# Patient Record
Sex: MALE | Race: OTHER | URBAN - METROPOLITAN AREA
[De-identification: names, ages, dates, MRNs, and addresses within clinical notes are randomized per-mention and may not be internally consistent; named-entity substitution may affect disease eponyms.]

---

## 2019-05-07 ENCOUNTER — OUTPATIENT (OUTPATIENT)
Dept: OUTPATIENT SERVICES | Facility: HOSPITAL | Age: 69
LOS: 1 days | Discharge: HOME | End: 2019-05-07
Payer: MEDICARE

## 2019-05-07 DIAGNOSIS — Z90.89 ACQUIRED ABSENCE OF OTHER ORGANS: Chronic | ICD-10-CM

## 2019-05-07 DIAGNOSIS — Z98.61 CORONARY ANGIOPLASTY STATUS: Chronic | ICD-10-CM

## 2019-05-07 DIAGNOSIS — Z98.890 OTHER SPECIFIED POSTPROCEDURAL STATES: Chronic | ICD-10-CM

## 2019-05-07 DIAGNOSIS — K40.90 UNILATERAL INGUINAL HERNIA, WITHOUT OBSTRUCTION OR GANGRENE, NOT SPECIFIED AS RECURRENT: ICD-10-CM

## 2019-05-07 LAB
ANION GAP SERPL CALC-SCNC: 8 MMOL/L — SIGNIFICANT CHANGE UP (ref 5–17)
BASOPHILS # BLD AUTO: 0.02 K/UL — SIGNIFICANT CHANGE UP (ref 0–0.2)
BASOPHILS NFR BLD AUTO: 0.3 % — SIGNIFICANT CHANGE UP (ref 0–2)
BUN SERPL-MCNC: 21 MG/DL — SIGNIFICANT CHANGE UP (ref 7–23)
CALCIUM SERPL-MCNC: 9 MG/DL — SIGNIFICANT CHANGE UP (ref 8.5–10.1)
CHLORIDE SERPL-SCNC: 108 MMOL/L — SIGNIFICANT CHANGE UP (ref 96–108)
CO2 SERPL-SCNC: 26 MMOL/L — SIGNIFICANT CHANGE UP (ref 22–31)
CREAT SERPL-MCNC: 0.83 MG/DL — SIGNIFICANT CHANGE UP (ref 0.5–1.3)
EOSINOPHIL # BLD AUTO: 0.14 K/UL — SIGNIFICANT CHANGE UP (ref 0–0.5)
EOSINOPHIL NFR BLD AUTO: 2.2 % — SIGNIFICANT CHANGE UP (ref 0–6)
GLUCOSE SERPL-MCNC: 154 MG/DL — HIGH (ref 70–99)
HCT VFR BLD CALC: 43.4 % — SIGNIFICANT CHANGE UP (ref 39–50)
HGB BLD-MCNC: 15.6 G/DL — SIGNIFICANT CHANGE UP (ref 13–17)
IMM GRANULOCYTES NFR BLD AUTO: 0.2 % — SIGNIFICANT CHANGE UP (ref 0–1.5)
LYMPHOCYTES # BLD AUTO: 1.42 K/UL — SIGNIFICANT CHANGE UP (ref 1–3.3)
LYMPHOCYTES # BLD AUTO: 22.2 % — SIGNIFICANT CHANGE UP (ref 13–44)
MCHC RBC-ENTMCNC: 33.1 PG — SIGNIFICANT CHANGE UP (ref 27–34)
MCHC RBC-ENTMCNC: 35.9 GM/DL — SIGNIFICANT CHANGE UP (ref 32–36)
MCV RBC AUTO: 92.1 FL — SIGNIFICANT CHANGE UP (ref 80–100)
MONOCYTES # BLD AUTO: 0.63 K/UL — SIGNIFICANT CHANGE UP (ref 0–0.9)
MONOCYTES NFR BLD AUTO: 9.8 % — SIGNIFICANT CHANGE UP (ref 2–14)
NEUTROPHILS # BLD AUTO: 4.19 K/UL — SIGNIFICANT CHANGE UP (ref 1.8–7.4)
NEUTROPHILS NFR BLD AUTO: 65.3 % — SIGNIFICANT CHANGE UP (ref 43–77)
NRBC # BLD: 0 /100 WBCS — SIGNIFICANT CHANGE UP (ref 0–0)
PLATELET # BLD AUTO: 142 K/UL — LOW (ref 150–400)
POTASSIUM SERPL-MCNC: 3.9 MMOL/L — SIGNIFICANT CHANGE UP (ref 3.5–5.3)
POTASSIUM SERPL-SCNC: 3.9 MMOL/L — SIGNIFICANT CHANGE UP (ref 3.5–5.3)
RBC # BLD: 4.71 M/UL — SIGNIFICANT CHANGE UP (ref 4.2–5.8)
RBC # FLD: 12.8 % — SIGNIFICANT CHANGE UP (ref 10.3–14.5)
SODIUM SERPL-SCNC: 142 MMOL/L — SIGNIFICANT CHANGE UP (ref 135–145)
WBC # BLD: 6.41 K/UL — SIGNIFICANT CHANGE UP (ref 3.8–10.5)
WBC # FLD AUTO: 6.41 K/UL — SIGNIFICANT CHANGE UP (ref 3.8–10.5)

## 2019-05-07 PROCEDURE — 93010 ELECTROCARDIOGRAM REPORT: CPT

## 2019-05-07 NOTE — CHART NOTE - NSCHARTNOTEFT_GEN_A_CORE
Plan  1. Stop all NSAIDS, herbal supplements and vitamins for 7 days.  2. NPO at midnight.  3. Take the following medications ( prilosec ) with small sips of water on the morning of your procedure/surgery.  4. Use EZ sponges as directed  5. Labs, EKG as per surgeon  6. PMD visit for optimization prior to surgery as per surgeon

## 2019-05-07 NOTE — ASU PATIENT PROFILE, ADULT - PSH
H/O craniotomy  for non ruptured aneurysm 2007  History of other surgery  endovascular stent placement for brain Aneurysm at [place] - 2010  History of other surgery  Craniotomy brain aneurysm attempted coiling 2007  brain aneurysm attempted clipping 2007  History of PTCA  2006 x 1 stent  History of tonsillectomy    S/P colonoscopy  last - within 5 yrs H/O craniotomy  for non ruptured aneurysm 2007, attempted clipping  History of other surgery  endovascular stent placement for brain Aneurysm at [place] - 2010  History of other surgery  for brain aneurysm - attempted coiling 2007  History of PTCA  2006 x 1 stent  History of tonsillectomy    S/P colonoscopy  last - within 5 yrs

## 2019-05-08 PROBLEM — I67.1 CEREBRAL ANEURYSM, NONRUPTURED: Chronic | Status: ACTIVE | Noted: 2019-05-07

## 2019-05-13 ENCOUNTER — RESULT REVIEW (OUTPATIENT)
Age: 69
End: 2019-05-13

## 2019-05-13 ENCOUNTER — OUTPATIENT (OUTPATIENT)
Dept: OUTPATIENT SERVICES | Facility: HOSPITAL | Age: 69
LOS: 1 days | Discharge: HOME | End: 2019-05-13
Payer: MEDICARE

## 2019-05-13 VITALS
TEMPERATURE: 97 F | RESPIRATION RATE: 16 BRPM | OXYGEN SATURATION: 98 % | SYSTOLIC BLOOD PRESSURE: 137 MMHG | DIASTOLIC BLOOD PRESSURE: 66 MMHG | HEART RATE: 57 BPM

## 2019-05-13 VITALS
WEIGHT: 185.41 LBS | DIASTOLIC BLOOD PRESSURE: 72 MMHG | HEART RATE: 54 BPM | SYSTOLIC BLOOD PRESSURE: 138 MMHG | OXYGEN SATURATION: 99 % | HEIGHT: 68 IN | TEMPERATURE: 98 F | RESPIRATION RATE: 16 BRPM

## 2019-05-13 DIAGNOSIS — Z98.890 OTHER SPECIFIED POSTPROCEDURAL STATES: Chronic | ICD-10-CM

## 2019-05-13 DIAGNOSIS — K40.90 UNILATERAL INGUINAL HERNIA, WITHOUT OBSTRUCTION OR GANGRENE, NOT SPECIFIED AS RECURRENT: ICD-10-CM

## 2019-05-13 DIAGNOSIS — Z90.89 ACQUIRED ABSENCE OF OTHER ORGANS: Chronic | ICD-10-CM

## 2019-05-13 DIAGNOSIS — Z98.61 CORONARY ANGIOPLASTY STATUS: Chronic | ICD-10-CM

## 2019-05-13 PROCEDURE — 88304 TISSUE EXAM BY PATHOLOGIST: CPT | Mod: 26

## 2019-05-13 RX ORDER — LOSARTAN/HYDROCHLOROTHIAZIDE 100MG-25MG
1 TABLET ORAL
Qty: 0 | Refills: 0 | DISCHARGE

## 2019-05-13 RX ORDER — SODIUM CHLORIDE 9 MG/ML
1000 INJECTION, SOLUTION INTRAVENOUS
Refills: 0 | Status: DISCONTINUED | OUTPATIENT
Start: 2019-05-13 | End: 2019-05-13

## 2019-05-13 RX ORDER — OMEPRAZOLE 10 MG/1
1 CAPSULE, DELAYED RELEASE ORAL
Qty: 0 | Refills: 0 | DISCHARGE

## 2019-05-13 RX ORDER — ICOSAPENT ETHYL 500 MG/1
2 CAPSULE, LIQUID FILLED ORAL
Qty: 0 | Refills: 0 | DISCHARGE

## 2019-05-13 RX ORDER — OXYCODONE HYDROCHLORIDE 5 MG/1
10 TABLET ORAL ONCE
Refills: 0 | Status: DISCONTINUED | OUTPATIENT
Start: 2019-05-13 | End: 2019-05-13

## 2019-05-13 RX ORDER — FENTANYL CITRATE 50 UG/ML
50 INJECTION INTRAVENOUS
Refills: 0 | Status: DISCONTINUED | OUTPATIENT
Start: 2019-05-13 | End: 2019-05-13

## 2019-05-13 RX ORDER — ONDANSETRON 8 MG/1
4 TABLET, FILM COATED ORAL ONCE
Refills: 0 | Status: DISCONTINUED | OUTPATIENT
Start: 2019-05-13 | End: 2019-05-13

## 2019-05-13 RX ORDER — ROSUVASTATIN CALCIUM 5 MG/1
1 TABLET ORAL
Qty: 0 | Refills: 0 | DISCHARGE

## 2019-05-13 NOTE — ASU PATIENT PROFILE, ADULT - PMH
Brain aneurysm  non ruptured - had stent placed in 2010 after attemtped clipping and coiling in 2007  CAD (coronary artery disease)  last stress test Fall 2018  GERD (gastroesophageal reflux disease)    Hearing loss  right  HTN (hypertension)    Hyperlipidemia    Inguinal hernia  right  OA (osteoarthritis)    Psoriasis    Skin cancer  squamous cell - head, shin - removed  Stented coronary artery  2006 PTCA x 1

## 2019-05-13 NOTE — ASU DISCHARGE PLAN (ADULT/PEDIATRIC) - CALL YOUR DOCTOR IF YOU HAVE ANY OF THE FOLLOWING:
please call [phone] for any concerns Pain not relieved by Medications/Bleeding that does not stop/please call [phone] for any concerns

## 2019-05-13 NOTE — ASU PATIENT PROFILE, ADULT - PSH
H/O craniotomy  for non ruptured aneurysm 2007, attempted clipping  History of other surgery  endovascular stent placement for brain Aneurysm at [place] - 2010  History of other surgery  for brain aneurysm - attempted coiling 2007  History of PTCA  2006 x 1 stent  History of tonsillectomy    S/P colonoscopy  last - within 5 yrs

## 2019-05-14 LAB — SURGICAL PATHOLOGY STUDY: SIGNIFICANT CHANGE UP

## 2019-05-16 DIAGNOSIS — I10 ESSENTIAL (PRIMARY) HYPERTENSION: ICD-10-CM

## 2019-05-16 DIAGNOSIS — K40.90 UNILATERAL INGUINAL HERNIA, WITHOUT OBSTRUCTION OR GANGRENE, NOT SPECIFIED AS RECURRENT: ICD-10-CM

## 2019-05-16 DIAGNOSIS — Z79.82 LONG TERM (CURRENT) USE OF ASPIRIN: ICD-10-CM

## 2019-05-16 DIAGNOSIS — D17.6 BENIGN LIPOMATOUS NEOPLASM OF SPERMATIC CORD: ICD-10-CM

## 2019-05-16 DIAGNOSIS — H91.90 UNSPECIFIED HEARING LOSS, UNSPECIFIED EAR: ICD-10-CM

## 2019-05-16 DIAGNOSIS — I25.10 ATHEROSCLEROTIC HEART DISEASE OF NATIVE CORONARY ARTERY WITHOUT ANGINA PECTORIS: ICD-10-CM

## 2019-05-16 DIAGNOSIS — Z98.61 CORONARY ANGIOPLASTY STATUS: ICD-10-CM

## 2019-05-16 DIAGNOSIS — E78.5 HYPERLIPIDEMIA, UNSPECIFIED: ICD-10-CM

## 2019-05-16 DIAGNOSIS — F17.290 NICOTINE DEPENDENCE, OTHER TOBACCO PRODUCT, UNCOMPLICATED: ICD-10-CM

## 2019-05-16 DIAGNOSIS — K21.9 GASTRO-ESOPHAGEAL REFLUX DISEASE WITHOUT ESOPHAGITIS: ICD-10-CM

## 2019-05-16 DIAGNOSIS — Z88.8 ALLERGY STATUS TO OTHER DRUGS, MEDICAMENTS AND BIOLOGICAL SUBSTANCES: ICD-10-CM

## 2021-01-16 ENCOUNTER — INPATIENT (INPATIENT)
Facility: HOSPITAL | Age: 71
LOS: 0 days | Discharge: HOME | DRG: 864 | End: 2021-01-17
Attending: INTERNAL MEDICINE | Admitting: HOSPITALIST
Payer: MEDICARE

## 2021-01-16 VITALS — WEIGHT: 147.05 LBS | HEIGHT: 68 IN

## 2021-01-16 DIAGNOSIS — K86.9 DISEASE OF PANCREAS, UNSPECIFIED: Chronic | ICD-10-CM

## 2021-01-16 DIAGNOSIS — A41.9 SEPSIS, UNSPECIFIED ORGANISM: ICD-10-CM

## 2021-01-16 DIAGNOSIS — Z98.61 CORONARY ANGIOPLASTY STATUS: Chronic | ICD-10-CM

## 2021-01-16 DIAGNOSIS — Z98.890 OTHER SPECIFIED POSTPROCEDURAL STATES: Chronic | ICD-10-CM

## 2021-01-16 DIAGNOSIS — Z90.89 ACQUIRED ABSENCE OF OTHER ORGANS: Chronic | ICD-10-CM

## 2021-01-16 PROBLEM — M19.90 UNSPECIFIED OSTEOARTHRITIS, UNSPECIFIED SITE: Chronic | Status: ACTIVE | Noted: 2019-05-07

## 2021-01-16 PROBLEM — L40.9 PSORIASIS, UNSPECIFIED: Chronic | Status: ACTIVE | Noted: 2019-05-07

## 2021-01-16 PROBLEM — C44.90 UNSPECIFIED MALIGNANT NEOPLASM OF SKIN, UNSPECIFIED: Chronic | Status: ACTIVE | Noted: 2019-05-07

## 2021-01-16 PROBLEM — I25.10 ATHEROSCLEROTIC HEART DISEASE OF NATIVE CORONARY ARTERY WITHOUT ANGINA PECTORIS: Chronic | Status: ACTIVE | Noted: 2019-05-07

## 2021-01-16 PROBLEM — K40.90 UNILATERAL INGUINAL HERNIA, WITHOUT OBSTRUCTION OR GANGRENE, NOT SPECIFIED AS RECURRENT: Chronic | Status: ACTIVE | Noted: 2019-05-07

## 2021-01-16 PROBLEM — E78.5 HYPERLIPIDEMIA, UNSPECIFIED: Chronic | Status: ACTIVE | Noted: 2019-05-07

## 2021-01-16 PROBLEM — K21.9 GASTRO-ESOPHAGEAL REFLUX DISEASE WITHOUT ESOPHAGITIS: Chronic | Status: ACTIVE | Noted: 2019-05-07

## 2021-01-16 PROBLEM — I10 ESSENTIAL (PRIMARY) HYPERTENSION: Chronic | Status: ACTIVE | Noted: 2019-05-07

## 2021-01-16 PROBLEM — H91.90 UNSPECIFIED HEARING LOSS, UNSPECIFIED EAR: Chronic | Status: ACTIVE | Noted: 2019-05-07

## 2021-01-16 PROBLEM — Z95.5 PRESENCE OF CORONARY ANGIOPLASTY IMPLANT AND GRAFT: Chronic | Status: ACTIVE | Noted: 2019-05-07

## 2021-01-16 LAB
ALBUMIN SERPL ELPH-MCNC: 3.3 G/DL — SIGNIFICANT CHANGE UP (ref 3.3–5)
ALP SERPL-CCNC: 189 U/L — HIGH (ref 40–120)
ALT FLD-CCNC: 23 U/L — SIGNIFICANT CHANGE UP (ref 12–78)
ANION GAP SERPL CALC-SCNC: 10 MMOL/L — SIGNIFICANT CHANGE UP (ref 5–17)
APPEARANCE UR: ABNORMAL
APTT BLD: 34.2 SEC — SIGNIFICANT CHANGE UP (ref 27.5–35.5)
AST SERPL-CCNC: 20 U/L — SIGNIFICANT CHANGE UP (ref 15–37)
BACTERIA # UR AUTO: NEGATIVE — SIGNIFICANT CHANGE UP
BASOPHILS # BLD AUTO: 0 K/UL — SIGNIFICANT CHANGE UP (ref 0–0.2)
BASOPHILS NFR BLD AUTO: 0 % — SIGNIFICANT CHANGE UP (ref 0–2)
BILIRUB SERPL-MCNC: 0.7 MG/DL — SIGNIFICANT CHANGE UP (ref 0.2–1.2)
BILIRUB UR-MCNC: NEGATIVE — SIGNIFICANT CHANGE UP
BUN SERPL-MCNC: 12 MG/DL — SIGNIFICANT CHANGE UP (ref 7–23)
CALCIUM SERPL-MCNC: 9.3 MG/DL — SIGNIFICANT CHANGE UP (ref 8.5–10.1)
CHLORIDE SERPL-SCNC: 103 MMOL/L — SIGNIFICANT CHANGE UP (ref 96–108)
CO2 SERPL-SCNC: 25 MMOL/L — SIGNIFICANT CHANGE UP (ref 22–31)
COLOR SPEC: YELLOW — SIGNIFICANT CHANGE UP
COMMENT - URINE: SIGNIFICANT CHANGE UP
CREAT SERPL-MCNC: 0.93 MG/DL — SIGNIFICANT CHANGE UP (ref 0.5–1.3)
DIFF PNL FLD: NEGATIVE — SIGNIFICANT CHANGE UP
EOSINOPHIL # BLD AUTO: 0 K/UL — SIGNIFICANT CHANGE UP (ref 0–0.5)
EOSINOPHIL NFR BLD AUTO: 0 % — SIGNIFICANT CHANGE UP (ref 0–6)
EPI CELLS # UR: NEGATIVE — SIGNIFICANT CHANGE UP
GLUCOSE SERPL-MCNC: 104 MG/DL — HIGH (ref 70–99)
GLUCOSE UR QL: NEGATIVE MG/DL — SIGNIFICANT CHANGE UP
HCT VFR BLD CALC: 36.5 % — LOW (ref 39–50)
HGB BLD-MCNC: 11.7 G/DL — LOW (ref 13–17)
INR BLD: 1.29 RATIO — HIGH (ref 0.88–1.16)
KETONES UR-MCNC: NEGATIVE — SIGNIFICANT CHANGE UP
LACTATE SERPL-SCNC: 3.2 MMOL/L — HIGH (ref 0.7–2)
LACTATE SERPL-SCNC: 3.2 MMOL/L — HIGH (ref 0.7–2)
LACTATE SERPL-SCNC: 3.3 MMOL/L — HIGH (ref 0.7–2)
LEUKOCYTE ESTERASE UR-ACNC: NEGATIVE — SIGNIFICANT CHANGE UP
LYMPHOCYTES # BLD AUTO: 1.17 K/UL — SIGNIFICANT CHANGE UP (ref 1–3.3)
LYMPHOCYTES # BLD AUTO: 15 % — SIGNIFICANT CHANGE UP (ref 13–44)
MCHC RBC-ENTMCNC: 28 PG — SIGNIFICANT CHANGE UP (ref 27–34)
MCHC RBC-ENTMCNC: 32.1 GM/DL — SIGNIFICANT CHANGE UP (ref 32–36)
MCV RBC AUTO: 87.3 FL — SIGNIFICANT CHANGE UP (ref 80–100)
MONOCYTES # BLD AUTO: 1.63 K/UL — HIGH (ref 0–0.9)
MONOCYTES NFR BLD AUTO: 21 % — HIGH (ref 2–14)
MYELOCYTES NFR BLD: 2 % — HIGH (ref 0–0)
NEUTROPHILS # BLD AUTO: 4.82 K/UL — SIGNIFICANT CHANGE UP (ref 1.8–7.4)
NEUTROPHILS NFR BLD AUTO: 59 % — SIGNIFICANT CHANGE UP (ref 43–77)
NEUTS BAND # BLD: 3 % — SIGNIFICANT CHANGE UP (ref 0–8)
NITRITE UR-MCNC: NEGATIVE — SIGNIFICANT CHANGE UP
NRBC # BLD: 0 /100 — SIGNIFICANT CHANGE UP (ref 0–0)
NRBC # BLD: SIGNIFICANT CHANGE UP /100 WBCS (ref 0–0)
PH UR: 5 — SIGNIFICANT CHANGE UP (ref 5–8)
PLAT MORPH BLD: NORMAL — SIGNIFICANT CHANGE UP
PLATELET # BLD AUTO: 113 K/UL — LOW (ref 150–400)
POTASSIUM SERPL-MCNC: 3.4 MMOL/L — LOW (ref 3.5–5.3)
POTASSIUM SERPL-SCNC: 3.4 MMOL/L — LOW (ref 3.5–5.3)
PROT SERPL-MCNC: 7.7 GM/DL — SIGNIFICANT CHANGE UP (ref 6–8.3)
PROT UR-MCNC: 15 MG/DL
PROTHROM AB SERPL-ACNC: 14.8 SEC — HIGH (ref 10.6–13.6)
RAPID RVP RESULT: SIGNIFICANT CHANGE UP
RBC # BLD: 4.18 M/UL — LOW (ref 4.2–5.8)
RBC # FLD: 18.6 % — HIGH (ref 10.3–14.5)
RBC BLD AUTO: NORMAL — SIGNIFICANT CHANGE UP
RBC CASTS # UR COMP ASSIST: NEGATIVE /HPF — SIGNIFICANT CHANGE UP (ref 0–4)
SARS-COV-2 RNA SPEC QL NAA+PROBE: SIGNIFICANT CHANGE UP
SODIUM SERPL-SCNC: 138 MMOL/L — SIGNIFICANT CHANGE UP (ref 135–145)
SP GR SPEC: 1.01 — SIGNIFICANT CHANGE UP (ref 1.01–1.02)
UROBILINOGEN FLD QL: NEGATIVE MG/DL — SIGNIFICANT CHANGE UP
WBC # BLD: 7.78 K/UL — SIGNIFICANT CHANGE UP (ref 3.8–10.5)
WBC # FLD AUTO: 7.78 K/UL — SIGNIFICANT CHANGE UP (ref 3.8–10.5)
WBC UR QL: SIGNIFICANT CHANGE UP

## 2021-01-16 PROCEDURE — 93010 ELECTROCARDIOGRAM REPORT: CPT

## 2021-01-16 PROCEDURE — 71045 X-RAY EXAM CHEST 1 VIEW: CPT | Mod: 26

## 2021-01-16 PROCEDURE — 83036 HEMOGLOBIN GLYCOSYLATED A1C: CPT

## 2021-01-16 PROCEDURE — 82962 GLUCOSE BLOOD TEST: CPT

## 2021-01-16 PROCEDURE — 83605 ASSAY OF LACTIC ACID: CPT | Mod: 59

## 2021-01-16 PROCEDURE — 86769 SARS-COV-2 COVID-19 ANTIBODY: CPT

## 2021-01-16 PROCEDURE — 86803 HEPATITIS C AB TEST: CPT

## 2021-01-16 PROCEDURE — 99223 1ST HOSP IP/OBS HIGH 75: CPT | Mod: AI

## 2021-01-16 PROCEDURE — 36415 COLL VENOUS BLD VENIPUNCTURE: CPT

## 2021-01-16 PROCEDURE — 81001 URINALYSIS AUTO W/SCOPE: CPT

## 2021-01-16 PROCEDURE — 87086 URINE CULTURE/COLONY COUNT: CPT

## 2021-01-16 RX ORDER — PIPERACILLIN AND TAZOBACTAM 4; .5 G/20ML; G/20ML
3.38 INJECTION, POWDER, LYOPHILIZED, FOR SOLUTION INTRAVENOUS EVERY 8 HOURS
Refills: 0 | Status: DISCONTINUED | OUTPATIENT
Start: 2021-01-16 | End: 2021-01-17

## 2021-01-16 RX ORDER — SODIUM CHLORIDE 9 MG/ML
2100 INJECTION INTRAMUSCULAR; INTRAVENOUS; SUBCUTANEOUS ONCE
Refills: 0 | Status: COMPLETED | OUTPATIENT
Start: 2021-01-16 | End: 2021-01-16

## 2021-01-16 RX ORDER — DEXTROSE 50 % IN WATER 50 %
15 SYRINGE (ML) INTRAVENOUS ONCE
Refills: 0 | Status: DISCONTINUED | OUTPATIENT
Start: 2021-01-16 | End: 2021-01-17

## 2021-01-16 RX ORDER — DRONABINOL 2.5 MG
2.5 CAPSULE ORAL
Refills: 0 | Status: DISCONTINUED | OUTPATIENT
Start: 2021-01-16 | End: 2021-01-17

## 2021-01-16 RX ORDER — ATORVASTATIN CALCIUM 80 MG/1
20 TABLET, FILM COATED ORAL AT BEDTIME
Refills: 0 | Status: DISCONTINUED | OUTPATIENT
Start: 2021-01-16 | End: 2021-01-17

## 2021-01-16 RX ORDER — INSULIN LISPRO 100/ML
VIAL (ML) SUBCUTANEOUS
Refills: 0 | Status: DISCONTINUED | OUTPATIENT
Start: 2021-01-16 | End: 2021-01-17

## 2021-01-16 RX ORDER — LIPASE/PROTEASE/AMYLASE 16-48-48K
2 CAPSULE,DELAYED RELEASE (ENTERIC COATED) ORAL
Refills: 0 | Status: DISCONTINUED | OUTPATIENT
Start: 2021-01-16 | End: 2021-01-17

## 2021-01-16 RX ORDER — PANTOPRAZOLE SODIUM 20 MG/1
40 TABLET, DELAYED RELEASE ORAL
Refills: 0 | Status: DISCONTINUED | OUTPATIENT
Start: 2021-01-16 | End: 2021-01-17

## 2021-01-16 RX ORDER — GLUCAGON INJECTION, SOLUTION 0.5 MG/.1ML
1 INJECTION, SOLUTION SUBCUTANEOUS ONCE
Refills: 0 | Status: DISCONTINUED | OUTPATIENT
Start: 2021-01-16 | End: 2021-01-17

## 2021-01-16 RX ORDER — POTASSIUM CHLORIDE 20 MEQ
40 PACKET (EA) ORAL
Refills: 0 | Status: DISCONTINUED | OUTPATIENT
Start: 2021-01-16 | End: 2021-01-17

## 2021-01-16 RX ORDER — ONDANSETRON 8 MG/1
4 TABLET, FILM COATED ORAL EVERY 6 HOURS
Refills: 0 | Status: DISCONTINUED | OUTPATIENT
Start: 2021-01-16 | End: 2021-01-17

## 2021-01-16 RX ORDER — SODIUM CHLORIDE 9 MG/ML
1000 INJECTION, SOLUTION INTRAVENOUS
Refills: 0 | Status: DISCONTINUED | OUTPATIENT
Start: 2021-01-16 | End: 2021-01-17

## 2021-01-16 RX ORDER — DEXTROSE 50 % IN WATER 50 %
12.5 SYRINGE (ML) INTRAVENOUS ONCE
Refills: 0 | Status: DISCONTINUED | OUTPATIENT
Start: 2021-01-16 | End: 2021-01-17

## 2021-01-16 RX ORDER — INSULIN LISPRO 100/ML
VIAL (ML) SUBCUTANEOUS AT BEDTIME
Refills: 0 | Status: DISCONTINUED | OUTPATIENT
Start: 2021-01-16 | End: 2021-01-17

## 2021-01-16 RX ORDER — DEXTROSE 50 % IN WATER 50 %
25 SYRINGE (ML) INTRAVENOUS ONCE
Refills: 0 | Status: DISCONTINUED | OUTPATIENT
Start: 2021-01-16 | End: 2021-01-17

## 2021-01-16 RX ORDER — MAGNESIUM SULFATE 500 MG/ML
2 VIAL (ML) INJECTION ONCE
Refills: 0 | Status: COMPLETED | OUTPATIENT
Start: 2021-01-16 | End: 2021-01-16

## 2021-01-16 RX ORDER — INSULIN GLARGINE 100 [IU]/ML
10 INJECTION, SOLUTION SUBCUTANEOUS AT BEDTIME
Refills: 0 | Status: DISCONTINUED | OUTPATIENT
Start: 2021-01-16 | End: 2021-01-17

## 2021-01-16 RX ORDER — POTASSIUM CHLORIDE 20 MEQ
40 PACKET (EA) ORAL
Refills: 0 | Status: DISCONTINUED | OUTPATIENT
Start: 2021-01-16 | End: 2021-01-16

## 2021-01-16 RX ORDER — ENOXAPARIN SODIUM 100 MG/ML
40 INJECTION SUBCUTANEOUS DAILY
Refills: 0 | Status: DISCONTINUED | OUTPATIENT
Start: 2021-01-16 | End: 2021-01-17

## 2021-01-16 RX ORDER — PIPERACILLIN AND TAZOBACTAM 4; .5 G/20ML; G/20ML
3.38 INJECTION, POWDER, LYOPHILIZED, FOR SOLUTION INTRAVENOUS ONCE
Refills: 0 | Status: COMPLETED | OUTPATIENT
Start: 2021-01-16 | End: 2021-01-16

## 2021-01-16 RX ORDER — FAMOTIDINE 10 MG/ML
40 INJECTION INTRAVENOUS AT BEDTIME
Refills: 0 | Status: DISCONTINUED | OUTPATIENT
Start: 2021-01-16 | End: 2021-01-17

## 2021-01-16 RX ADMIN — PIPERACILLIN AND TAZOBACTAM 200 GRAM(S): 4; .5 INJECTION, POWDER, LYOPHILIZED, FOR SOLUTION INTRAVENOUS at 16:01

## 2021-01-16 RX ADMIN — SODIUM CHLORIDE 2100 MILLILITER(S): 9 INJECTION INTRAMUSCULAR; INTRAVENOUS; SUBCUTANEOUS at 17:00

## 2021-01-16 RX ADMIN — SODIUM CHLORIDE 2100 MILLILITER(S): 9 INJECTION INTRAMUSCULAR; INTRAVENOUS; SUBCUTANEOUS at 15:10

## 2021-01-16 RX ADMIN — PIPERACILLIN AND TAZOBACTAM 3.38 GRAM(S): 4; .5 INJECTION, POWDER, LYOPHILIZED, FOR SOLUTION INTRAVENOUS at 17:00

## 2021-01-16 NOTE — ED ADULT NURSE NOTE - PMH
Brain aneurysm  non ruptured - had stent placed in 2010 after attemtped clipping and coiling in 2007  CAD (coronary artery disease)  last stress test Fall 2018  GERD (gastroesophageal reflux disease)    Hearing loss  right  HTN (hypertension)    Hyperlipidemia    Inguinal hernia  right  OA (osteoarthritis)    Pancreatic cancer  s/p Whipple procedure  Psoriasis    Skin cancer  squamous cell - head, shin - removed  Stented coronary artery  2006 PTCA x 1

## 2021-01-16 NOTE — H&P ADULT - HISTORY OF PRESENT ILLNESS
69 yo M with a PMH pancreatic cancer (s/p Whipple, on chemotherapy), CAD s/p stents, brain aneurysm s/p embolization, HTN, HLD, and GERD who presents with fever and palpitations.    In the ED, he was given Zosyn 3.375 g IV x1 and NS 2.1 L x1. 69 yo M with a [facility] pancreatic cancer (s/p Whipple, on chemotherapy), medication-induced DM, CAD s/p stents, brain aneurysm s/p embolization, HTN, HLD, and GERD who presents with fever. He had been feeling at baseline until this morning, when he woke up and felt very fatigued and with malaise. He had a fever to 102F (he notes he had not had a fever in a long time, including during any prior chemotherapy treatments). He denies any specific symptoms other than some slight rhinorrhea for the past 10 days. He denies recent travel or sick contacts. He lives with his wife who goes out to do errands. His last chemotherapy was 1.5 weeks ago and he is scheduled for his next dose in 3 days.  ROS is notable for chronic intermittent soft stools without recent changes, and intermittent nausea/vomiting.    His laparoscopic Whipple was in September 2020 shortly after he was diagnosed. 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 He began chemotherapy around Thanksgiving.    In the ED, he was given Zosyn 3.375 g IV x1 and NS 2.1 L x1.

## 2021-01-16 NOTE — ED ADULT NURSE NOTE - OBJECTIVE STATEMENT
pt c/o fever, fatigue, body aches and weakness starting this morning. fever up to 102. pt has pancreatic ca, last chemo treatment 1/5. pt has port (not accessed) on right chest. pt denies SOB, CP, palpitations, n/v/d. PT to ed from home for c/o fatigue and fever since this morning. pt ambulatory without assistance from home, lives with wife.

## 2021-01-16 NOTE — ED PROVIDER NOTE - OBJECTIVE STATEMENT
71 y/o M with PMHx of skin CA, pancreatic CA s/p Whipple on 4th round of chemo therapy follows at [facility], CAD s/p coronary artery stent placement, non-ruptured brain aneurysm s/p craniotomy for attempted clipping s/p stent placement, HTN, HLD, and GERD presents to the ED c/o +palpitations with associated +fever. Thought it was dehydration so came to the ED but unsure why he has a fever. Also notes +urinary frequency and +soft stools last night. NKDA. PCP: Dr. Ariel Amaro.

## 2021-01-16 NOTE — H&P ADULT - NSHPLABSRESULTS_GEN_ALL_CORE
Labs personally reviewed and interpreted. Notable for no leukocytosis (WBC 7.78), left shift, or lymphopenia but with 21% monocytosis. Hb 11.7 (was 15 in 2019), plt low at 113, INR 1.29, Na 138, K slightly low at 3.4, Cl 103, HCO3 25, BUN/creatinine 12/0.93, , calcium 9.3 with albumin 3.3, alk phos elevated at 189, AST/ALT normal at 20/23. Lactate elevated at 3.3 x1, then 3.2 x1, then 3.2 x1.  COVID-19 PCR/RVP negative.    CXR personally reviewed and interpreted. Notable for clear lungs, no focal consolidations, effusions, interstitial markings, pneumothorax, or obvious cardiomegaly.    EKG ______________ Labs personally reviewed and interpreted. Notable for no leukocytosis (WBC 7.78), left shift, or lymphopenia but with 21% monocytosis. Hb 11.7 (was 15 in 2019), plt low at 113, INR 1.29, Na 138, K slightly low at 3.4, Cl 103, HCO3 25, BUN/creatinine 12/0.93, , calcium 9.3 with albumin 3.3, alk phos elevated at 189, AST/ALT normal at 20/23. Lactate elevated at 3.3 x1, then 3.2 x1, then 3.2 x1.  COVID-19 PCR/RVP negative.    CXR personally reviewed and interpreted. Notable for clear lungs, no focal consolidations, effusions, interstitial markings, pneumothorax, or obvious cardiomegaly.    EKG personally reviewed. Normal sinus rhythm, new left axis deviation. New TWIs in leads III and aVF. No pathological Q waves or ST changes. Rate 88, , QTc 433.

## 2021-01-16 NOTE — ED ADULT NURSE REASSESSMENT NOTE - NS ED NURSE REASSESS COMMENT FT1
received pt from ST at 1542, sepsis VS to be obtained at this time. blood and BC draw PTA to main 8 by MARIANA Castelan.

## 2021-01-16 NOTE — H&P ADULT - NSHPREVIEWOFSYSTEMS_GEN_ALL_CORE
Gen: + fevers, weight loss, malaise, fatigue  Eyes: no blurred vision or lacrimation  ENT: no tinnitus, vertigo, or decreased hearing  Resp: no wheezing, dyspnea, pleuritic chest pain, hemoptysis, or orthopnea  CV: no chest pain, dyspnea on exertion, or palpitations  GI: + intermittent nausea, vomiting, soft stools. No abdominal pain, constipation, melena, or hematochezia  : no dysuria, hematuria, discharge, or incontinence  MSK: no arthralgias, joint swelling, or myalgias  Neuro: no focal deficits, confusion, dizziness, tremors, or seizures  Skin: no rash, lesions, or edema

## 2021-01-16 NOTE — H&P ADULT - ASSESSMENT
71 yo M with a PMH pancreatic cancer (s/p Whipple, on chemotherapy), CAD s/p stents, brain aneurysm s/p embolization, HTN, HLD, and GERD who presents with fever and palpitations. 69 yo M with a PMH pancreatic cancer (s/p Whipple, on chemotherapy), medication-induced DM, CAD s/p stents, brain aneurysm s/p embolization, HTN, HLD, and GERD who presents with fever.    1) Fever  - Unknown etiology  - Elevated temperature but not febrile in ED  - No obvious source, no abdominal pain, CXR normal, UA normal, no cardiac murmurs, no cellulitis  - Suspect malignancy vs chemotherapy related  - However, would continue antibiotics (Zosyn) for now, with ID consult  - No prior cx seen, no known history of resistant organisms  - COVID-19 PCR negative, RVP negative  - F/u blood and urine cx    2) Pancreatic cancer  - S/p laparoscopic Whipple, September 2020  - Oncology f/u outpatient, plan for next chemotherapy Tuesday 1/19  - Anti-nausea medications  - Dronabinol 2.5 mg BID    3) CAD  - C/w statin    4) GERD  - C/w Famotidine QHS    5) Drug-induced diabetes mellitus  - Supposedly since malignancy, due to ?chemo  - C/w Lantus 10 units QHS  - Check FS with low dose SSI QAC + HS  - Check A1C    6) Prophylactic measure  - DVT PPX: IMPROVE score of 3, will give Lovenox 40 mg SQ QD  - Diet: consistent carb/DASH  - Dispo: pending improvement in sxs, ID consult 71 yo M with a PMH pancreatic cancer (s/p Whipple, on chemotherapy), medication-induced DM, CAD s/p stents, brain aneurysm s/p embolization, HTN, HLD, and GERD who presents with fever.    1) Fever  - Unknown etiology  - Elevated temperature but not febrile in ED  - No obvious source, no abdominal pain, CXR normal, UA normal, no cardiac murmurs, no cellulitis  - Suspect malignancy vs chemotherapy related  - However, would continue antibiotics (Zosyn) for now, with ID consult  - No prior cx seen, no known history of resistant organisms  - COVID-19 PCR negative, RVP negative  - F/u blood and urine cx    2) Lactic acidosis  - Possibly due to infection??  - Patient is very hemodynamically stable, low suspicion for poor perfusion  - May have underlying hepatic abnormality affecting clearance but less likely given no other lab abnormalities  - May have lag time with lactate, will repeat  - Consider more IVFs if dehydrated    3) Pancreatic cancer  - S/p laparoscopic Whipple, September 2020  - Oncology f/u outpatient, plan for next chemotherapy Tuesday 1/19  - Anti-nausea medications  - Dronabinol 2.5 mg BID  - Pancreatic enzymes TID    4) CAD  - C/w statin    5) GERD  - C/w Famotidine QHS    6) Drug-induced diabetes mellitus  - Supposedly since malignancy, due to ?chemo  - C/w Lantus 10 units QHS  - Check FS with low dose SSI QAC + HS  - Check A1C    7) Hypokalemia  - Mild, 3.4  - On potassium supplements at home, will continue  - Give Mg and check K and Mg in AM    8) Prophylactic measure  - DVT PPX: IMPROVE score of 3, will give Lovenox 40 mg SQ QD  - Diet: consistent carb/DASH  - Dispo: pending improvement in sxs, ID consult

## 2021-01-16 NOTE — ED PROVIDER NOTE - PSH
H/O craniotomy  for non ruptured aneurysm 2007, attempted clipping  History of other surgery  endovascular stent placement for brain Aneurysm at [place] - 2010  History of other surgery  for brain aneurysm - attempted coiling 2007  History of PTCA  2006 x 1 stent  History of tonsillectomy    S/P colonoscopy  last - within 5 yrs

## 2021-01-16 NOTE — H&P ADULT - NSICDXPASTSURGICALHX_GEN_ALL_CORE_FT
PAST SURGICAL HISTORY:  H/O craniotomy for non ruptured aneurysm 2007, attempted clipping    H/O inguinal hernia repair     History of other surgery for brain aneurysm - attempted coiling 2007    History of other surgery endovascular stent placement for brain Aneurysm at [place] - 2010    History of PTCA 2006 x 1 stent    History of tonsillectomy     Pancreatic disorder Whipple for cancer    S/P colonoscopy last - within 5 yrs

## 2021-01-16 NOTE — ED ADULT NURSE NOTE - NSIMPLEMENTINTERV_GEN_ALL_ED
Implemented All Universal Safety Interventions:  [place] to call system. Call bell, personal items and telephone within reach. Instruct patient to call for assistance. Room bathroom lighting operational. Non-slip footwear when patient is off stretcher. Physically safe environment: no spills, clutter or unnecessary equipment. Stretcher in lowest position, wheels locked, appropriate side rails in place.

## 2021-01-16 NOTE — H&P ADULT - NSHPPHYSICALEXAM_GEN_ALL_CORE
Vital Signs Last 24 Hrs  T(C): 37 (16 Jan 2021 18:30), Max: 37.8 (16 Jan 2021 14:33)  T(F): 98.6 (16 Jan 2021 18:30), Max: 100.1 (16 Jan 2021 14:33)  HR: 84 (16 Jan 2021 18:30) (79 - 110)  BP: 125/76 (16 Jan 2021 18:30) (117/69 - 134/106)  BP(mean): 86 (16 Jan 2021 18:30) (79 - 86)  RR: 18 (16 Jan 2021 18:30) (16 - 20)  SpO2: 98% (16 Jan 2021 18:30) (96% - 99%) Vital Signs Last 24 Hrs  T(C): 37 (16 Jan 2021 18:30), Max: 37.8 (16 Jan 2021 14:33)  T(F): 98.6 (16 Jan 2021 18:30), Max: 100.1 (16 Jan 2021 14:33)  HR: 84 (16 Jan 2021 18:30) (79 - 110)  BP: 125/76 (16 Jan 2021 18:30) (117/69 - 134/106)  BP(mean): 86 (16 Jan 2021 18:30) (79 - 86)  RR: 18 (16 Jan 2021 18:30) (16 - 20)  SpO2: 98% (16 Jan 2021 18:30) (96% - 99%)    GENERAL: No acute distress  HEENT: PERRL, EOMI, MM dry, no oropharyngeal lesions  NECK: supple, no stiffness, no JVD, no thyromegaly  PULM: respirations non-labored, clear to auscultation bilaterally, no rales, rhonchi, or wheezes  CV: regular rate and rhythm, no murmurs, gallops, or rubs  GI: abdomen soft, nontender, nondistended, no masses felt, normal bowel sounds  MSK: strength 5/5 bilateral upper/lower extremities. No joint swelling, erythema, or warmth. Chest chemo-port nontender  LYMPH: no anterior cervical, posterior cervical, supraclavicular, or inguinal lymphadenopathy  NEURO: A&Ox3, no tremors, sensation intact  SKIN: no rashes, lesions, or edema

## 2021-01-16 NOTE — H&P ADULT - NSHPSOCIALHISTORY_GEN_ALL_CORE
Former smoker, 1/2 ppd x ~ 5 years stopped in 1970s.  Drank 2 glasses of alcohol per day until September 2020.  Lives with his wife.  Ambulates without assistance.  Retired .

## 2021-01-16 NOTE — ED ADULT TRIAGE NOTE - CHIEF COMPLAINT QUOTE
PT to ed from home for c/o fatigue and fever since this morning. Reports fever of 102.0, took Tylenol @1330.  PT a&ox4. Denies SOB and chest pain. Reports being on chemo therapy. Wife -[phone] Partial Purse String (Intermediate) Text: Given the location of the defect and the characteristics of the surrounding skin an intermediate purse string closure was deemed most appropriate.  Undermining was performed circumfirentially around the surgical defect.  A purse string suture was then placed and tightened. Wound tension only allowed a partial closure of the circular defect.

## 2021-01-16 NOTE — ED ADULT NURSE NOTE - CHIEF COMPLAINT QUOTE
PT to ed from home for c/o fatigue and fever since this morning. Reports fever of 102.0, took Tylenol @1330.  PT a&ox4. Denies SOB and chest pain. Reports being on chemo therapy. Wife -[phone]

## 2021-01-16 NOTE — H&P ADULT - NSICDXPASTMEDICALHX_GEN_ALL_CORE_FT
PAST MEDICAL HISTORY:  Brain aneurysm non ruptured - had stent placed in 2010 after attemtped clipping and coiling in 2007    CAD (coronary artery disease) last stress test Fall 2018    GERD (gastroesophageal reflux disease)     Hearing loss right    HTN (hypertension)     Hyperlipidemia     Inguinal hernia right    OA (osteoarthritis)     Pancreatic cancer s/p Whipple procedure    Psoriasis     Skin cancer squamous cell - head, shin - removed    Stented coronary artery 2006 PTCA x 1     PAST MEDICAL HISTORY:  Brain aneurysm non ruptured - had stent placed in 2010 after attemtped clipping and coiling in 2007    CAD (coronary artery disease) last stress test Fall 2018    Drug or chemical induced diabetes mellitus     GERD (gastroesophageal reflux disease)     Hearing loss right    HTN (hypertension)     Hyperlipidemia     Inguinal hernia right    OA (osteoarthritis)     Pancreatic cancer s/p Whipple procedure    Psoriasis     Skin cancer squamous cell - head, shin - removed    Stented coronary artery 2006 PTCA x 1

## 2021-01-16 NOTE — ED STATDOCS - PROGRESS NOTE DETAILS
Scribe Jaclyn Casale for attending Dr Bustillos: 69 y/o male with pmhx of brain aneurysm, inguinal hernia, psoriasis, OA, skin cancer on chemo therapy being treated at [facility] , HERD, HLD, HTN, CAD, stented coronary artery, hearing loss presents to the ED c/o fatigue and fever tmax 102. Pt is experiencing decreased appetite, nausea. pt took Tylenol at 1330. Denies SOB, chest pain. will send to the main ED for further eval.

## 2021-01-16 NOTE — ED STATDOCS - PSH
H/O craniotomy  for non ruptured aneurysm 2007, attempted clipping  History of other surgery  endovascular stent placement for brain Aneurysm at [place] - 2010  History of other surgery  for brain aneurysm - attempted coiling 2007  History of PTCA  2006 x 1 stent  History of tonsillectomy    S/P colonoscopy  last - within 5 yrs

## 2021-01-17 ENCOUNTER — TRANSCRIPTION ENCOUNTER (OUTPATIENT)
Age: 71
End: 2021-01-17

## 2021-01-17 VITALS — WEIGHT: 148.59 LBS | HEIGHT: 68 IN

## 2021-01-17 LAB
A1C WITH ESTIMATED AVERAGE GLUCOSE RESULT: 5.4 % — SIGNIFICANT CHANGE UP (ref 4–5.6)
CULTURE RESULTS: NO GROWTH — SIGNIFICANT CHANGE UP
ESTIMATED AVERAGE GLUCOSE: 108 MG/DL — SIGNIFICANT CHANGE UP (ref 68–114)
HCV AB S/CO SERPL IA: 0.06 S/CO — SIGNIFICANT CHANGE UP (ref 0–0.99)
HCV AB SERPL-IMP: SIGNIFICANT CHANGE UP
LACTATE SERPL-SCNC: 2 MMOL/L — SIGNIFICANT CHANGE UP (ref 0.7–2)
SARS-COV-2 IGG SERPL QL IA: NEGATIVE — SIGNIFICANT CHANGE UP
SARS-COV-2 IGM SERPL IA-ACNC: 0.06 INDEX — SIGNIFICANT CHANGE UP
SPECIMEN SOURCE: SIGNIFICANT CHANGE UP

## 2021-01-17 PROCEDURE — 99239 HOSP IP/OBS DSCHRG MGMT >30: CPT

## 2021-01-17 RX ORDER — ONDANSETRON 8 MG/1
4 TABLET, FILM COATED ORAL EVERY 6 HOURS
Refills: 0 | Status: DISCONTINUED | OUTPATIENT
Start: 2021-01-17 | End: 2021-01-17

## 2021-01-17 RX ORDER — CEFUROXIME AXETIL 250 MG
1 TABLET ORAL
Qty: 8 | Refills: 0
Start: 2021-01-17 | End: 2021-01-20

## 2021-01-17 RX ORDER — LIPASE/PROTEASE/AMYLASE 16-48-48K
2 CAPSULE,DELAYED RELEASE (ENTERIC COATED) ORAL
Refills: 0 | Status: DISCONTINUED | OUTPATIENT
Start: 2021-01-17 | End: 2021-01-17

## 2021-01-17 RX ADMIN — Medication 2.5 MILLIGRAM(S): at 14:01

## 2021-01-17 RX ADMIN — INSULIN GLARGINE 10 UNIT(S): 100 INJECTION, SOLUTION SUBCUTANEOUS at 01:55

## 2021-01-17 RX ADMIN — Medication 0: at 01:55

## 2021-01-17 RX ADMIN — PIPERACILLIN AND TAZOBACTAM 25 GRAM(S): 4; .5 INJECTION, POWDER, LYOPHILIZED, FOR SOLUTION INTRAVENOUS at 14:03

## 2021-01-17 RX ADMIN — Medication 40 MILLIEQUIVALENT(S): at 00:13

## 2021-01-17 RX ADMIN — Medication 50 GRAM(S): at 00:13

## 2021-01-17 RX ADMIN — ENOXAPARIN SODIUM 40 MILLIGRAM(S): 100 INJECTION SUBCUTANEOUS at 09:58

## 2021-01-17 RX ADMIN — PIPERACILLIN AND TAZOBACTAM 25 GRAM(S): 4; .5 INJECTION, POWDER, LYOPHILIZED, FOR SOLUTION INTRAVENOUS at 05:54

## 2021-01-17 RX ADMIN — PANTOPRAZOLE SODIUM 40 MILLIGRAM(S): 20 TABLET, DELAYED RELEASE ORAL at 09:58

## 2021-01-17 RX ADMIN — PIPERACILLIN AND TAZOBACTAM 25 GRAM(S): 4; .5 INJECTION, POWDER, LYOPHILIZED, FOR SOLUTION INTRAVENOUS at 01:56

## 2021-01-17 RX ADMIN — Medication 40 MILLIEQUIVALENT(S): at 09:58

## 2021-01-17 RX ADMIN — Medication 2 CAPSULE(S): at 12:19

## 2021-01-17 RX ADMIN — Medication 2 CAPSULE(S): at 09:57

## 2021-01-17 NOTE — DISCHARGE NOTE PROVIDER - CARE PROVIDER_API CALL
Follow up with pcp and oncologist with in  1 week,   Phone: (   )    -  Fax: (   )    -  Follow Up Time:

## 2021-01-17 NOTE — DISCHARGE NOTE PROVIDER - HOSPITAL COURSE
Reason for Admission: fever  History of Present Illness:   69 yo M with a PMH pancreatic cancer (s/p Whipple, on chemotherapy), medication-induced DM, CAD s/p stents, brain aneurysm s/p embolization, HTN, HLD, and GERD who presents with fever. He had been feeling at baseline until this morning, when he woke up and felt very fatigued and with malaise. He had a fever to 102F (he notes he had not had a fever in a long time, including during any prior chemotherapy treatments). He denies any specific symptoms other than some slight rhinorrhea for the past 10 days. He denies recent travel or sick contacts. He lives with his wife who goes out to do errands. His last chemotherapy was 1.5 weeks ago and he is scheduled for his next dose in 3 days.  ROS is notable for chronic intermittent soft stools without recent changes, and intermittent nausea/vomiting.    His laparoscopic Whipple was in September 2020 shortly after he was diagnosed. 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 He began chemotherapy around Thanksgiving.    In the ED, he was given Zosyn 3.375 g IV x1 and NS 2.1 L x1.    Hospital Course:  Pt seen at bedside, feeling well, denies fever, chills, n, v, abd pain, chest pain, SOB, nausea, vomiting or diarrhea.  Pt denies any urinary complaints. Pt feels well and eager to go home.   Infectious work up negative, but was given empiric zosyn while hospitalized.    REVIEW OF SYSTEMS: All other review of systems is negative unless indicated above.    Vital Signs Last 24 Hrs  T(C): 36.7 (17 Jan 2021 09:26), Max: 37.8 (16 Jan 2021 14:33)  T(F): 98.1 (17 Jan 2021 09:26), Max: 100.1 (16 Jan 2021 14:33)  HR: 69 (17 Jan 2021 09:26) (69 - 110)  BP: 138/73 (17 Jan 2021 09:26) (117/69 - 138/73)  BP(mean): 86 (16 Jan 2021 18:30) (79 - 86)  RR: 17 (17 Jan 2021 09:26) (16 - 20)  SpO2: 95% (17 Jan 2021 09:26) (95% - 99%)    PHYSICAL EXAM:    Constitutional: NAD, awake and alert, well-developed  HEENT: PERR, EOMI, Normal Hearing, MMM  Neck: Soft and supple  Respiratory: Breath sounds are clear bilaterally, No wheezing, rales or rhonchi  Cardiovascular: S1 and S2, regular rate and rhythm, no Murmurs, gallops or rubs  Gastrointestinal: Bowel Sounds present, soft, nontender, nondistended, no guarding, no rebound  Extremities: No peripheral edema  Neurological: A/O x 3, no focal deficits in my limited exam    med/labs: Reviewed and interpreted     Assessment and Plan:  69 yo M with a PMH pancreatic cancer (s/p Whipple, on chemotherapy), medication-induced DM, CAD s/p stents, brain aneurysm s/p embolization, HTN, HLD, and GERD who presents with fever.    1) Fever:  Resolved, unknown etiology.  Infectious work up negative.  Pt with out symptoms.  Urine culture and blood cultures pending.   - However, would continue antibiotics (Zosyn) for now, with ID consult  - No prior cx seen, no known history of resistant organisms  - COVID-19 PCR negative, RVP negative    2) Lactic acidosis / Dehydration: RESOLVED s/p IVFs      3) Pancreatic cancer  - S/p laparoscopic Whipple, September 2020  - Oncology f/u outpatient, plan for next chemotherapy Tuesday 1/19  - Anti-nausea medications  - Dronabinol 2.5 mg BID  - Pancreatic enzymes TID    4) CAD  - C/w statin    5) GERD  - C/w Famotidine QHS    6) Drug-induced diabetes mellitus  - Supposedly since malignancy, due to ?chemo  - C/w Lantus 10 units QHS  - Check FS with low dose SSI QAC + HS        Discharge home with out patient follow up.   Reason for Admission: fever  History of Present Illness:   71 yo M with a PMH pancreatic cancer (s/p Whipple, on chemotherapy), medication-induced DM, CAD s/p stents, brain aneurysm s/p embolization, HTN, HLD, and GERD who presents with fever. He had been feeling at baseline until this morning, when he woke up and felt very fatigued and with malaise. He had a fever to 102F (he notes he had not had a fever in a long time, including during any prior chemotherapy treatments). He denies any specific symptoms other than some slight rhinorrhea for the past 10 days. He denies recent travel or sick contacts. He lives with his wife who goes out to do errands. His last chemotherapy was 1.5 weeks ago and he is scheduled for his next dose in 3 days.  ROS is notable for chronic intermittent soft stools without recent changes, and intermittent nausea/vomiting.    His laparoscopic Whipple was in September 2020 shortly after he was diagnosed. 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 He began chemotherapy around Thanksgiving.    In the ED, he was given Zosyn 3.375 g IV x1 and NS 2.1 L x1.    Hospital Course:  Pt seen at bedside, feeling well, denies fever, chills, n, v, abd pain, chest pain, SOB, nausea, vomiting or diarrhea.  Pt denies any urinary complaints. Pt feels well and eager to go home.   Infectious work up negative, but was given empiric zosyn while hospitalized.  Will sent to complete 5 days empiric antibiotics.    REVIEW OF SYSTEMS: All other review of systems is negative unless indicated above.    Vital Signs Last 24 Hrs  T(C): 36.7 (17 Jan 2021 09:26), Max: 37.8 (16 Jan 2021 14:33)  T(F): 98.1 (17 Jan 2021 09:26), Max: 100.1 (16 Jan 2021 14:33)  HR: 69 (17 Jan 2021 09:26) (69 - 110)  BP: 138/73 (17 Jan 2021 09:26) (117/69 - 138/73)  BP(mean): 86 (16 Jan 2021 18:30) (79 - 86)  RR: 17 (17 Jan 2021 09:26) (16 - 20)  SpO2: 95% (17 Jan 2021 09:26) (95% - 99%)    PHYSICAL EXAM:    Constitutional: NAD, awake and alert, well-developed  HEENT: PERR, EOMI, Normal Hearing, MMM  Neck: Soft and supple  Respiratory: Breath sounds are clear bilaterally, No wheezing, rales or rhonchi  Cardiovascular: S1 and S2, regular rate and rhythm, no Murmurs, gallops or rubs  Gastrointestinal: Bowel Sounds present, soft, nontender, nondistended, no guarding, no rebound  Extremities: No peripheral edema  Neurological: A/O x 3, no focal deficits in my limited exam    med/labs: Reviewed and interpreted     Assessment and Plan:  71 yo M with a PMH pancreatic cancer (s/p Whipple, on chemotherapy), medication-induced DM, CAD s/p stents, brain aneurysm s/p embolization, HTN, HLD, and GERD who presents with fever.    1) Fever:  Resolved, unknown etiology.  Infectious work up negative.  Pt with out symptoms.  Urine culture and blood cultures pending.   - However, would continue antibiotics (Zosyn) for now, with ID consult  - No prior cx seen, no known history of resistant organisms  - COVID-19 PCR negative, RVP negative    2) Lactic acidosis / Dehydration: RESOLVED s/p IVFs      3) Pancreatic cancer  - S/p laparoscopic Whipple, September 2020  - Oncology f/u outpatient, plan for next chemotherapy Tuesday 1/19  - Anti-nausea medications  - Dronabinol 2.5 mg BID  - Pancreatic enzymes TID    4) CAD  - C/w statin    5) GERD  - C/w Famotidine QHS    6) Drug-induced diabetes mellitus  - Supposedly since malignancy, due to ?chemo  - C/w Lantus 10 units QHS  - Check FS with low dose SSI QAC + HS        Discharge home with out patient follow up.

## 2021-01-17 NOTE — PATIENT PROFILE ADULT - SAFE PLACE TO LIVE
no Referred To Otolaryngology For Closure Text (Leave Blank If You Do Not Want): After obtaining clear surgical margins the patient was sent to otolaryngology for surgical repair.  The patient understands they will receive post-surgical care and follow-up from the referring physician's office.

## 2021-01-17 NOTE — DISCHARGE NOTE PROVIDER - NSDCCPCAREPLAN_GEN_ALL_CORE_FT
PRINCIPAL DISCHARGE DIAGNOSIS  Diagnosis: Fever  Assessment and Plan of Treatment: Resolved.  Infectious work up negative. COVID negative. xray clear. Urinalysis not showing infection.  If symptoms recur come back to ED for further work up and management.       PRINCIPAL DISCHARGE DIAGNOSIS  Diagnosis: Fever  Assessment and Plan of Treatment: Resolved.  Infectious work up negative. COVID negative. xray clear. Urinalysis not showing infection.  If symptoms recur come back to ED for further work up and management.  Antibiotic sent to your pharmacy to complete 5 day course.

## 2021-01-17 NOTE — CONSULT NOTE ADULT - SUBJECTIVE AND OBJECTIVE BOX
[facility]/ [place] [facility] consult   HPI:  71 yo M with a PMH pancreatic cancer (s/p Whipple, on chemotherapy), medication-induced DM, CAD s/p stents, brain aneurysm s/p embolization, HTN, HLD, and GERD who presents with fever. He had been feeling at baseline until this morning, when he woke up and felt very fatigued and with malaise. He had a fever to 102F (he notes he had not had a fever in a long time, including during any prior chemotherapy treatments). He denies any specific symptoms other than some slight rhinorrhea for the past 10 days. He denies recent travel or sick contacts. He lives with his wife who goes out to do errands. His last chemotherapy was 1.5 weeks ago and he is scheduled for his next dose in 3 days.  ROS is notable for chronic intermittent soft stools without recent changes, and intermittent nausea/vomiting.    His laparoscopic Whipple was in September 2020 shortly after he was diagnosed. 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 He began chemotherapy around Thanksgiving.    In the ED, he was given Zosyn 3.375 g IV x1 and NS 2.1 L x1. (16 Jan 2021 21:50)          Allergies    No Known Allergies    Intolerances        MEDICATIONS  (STANDING):  atorvastatin 20 milliGRAM(s) Oral at bedtime  dextrose 40% Gel 15 Gram(s) Oral once  dextrose 5%. 1000 milliLiter(s) (100 mL/Hr) IV Continuous <Continuous>  dextrose 50% Injectable 25 Gram(s) IV Push once  dextrose 50% Injectable 12.5 Gram(s) IV Push once  dextrose 50% Injectable 25 Gram(s) IV Push once  dronabinol 2.5 milliGRAM(s) Oral two times a day  enoxaparin Injectable 40 milliGRAM(s) SubCutaneous daily  famotidine   Suspension 40 milliGRAM(s) Oral at bedtime  glucagon  Injectable 1 milliGRAM(s) IntraMuscular once  insulin glargine Injectable (LANTUS) 10 Unit(s) SubCutaneous at bedtime  insulin lispro (ADMELOG) corrective regimen sliding scale   SubCutaneous three times a day before meals  insulin lispro (ADMELOG) corrective regimen sliding scale   SubCutaneous at bedtime  pancrelipase  (CREON 12,000 Lipase Units) 2 Capsule(s) Oral three times a day with meals  pantoprazole    Tablet 40 milliGRAM(s) Oral two times a day  potassium chloride   Powder 40 milliEquivalent(s) Oral two times a day    MEDICATIONS  (PRN):  ondansetron   Disintegrating Tablet 4 milliGRAM(s) Oral every 6 hours PRN Nausea and/or Vomiting      PAST MEDICAL & SURGICAL HISTORY:  Drug or chemical induced diabetes mellitus    Pancreatic cancer  s/p Whipple procedure    Brain aneurysm  non ruptured - had stent placed in 2010 after attemtped clipping and coiling in 2007    Inguinal hernia  right    Psoriasis    OA (osteoarthritis)    Skin cancer  squamous cell - head, shin - removed    GERD (gastroesophageal reflux disease)    Hyperlipidemia    HTN (hypertension)    CAD (coronary artery disease)  last stress test Fall 2018    Stented coronary artery  2006 PTCA x 1    Hearing loss  right    Pancreatic disorder  Whipple for cancer    H/O inguinal hernia repair    History of PTCA  2006 x 1 stent    S/P colonoscopy  last - within 5 yrs    History of tonsillectomy    History of other surgery  endovascular stent placement for brain Aneurysm at [place] - 2010    History of other surgery  for brain aneurysm - attempted coiling 2007    H/O craniotomy  for non ruptured aneurysm 2007, attempted clipping        FAMILY HISTORY:  Family history of breast cancer in sister        SOCIAL HISTORY: No EtOH, no tobacco    Interval:       Todays's Evaluation:    GENERAL: NAD, well-developed  HEAD:  Atraumatic, Normocephalic  EYES: EOMI, PERRLA, conjunctiva and sclera clear  NECK: Supple, No JVD  CHEST/LUNG: Clear to auscultation bilaterally; No wheeze  HEART: Regular rate and rhythm; No murmurs, rubs, or gallops  ABDOMEN: Soft, Nontender, Nondistended; Bowel sounds present  EXTREMITIES:  2+ Peripheral Pulses, No clubbing, cyanosis, or edema  NEUROLOGY: non-focal  SKIN: No rashes or lesions    Laboratories:                           11.7   7.78  )-----------( 113      ( 16 Jan 2021 15:20 )             36.5       01-16    138  |  103  |  12  ----------------------------<  104<H>  3.4<L>   |  25  |  0.93    Ca    9.3      16 Jan 2021 15:20    TPro  7.7  /  Alb  3.3  /  TBili  0.7  /  DBili  x   /  AST  20  /  ALT  23  /  AlkPhos  189<H>  01-16          Summary:    Plan:    
Patient is a 70y old  Male who presents with a chief complaint of fever (2021 14:47)    HPI:  69 yo M with a PMH pancreatic cancer (s/p Whipple, on chemotherapy), medication-induced DM, CAD s/p stents, brain aneurysm s/p embolization, HTN, HLD, and GERD admitted on  for evaluation of fever and fatigue. Last chemotherapy was about 1 and half weeks ago; since admission has been afebrile; has no sick contacts and no specific complaints at all. Wants to go home. No recent travel and no recent dental work.       PMH: as above  PSH: as above  Meds: per reconciliation sheet, noted below  MEDICATIONS  (STANDING):  atorvastatin 20 milliGRAM(s) Oral at bedtime  dextrose 40% Gel 15 Gram(s) Oral once  dextrose 5%. 1000 milliLiter(s) (100 mL/Hr) IV Continuous <Continuous>  dextrose 50% Injectable 25 Gram(s) IV Push once  dextrose 50% Injectable 12.5 Gram(s) IV Push once  dextrose 50% Injectable 25 Gram(s) IV Push once  dronabinol 2.5 milliGRAM(s) Oral two times a day  enoxaparin Injectable 40 milliGRAM(s) SubCutaneous daily  famotidine   Suspension 40 milliGRAM(s) Oral at bedtime  glucagon  Injectable 1 milliGRAM(s) IntraMuscular once  insulin glargine Injectable (LANTUS) 10 Unit(s) SubCutaneous at bedtime  insulin lispro (ADMELOG) corrective regimen sliding scale   SubCutaneous three times a day before meals  insulin lispro (ADMELOG) corrective regimen sliding scale   SubCutaneous at bedtime  pancrelipase  (CREON 12,000 Lipase Units) 2 Capsule(s) Oral three times a day with meals  pantoprazole    Tablet 40 milliGRAM(s) Oral two times a day  potassium chloride   Powder 40 milliEquivalent(s) Oral two times a day    MEDICATIONS  (PRN):  ondansetron   Disintegrating Tablet 4 milliGRAM(s) Oral every 6 hours PRN Nausea and/or Vomiting    Allergies    No Known Allergies    Intolerances      Social: no smoking, no alcohol, no illegal drugs; no recent travel, no exposure to TB  FAMILY HISTORY:  Family history of breast cancer in sister       no history of premature cardiovascular disease in first degree relatives  ROS: the patient has no chills, no HA, no dizziness, no sore throat, no blurry vision, no CP, no palpitations, no SOB, no cough, no abdominal pain, no diarrhea, no N/V, no dysuria, no leg pain, no claudication, no rash, no joint aches, no rectal pain or bleeding, no night sweats  All other systems reviewed and are negative    Vital Signs Last 24 Hrs  T(C): 36.6 (2021 14:48), Max: 36.9 (2021 01:17)  T(F): 97.9 (2021 14:48), Max: 98.4 (2021 01:17)  HR: 70 (2021 14:48) (69 - 74)  BP: 127/81 (2021 14:48) (124/78 - 138/73)  BP(mean): --  RR: 18 (2021 14:48) (17 - 18)  SpO2: 96% (2021 14:48) (95% - 96%)  Daily     Daily     PE:    Constitutional: frail looking  HEENT: NC/AT, EOMI, PERRLA, conjunctivae clear; ears and nose atraumatic; pharynx clear  Neck: supple; thyroid not palpable  Back: no tenderness  Respiratory: respiratory effort normal; clear to auscultation  Cardiovascular: S1S2 regular, no murmurs  Abdomen: soft, not tender, not distended, positive BS; no liver or spleen organomegaly  Genitourinary: no suprapubic tenderness  Musculoskeletal: no muscle tenderness, no joint swelling or tenderness  Neurological/ Psychiatric: AxOx3, judgement and insight normal;  moving all extremities  Skin: no rashes; no palpable lesions    Labs: all available labs reviewed                        11.7   778  )-----------( 113      ( 2021 15:20 )             36.5     -16    138  |  103  |  12  ----------------------------<  104<H>  3.4<L>   |  25  |  0.93    Ca    9.3      2021 15:20    TPro  7.7  /  Alb  3.3  /  TBili  0.7  /  DBili  x   /  AST  20  /  ALT  23  /  AlkPhos  189<H>  01-16     LIVER FUNCTIONS - ( 2021 15:20 )  Alb: 3.3 g/dL / Pro: 7.7 gm/dL / ALK PHOS: 189 U/L / ALT: 23 U/L / AST: 20 U/L / GGT: x           Urinalysis Basic - ( 2021 18:36 )    Color: Yellow / Appearance: Slightly Turbid / S.015 / pH: x  Gluc: x / Ketone: Negative  / Bili: Negative / Urobili: Negative mg/dL   Blood: x / Protein: 15 mg/dL / Nitrite: Negative   Leuk Esterase: Negative / RBC: Negative /HPF / WBC 0-2   Sq Epi: x / Non Sq Epi: Negative / Bacteria: Negative    < from: Xray Chest 1 View- PORTABLE-Urgent (21 @ 15:57) >    EXAM:  XR CHEST PORTABLE URGENT 1V                            PROCEDURE DATE:  2021          INTERPRETATION:  Portable chest radiograph    CLINICAL INFORMATION: Sepsis    TECHNIQUE:  Portable  AP view of the chest was obtained.    COMPARISON:2007 chest radiograph available for review.    FINDINGS: Mediport catheter tip in SVC.  The lungs are clear of airspace consolidations or effusions. No pneumothorax.    The heart and mediastinum are within normal limits.    Visualized osseous structures are intact.        IMPRESSION:   No evidence of active chest disease.      < end of copied text >      Culture Results:   No growth ( @ 18:36)    Radiology: all available radiological tests reviewed    Advanced directives addressed: full resuscitation

## 2021-01-17 NOTE — DISCHARGE NOTE NURSING/CASE MANAGEMENT/SOCIAL WORK - PATIENT PORTAL LINK FT
You can access the FollowMyHealth Patient Portal offered by [facility] by registering at the following website: [email or web address]. By joining [email or web address]’s FollowMyHealth portal, you will also be able to view your health information using other applications (apps) compatible with our system.

## 2021-01-17 NOTE — CONSULT NOTE ADULT - ASSESSMENT
71 yo M with a PMH pancreatic cancer (s/p Whipple, on chemotherapy), medication-induced DM, CAD s/p stents, brain aneurysm s/p embolization, HTN, HLD, and GERD admitted on 1/16 for evaluation of fever and fatigue. Last chemotherapy was about 1 and half weeks ago; since admission has been afebrile; has no sick contacts and no specific complaints at all. Wants to go home. No recent travel and no recent dental work.   1. Patient admitted with one acute episode of fever; none since admission and negative review of symptoms and unremarkable physical exam  - follow up cultures   - received zosyn which has been stopped  - okay from id standpoint to discharge home at this point on no antibiotics; patient is not neutropenic  - to follow up with oncology as outpatient  2. other issues: per medicine
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

## 2021-01-17 NOTE — DISCHARGE NOTE PROVIDER - NSDCMRMEDTOKEN_GEN_ALL_CORE_FT
dronabinol 2.5 mg oral capsule: 1 cap(s) orally 2 times a day  famotidine 40 mg/5 mL oral liquid: 5 milliliter(s) orally once a day (at bedtime)  HumaLOG 100 units/mL injectable solution: sliding scale insulin (usually 0-2 units) injectable 3 times a day (before meals)  hydroCHLOROthiazide 12.5 mg oral capsule: 1 cap(s) orally once a day  Lantus 100 units/mL subcutaneous solution: 10 unit(s) subcutaneous once a day (at bedtime)  omeprazole 40 mg oral delayed release capsule: 1 cap(s) orally 2 times a day  potassium chloride 20 mEq/15 mL oral liquid: 15 milliliter(s) orally 2 times a day  prochlorperazine 10 mg oral tablet: 1 tab(s) orally 3 times a day, As Needed  rosuvastatin 5 mg oral tablet: 1 tab(s) orally once a day  Zenpep 15,000 units-47,000 units-63,000 units oral delayed release capsule: 2 cap(s) orally 3 times a day  Zofran 8 mg oral tablet: 1 tab(s) orally 3 times a day   cefuroxime 500 mg oral tablet: 1 tab(s) orally 2 times a day   dronabinol 2.5 mg oral capsule: 1 cap(s) orally 2 times a day  famotidine 40 mg/5 mL oral liquid: 5 milliliter(s) orally once a day (at bedtime)  HumaLOG 100 units/mL injectable solution: sliding scale insulin (usually 0-2 units) injectable 3 times a day (before meals)  hydroCHLOROthiazide 12.5 mg oral capsule: 1 cap(s) orally once a day  Lantus 100 units/mL subcutaneous solution: 10 unit(s) subcutaneous once a day (at bedtime)  omeprazole 40 mg oral delayed release capsule: 1 cap(s) orally 2 times a day  potassium chloride 20 mEq/15 mL oral liquid: 15 milliliter(s) orally 2 times a day  prochlorperazine 10 mg oral tablet: 1 tab(s) orally 3 times a day, As Needed  rosuvastatin 5 mg oral tablet: 1 tab(s) orally once a day  Zenpep 15,000 units-47,000 units-63,000 units oral delayed release capsule: 2 cap(s) orally 3 times a day  Zofran 8 mg oral tablet: 1 tab(s) orally 3 times a day

## 2021-01-18 ENCOUNTER — TRANSCRIPTION ENCOUNTER (OUTPATIENT)
Age: 71
End: 2021-01-18

## 2021-01-21 LAB
CULTURE RESULTS: SIGNIFICANT CHANGE UP
CULTURE RESULTS: SIGNIFICANT CHANGE UP
SPECIMEN SOURCE: SIGNIFICANT CHANGE UP
SPECIMEN SOURCE: SIGNIFICANT CHANGE UP

## 2021-01-22 DIAGNOSIS — T45.1X5A ADVERSE EFFECT OF ANTINEOPLASTIC AND IMMUNOSUPPRESSIVE DRUGS, INITIAL ENCOUNTER: ICD-10-CM

## 2021-01-22 DIAGNOSIS — H91.91 UNSPECIFIED HEARING LOSS, RIGHT EAR: ICD-10-CM

## 2021-01-22 DIAGNOSIS — Z85.828 PERSONAL HISTORY OF OTHER MALIGNANT NEOPLASM OF SKIN: ICD-10-CM

## 2021-01-22 DIAGNOSIS — Z87.891 PERSONAL HISTORY OF NICOTINE DEPENDENCE: ICD-10-CM

## 2021-01-22 DIAGNOSIS — C77.2 SECONDARY AND UNSPECIFIED MALIGNANT NEOPLASM OF INTRA-ABDOMINAL LYMPH NODES: ICD-10-CM

## 2021-01-22 DIAGNOSIS — E86.0 DEHYDRATION: ICD-10-CM

## 2021-01-22 DIAGNOSIS — E78.5 HYPERLIPIDEMIA, UNSPECIFIED: ICD-10-CM

## 2021-01-22 DIAGNOSIS — Z95.820 PERIPHERAL VASCULAR ANGIOPLASTY STATUS WITH IMPLANTS AND GRAFTS: ICD-10-CM

## 2021-01-22 DIAGNOSIS — R11.2 NAUSEA WITH VOMITING, UNSPECIFIED: ICD-10-CM

## 2021-01-22 DIAGNOSIS — Z95.5 PRESENCE OF CORONARY ANGIOPLASTY IMPLANT AND GRAFT: ICD-10-CM

## 2021-01-22 DIAGNOSIS — E87.6 HYPOKALEMIA: ICD-10-CM

## 2021-01-22 DIAGNOSIS — Z90.49 ACQUIRED ABSENCE OF OTHER SPECIFIED PARTS OF DIGESTIVE TRACT: ICD-10-CM

## 2021-01-22 DIAGNOSIS — Z90.411 ACQUIRED PARTIAL ABSENCE OF PANCREAS: ICD-10-CM

## 2021-01-22 DIAGNOSIS — E09.9 DRUG OR CHEMICAL INDUCED DIABETES MELLITUS WITHOUT COMPLICATIONS: ICD-10-CM

## 2021-01-22 DIAGNOSIS — R50.9 FEVER, UNSPECIFIED: ICD-10-CM

## 2021-01-22 DIAGNOSIS — K40.90 UNILATERAL INGUINAL HERNIA, WITHOUT OBSTRUCTION OR GANGRENE, NOT SPECIFIED AS RECURRENT: ICD-10-CM

## 2021-01-22 DIAGNOSIS — I25.10 ATHEROSCLEROTIC HEART DISEASE OF NATIVE CORONARY ARTERY WITHOUT ANGINA PECTORIS: ICD-10-CM

## 2021-01-22 DIAGNOSIS — I10 ESSENTIAL (PRIMARY) HYPERTENSION: ICD-10-CM

## 2021-01-22 DIAGNOSIS — M19.90 UNSPECIFIED OSTEOARTHRITIS, UNSPECIFIED SITE: ICD-10-CM

## 2021-01-22 DIAGNOSIS — E87.2 ACIDOSIS: ICD-10-CM

## 2021-01-22 DIAGNOSIS — Z98.890 OTHER SPECIFIED POSTPROCEDURAL STATES: ICD-10-CM

## 2021-01-22 DIAGNOSIS — C24.1 MALIGNANT NEOPLASM OF AMPULLA OF VATER: ICD-10-CM

## 2021-01-22 DIAGNOSIS — I67.1 CEREBRAL ANEURYSM, NONRUPTURED: ICD-10-CM

## 2021-01-22 DIAGNOSIS — K21.9 GASTRO-ESOPHAGEAL REFLUX DISEASE WITHOUT ESOPHAGITIS: ICD-10-CM

## 2021-07-26 ENCOUNTER — INPATIENT (INPATIENT)
Facility: HOSPITAL | Age: 71
LOS: 1 days | Discharge: HOME | DRG: 809 | End: 2021-07-28
Attending: FAMILY MEDICINE | Admitting: FAMILY MEDICINE
Payer: MEDICARE

## 2021-07-26 VITALS
HEIGHT: 68 IN | WEIGHT: 139.99 LBS | HEART RATE: 88 BPM | DIASTOLIC BLOOD PRESSURE: 70 MMHG | TEMPERATURE: 98 F | SYSTOLIC BLOOD PRESSURE: 116 MMHG | OXYGEN SATURATION: 99 % | RESPIRATION RATE: 14 BRPM

## 2021-07-26 DIAGNOSIS — Z98.890 OTHER SPECIFIED POSTPROCEDURAL STATES: Chronic | ICD-10-CM

## 2021-07-26 DIAGNOSIS — I25.10 ATHEROSCLEROTIC HEART DISEASE OF NATIVE CORONARY ARTERY WITHOUT ANGINA PECTORIS: ICD-10-CM

## 2021-07-26 DIAGNOSIS — D70.9 NEUTROPENIA, UNSPECIFIED: ICD-10-CM

## 2021-07-26 DIAGNOSIS — I10 ESSENTIAL (PRIMARY) HYPERTENSION: ICD-10-CM

## 2021-07-26 DIAGNOSIS — Z98.61 CORONARY ANGIOPLASTY STATUS: Chronic | ICD-10-CM

## 2021-07-26 DIAGNOSIS — K21.9 GASTRO-ESOPHAGEAL REFLUX DISEASE WITHOUT ESOPHAGITIS: ICD-10-CM

## 2021-07-26 DIAGNOSIS — Z90.89 ACQUIRED ABSENCE OF OTHER ORGANS: Chronic | ICD-10-CM

## 2021-07-26 DIAGNOSIS — K86.9 DISEASE OF PANCREAS, UNSPECIFIED: Chronic | ICD-10-CM

## 2021-07-26 LAB
ALBUMIN SERPL ELPH-MCNC: 3.5 G/DL — SIGNIFICANT CHANGE UP (ref 3.3–5)
ALP SERPL-CCNC: 89 U/L — SIGNIFICANT CHANGE UP (ref 40–120)
ALT FLD-CCNC: 63 U/L — SIGNIFICANT CHANGE UP (ref 12–78)
ANION GAP SERPL CALC-SCNC: 6 MMOL/L — SIGNIFICANT CHANGE UP (ref 5–17)
APPEARANCE UR: CLEAR — SIGNIFICANT CHANGE UP
APTT BLD: 31.7 SEC — SIGNIFICANT CHANGE UP (ref 27.5–35.5)
AST SERPL-CCNC: 29 U/L — SIGNIFICANT CHANGE UP (ref 15–37)
BASOPHILS # BLD AUTO: 0 K/UL — SIGNIFICANT CHANGE UP (ref 0–0.2)
BASOPHILS NFR BLD AUTO: 0 % — SIGNIFICANT CHANGE UP (ref 0–2)
BILIRUB SERPL-MCNC: 1.8 MG/DL — HIGH (ref 0.2–1.2)
BILIRUB UR-MCNC: NEGATIVE — SIGNIFICANT CHANGE UP
BUN SERPL-MCNC: 17 MG/DL — SIGNIFICANT CHANGE UP (ref 7–23)
CALCIUM SERPL-MCNC: 8.9 MG/DL — SIGNIFICANT CHANGE UP (ref 8.5–10.1)
CHLORIDE SERPL-SCNC: 104 MMOL/L — SIGNIFICANT CHANGE UP (ref 96–108)
CO2 SERPL-SCNC: 28 MMOL/L — SIGNIFICANT CHANGE UP (ref 22–31)
COLOR SPEC: YELLOW — SIGNIFICANT CHANGE UP
CREAT SERPL-MCNC: 0.76 MG/DL — SIGNIFICANT CHANGE UP (ref 0.5–1.3)
DIFF PNL FLD: NEGATIVE — SIGNIFICANT CHANGE UP
EOSINOPHIL # BLD AUTO: 0.04 K/UL — SIGNIFICANT CHANGE UP (ref 0–0.5)
EOSINOPHIL NFR BLD AUTO: 3 % — SIGNIFICANT CHANGE UP (ref 0–6)
GLUCOSE SERPL-MCNC: 149 MG/DL — HIGH (ref 70–99)
GLUCOSE UR QL: NEGATIVE MG/DL — SIGNIFICANT CHANGE UP
HCT VFR BLD CALC: 30.9 % — LOW (ref 39–50)
HGB BLD-MCNC: 10.5 G/DL — LOW (ref 13–17)
INR BLD: 1.11 RATIO — SIGNIFICANT CHANGE UP (ref 0.88–1.16)
KETONES UR-MCNC: NEGATIVE — SIGNIFICANT CHANGE UP
LACTATE SERPL-SCNC: 1.9 MMOL/L — SIGNIFICANT CHANGE UP (ref 0.7–2)
LEUKOCYTE ESTERASE UR-ACNC: NEGATIVE — SIGNIFICANT CHANGE UP
LYMPHOCYTES # BLD AUTO: 0.54 K/UL — LOW (ref 1–3.3)
LYMPHOCYTES # BLD AUTO: 41 % — SIGNIFICANT CHANGE UP (ref 13–44)
MCHC RBC-ENTMCNC: 30.8 PG — SIGNIFICANT CHANGE UP (ref 27–34)
MCHC RBC-ENTMCNC: 34 GM/DL — SIGNIFICANT CHANGE UP (ref 32–36)
MCV RBC AUTO: 90.6 FL — SIGNIFICANT CHANGE UP (ref 80–100)
MONOCYTES # BLD AUTO: 0.09 K/UL — SIGNIFICANT CHANGE UP (ref 0–0.9)
MONOCYTES NFR BLD AUTO: 7 % — SIGNIFICANT CHANGE UP (ref 2–14)
NEUTROPHILS # BLD AUTO: 0.6 K/UL — LOW (ref 1.8–7.4)
NEUTROPHILS NFR BLD AUTO: 45 % — SIGNIFICANT CHANGE UP (ref 43–77)
NITRITE UR-MCNC: NEGATIVE — SIGNIFICANT CHANGE UP
NRBC # BLD: SIGNIFICANT CHANGE UP /100 WBCS (ref 0–0)
PH UR: 5 — SIGNIFICANT CHANGE UP (ref 5–8)
PLATELET # BLD AUTO: 48 K/UL — LOW (ref 150–400)
POTASSIUM SERPL-MCNC: 3.4 MMOL/L — LOW (ref 3.5–5.3)
POTASSIUM SERPL-SCNC: 3.4 MMOL/L — LOW (ref 3.5–5.3)
PROT SERPL-MCNC: 6.7 GM/DL — SIGNIFICANT CHANGE UP (ref 6–8.3)
PROT UR-MCNC: 15 MG/DL
PROTHROM AB SERPL-ACNC: 12.8 SEC — SIGNIFICANT CHANGE UP (ref 10.6–13.6)
RAPID RVP RESULT: SIGNIFICANT CHANGE UP
RBC # BLD: 3.41 M/UL — LOW (ref 4.2–5.8)
RBC # FLD: 12.2 % — SIGNIFICANT CHANGE UP (ref 10.3–14.5)
SARS-COV-2 RNA SPEC QL NAA+PROBE: SIGNIFICANT CHANGE UP
SODIUM SERPL-SCNC: 138 MMOL/L — SIGNIFICANT CHANGE UP (ref 135–145)
SP GR SPEC: 1 — LOW (ref 1.01–1.02)
UROBILINOGEN FLD QL: NEGATIVE MG/DL — SIGNIFICANT CHANGE UP
WBC # BLD: 1.31 K/UL — LOW (ref 3.8–10.5)
WBC # FLD AUTO: 1.31 K/UL — LOW (ref 3.8–10.5)

## 2021-07-26 PROCEDURE — 85025 COMPLETE CBC W/AUTO DIFF WBC: CPT

## 2021-07-26 PROCEDURE — 82962 GLUCOSE BLOOD TEST: CPT

## 2021-07-26 PROCEDURE — 93010 ELECTROCARDIOGRAM REPORT: CPT

## 2021-07-26 PROCEDURE — 81001 URINALYSIS AUTO W/SCOPE: CPT

## 2021-07-26 PROCEDURE — 99285 EMERGENCY DEPT VISIT HI MDM: CPT

## 2021-07-26 PROCEDURE — 86769 SARS-COV-2 COVID-19 ANTIBODY: CPT

## 2021-07-26 PROCEDURE — 36415 COLL VENOUS BLD VENIPUNCTURE: CPT

## 2021-07-26 PROCEDURE — 83036 HEMOGLOBIN GLYCOSYLATED A1C: CPT

## 2021-07-26 PROCEDURE — 71045 X-RAY EXAM CHEST 1 VIEW: CPT | Mod: 26

## 2021-07-26 PROCEDURE — 87086 URINE CULTURE/COLONY COUNT: CPT

## 2021-07-26 PROCEDURE — 80048 BASIC METABOLIC PNL TOTAL CA: CPT

## 2021-07-26 PROCEDURE — 83735 ASSAY OF MAGNESIUM: CPT

## 2021-07-26 RX ORDER — PANTOPRAZOLE SODIUM 20 MG/1
40 TABLET, DELAYED RELEASE ORAL
Refills: 0 | Status: DISCONTINUED | OUTPATIENT
Start: 2021-07-26 | End: 2021-07-28

## 2021-07-26 RX ORDER — SODIUM CHLORIDE 9 MG/ML
1000 INJECTION, SOLUTION INTRAVENOUS
Refills: 0 | Status: DISCONTINUED | OUTPATIENT
Start: 2021-07-26 | End: 2021-07-28

## 2021-07-26 RX ORDER — INSULIN LISPRO 100/ML
VIAL (ML) SUBCUTANEOUS
Refills: 0 | Status: DISCONTINUED | OUTPATIENT
Start: 2021-07-26 | End: 2021-07-28

## 2021-07-26 RX ORDER — DEXTROSE 50 % IN WATER 50 %
25 SYRINGE (ML) INTRAVENOUS ONCE
Refills: 0 | Status: DISCONTINUED | OUTPATIENT
Start: 2021-07-26 | End: 2021-07-28

## 2021-07-26 RX ORDER — FAMOTIDINE 10 MG/ML
5 INJECTION INTRAVENOUS
Qty: 0 | Refills: 0 | DISCHARGE

## 2021-07-26 RX ORDER — ACETAMINOPHEN 500 MG
650 TABLET ORAL EVERY 6 HOURS
Refills: 0 | Status: DISCONTINUED | OUTPATIENT
Start: 2021-07-26 | End: 2021-07-28

## 2021-07-26 RX ORDER — LIPASE/PROTEASE/AMYLASE 16-48-48K
4 CAPSULE,DELAYED RELEASE (ENTERIC COATED) ORAL
Refills: 0 | Status: DISCONTINUED | OUTPATIENT
Start: 2021-07-26 | End: 2021-07-28

## 2021-07-26 RX ORDER — LOSARTAN POTASSIUM 100 MG/1
50 TABLET, FILM COATED ORAL DAILY
Refills: 0 | Status: DISCONTINUED | OUTPATIENT
Start: 2021-07-26 | End: 2021-07-26

## 2021-07-26 RX ORDER — HYDROCHLOROTHIAZIDE 25 MG
12.5 TABLET ORAL DAILY
Refills: 0 | Status: DISCONTINUED | OUTPATIENT
Start: 2021-07-27 | End: 2021-07-28

## 2021-07-26 RX ORDER — ROSUVASTATIN CALCIUM 5 MG/1
1 TABLET ORAL
Qty: 0 | Refills: 0 | DISCHARGE

## 2021-07-26 RX ORDER — SODIUM CHLORIDE 9 MG/ML
1950 INJECTION INTRAMUSCULAR; INTRAVENOUS; SUBCUTANEOUS ONCE
Refills: 0 | Status: COMPLETED | OUTPATIENT
Start: 2021-07-26 | End: 2021-07-26

## 2021-07-26 RX ORDER — ASPIRIN/CALCIUM CARB/MAGNESIUM 324 MG
81 TABLET ORAL DAILY
Refills: 0 | Status: DISCONTINUED | OUTPATIENT
Start: 2021-07-26 | End: 2021-07-28

## 2021-07-26 RX ORDER — LANOLIN ALCOHOL/MO/W.PET/CERES
10 CREAM (GRAM) TOPICAL AT BEDTIME
Refills: 0 | Status: DISCONTINUED | OUTPATIENT
Start: 2021-07-26 | End: 2021-07-28

## 2021-07-26 RX ORDER — OMEPRAZOLE 10 MG/1
1 CAPSULE, DELAYED RELEASE ORAL
Qty: 0 | Refills: 0 | DISCHARGE

## 2021-07-26 RX ORDER — LIPASE/PROTEASE/AMYLASE 16-48-48K
4 CAPSULE,DELAYED RELEASE (ENTERIC COATED) ORAL
Refills: 0 | Status: DISCONTINUED | OUTPATIENT
Start: 2021-07-26 | End: 2021-07-26

## 2021-07-26 RX ORDER — ONDANSETRON 8 MG/1
1 TABLET, FILM COATED ORAL
Qty: 0 | Refills: 0 | DISCHARGE

## 2021-07-26 RX ORDER — ENOXAPARIN SODIUM 100 MG/ML
40 INJECTION SUBCUTANEOUS DAILY
Refills: 0 | Status: DISCONTINUED | OUTPATIENT
Start: 2021-07-27 | End: 2021-07-27

## 2021-07-26 RX ORDER — IBUPROFEN 200 MG
400 TABLET ORAL THREE TIMES A DAY
Refills: 0 | Status: DISCONTINUED | OUTPATIENT
Start: 2021-07-26 | End: 2021-07-28

## 2021-07-26 RX ORDER — IBUPROFEN 200 MG
600 TABLET ORAL ONCE
Refills: 0 | Status: COMPLETED | OUTPATIENT
Start: 2021-07-26 | End: 2021-07-26

## 2021-07-26 RX ORDER — LIPASE/PROTEASE/AMYLASE 16-48-48K
2 CAPSULE,DELAYED RELEASE (ENTERIC COATED) ORAL
Refills: 0 | Status: DISCONTINUED | OUTPATIENT
Start: 2021-07-26 | End: 2021-07-26

## 2021-07-26 RX ORDER — INSULIN GLARGINE 100 [IU]/ML
10 INJECTION, SOLUTION SUBCUTANEOUS
Qty: 0 | Refills: 0 | DISCHARGE

## 2021-07-26 RX ORDER — LANOLIN ALCOHOL/MO/W.PET/CERES
3 CREAM (GRAM) TOPICAL AT BEDTIME
Refills: 0 | Status: DISCONTINUED | OUTPATIENT
Start: 2021-07-26 | End: 2021-07-26

## 2021-07-26 RX ORDER — GLUCAGON INJECTION, SOLUTION 0.5 MG/.1ML
1 INJECTION, SOLUTION SUBCUTANEOUS ONCE
Refills: 0 | Status: DISCONTINUED | OUTPATIENT
Start: 2021-07-26 | End: 2021-07-28

## 2021-07-26 RX ORDER — LIPASE/PROTEASE/AMYLASE 16-48-48K
2 CAPSULE,DELAYED RELEASE (ENTERIC COATED) ORAL
Qty: 0 | Refills: 0 | DISCHARGE

## 2021-07-26 RX ORDER — CEFEPIME 1 G/1
2000 INJECTION, POWDER, FOR SOLUTION INTRAMUSCULAR; INTRAVENOUS ONCE
Refills: 0 | Status: COMPLETED | OUTPATIENT
Start: 2021-07-26 | End: 2021-07-26

## 2021-07-26 RX ORDER — DRONABINOL 2.5 MG
1 CAPSULE ORAL
Qty: 0 | Refills: 0 | DISCHARGE

## 2021-07-26 RX ORDER — VANCOMYCIN HCL 1 G
1000 VIAL (EA) INTRAVENOUS ONCE
Refills: 0 | Status: COMPLETED | OUTPATIENT
Start: 2021-07-26 | End: 2021-07-26

## 2021-07-26 RX ORDER — INSULIN LISPRO 100/ML
0 VIAL (ML) SUBCUTANEOUS
Qty: 0 | Refills: 0 | DISCHARGE

## 2021-07-26 RX ORDER — POTASSIUM CHLORIDE 20 MEQ
15 PACKET (EA) ORAL
Qty: 0 | Refills: 0 | DISCHARGE

## 2021-07-26 RX ORDER — DEXTROSE 50 % IN WATER 50 %
15 SYRINGE (ML) INTRAVENOUS ONCE
Refills: 0 | Status: DISCONTINUED | OUTPATIENT
Start: 2021-07-26 | End: 2021-07-28

## 2021-07-26 RX ORDER — SODIUM CHLORIDE 9 MG/ML
1000 INJECTION INTRAMUSCULAR; INTRAVENOUS; SUBCUTANEOUS
Refills: 0 | Status: DISCONTINUED | OUTPATIENT
Start: 2021-07-26 | End: 2021-07-28

## 2021-07-26 RX ORDER — PROCHLORPERAZINE MALEATE 5 MG
1 TABLET ORAL
Qty: 0 | Refills: 0 | DISCHARGE

## 2021-07-26 RX ORDER — CEFEPIME 1 G/1
INJECTION, POWDER, FOR SOLUTION INTRAMUSCULAR; INTRAVENOUS
Refills: 0 | Status: DISCONTINUED | OUTPATIENT
Start: 2021-07-27 | End: 2021-07-28

## 2021-07-26 RX ORDER — ONDANSETRON 8 MG/1
4 TABLET, FILM COATED ORAL EVERY 8 HOURS
Refills: 0 | Status: DISCONTINUED | OUTPATIENT
Start: 2021-07-26 | End: 2021-07-28

## 2021-07-26 RX ORDER — LIPASE/PROTEASE/AMYLASE 16-48-48K
3 CAPSULE,DELAYED RELEASE (ENTERIC COATED) ORAL
Refills: 0 | Status: DISCONTINUED | OUTPATIENT
Start: 2021-07-26 | End: 2021-07-26

## 2021-07-26 RX ORDER — ATORVASTATIN CALCIUM 80 MG/1
20 TABLET, FILM COATED ORAL AT BEDTIME
Refills: 0 | Status: DISCONTINUED | OUTPATIENT
Start: 2021-07-26 | End: 2021-07-27

## 2021-07-26 RX ORDER — DEXTROSE 50 % IN WATER 50 %
12.5 SYRINGE (ML) INTRAVENOUS ONCE
Refills: 0 | Status: DISCONTINUED | OUTPATIENT
Start: 2021-07-26 | End: 2021-07-28

## 2021-07-26 RX ORDER — CEFEPIME 1 G/1
2000 INJECTION, POWDER, FOR SOLUTION INTRAMUSCULAR; INTRAVENOUS EVERY 8 HOURS
Refills: 0 | Status: DISCONTINUED | OUTPATIENT
Start: 2021-07-27 | End: 2021-07-28

## 2021-07-26 RX ORDER — LIPASE/PROTEASE/AMYLASE 16-48-48K
2 CAPSULE,DELAYED RELEASE (ENTERIC COATED) ORAL
Refills: 0 | Status: DISCONTINUED | OUTPATIENT
Start: 2021-07-26 | End: 2021-07-28

## 2021-07-26 RX ORDER — LOSARTAN POTASSIUM 100 MG/1
100 TABLET, FILM COATED ORAL DAILY
Refills: 0 | Status: DISCONTINUED | OUTPATIENT
Start: 2021-07-26 | End: 2021-07-28

## 2021-07-26 RX ADMIN — Medication 600 MILLIGRAM(S): at 21:58

## 2021-07-26 RX ADMIN — SODIUM CHLORIDE 1950 MILLILITER(S): 9 INJECTION INTRAMUSCULAR; INTRAVENOUS; SUBCUTANEOUS at 17:30

## 2021-07-26 RX ADMIN — SODIUM CHLORIDE 1950 MILLILITER(S): 9 INJECTION INTRAMUSCULAR; INTRAVENOUS; SUBCUTANEOUS at 18:36

## 2021-07-26 RX ADMIN — Medication 250 MILLIGRAM(S): at 18:36

## 2021-07-26 NOTE — ED PROVIDER NOTE - PSH
H/O craniotomy  for non ruptured aneurysm 2007, attempted clipping  H/O inguinal hernia repair    History of other surgery  endovascular stent placement for brain Aneurysm at [place] - 2010  History of other surgery  for brain aneurysm - attempted coiling 2007  History of PTCA  2006 x 1 stent  History of tonsillectomy    Pancreatic disorder  Whipple for cancer  S/P colonoscopy  last - within 5 yrs

## 2021-07-26 NOTE — ED PROVIDER NOTE - CARDIAC, MLM
Normal rate, normal radial pulse, regular rhythm.  Heart sounds S1, S2.  No murmurs, rubs or gallops.

## 2021-07-26 NOTE — ED PROVIDER NOTE - OBJECTIVE STATEMENT
72 y/o male with a PMHx of pancreatic CA, osteoarthritis, HTN, HLD, CAD s/p stent, hard of hearing, unruptured brain aneurysm with stent and DM presents ambulatory to the ED c/o fever x4 days. Pt sent in by oncologist Dr. Thomas for +fatigue, +fever at home tmax 100.9, fever lowered to 97 s/p ibuprofen. Pt is +neutropenic with wbc at 1k. Pt started on zosyn, Neupogen administered at oncologist office PTA today. Pt recently started new chemo regimen 2 weeks ago for pancreatic CA. Pt was sent in for admission, reports +mild nausea, +fatigue, +aching pain in back. Pt denies vomiting, diarrhea, dysuria, SOB, HA, skin sores. PCP: Dr. Mckay. 72 y/o male with a PMHx of pancreatic CA, osteoarthritis, HTN, HLD, CAD s/p stent, hard of hearing, unruptured brain aneurysm with stent and DM presents ambulatory to the ED c/o fever x4 days. Pt sent in by oncologist Dr. Thomas for +fatigue, +fever at home tmax 100.9, fever lowered to 97 s/p ibuprofen. Pt is +neutropenic with wbc at 1k. Pt started on zosyn, Neupogen administered at oncologist office PTA today. Pt recently started new chemo regimen 2 weeks ago for pancreatic CA. Pt was sent in for admission, reports +mild nausea, +fatigue, +aching pain in back. Pt denies vomiting, diarrhea, dysuria, SOB, HA, skin sores. PCP: Dr. Amaro.

## 2021-07-26 NOTE — H&P ADULT - NSICDXPASTMEDICALHX_GEN_ALL_CORE_FT
PAST MEDICAL HISTORY:  Brain aneurysm non ruptured - had stent placed in 2010 after attemtped clipping and coiling in 2007    CAD (coronary artery disease) last stress test Fall 2018    Drug or chemical induced diabetes mellitus     GERD (gastroesophageal reflux disease)     Hearing loss right    HTN (hypertension)     Hyperlipidemia     Inguinal hernia right    OA (osteoarthritis)     Pancreatic cancer s/p Whipple procedure    Psoriasis     Skin cancer squamous cell - head, shin - removed    Stented coronary artery 2006 PTCA x 1

## 2021-07-26 NOTE — ED PROVIDER NOTE - CONSTITUTIONAL, MLM
normal... Older white male, awake, alert, oriented to person, place, time/situation and in no apparent distress, non toxic.

## 2021-07-26 NOTE — H&P ADULT - HISTORY OF PRESENT ILLNESS
70 yo M with a PMH ampullary pancreatic cancer (s/p Whipple, on chemotherapy), medication-induced DM, CAD s/p stents, brain aneurysm s/p embolization, HTN, HLD, and GERD who presents from Oncologists office with neutropenic  fever. Pt sent in by oncologist Dr. Thomas ([facility] [place]) for +fatigue, +fever at home tmax 100.9, fever lowered to 97 s/p ibuprofen. Pt is +neutropenic with wbc at 1k on outpatient labs. Pt started on Zosyn and Neupogen administered at oncologist office PTA today. Pt recently started new chemo regimen 2 weeks ago for pancreatic CA.. He denies any specific symptoms other than some chronic post nasal drip. He denies recent travel or sick contacts. He lives with his wife who goes out to do errands.

## 2021-07-26 NOTE — ED PROVIDER NOTE - ENMT, MLM
Airway patent, Nasal mucosa clear. Mouth with slightly dry mucosa. Throat has no vesicles, no oropharyngeal exudates and uvula is midline, no obvious legions/sores.

## 2021-07-26 NOTE — ED ADULT TRIAGE NOTE - CHIEF COMPLAINT QUOTE
sent in by dr. ordonez's office for fatigue, fever at home, neutropenic, started on zosyn, sent in for admission

## 2021-07-26 NOTE — H&P ADULT - NSHPLABSRESULTS_GEN_ALL_CORE
10.5   1.31  )-----------( 48       ( 2021 17:48 )             30.9         138  |  104  |  17  ----------------------------<  149<H>  3.4<L>   |  28  |  0.76    Ca    8.9      2021 17:48    TPro  6.7  /  Alb  3.5  /  TBili  1.8<H>  /  DBili  x   /  AST  29  /  ALT  63  /  AlkPhos  89          LIVER FUNCTIONS - ( 2021 17:48 )  Alb: 3.5 g/dL / Pro: 6.7 gm/dL / ALK PHOS: 89 U/L / ALT: 63 U/L / AST: 29 U/L / GGT: x           PT/INR - ( 2021 17:48 )   PT: 12.8 sec;   INR: 1.11 ratio         PTT - ( 2021 17:48 )  PTT:31.7 sec  Urinalysis Basic - ( 2021 20:57 )    Color: Yellow / Appearance: Clear / S.005 / pH: x  Gluc: x / Ketone: Negative  / Bili: Negative / Urobili: Negative mg/dL   Blood: x / Protein: 15 mg/dL / Nitrite: Negative   Leuk Esterase: Negative / RBC: 0-2 /HPF / WBC 0-2   Sq Epi: x / Non Sq Epi: x / Bacteria: Negative        Lactate, Blood: 1.9 mmol/L ( @ 17:48)    Blood, Urine: Negative ( @ 20:57)

## 2021-07-26 NOTE — H&P ADULT - PROBLEM SELECTOR PLAN 1
- Ampullary pancreatic cancer, recent peritoneal biopsy proven recurrence noted during hernia repair, s/p [place] and Nab Paclitaxel 7/20/21    - Admit to med surg  - Received Zosyn and Neupogen PTA, Vanco given I ED  - Continue Cefepime 2g q8h  - follow urine and blood cultures. CXR negative. No SIRS criteria/sepsis  - IVF  - ID consult  - Repeat CBC with diff in AM

## 2021-07-26 NOTE — ED PROVIDER NOTE - PMH
Brain aneurysm  non ruptured - had stent placed in 2010 after attemtped clipping and coiling in 2007  CAD (coronary artery disease)  last stress test Fall 2018  Drug or chemical induced diabetes mellitus    GERD (gastroesophageal reflux disease)    Hearing loss  right  HTN (hypertension)    Hyperlipidemia    Inguinal hernia  right  OA (osteoarthritis)    Pancreatic cancer  s/p Whipple procedure  Psoriasis    Skin cancer  squamous cell - head, shin - removed  Stented coronary artery  2006 PTCA x 1

## 2021-07-26 NOTE — ED PROVIDER NOTE - CLINICAL SUMMARY MEDICAL DECISION MAKING FREE TEXT BOX
70 y/o male +pancreatic CA followed at [facility], on new chemo regiment past 2 weeks, last dose 1 week ago, BIBEMS from onc office regarding +generalized weakness, +fever at home tmax 100.9, outpatient labs today, wbc 1k with 44% neutrophils. +neutropenic fever meta port access at onc office, 1 dose IV zosyn administered PTA, meta port accessed blood culture obtained from meta port and peripheral and urine cultures sent, IV zosyn administered from port. Pt received Neupogen today also PTA. Plan neutropenic fever/sepsis workup, sepsis labs, IV fluid chest XR, EKG, IV vancomycin. Pt already received zosyn IV PTA, admit.

## 2021-07-26 NOTE — ED ADULT NURSE NOTE - NSIMPLEMENTINTERV_GEN_ALL_ED
Implemented All Fall with Harm Risk Interventions:  [place]ares to call system. Call bell, personal items and telephone within reach. Instruct patient to call for assistance. Room bathroom lighting operational. Non-slip footwear when patient is off stretcher. Physically safe environment: no spills, clutter or unnecessary equipment. Stretcher in lowest position, wheels locked, appropriate side rails in place. Provide visual cue, wrist band, yellow gown, etc. Monitor gait and stability. Monitor for mental status changes and reorient to person, place, and time. Review medications for side effects contributing to fall risk. Reinforce activity limits and safety measures with patient and family. Provide visual clues: red socks.

## 2021-07-26 NOTE — H&P ADULT - NSICDXPASTSURGICALHX_GEN_ALL_CORE_FT
PAST SURGICAL HISTORY:  H/O craniotomy for non ruptured aneurysm 2007, attempted clipping    H/O inguinal hernia repair     History of other surgery for brain aneurysm - attempted coiling 2007    History of other surgery endovascular stent placement for brain Aneurysm at [place] - 2010    History of PTCA 2006 x 1 stent    History of tonsillectomy     Pancreatic disorder Whipple for cancer    S/P colonoscopy last - within 5 yrs

## 2021-07-26 NOTE — ED PROVIDER NOTE - CARE PLAN
Principal Discharge DX:	Neutropenic fever  Secondary Diagnosis:	Malignant neoplasm of pancreas, unspecified location of malignancy

## 2021-07-27 PROBLEM — E09.9 DRUG OR CHEMICAL INDUCED DIABETES MELLITUS WITHOUT COMPLICATIONS: Chronic | Status: ACTIVE | Noted: 2021-01-17

## 2021-07-27 PROBLEM — C25.9 MALIGNANT NEOPLASM OF PANCREAS, UNSPECIFIED: Chronic | Status: ACTIVE | Noted: 2021-01-16

## 2021-07-27 LAB
A1C WITH ESTIMATED AVERAGE GLUCOSE RESULT: 5.5 % — SIGNIFICANT CHANGE UP (ref 4–5.6)
ANION GAP SERPL CALC-SCNC: 6 MMOL/L — SIGNIFICANT CHANGE UP (ref 5–17)
BASOPHILS # BLD AUTO: 0 K/UL — SIGNIFICANT CHANGE UP (ref 0–0.2)
BASOPHILS NFR BLD AUTO: 0 % — SIGNIFICANT CHANGE UP (ref 0–2)
BUN SERPL-MCNC: 12 MG/DL — SIGNIFICANT CHANGE UP (ref 7–23)
CALCIUM SERPL-MCNC: 8.6 MG/DL — SIGNIFICANT CHANGE UP (ref 8.5–10.1)
CHLORIDE SERPL-SCNC: 111 MMOL/L — HIGH (ref 96–108)
CO2 SERPL-SCNC: 26 MMOL/L — SIGNIFICANT CHANGE UP (ref 22–31)
COVID-19 SPIKE DOMAIN AB INTERP: POSITIVE
COVID-19 SPIKE DOMAIN ANTIBODY RESULT: 14.3 U/ML — HIGH
CREAT SERPL-MCNC: 0.55 MG/DL — SIGNIFICANT CHANGE UP (ref 0.5–1.3)
CULTURE RESULTS: NO GROWTH — SIGNIFICANT CHANGE UP
EOSINOPHIL # BLD AUTO: 0.02 K/UL — SIGNIFICANT CHANGE UP (ref 0–0.5)
EOSINOPHIL NFR BLD AUTO: 1 % — SIGNIFICANT CHANGE UP (ref 0–6)
ESTIMATED AVERAGE GLUCOSE: 111 MG/DL — SIGNIFICANT CHANGE UP (ref 68–114)
GLUCOSE SERPL-MCNC: 92 MG/DL — SIGNIFICANT CHANGE UP (ref 70–99)
HCT VFR BLD CALC: 25.7 % — LOW (ref 39–50)
HGB BLD-MCNC: 9 G/DL — LOW (ref 13–17)
LYMPHOCYTES # BLD AUTO: 0.59 K/UL — LOW (ref 1–3.3)
LYMPHOCYTES # BLD AUTO: 27 % — SIGNIFICANT CHANGE UP (ref 13–44)
MCHC RBC-ENTMCNC: 31.8 PG — SIGNIFICANT CHANGE UP (ref 27–34)
MCHC RBC-ENTMCNC: 35 GM/DL — SIGNIFICANT CHANGE UP (ref 32–36)
MCV RBC AUTO: 90.8 FL — SIGNIFICANT CHANGE UP (ref 80–100)
MONOCYTES # BLD AUTO: 0.2 K/UL — SIGNIFICANT CHANGE UP (ref 0–0.9)
MONOCYTES NFR BLD AUTO: 9 % — SIGNIFICANT CHANGE UP (ref 2–14)
NEUTROPHILS # BLD AUTO: 1.3 K/UL — LOW (ref 1.8–7.4)
NEUTROPHILS NFR BLD AUTO: 56 % — SIGNIFICANT CHANGE UP (ref 43–77)
NRBC # BLD: SIGNIFICANT CHANGE UP /100 WBCS (ref 0–0)
PLATELET # BLD AUTO: 33 K/UL — LOW (ref 150–400)
POTASSIUM SERPL-MCNC: 3.7 MMOL/L — SIGNIFICANT CHANGE UP (ref 3.5–5.3)
POTASSIUM SERPL-SCNC: 3.7 MMOL/L — SIGNIFICANT CHANGE UP (ref 3.5–5.3)
RBC # BLD: 2.83 M/UL — LOW (ref 4.2–5.8)
RBC # FLD: 12.2 % — SIGNIFICANT CHANGE UP (ref 10.3–14.5)
SARS-COV-2 IGG+IGM SERPL QL IA: 14.3 U/ML — HIGH
SARS-COV-2 IGG+IGM SERPL QL IA: POSITIVE
SODIUM SERPL-SCNC: 143 MMOL/L — SIGNIFICANT CHANGE UP (ref 135–145)
SPECIMEN SOURCE: SIGNIFICANT CHANGE UP
WBC # BLD: 2.2 K/UL — LOW (ref 3.8–10.5)
WBC # FLD AUTO: 2.2 K/UL — LOW (ref 3.8–10.5)

## 2021-07-27 PROCEDURE — 99223 1ST HOSP IP/OBS HIGH 75: CPT

## 2021-07-27 PROCEDURE — 12345: CPT | Mod: NC

## 2021-07-27 RX ORDER — ROSUVASTATIN CALCIUM 5 MG/1
5 TABLET ORAL AT BEDTIME
Refills: 0 | Status: DISCONTINUED | OUTPATIENT
Start: 2021-07-27 | End: 2021-07-28

## 2021-07-27 RX ORDER — ROSUVASTATIN CALCIUM 5 MG/1
5 TABLET ORAL ONCE
Refills: 0 | Status: DISCONTINUED | OUTPATIENT
Start: 2021-07-27 | End: 2021-07-28

## 2021-07-27 RX ADMIN — LOSARTAN POTASSIUM 100 MILLIGRAM(S): 100 TABLET, FILM COATED ORAL at 09:38

## 2021-07-27 RX ADMIN — Medication 4 CAPSULE(S): at 09:11

## 2021-07-27 RX ADMIN — CEFEPIME 100 MILLIGRAM(S): 1 INJECTION, POWDER, FOR SOLUTION INTRAMUSCULAR; INTRAVENOUS at 05:21

## 2021-07-27 RX ADMIN — PANTOPRAZOLE SODIUM 40 MILLIGRAM(S): 20 TABLET, DELAYED RELEASE ORAL at 09:38

## 2021-07-27 RX ADMIN — CEFEPIME 100 MILLIGRAM(S): 1 INJECTION, POWDER, FOR SOLUTION INTRAMUSCULAR; INTRAVENOUS at 21:40

## 2021-07-27 RX ADMIN — Medication 81 MILLIGRAM(S): at 09:38

## 2021-07-27 RX ADMIN — Medication 4 CAPSULE(S): at 17:55

## 2021-07-27 RX ADMIN — Medication 10 MILLIGRAM(S): at 21:40

## 2021-07-27 RX ADMIN — CEFEPIME 100 MILLIGRAM(S): 1 INJECTION, POWDER, FOR SOLUTION INTRAMUSCULAR; INTRAVENOUS at 13:14

## 2021-07-27 RX ADMIN — Medication 12.5 MILLIGRAM(S): at 09:39

## 2021-07-27 RX ADMIN — Medication 4 CAPSULE(S): at 13:22

## 2021-07-27 RX ADMIN — SODIUM CHLORIDE 75 MILLILITER(S): 9 INJECTION INTRAMUSCULAR; INTRAVENOUS; SUBCUTANEOUS at 02:15

## 2021-07-27 RX ADMIN — ROSUVASTATIN CALCIUM 5 MILLIGRAM(S): 5 TABLET ORAL at 21:40

## 2021-07-27 RX ADMIN — SODIUM CHLORIDE 75 MILLILITER(S): 9 INJECTION INTRAMUSCULAR; INTRAVENOUS; SUBCUTANEOUS at 16:07

## 2021-07-27 RX ADMIN — Medication 1 TABLET(S): at 09:42

## 2021-07-27 NOTE — CONSULT NOTE ADULT - ASSESSMENT
70yo male patient of Dr. Thomas with ampullary adenocarcinoma,  pancreatobiliary type s/p resection s/p gem and Nab paclitaxel (LD 7/20/21). He presented 7/26   with reports of fever to 100.8 at home and general feeling of fatigue and unwell.   noted to be neutrepenic    #Febrile neutropenia  -c/w cefepime  -ID eval  -f/u cultures  -daily CBC and start G-CSF if ANC goes <0.5    #Pancyteopenia  -likely chemo related  -transfuse plts if < 15 K prbc if Hgb < 7  -daily cbc    We will be happy to answer any onc questions on behalf of [facility] and will be in touch with them.    Mango Duffy MD  Hematology/Oncology  Cell:  [phone]  Office Phone: [phone]  Office Fax:  [phone]  [facility] [place] [address]  70yo male patient of Dr. Thomas with ampullary adenocarcinoma,  pancreatobiliary type s/p resection s/p gem and Nab paclitaxel (LD 7/20/21). He presented 7/26   with reports of fever to 100.8 at home and general feeling of fatigue and unwell.   noted to be neutrepenic    #Febrile neutropenia  -c/w cefepime  -ID eval  -f/u cultures  -daily CBC and start G- mcg daily (received 1 dose at [facility] [place] yesterday)     #Pancyteopenia  -likely chemo related  -transfuse plts if < 15 K prbc if Hgb < 7  -daily cbc    We will be happy to answer any onc questions on behalf of [facility] and will be in touch with them.  d/w hospitalist  Mango Duffy MD  Hematology/Oncology  Cell:  [phone]  Office Phone: [phone]  Office Fax:  [phone]  [facility] [place] [address]

## 2021-07-27 NOTE — PROGRESS NOTE ADULT - ASSESSMENT
Problem/Plan - 1:  ·  Problem: Neutropenic fever.  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 CXR negative. No SIRS criteria/sepsis  - IVF  - ID consult  - Repeat CBC with diff in AM.     Problem/Plan - 2:  ·  Problem: Gastroesophageal reflux disease, unspecified whether esophagitis present.  Plan: - Cont PPI.     Problem/Plan - 3:  ·  Problem: Coronary artery disease involving native coronary artery of native heart without angina pectoris.  Plan: - stable, cont statin, asa.     Problem/Plan - 4:  ·  Problem: Essential hypertension.  Plan: - continue Losartan/HCTZ with parameters.    Neutropenic fever.    Ampullary pancreatic cancer, recent peritoneal biopsy proven recurrence noted during hernia repair, s/p [place] and Nab Paclitaxel 7/20/21  - Received Zosyn and Neupogen PTA, Vanco given I ED  7/27  Continue Cefepime 2g q8h, f/u Cxs   appreciate ID help  discussed with Dr Duffy - to consider addn of neupogen       Gastroesophageal reflux disease  Cont PPI.     Coronary artery disease involving native coronary artery of native heart without angina pectoris  stable, cont statin, asa.     Essential hypertension.  continue Losartan/HCTZ with parameters.       7/27 discussed with Oncololgy, team at [facility]  DC Planning - home tomorrow

## 2021-07-27 NOTE — CONSULT NOTE ADULT - SUBJECTIVE AND OBJECTIVE BOX
Patient is a 71y old  Male who presents with a chief complaint of fever    HPI:  72 y/o Male with h/o ampullary pancreatic cancer s/p Whipple, on chemotherapy, medication-induced DM, CAD s/p stents, brain aneurysm s/p embolization, HTN, HLD, and GERD was admitted on  for neutropenic  fever. Pt sent in by oncologist Dr. Thomas ([facility] [place]) for fatigue, fever at home tmax 100.9F, fever lowered to 97 s/p ibuprofen. Pt is neutropenic with wbc at 1k on outpatient labs. Pt started on Zosyn and Neupogen administered at oncologist office on the day of admission. Pt recently started new chemo regimen 2 weeks PTA for pancreatic CA.. He denies any specific symptoms other than some chronic post nasal drip. In ER he received cefepime.     PMH: as above  PSH: as above  Meds: per reconciliation sheet, noted below  MEDICATIONS  (STANDING):  aspirin enteric coated 81 milliGRAM(s) Oral daily  cefepime   IVPB 2000 milliGRAM(s) IV Intermittent every 8 hours  cefepime   IVPB      dextrose 40% Gel 15 Gram(s) Oral once  dextrose 5%. 1000 milliLiter(s) (50 mL/Hr) IV Continuous <Continuous>  dextrose 5%. 1000 milliLiter(s) (100 mL/Hr) IV Continuous <Continuous>  dextrose 50% Injectable 25 Gram(s) IV Push once  dextrose 50% Injectable 12.5 Gram(s) IV Push once  dextrose 50% Injectable 25 Gram(s) IV Push once  enoxaparin Injectable 40 milliGRAM(s) SubCutaneous daily  glucagon  Injectable 1 milliGRAM(s) IntraMuscular once  hydrochlorothiazide 12.5 milliGRAM(s) Oral daily  insulin lispro (ADMELOG) corrective regimen sliding scale   SubCutaneous three times a day before meals  losartan 100 milliGRAM(s) Oral daily  melatonin 10 milliGRAM(s) Oral at bedtime  multivitamin 1 Tablet(s) Oral daily  pancrelipase (ZENPEP 20,000 Lipase Units) 4 Capsule(s) Oral three times a day with meals  pantoprazole    Tablet 40 milliGRAM(s) Oral before breakfast  rosuvastatin 5 milliGRAM(s) Oral once  rosuvastatin 5 milliGRAM(s) Oral at bedtime  sodium chloride 0.9%. 1000 milliLiter(s) (75 mL/Hr) IV Continuous <Continuous>    MEDICATIONS  (PRN):  acetaminophen   Tablet .. 650 milliGRAM(s) Oral every 6 hours PRN Temp greater or equal to 38.5C (101.3F), Mild Pain (1 - 3)  aluminum hydroxide/magnesium hydroxide/simethicone Suspension 30 milliLiter(s) Oral every 4 hours PRN Dyspepsia  ibuprofen  Tablet. 400 milliGRAM(s) Oral three times a day PRN Moderate Pain (4 - 6)  ondansetron Injectable 4 milliGRAM(s) IV Push every 8 hours PRN Nausea and/or Vomiting  pancrelipase (ZENPEP 20,000 Lipase Units) 2 Capsule(s) Oral three times a day with meals PRN SNACKS    Allergies    No Known Allergies    Intolerances      Social: no smoking, no alcohol, no illegal drugs; no recent travel, no exposure to TB  FAMILY HISTORY:  Family history of breast cancer in sister      no history of premature cardiovascular disease in first degree relatives    ROS: the patient denies HA, no seizures, no dizziness, no sore throat, no nasal congestion, no blurry vision, no CP, no palpitations, no SOB, no cough, no abdominal pain, no diarrhea, no N/V, no dysuria, no leg pain, no claudication, no rash, no joint aches, no rectal pain or bleeding, no night sweats; has increased weakness  All other systems reviewed and are negative    Vital Signs Last 24 Hrs  T(C): 36.6 (2021 06:40), Max: 36.9 (2021 21:59)  T(F): 97.8 (2021 06:40), Max: 98.4 (2021 21:59)  HR: 57 (2021 06:40) (57 - 88)  BP: 120/49 (2021 06:40) (116/70 - 138/77)  BP(mean): 79 (2021 20:25) (79 - 83)  RR: 18 (2021 06:40) (14 - 20)  SpO2: 100% (2021 06:40) (97% - 100%)  Daily Height in cm: 172.72 (2021 16:56)    Daily     PE:    Constitutional:  No acute distress  HEENT: NC/AT, EOMI, PERRLA, conjunctivae clear; ears and nose atraumatic; pharynx benign  Neck: supple; thyroid not palpable  Back: no tenderness  Respiratory: respiratory effort normal; clear to auscultation  Cardiovascular: S1S2 regular, no murmurs  Abdomen: soft, not tender, not distended, positive BS; no liver or spleen organomegaly  Genitourinary: no suprapubic tenderness  Lymphatic: no LN palpable  Musculoskeletal: no muscle tenderness, no joint swelling or tenderness  Extremities: no pedal edema  Neurological/ Psychiatric: AxOx3, judgement and insight normal; moving all extremities  Skin: no rashes; no palpable lesions    Labs: all available labs reviewed                        10.5   1.31  )-----------( 48       ( 2021 17:48 )             30.9         138  |  104  |  17  ----------------------------<  149<H>  3.4<L>   |  28  |  0.76    Ca    8.9      2021 17:48    TPro  6.7  /  Alb  3.5  /  TBili  1.8<H>  /  DBili  x   /  AST  29  /  ALT  63  /  AlkPhos  89       LIVER FUNCTIONS - ( 2021 17:48 )  Alb: 3.5 g/dL / Pro: 6.7 gm/dL / ALK PHOS: 89 U/L / ALT: 63 U/L / AST: 29 U/L / GGT: x           Urinalysis Basic - ( 2021 20:57 )    Color: Yellow / Appearance: Clear / S.005 / pH: x  Gluc: x / Ketone: Negative  / Bili: Negative / Urobili: Negative mg/dL   Blood: x / Protein: 15 mg/dL / Nitrite: Negative   Leuk Esterase: Negative / RBC: 0-2 /HPF / WBC 0-2   Sq Epi: x / Non Sq Epi: x / Bacteria: Negative    ( @ 17:48)  NotDete      Radiology: all available radiological tests reviewed    < from: Xray Chest 1 View- PORTABLE-Urgent (21 @ 17:33) >  No acute disease.    < end of copied text >      Advanced directives addressed: full resuscitation
: 72yo male patient of Dr. Thomas with ampullary adenocarcinoma,  pancreatobiliary type s/p resection s/p gem and Nab paclitaxel (LD 7/20/21). He presented 7/26   with reports of fever to 100.8 at home and general feeling of fatigue and unwell. Denies chills,  cough, or congestion. Ongoing back and rib pain relieved with tylenol or advil which he is using  infrequently. Po intake has been diminished.    Pt started on Zosyn and Neupogen administered at oncologist office PTA today. Pt recently started new chemo regimen 2 weeks ago for pancreatic CA.. He denies any specific symptoms other than some chronic post nasal drip. He denies recent travel or sick contacts. He lives with his wife who goes out to do errands.        Past Medical History:  Coronary artery disease  HTN  Hearing loss  Hyperlipidemia  Hypertension  Status post cardiac catheterization, PCI, left circumflex artery 2006. Two thousand seven repeat  catheterization showed nonobstructive disease.  Skin cancer, squamous cell carcinoma  Vertigo. (1)    Past Surgical History:  Craniotomy brain aneurysm 2007  Cerebral angiogram 2010  Laparoscopic cholecystectomy September 2020  Hernia repair 2019  Tonsillectomy  Wrist surgery April 2019 for trigger finger. (1)    Oncology History:  Ampullary adenocarcinoma status post Whipple resection, 5/18 lymph nodes involved. pT3b N2  0. IHC for MMR protein staining intact. IHC focal weak staining CDX2, CK 20-, MUC1 positive.  Negative margins. 9/15/20, Dr. Betty Sanchez, [place]  11/24/20 to 4/27/21 Treatment: adjuvant FOLFIRINOX 12 cycles  oxali held following C6 due to poor tolerance  July 2021 imaging with suspicion for recurrence, though not definitive, noted to have peritoneal  recurrence at time of hernia repair, biopsy-proven peritoneal disease metastatic adenocarcinoma.        Tobacco Use:  Former smoker.(1)  Details: 0.25 packs of cigarettes a day for 10 years. Quit in 1976.  Social History:  Drink 1 glass of wine, 4-5 shots liquor per week, none since the [place]. Retired police office.    ALLERGIES:  · No known allergies:    Home Medication List:  · Zenpep 15,000 units-47,000 units-63,000 units oral delayed release capsule: 3  cap(s) orally 3 times a day x 30 days prior to meals and one cap prior to snacks  · Marinol 2.5 mg oral capsule: 1 cap(s) orally 2 times a day x 30 days MDD:2 tabs , prior  to lunch and dinner for appetite  · ondansetron 8 mg oral tablet: 1 tab(s) orally every 8 hours x 20 days  · HumaLOG 100 units/mL subcutaneous solution:  · Tums 500 mg oral tablet, chewable: 1 tab(s) orally once a day, As Needed  · aspirin 81 mg oral tablet: 81 milligram(s) orally once a day (at bedtime)  · losartan-hydrochlorothiazide 100mg-12.5mg oral tablet: 1 tab(s) orally once a day  · omeprazole 20 mg oral delayed release capsule: 1 cap(s) orally once a day    Current meds  acetaminophen   Tablet .. 650 milliGRAM(s) Oral every 6 hours PRN  aluminum hydroxide/magnesium hydroxide/simethicone Suspension 30 milliLiter(s) Oral every 4 hours PRN  aspirin enteric coated 81 milliGRAM(s) Oral daily  cefepime   IVPB 2000 milliGRAM(s) IV Intermittent every 8 hours  cefepime   IVPB      enoxaparin Injectable 40 milliGRAM(s) SubCutaneous daily  glucagon  Injectable 1 milliGRAM(s) IntraMuscular once  hydrochlorothiazide 12.5 milliGRAM(s) Oral daily  ibuprofen  Tablet. 400 milliGRAM(s) Oral three times a day PRN  insulin lispro (ADMELOG) corrective regimen sliding scale   SubCutaneous three times a day before meals  losartan 100 milliGRAM(s) Oral daily  melatonin 10 milliGRAM(s) Oral at bedtime  multivitamin 1 Tablet(s) Oral daily  ondansetron Injectable 4 milliGRAM(s) IV Push every 8 hours PRN  pancrelipase (ZENPEP 20,000 Lipase Units) 4 Capsule(s) Oral three times a day with meals  pancrelipase (ZENPEP 20,000 Lipase Units) 2 Capsule(s) Oral three times a day with meals PRN  pantoprazole    Tablet 40 milliGRAM(s) Oral before breakfast  rosuvastatin 5 milliGRAM(s) Oral once  rosuvastatin 5 milliGRAM(s) Oral at bedtime  sodium chloride 0.9%. 1000 milliLiter(s) IV Continuous <Continuous>      No Known Allergies      ROS otherwise negative     T(C): 36.6 (07-27-21 @ 06:40), Max: 36.9 (07-26-21 @ 21:59)  HR: 57 (07-27-21 @ 06:40) (57 - 88)  BP: 120/49 (07-27-21 @ 06:40) (116/70 - 138/77)  RR: 18 (07-27-21 @ 06:40) (14 - 20)  SpO2: 100% (07-27-21 @ 06:40) (97% - 100%)  PHYSICAL EXAM  Gen:  laying in bed, nad  H:  anicteric, eomi  Ext:  no edema                          10.5   1.31  )-----------( 48       ( 26 Jul 2021 17:48 )             30.9   07-26    138  |  104  |  17  ----------------------------<  149<H>  3.4<L>   |  28  |  0.76    Ca    8.9      26 Jul 2021 17:48    TPro  6.7  /  Alb  3.5  /  TBili  1.8<H>  /  DBili  x   /  AST  29  /  ALT  63  /  AlkPhos  89  07-26    < from: Xray Chest 1 View- PORTABLE-Urgent (07.26.21 @ 17:33) >  IMPRESSION: No acute disease.    < end of copied text >

## 2021-07-27 NOTE — PROGRESS NOTE ADULT - SUBJECTIVE AND OBJECTIVE BOX
CHIEF COMPLAINT:    SUBJECTIVE:         Vital Signs Last 24 Hrs  T(F): 97.8 (27 Jul 2021 09:24), Max: 98.4 (26 Jul 2021 21:59)  HR: 62 (27 Jul 2021 09:24) (57 - 88)  BP: 123/61 (27 Jul 2021 09:24) (116/70 - 138/77)  RR: 18 (27 Jul 2021 09:24) (14 - 20)  SpO2: 100% (27 Jul 2021 09:24) (97% - 100%)  PHYSICAL EXAM:  Constitutional: NAD, awake and alert, well-developed  HEENT: PERR, EOMI  Neck: Soft and supple,  No JVD  Respiratory: Breath sounds are clear bilaterally, No wheezing, rales or rhonchi  Cardiovascular: S1 and S2, regular rate and rhythm, no Murmurs  Gastrointestinal: Bowel Sounds present, soft, nontender, nondistended  Extremities: No peripheral edema  Vascular: 2+ peripheral pulses  Neurological: A/O x 3, no focal deficits  Musculoskeletal: 5/5 strength b/l upper and lower extremities  Skin: No rashes    MEDICATIONS:  MEDICATIONS  (STANDING):  aspirin enteric coated 81 milliGRAM(s) Oral daily  cefepime   IVPB 2000 milliGRAM(s) IV Intermittent every 8 hours  cefepime   IVPB      dextrose 40% Gel 15 Gram(s) Oral once  dextrose 5%. 1000 milliLiter(s) (50 mL/Hr) IV Continuous <Continuous>  dextrose 5%. 1000 milliLiter(s) (100 mL/Hr) IV Continuous <Continuous>  dextrose 50% Injectable 25 Gram(s) IV Push once  dextrose 50% Injectable 12.5 Gram(s) IV Push once  dextrose 50% Injectable 25 Gram(s) IV Push once  glucagon  Injectable 1 milliGRAM(s) IntraMuscular once  hydrochlorothiazide 12.5 milliGRAM(s) Oral daily  insulin lispro (ADMELOG) corrective regimen sliding scale   SubCutaneous three times a day before meals  losartan 100 milliGRAM(s) Oral daily  melatonin 10 milliGRAM(s) Oral at bedtime  multivitamin 1 Tablet(s) Oral daily  pancrelipase (ZENPEP 20,000 Lipase Units) 4 Capsule(s) Oral three times a day with meals  pantoprazole    Tablet 40 milliGRAM(s) Oral before breakfast  rosuvastatin 5 milliGRAM(s) Oral once  rosuvastatin 5 milliGRAM(s) Oral at bedtime  sodium chloride 0.9%. 1000 milliLiter(s) (75 mL/Hr) IV Continuous <Continuous>      LABS: All Labs Reviewed:                      9.0    2.20  )-----------( 33       ( 27 Jul 2021 08:20 )             25.7   138  |  104  |  17  ----------------------------<  149<H>  3.4<L>   |  28  |  0.76  Ca    8.9      26 Jul 2021 17:48  TPro  6.7  /  Alb  3.5  /  TBili  1.8<H>  /  DBili  x   /  AST  29  /  ALT  63  /  AlkPhos  89  07-26  PT/INR - ( 26 Jul 2021 17:48 )   PT: 12.8 sec;   INR: 1.11 ratio    PTT - ( 26 Jul 2021 17:48 )  PTT:31.7 sec      RADIOLOGY/EKG:    DVT PPX:    ADVANCED DIRECTIVE:    DISPOSITION: CHIEF COMPLAINT:70 yo M with a PMH ampullary pancreatic cancer (s/p Whipple, on chemotherapy), medication-induced DM, CAD s/p stents, brain aneurysm s/p embolization, HTN, HLD, and GERD who presents from Oncologists office with neutropenic  fever. Pt sent in by oncologist Dr. Thomas ([facility] [place]) for +fatigue, +fever at home tmax 100.9, fever lowered to 97 s/p ibuprofen. Pt is +neutropenic with wbc at 1k on outpatient labs. Pt started on Zosyn and Neupogen administered at oncologist office PTA today. Pt recently started new chemo regimen 2 weeks ago for pancreatic CA.. He denies any specific symptoms other than some chronic post nasal drip. He denies recent travel or sick contacts. He lives with his wife who goes out to do errands.    7/27 - no cp palps sob cough odynophagia abdo distress etc; ambualting in hallway     Vital Signs Last 24 Hrs  T(F): 97.8 (27 Jul 2021 09:24), Max: 98.4 (26 Jul 2021 21:59)  HR: 62 (27 Jul 2021 09:24) (57 - 88)  BP: 123/61 (27 Jul 2021 09:24) (116/70 - 138/77)  RR: 18 (27 Jul 2021 09:24) (14 - 20)  SpO2: 100% (27 Jul 2021 09:24) (97% - 100%)  PHYSICAL EXAM:  Constitutional: NAD, awake and alert, well-developed  HEENT: PERR, EOMI  Neck: Soft and supple,  No JVD  Respiratory: Breath sounds are clear bilaterally, No wheezing, rales or rhonchi  Cardiovascular: S1 and S2, regular rate and rhythm, no Murmurs  Gastrointestinal: Bowel Sounds present, soft, nontender, nondistended  Extremities: No peripheral edema  Vascular: 2+ peripheral pulses  Neurological: A/O x 3, no focal deficits  Musculoskeletal: 5/5 strength b/l upper and lower extremities  Skin: No rashes    MEDICATIONS:  MEDICATIONS  (STANDING):  aspirin enteric coated 81 milliGRAM(s) Oral daily  cefepime   IVPB 2000 milliGRAM(s) IV Intermittent every 8 hours  cefepime   IVPB      hydrochlorothiazide 12.5 milliGRAM(s) Oral daily  insulin lispro (ADMELOG) corrective regimen sliding scale   SubCutaneous three times a day before meals  losartan 100 milliGRAM(s) Oral daily  melatonin 10 milliGRAM(s) Oral at bedtime  multivitamin 1 Tablet(s) Oral daily  pancrelipase (ZENPEP 20,000 Lipase Units) 4 Capsule(s) Oral three times a day with meals  pantoprazole    Tablet 40 milliGRAM(s) Oral before breakfast  rosuvastatin 5 milliGRAM(s) Oral once  rosuvastatin 5 milliGRAM(s) Oral at bedtime  sodium chloride 0.9%. 1000 milliLiter(s) (75 mL/Hr) IV Continuous <Continuous>      LABS: All Labs Reviewed:                      9.0    2.20  )-----------( 33       ( 27 Jul 2021 08:20 )             25.7   138  |  104  |  17  ----------------------------<  149<H>  3.4<L>   |  28  |  0.76  Ca    8.9      26 Jul 2021 17:48    RADIOLOGY/EKG:  < from: Xray Chest 1 View- PORTABLE-Urgent (07.26.21 @ 17:33) >  Right chest wall infusion port with the tip in the SVC.  The cardiac silhouette is within normal limits. The lungs are clear. No pleural abnormality.

## 2021-07-27 NOTE — CONSULT NOTE ADULT - ASSESSMENT
72 y/o Male with h/o ampullary pancreatic cancer s/p Whipple, on chemotherapy, medication-induced DM, CAD s/p stents, brain aneurysm s/p embolization, HTN, HLD, and GERD was admitted on 7/26 for neutropenic  fever. Pt [place] [place], fever at home tmax 100.9F, fever lowered to 97 s/p ibuprofen. Pt is neutropenic with wbc at 1k on outpatient labs. Pt started on Zosyn and Neupogen administered at oncologist office on the day of admission. Pt recently started new chemo regimen 2 weeks PTA for pancreatic CA.. He denies any specific symptoms other than some chronic post nasal drip. In ER he received cefepime.     1. Febrile syndrome. Neutropenia. Pancreatic Ca s/p chemotherapy.  -pancytopenia  -obtain BC x 2  -agree with cefepime 2 gm IV q8h  -reason for abx use and side effects reviewed with patient; monitor BMP   -neupogen per oncology  -old chart reviewed to assess prior cultures  -monitor temps  -f/u CBC  -supportive care  2. Other issues:   -care per medicine

## 2021-07-28 ENCOUNTER — TRANSCRIPTION ENCOUNTER (OUTPATIENT)
Age: 71
End: 2021-07-28

## 2021-07-28 VITALS
RESPIRATION RATE: 19 BRPM | OXYGEN SATURATION: 99 % | SYSTOLIC BLOOD PRESSURE: 149 MMHG | TEMPERATURE: 98 F | HEART RATE: 54 BPM | DIASTOLIC BLOOD PRESSURE: 66 MMHG

## 2021-07-28 LAB
ANION GAP SERPL CALC-SCNC: 5 MMOL/L — SIGNIFICANT CHANGE UP (ref 5–17)
BASOPHILS # BLD AUTO: 0.03 K/UL — SIGNIFICANT CHANGE UP (ref 0–0.2)
BASOPHILS NFR BLD AUTO: 1 % — SIGNIFICANT CHANGE UP (ref 0–2)
BUN SERPL-MCNC: 10 MG/DL — SIGNIFICANT CHANGE UP (ref 7–23)
CALCIUM SERPL-MCNC: 8.7 MG/DL — SIGNIFICANT CHANGE UP (ref 8.5–10.1)
CHLORIDE SERPL-SCNC: 111 MMOL/L — HIGH (ref 96–108)
CO2 SERPL-SCNC: 27 MMOL/L — SIGNIFICANT CHANGE UP (ref 22–31)
CREAT SERPL-MCNC: 0.54 MG/DL — SIGNIFICANT CHANGE UP (ref 0.5–1.3)
EOSINOPHIL # BLD AUTO: 0.1 K/UL — SIGNIFICANT CHANGE UP (ref 0–0.5)
EOSINOPHIL NFR BLD AUTO: 3 % — SIGNIFICANT CHANGE UP (ref 0–6)
GLUCOSE SERPL-MCNC: 106 MG/DL — HIGH (ref 70–99)
HCT VFR BLD CALC: 26 % — LOW (ref 39–50)
HGB BLD-MCNC: 9 G/DL — LOW (ref 13–17)
LYMPHOCYTES # BLD AUTO: 1.17 K/UL — SIGNIFICANT CHANGE UP (ref 1–3.3)
LYMPHOCYTES # BLD AUTO: 37 % — SIGNIFICANT CHANGE UP (ref 13–44)
MAGNESIUM SERPL-MCNC: 1.2 MG/DL — LOW (ref 1.6–2.6)
MCHC RBC-ENTMCNC: 31.6 PG — SIGNIFICANT CHANGE UP (ref 27–34)
MCHC RBC-ENTMCNC: 34.6 GM/DL — SIGNIFICANT CHANGE UP (ref 32–36)
MCV RBC AUTO: 91.2 FL — SIGNIFICANT CHANGE UP (ref 80–100)
MONOCYTES # BLD AUTO: 0.25 K/UL — SIGNIFICANT CHANGE UP (ref 0–0.9)
MONOCYTES NFR BLD AUTO: 8 % — SIGNIFICANT CHANGE UP (ref 2–14)
NEUTROPHILS # BLD AUTO: 1.62 K/UL — LOW (ref 1.8–7.4)
NEUTROPHILS NFR BLD AUTO: 51 % — SIGNIFICANT CHANGE UP (ref 43–77)
NRBC # BLD: SIGNIFICANT CHANGE UP /100 WBCS (ref 0–0)
PLATELET # BLD AUTO: 36 K/UL — LOW (ref 150–400)
POTASSIUM SERPL-MCNC: 3.5 MMOL/L — SIGNIFICANT CHANGE UP (ref 3.5–5.3)
POTASSIUM SERPL-SCNC: 3.5 MMOL/L — SIGNIFICANT CHANGE UP (ref 3.5–5.3)
RBC # BLD: 2.85 M/UL — LOW (ref 4.2–5.8)
RBC # FLD: 12.4 % — SIGNIFICANT CHANGE UP (ref 10.3–14.5)
SODIUM SERPL-SCNC: 143 MMOL/L — SIGNIFICANT CHANGE UP (ref 135–145)
WBC # BLD: 3.17 K/UL — LOW (ref 3.8–10.5)
WBC # FLD AUTO: 3.17 K/UL — LOW (ref 3.8–10.5)

## 2021-07-28 PROCEDURE — 99239 HOSP IP/OBS DSCHRG MGMT >30: CPT

## 2021-07-28 RX ORDER — MAGNESIUM SULFATE 500 MG/ML
2 VIAL (ML) INJECTION ONCE
Refills: 0 | Status: COMPLETED | OUTPATIENT
Start: 2021-07-28 | End: 2021-07-28

## 2021-07-28 RX ORDER — IBUPROFEN 200 MG
1 TABLET ORAL
Qty: 0 | Refills: 0 | DISCHARGE

## 2021-07-28 RX ORDER — CEFUROXIME AXETIL 250 MG
1 TABLET ORAL
Qty: 14 | Refills: 0
Start: 2021-07-28 | End: 2021-08-03

## 2021-07-28 RX ADMIN — Medication 12.5 MILLIGRAM(S): at 08:47

## 2021-07-28 RX ADMIN — PANTOPRAZOLE SODIUM 40 MILLIGRAM(S): 20 TABLET, DELAYED RELEASE ORAL at 08:47

## 2021-07-28 RX ADMIN — Medication 4 CAPSULE(S): at 11:56

## 2021-07-28 RX ADMIN — Medication 81 MILLIGRAM(S): at 08:46

## 2021-07-28 RX ADMIN — LOSARTAN POTASSIUM 100 MILLIGRAM(S): 100 TABLET, FILM COATED ORAL at 08:47

## 2021-07-28 RX ADMIN — Medication 4 CAPSULE(S): at 08:37

## 2021-07-28 RX ADMIN — SODIUM CHLORIDE 75 MILLILITER(S): 9 INJECTION INTRAMUSCULAR; INTRAVENOUS; SUBCUTANEOUS at 05:57

## 2021-07-28 RX ADMIN — CEFEPIME 100 MILLIGRAM(S): 1 INJECTION, POWDER, FOR SOLUTION INTRAMUSCULAR; INTRAVENOUS at 13:27

## 2021-07-28 RX ADMIN — Medication 50 GRAM(S): at 10:55

## 2021-07-28 RX ADMIN — CEFEPIME 100 MILLIGRAM(S): 1 INJECTION, POWDER, FOR SOLUTION INTRAMUSCULAR; INTRAVENOUS at 05:58

## 2021-07-28 RX ADMIN — Medication 1 TABLET(S): at 08:47

## 2021-07-28 NOTE — PROGRESS NOTE ADULT - SUBJECTIVE AND OBJECTIVE BOX
INTERVAL HPI/OVERNIGHT EVENTS:  Patient S&E at bedside. No o/n events, patient resting comfortably.     VITAL SIGNS:  T(F): 97.8 (21 @ 20:20)  HR: 67 (21 @ 20:20)  BP: 119/59 (21 @ 20:20)  RR: 18 (21 @ 20:20)  SpO2: 100% (21 @ 20:20)  Wt(kg): --    PHYSICAL EXAM:    Constitutional: NAD  Eyes: EOMI, sclera non-icteric  Neck: supple, no masses, no JVD  Respiratory: CTA b/l, good air entry b/l  Cardiovascular: RRR, no M/R/G  Gastrointestinal: soft, NTND, no masses palpable, + BS, no hepatosplenomegaly  Extremities: no c/c/e  Neurological: AAOx3      MEDICATIONS  (STANDING):  aspirin enteric coated 81 milliGRAM(s) Oral daily  cefepime   IVPB 2000 milliGRAM(s) IV Intermittent every 8 hours  cefepime   IVPB      dextrose 40% Gel 15 Gram(s) Oral once  dextrose 5%. 1000 milliLiter(s) (50 mL/Hr) IV Continuous <Continuous>  dextrose 5%. 1000 milliLiter(s) (100 mL/Hr) IV Continuous <Continuous>  dextrose 50% Injectable 25 Gram(s) IV Push once  dextrose 50% Injectable 12.5 Gram(s) IV Push once  dextrose 50% Injectable 25 Gram(s) IV Push once  glucagon  Injectable 1 milliGRAM(s) IntraMuscular once  hydrochlorothiazide 12.5 milliGRAM(s) Oral daily  insulin lispro (ADMELOG) corrective regimen sliding scale   SubCutaneous three times a day before meals  losartan 100 milliGRAM(s) Oral daily  melatonin 10 milliGRAM(s) Oral at bedtime  multivitamin 1 Tablet(s) Oral daily  pancrelipase (ZENPEP 20,000 Lipase Units) 4 Capsule(s) Oral three times a day with meals  pantoprazole    Tablet 40 milliGRAM(s) Oral before breakfast  rosuvastatin 5 milliGRAM(s) Oral once  rosuvastatin 5 milliGRAM(s) Oral at bedtime  sodium chloride 0.9%. 1000 milliLiter(s) (75 mL/Hr) IV Continuous <Continuous>    MEDICATIONS  (PRN):  acetaminophen   Tablet .. 650 milliGRAM(s) Oral every 6 hours PRN Temp greater or equal to 38.5C (101.3F), Mild Pain (1 - 3)  aluminum hydroxide/magnesium hydroxide/simethicone Suspension 30 milliLiter(s) Oral every 4 hours PRN Dyspepsia  ibuprofen  Tablet. 400 milliGRAM(s) Oral three times a day PRN Moderate Pain (4 - 6)  ondansetron Injectable 4 milliGRAM(s) IV Push every 8 hours PRN Nausea and/or Vomiting  pancrelipase (ZENPEP 20,000 Lipase Units) 2 Capsule(s) Oral three times a day with meals PRN SNACKS      Allergies    No Known Allergies    Intolerances        LABS:                        9.0    3.17  )-----------( x        ( 2021 07:35 )             26.0         143  |  111<H>  |  10  ----------------------------<  106<H>  3.5   |  27  |  0.54    Ca    8.7      2021 07:35  Mg     1.2         TPro  6.7  /  Alb  3.5  /  TBili  1.8<H>  /  DBili  x   /  AST  29  /  ALT  63  /  AlkPhos  89      PT/INR - ( 2021 17:48 )   PT: 12.8 sec;   INR: 1.11 ratio         PTT - ( 2021 17:48 )  PTT:31.7 sec  Urinalysis Basic - ( 2021 20:57 )    Color: Yellow / Appearance: Clear / S.005 / pH: x  Gluc: x / Ketone: Negative  / Bili: Negative / Urobili: Negative mg/dL   Blood: x / Protein: 15 mg/dL / Nitrite: Negative   Leuk Esterase: Negative / RBC: 0-2 /HPF / WBC 0-2   Sq Epi: x / Non Sq Epi: x / Bacteria: Negative        RADIOLOGY & ADDITIONAL TESTS:  Studies reviewed.    ASSESSMENT & PLAN:

## 2021-07-28 NOTE — PROGRESS NOTE ADULT - ASSESSMENT
72yo male patient of Dr. Thomas with ampullary adenocarcinoma,  pancreatobiliary type s/p resection s/p gem and Nab paclitaxel (LD 7/20/21). He presented 7/26   with reports of fever to 100.8 at home and general feeling of fatigue and unwell.   noted to be neutrepenic    #Febrile neutropenia  -c/w cefepime  -ID eval appreciated   -f/u cultures - ALL CULTURE URINE AND BLOOD NEG  -daily CBC and start G- mcg daily   - Diff pending continue with GCSF  - CXR neg      #Pancyteopenia  -likely chemo related  -transfuse plts if < 15 K prbc if Hgb < 7  -daily cbc   70yo male patient of Dr. Thomas with ampullary adenocarcinoma,  pancreatobiliary type s/p resection s/p gem and Nab paclitaxel (LD 7/20/21). He presented 7/26   with reports of fever to 100.8 at home and general feeling of fatigue and unwell.   noted to be neutrepenic    #Febrile neutropenia  -c/w cefepime  -ID eval appreciated   -f/u cultures - ALL CULTURE URINE AND BLOOD NEG  -daily CBC and start G- mcg daily   - Diff pending continue with GCSF  - CXR neg    - WOULD BE OK for discharge and d/c home on CEFTIN   - ANC 1600 discussed case with Dr WINKLER  #Pancyteopenia  -likely chemo related  -transfuse plts if < 15 K prbc if Hgb < 7  -daily cbc

## 2021-07-28 NOTE — DISCHARGE NOTE PROVIDER - HOSPITAL COURSE
72 yo M with a PMH ampullary pancreatic cancer (s/p Whipple, on chemotherapy), medication-induced DM, CAD s/p stents, brain aneurysm s/p embolization, HTN, HLD, and GERD who presents on 7/26/21  from Oncologists office with neutropenic  fever. Pt sent in by oncologist Dr. Thomas ([facility] for +fatigue, +fever at home tmax 100.9, fever lowered to 97 s/p ibuprofen. Pt is +neutropenic with wbc at 1k on outpatient labs. Pt started on Zosyn and Neupogen administered at oncologist office PTA today. Pt recently started new chemo regimen 2 weeks ago for pancreatic CA.. He denies any specific symptoms other than some chronic post nasal drip. He denies recent travel or sick contacts. He lives with his wife who goes out to do errands.    7/27 - no cp palps sob cough odynophagia abdo distress etc; ambualting in hallway   7/28 - pt seen and examined, denies cp, dyspnea, tolerating po intake, afebrile, denies diarrhea , plan discussed   Review of system- Rest of the review of system are negative except mentioned in HPI    Vital Signs Last 24 Hrs  T(C): 36.6 (07-28-21 @ 08:40), Max: 36.6 (07-27-21 @ 20:20)  T(F): 97.9 (07-28-21 @ 08:40), Max: 97.9 (07-28-21 @ 08:40)  HR: 54 (07-28-21 @ 08:40) (54 - 67)  BP: 149/66 (07-28-21 @ 08:40) (119/59 - 149/66)  RR: 19 (07-28-21 @ 08:40) (18 - 19)  SpO2: 99% (07-28-21 @ 08:40) (99% - 100%)  Wt(kg): --    PHYSICAL EXAM:  Constitutional: NAD, awake and alert, well-developed  HEENT: PERR, EOMI  Neck: Soft and supple,  No JVD  Respiratory: Breath sounds are clear bilaterally, No wheezing, rales or rhonchi  Cardiovascular: S1 and S2, regular rate and rhythm, no Murmurs  Gastrointestinal: Bowel Sounds present, soft, nontender, nondistended  Extremities: No peripheral edema  Vascular: 2+ peripheral pulses  Neurological: A/O x 3, no focal deficits  Musculoskeletal: 5/5 strength b/l upper and lower extremities  Skin: No rashes    · Assessment    Neutropenic fever.    Ampullary pancreatic cancer, recent peritoneal biopsy proven recurrence noted during hernia repair, s/p [place] Paclitaxel 7/20/21  - s/p  Zosyn and Neupogen PTA, Vanco given I ED  - c/w  Cefepime 2g q8h, f/u Cxs  - neg , po ceftin 500 bid for 7 days  appreciate ID help   discussed with Dr Duffy - to consider addn of neupogen   Gastroesophageal reflux disease Cont PPI.   Coronary artery disease involving native coronary artery of native heart without angina pectoris  stable, cont statin, asa.   Essential hypertension.  continue Losartan/HCTZ with parameters.      discussed with Oncololgy, team at [facility]  Disposition - medically optimized to be discharged home with close follow up with PCP  complete antibiotics  return to ED if fever, abdominal pain, nausea, vomiting, chest pain, dyspnea  Discharge plan discussed with patient, RN  Patient advised to follow up with PCP within 3-7 days  time spend 40 min  Discharge note faxed to PCP with my contact information to call me back   PCP within 1 week

## 2021-07-28 NOTE — PROGRESS NOTE ADULT - SUBJECTIVE AND OBJECTIVE BOX
Date of service: 21 @ 09:02    Lying in bed in NAD  Feel better  Fever is down    ROS: denies dizziness, no HA, no SOB or cough, no abdominal pain, no diarrhea or constipation; no dysuria, no legs pain, no rashes    MEDICATIONS  (STANDING):  aspirin enteric coated 81 milliGRAM(s) Oral daily  cefepime   IVPB 2000 milliGRAM(s) IV Intermittent every 8 hours  cefepime   IVPB      dextrose 40% Gel 15 Gram(s) Oral once  dextrose 5%. 1000 milliLiter(s) (50 mL/Hr) IV Continuous <Continuous>  dextrose 5%. 1000 milliLiter(s) (100 mL/Hr) IV Continuous <Continuous>  dextrose 50% Injectable 25 Gram(s) IV Push once  dextrose 50% Injectable 12.5 Gram(s) IV Push once  dextrose 50% Injectable 25 Gram(s) IV Push once  glucagon  Injectable 1 milliGRAM(s) IntraMuscular once  hydrochlorothiazide 12.5 milliGRAM(s) Oral daily  insulin lispro (ADMELOG) corrective regimen sliding scale   SubCutaneous three times a day before meals  losartan 100 milliGRAM(s) Oral daily  magnesium sulfate  IVPB 2 Gram(s) IV Intermittent once  melatonin 10 milliGRAM(s) Oral at bedtime  multivitamin 1 Tablet(s) Oral daily  pancrelipase (ZENPEP 20,000 Lipase Units) 4 Capsule(s) Oral three times a day with meals  pantoprazole    Tablet 40 milliGRAM(s) Oral before breakfast  rosuvastatin 5 milliGRAM(s) Oral once  rosuvastatin 5 milliGRAM(s) Oral at bedtime  sodium chloride 0.9%. 1000 milliLiter(s) (75 mL/Hr) IV Continuous <Continuous>    Vital Signs Last 24 Hrs  T(C): 36.6 (2021 08:40), Max: 36.6 (2021 09:24)  T(F): 97.9 (2021 08:40), Max: 97.9 (2021 08:40)  HR: 54 (2021 08:40) (54 - 67)  BP: 149/66 (2021 08:40) (119/59 - 149/66)  BP(mean): --  RR: 19 (2021 08:40) (18 - 19)  SpO2: 99% (2021 08:40) (99% - 100%)     Physical exam:    Constitutional:  No acute distress  HEENT: NC/AT, EOMI, PERRLA, conjunctivae clear  Neck: supple; thyroid not palpable  Back: no tenderness  Respiratory: respiratory effort normal; clear to auscultation  Cardiovascular: S1S2 regular, no murmurs  Abdomen: soft, not tender, not distended, positive BS  Genitourinary: no suprapubic tenderness  Lymphatic: no LN palpable  Musculoskeletal: no muscle tenderness, no joint swelling or tenderness  Extremities: no pedal edema  Neurological/ Psychiatric: AxOx3, moving all extremities  Skin: no rashes; no palpable lesions    Labs: reviewed                        9.0    3.17  )-----------( x        ( 2021 07:35 )             26.0     0728    143  |  111<H>  |  10  ----------------------------<  106<H>  3.5   |  27  |  0.54    Ca    8.7      2021 07:35  Mg     1.2         TPro  6.7  /  Alb  3.5  /  TBili  1.8<H>  /  DBili  x   /  AST  29  /  ALT  63  /  AlkPhos  89                          10.5   1.31  )-----------( 48       ( 2021 17:48 )             30.9     07-26    138  |  104  |  17  ----------------------------<  149<H>  3.4<L>   |  28  |  0.76    Ca    8.9      2021 17:48    TPro  6.7  /  Alb  3.5  /  TBili  1.8<H>  /  DBili  x   /  AST  29  /  ALT  63  /  AlkPhos  89       LIVER FUNCTIONS - ( 2021 17:48 )  Alb: 3.5 g/dL / Pro: 6.7 gm/dL / ALK PHOS: 89 U/L / ALT: 63 U/L / AST: 29 U/L / GGT: x           Urinalysis Basic - ( 2021 20:57 )    Color: Yellow / Appearance: Clear / S.005 / pH: x  Gluc: x / Ketone: Negative  / Bili: Negative / Urobili: Negative mg/dL   Blood: x / Protein: 15 mg/dL / Nitrite: Negative   Leuk Esterase: Negative / RBC: 0-2 /HPF / WBC 0-2   Sq Epi: x / Non Sq Epi: x / Bacteria: Negative    ( @ 17:48)  NotDete      Culture - Urine (collected 2021 20:57)  Source: .Urine None  Final Report (2021 21:31):    No growth    Culture - Blood (collected 2021 18:27)  Source: .Blood None  Preliminary Report (2021 02:01):    No growth to date.    Culture - Blood (collected 2021 17:48)  Source: .Blood Blood-Peripheral  Preliminary Report (2021 01:02):    No growth to date.    Radiology: all available radiological tests reviewed    < from: Xray Chest 1 View- PORTABLE-Urgent (21 @ 17:33) >  No acute disease.    < end of copied text >      Advanced directives addressed: full resuscitation

## 2021-07-28 NOTE — DISCHARGE NOTE PROVIDER - NSDCMRMEDTOKEN_GEN_ALL_CORE_FT
Aspirin Enteric Coated 81 mg oral delayed release tablet: 1 tab(s) orally once a day  cefuroxime 500 mg oral tablet: 1 tab(s) orally 2 times a day   diphenhydrAMINE 25 mg oral capsule: 1 cap(s) orally once a day (at bedtime)  HumaLOG 100 units/mL injectable solution: Use as needed after a high carb/sugar meal  losartan-hydrochlorothiazide 50mg-12.5mg oral tablet: 1 tab(s) orally once a day  Melatonin 10 mg oral capsule: 1 cap(s) orally once a day (at bedtime)  Multiple Vitamins oral tablet: 1 tab(s) orally once a day  omeprazole 20 mg oral delayed release capsule: 1 cap(s) orally 1-2 times daily  ondansetron 8 mg oral tablet: 1 tab(s) orally 3 times a day, As Needed - for nausea  rosuvastatin 5 mg oral tablet: 1 tab(s) orally once a day (in the evening)  Tylenol Extra Strength 500 mg oral tablet: 1 tab(s) orally every 8 hours, As Needed for pain  Zenpep 40,000 units-126,000 units-168,000 units oral delayed release capsule: 2 cap(s) orally 3 times a day before meals  Zenpep 40,000 units-126,000 units-168,000 units oral delayed release capsule: 1 cap(s) orally with snacks

## 2021-07-28 NOTE — PROGRESS NOTE ADULT - ASSESSMENT
70 y/o Male with h/o ampullary pancreatic cancer s/p Whipple, on chemotherapy, medication-induced DM, CAD s/p stents, brain aneurysm s/p embolization, HTN, HLD, and GERD was admitted on 7/26 for neutropenic  fever. Pt sent in by oncologist Dr. Thomas ([facility]) for fatigue, fever at home tmax 100.9F, fever lowered to 97 s/p ibuprofen. Pt is neutropenic with wbc at 1k on outpatient labs. Pt started on Zosyn and Neupogen administered at oncologist office on the day of admission. Pt recently started new chemo regimen 2 weeks PTA for pancreatic CA.. He denies any specific symptoms other than some chronic post nasal drip. In ER he received cefepime.     1. Febrile syndrome. Neutropenia. Pancreatic Ca s/p chemotherapy.  -pancytopenia  -BC x 2 are negative to date  -on cefepime 2 gm IV q8h # 2  -tolerating abx well so far; no side effects noted  -neupogen per oncology  -neutropenia resolving; no further fever  -may change abx to ceftin 500 mg PO q12h for 7 more days  -monitor temps  -f/u CBC  -supportive care  2. Other issues:   -care per medicine

## 2021-07-28 NOTE — DISCHARGE NOTE PROVIDER - CARE PROVIDER_API CALL
Ariel Amaro  [facility]  [address] [place] [address]  Phone: [phone]  Fax: [phone]  Follow Up Time: 1 week    Wally Castro)  Internal Medicine  [address]  [place] [address]  Phone: [phone]  Fax: [phone]  Follow Up Time: 1-3 days

## 2021-07-28 NOTE — DISCHARGE NOTE PROVIDER - PROVIDER TOKENS
PROVIDER:[TOKEN:[[id number]],FOLLOWUP:[1 week]],PROVIDER:[TOKEN:[[id number]],FOLLOWUP:[1-3 days]]

## 2021-07-28 NOTE — DISCHARGE NOTE PROVIDER - NSDCCPCAREPLAN_GEN_ALL_CORE_FT
PRINCIPAL DISCHARGE DIAGNOSIS  Diagnosis: Neutropenic fever  Assessment and Plan of Treatment: complete antibiotics, follow up with PCP within 1 week      SECONDARY DISCHARGE DIAGNOSES  Diagnosis: Malignant neoplasm of pancreas, unspecified location of malignancy  Assessment and Plan of Treatment: follow up with oncologist within 1 week

## 2021-07-28 NOTE — DISCHARGE NOTE NURSING/CASE MANAGEMENT/SOCIAL WORK - PATIENT PORTAL LINK FT
You can access the FollowMyHealth Patient Portal offered by [facility] by registering at the following website: [email or web address]. By joining [facility]’s FollowMyHealth portal, you will also be able to view your health information using other applications (apps) compatible with our system.

## 2021-08-03 DIAGNOSIS — M19.90 UNSPECIFIED OSTEOARTHRITIS, UNSPECIFIED SITE: ICD-10-CM

## 2021-08-03 DIAGNOSIS — D61.810 ANTINEOPLASTIC CHEMOTHERAPY INDUCED PANCYTOPENIA: ICD-10-CM

## 2021-08-03 DIAGNOSIS — Z79.82 LONG TERM (CURRENT) USE OF ASPIRIN: ICD-10-CM

## 2021-08-03 DIAGNOSIS — I10 ESSENTIAL (PRIMARY) HYPERTENSION: ICD-10-CM

## 2021-08-03 DIAGNOSIS — C25.9 MALIGNANT NEOPLASM OF PANCREAS, UNSPECIFIED: ICD-10-CM

## 2021-08-03 DIAGNOSIS — Z95.5 PRESENCE OF CORONARY ANGIOPLASTY IMPLANT AND GRAFT: ICD-10-CM

## 2021-08-03 DIAGNOSIS — K21.9 GASTRO-ESOPHAGEAL REFLUX DISEASE WITHOUT ESOPHAGITIS: ICD-10-CM

## 2021-08-03 DIAGNOSIS — Z85.828 PERSONAL HISTORY OF OTHER MALIGNANT NEOPLASM OF SKIN: ICD-10-CM

## 2021-08-03 DIAGNOSIS — Z92.21 PERSONAL HISTORY OF ANTINEOPLASTIC CHEMOTHERAPY: ICD-10-CM

## 2021-08-03 DIAGNOSIS — T45.1X5A ADVERSE EFFECT OF ANTINEOPLASTIC AND IMMUNOSUPPRESSIVE DRUGS, INITIAL ENCOUNTER: ICD-10-CM

## 2021-08-03 DIAGNOSIS — L40.9 PSORIASIS, UNSPECIFIED: ICD-10-CM

## 2021-08-03 DIAGNOSIS — I25.10 ATHEROSCLEROTIC HEART DISEASE OF NATIVE CORONARY ARTERY WITHOUT ANGINA PECTORIS: ICD-10-CM

## 2021-08-03 DIAGNOSIS — D70.1 AGRANULOCYTOSIS SECONDARY TO CANCER CHEMOTHERAPY: ICD-10-CM

## 2021-08-03 DIAGNOSIS — E78.5 HYPERLIPIDEMIA, UNSPECIFIED: ICD-10-CM

## 2021-08-03 DIAGNOSIS — H91.93 UNSPECIFIED HEARING LOSS, BILATERAL: ICD-10-CM

## 2021-08-06 ENCOUNTER — INPATIENT (INPATIENT)
Facility: HOSPITAL | Age: 71
LOS: 1 days | Discharge: HOME | DRG: 445 | End: 2021-08-08
Attending: FAMILY MEDICINE | Admitting: INTERNAL MEDICINE
Payer: MEDICARE

## 2021-08-06 VITALS — HEIGHT: 68 IN | WEIGHT: 145.06 LBS

## 2021-08-06 DIAGNOSIS — Z98.890 OTHER SPECIFIED POSTPROCEDURAL STATES: Chronic | ICD-10-CM

## 2021-08-06 DIAGNOSIS — K86.9 DISEASE OF PANCREAS, UNSPECIFIED: Chronic | ICD-10-CM

## 2021-08-06 DIAGNOSIS — Z90.89 ACQUIRED ABSENCE OF OTHER ORGANS: Chronic | ICD-10-CM

## 2021-08-06 DIAGNOSIS — Z98.61 CORONARY ANGIOPLASTY STATUS: Chronic | ICD-10-CM

## 2021-08-06 DIAGNOSIS — R50.9 FEVER, UNSPECIFIED: ICD-10-CM

## 2021-08-06 LAB
ADD ON TEST-SPECIMEN IN LAB: SIGNIFICANT CHANGE UP
ALBUMIN SERPL ELPH-MCNC: 3.3 G/DL — SIGNIFICANT CHANGE UP (ref 3.3–5)
ALP SERPL-CCNC: 83 U/L — SIGNIFICANT CHANGE UP (ref 40–120)
ALT FLD-CCNC: 28 U/L — SIGNIFICANT CHANGE UP (ref 12–78)
ANION GAP SERPL CALC-SCNC: 8 MMOL/L — SIGNIFICANT CHANGE UP (ref 5–17)
APPEARANCE UR: CLEAR — SIGNIFICANT CHANGE UP
APTT BLD: 32.9 SEC — SIGNIFICANT CHANGE UP (ref 27.5–35.5)
AST SERPL-CCNC: 25 U/L — SIGNIFICANT CHANGE UP (ref 15–37)
BASOPHILS # BLD AUTO: 0 K/UL — SIGNIFICANT CHANGE UP (ref 0–0.2)
BASOPHILS NFR BLD AUTO: 0 % — SIGNIFICANT CHANGE UP (ref 0–2)
BILIRUB SERPL-MCNC: 1.2 MG/DL — SIGNIFICANT CHANGE UP (ref 0.2–1.2)
BILIRUB UR-MCNC: NEGATIVE — SIGNIFICANT CHANGE UP
BUN SERPL-MCNC: 19 MG/DL — SIGNIFICANT CHANGE UP (ref 7–23)
CALCIUM SERPL-MCNC: 8.2 MG/DL — LOW (ref 8.5–10.1)
CHLORIDE SERPL-SCNC: 107 MMOL/L — SIGNIFICANT CHANGE UP (ref 96–108)
CO2 SERPL-SCNC: 25 MMOL/L — SIGNIFICANT CHANGE UP (ref 22–31)
COLOR SPEC: YELLOW — SIGNIFICANT CHANGE UP
CREAT SERPL-MCNC: 0.55 MG/DL — SIGNIFICANT CHANGE UP (ref 0.5–1.3)
DIFF PNL FLD: NEGATIVE — SIGNIFICANT CHANGE UP
EOSINOPHIL # BLD AUTO: 0.01 K/UL — SIGNIFICANT CHANGE UP (ref 0–0.5)
EOSINOPHIL NFR BLD AUTO: 0.2 % — SIGNIFICANT CHANGE UP (ref 0–6)
GLUCOSE SERPL-MCNC: 111 MG/DL — HIGH (ref 70–99)
GLUCOSE UR QL: NEGATIVE MG/DL — SIGNIFICANT CHANGE UP
HCT VFR BLD CALC: 28.7 % — LOW (ref 39–50)
HGB BLD-MCNC: 9.9 G/DL — LOW (ref 13–17)
IMM GRANULOCYTES NFR BLD AUTO: 0.3 % — SIGNIFICANT CHANGE UP (ref 0–1.5)
INR BLD: 1.15 RATIO — SIGNIFICANT CHANGE UP (ref 0.88–1.16)
KETONES UR-MCNC: NEGATIVE — SIGNIFICANT CHANGE UP
LACTATE SERPL-SCNC: 1.4 MMOL/L — SIGNIFICANT CHANGE UP (ref 0.7–2)
LEUKOCYTE ESTERASE UR-ACNC: NEGATIVE — SIGNIFICANT CHANGE UP
LYMPHOCYTES # BLD AUTO: 0.58 K/UL — LOW (ref 1–3.3)
LYMPHOCYTES # BLD AUTO: 8.9 % — LOW (ref 13–44)
MCHC RBC-ENTMCNC: 31.4 PG — SIGNIFICANT CHANGE UP (ref 27–34)
MCHC RBC-ENTMCNC: 34.5 GM/DL — SIGNIFICANT CHANGE UP (ref 32–36)
MCV RBC AUTO: 91.1 FL — SIGNIFICANT CHANGE UP (ref 80–100)
MONOCYTES # BLD AUTO: 0.35 K/UL — SIGNIFICANT CHANGE UP (ref 0–0.9)
MONOCYTES NFR BLD AUTO: 5.4 % — SIGNIFICANT CHANGE UP (ref 2–14)
NEUTROPHILS # BLD AUTO: 5.53 K/UL — SIGNIFICANT CHANGE UP (ref 1.8–7.4)
NEUTROPHILS NFR BLD AUTO: 85.2 % — HIGH (ref 43–77)
NITRITE UR-MCNC: NEGATIVE — SIGNIFICANT CHANGE UP
PH UR: 5 — SIGNIFICANT CHANGE UP (ref 5–8)
PLATELET # BLD AUTO: 170 K/UL — SIGNIFICANT CHANGE UP (ref 150–400)
POTASSIUM SERPL-MCNC: 3 MMOL/L — LOW (ref 3.5–5.3)
POTASSIUM SERPL-SCNC: 3 MMOL/L — LOW (ref 3.5–5.3)
PROT SERPL-MCNC: 6.4 GM/DL — SIGNIFICANT CHANGE UP (ref 6–8.3)
PROT UR-MCNC: NEGATIVE MG/DL — SIGNIFICANT CHANGE UP
PROTHROM AB SERPL-ACNC: 13.4 SEC — SIGNIFICANT CHANGE UP (ref 10.6–13.6)
RAPID RVP RESULT: SIGNIFICANT CHANGE UP
RBC # BLD: 3.15 M/UL — LOW (ref 4.2–5.8)
RBC # FLD: 14.6 % — HIGH (ref 10.3–14.5)
SARS-COV-2 RNA SPEC QL NAA+PROBE: SIGNIFICANT CHANGE UP
SODIUM SERPL-SCNC: 140 MMOL/L — SIGNIFICANT CHANGE UP (ref 135–145)
SP GR SPEC: 1 — LOW (ref 1.01–1.02)
UROBILINOGEN FLD QL: NEGATIVE MG/DL — SIGNIFICANT CHANGE UP
WBC # BLD: 6.49 K/UL — SIGNIFICANT CHANGE UP (ref 3.8–10.5)
WBC # FLD AUTO: 6.49 K/UL — SIGNIFICANT CHANGE UP (ref 3.8–10.5)

## 2021-08-06 PROCEDURE — 71045 X-RAY EXAM CHEST 1 VIEW: CPT | Mod: 26

## 2021-08-06 PROCEDURE — 96365 THER/PROPH/DIAG IV INF INIT: CPT

## 2021-08-06 PROCEDURE — 83690 ASSAY OF LIPASE: CPT

## 2021-08-06 PROCEDURE — 84484 ASSAY OF TROPONIN QUANT: CPT

## 2021-08-06 PROCEDURE — 93010 ELECTROCARDIOGRAM REPORT: CPT

## 2021-08-06 PROCEDURE — 80048 BASIC METABOLIC PNL TOTAL CA: CPT

## 2021-08-06 PROCEDURE — 36415 COLL VENOUS BLD VENIPUNCTURE: CPT

## 2021-08-06 PROCEDURE — 83880 ASSAY OF NATRIURETIC PEPTIDE: CPT

## 2021-08-06 PROCEDURE — 93005 ELECTROCARDIOGRAM TRACING: CPT

## 2021-08-06 PROCEDURE — 86140 C-REACTIVE PROTEIN: CPT

## 2021-08-06 PROCEDURE — 80053 COMPREHEN METABOLIC PANEL: CPT

## 2021-08-06 PROCEDURE — 99285 EMERGENCY DEPT VISIT HI MDM: CPT | Mod: CS

## 2021-08-06 PROCEDURE — 99223 1ST HOSP IP/OBS HIGH 75: CPT

## 2021-08-06 PROCEDURE — 81003 URINALYSIS AUTO W/O SCOPE: CPT

## 2021-08-06 PROCEDURE — 85027 COMPLETE CBC AUTOMATED: CPT

## 2021-08-06 PROCEDURE — 85025 COMPLETE CBC W/AUTO DIFF WBC: CPT

## 2021-08-06 PROCEDURE — 87086 URINE CULTURE/COLONY COUNT: CPT

## 2021-08-06 PROCEDURE — 82962 GLUCOSE BLOOD TEST: CPT

## 2021-08-06 PROCEDURE — 82550 ASSAY OF CK (CPK): CPT

## 2021-08-06 PROCEDURE — 86769 SARS-COV-2 COVID-19 ANTIBODY: CPT

## 2021-08-06 PROCEDURE — 99285 EMERGENCY DEPT VISIT HI MDM: CPT | Mod: 25

## 2021-08-06 PROCEDURE — 84145 PROCALCITONIN (PCT): CPT

## 2021-08-06 RX ORDER — DEXTROSE 50 % IN WATER 50 %
15 SYRINGE (ML) INTRAVENOUS ONCE
Refills: 0 | Status: DISCONTINUED | OUTPATIENT
Start: 2021-08-06 | End: 2021-08-08

## 2021-08-06 RX ORDER — CEFEPIME 1 G/1
1000 INJECTION, POWDER, FOR SOLUTION INTRAMUSCULAR; INTRAVENOUS EVERY 12 HOURS
Refills: 0 | Status: DISCONTINUED | OUTPATIENT
Start: 2021-08-06 | End: 2021-08-06

## 2021-08-06 RX ORDER — SODIUM CHLORIDE 9 MG/ML
1000 INJECTION, SOLUTION INTRAVENOUS
Refills: 0 | Status: DISCONTINUED | OUTPATIENT
Start: 2021-08-06 | End: 2021-08-08

## 2021-08-06 RX ORDER — LIPASE/PROTEASE/AMYLASE 16-48-48K
1 CAPSULE,DELAYED RELEASE (ENTERIC COATED) ORAL
Refills: 0 | Status: DISCONTINUED | OUTPATIENT
Start: 2021-08-06 | End: 2021-08-06

## 2021-08-06 RX ORDER — INSULIN LISPRO 100/ML
VIAL (ML) SUBCUTANEOUS AT BEDTIME
Refills: 0 | Status: DISCONTINUED | OUTPATIENT
Start: 2021-08-06 | End: 2021-08-08

## 2021-08-06 RX ORDER — MAGNESIUM SULFATE 500 MG/ML
1 VIAL (ML) INJECTION ONCE
Refills: 0 | Status: COMPLETED | OUTPATIENT
Start: 2021-08-06 | End: 2021-08-06

## 2021-08-06 RX ORDER — INSULIN LISPRO 100/ML
VIAL (ML) SUBCUTANEOUS
Refills: 0 | Status: DISCONTINUED | OUTPATIENT
Start: 2021-08-06 | End: 2021-08-08

## 2021-08-06 RX ORDER — POTASSIUM CHLORIDE 20 MEQ
40 PACKET (EA) ORAL ONCE
Refills: 0 | Status: COMPLETED | OUTPATIENT
Start: 2021-08-06 | End: 2021-08-06

## 2021-08-06 RX ORDER — ONDANSETRON 8 MG/1
4 TABLET, FILM COATED ORAL EVERY 6 HOURS
Refills: 0 | Status: DISCONTINUED | OUTPATIENT
Start: 2021-08-06 | End: 2021-08-08

## 2021-08-06 RX ORDER — LIPASE/PROTEASE/AMYLASE 16-48-48K
2 CAPSULE,DELAYED RELEASE (ENTERIC COATED) ORAL
Refills: 0 | Status: DISCONTINUED | OUTPATIENT
Start: 2021-08-06 | End: 2021-08-07

## 2021-08-06 RX ORDER — DEXTROSE 50 % IN WATER 50 %
25 SYRINGE (ML) INTRAVENOUS ONCE
Refills: 0 | Status: DISCONTINUED | OUTPATIENT
Start: 2021-08-06 | End: 2021-08-08

## 2021-08-06 RX ORDER — DEXTROSE 50 % IN WATER 50 %
12.5 SYRINGE (ML) INTRAVENOUS ONCE
Refills: 0 | Status: DISCONTINUED | OUTPATIENT
Start: 2021-08-06 | End: 2021-08-08

## 2021-08-06 RX ORDER — CEFEPIME 1 G/1
2000 INJECTION, POWDER, FOR SOLUTION INTRAMUSCULAR; INTRAVENOUS ONCE
Refills: 0 | Status: COMPLETED | OUTPATIENT
Start: 2021-08-06 | End: 2021-08-06

## 2021-08-06 RX ORDER — LIPASE/PROTEASE/AMYLASE 16-48-48K
1 CAPSULE,DELAYED RELEASE (ENTERIC COATED) ORAL
Refills: 0 | Status: DISCONTINUED | OUTPATIENT
Start: 2021-08-06 | End: 2021-08-08

## 2021-08-06 RX ORDER — LIPASE/PROTEASE/AMYLASE 16-48-48K
2 CAPSULE,DELAYED RELEASE (ENTERIC COATED) ORAL
Refills: 0 | Status: DISCONTINUED | OUTPATIENT
Start: 2021-08-06 | End: 2021-08-06

## 2021-08-06 RX ORDER — ASPIRIN/CALCIUM CARB/MAGNESIUM 324 MG
81 TABLET ORAL DAILY
Refills: 0 | Status: DISCONTINUED | OUTPATIENT
Start: 2021-08-06 | End: 2021-08-08

## 2021-08-06 RX ORDER — GLUCAGON INJECTION, SOLUTION 0.5 MG/.1ML
1 INJECTION, SOLUTION SUBCUTANEOUS ONCE
Refills: 0 | Status: DISCONTINUED | OUTPATIENT
Start: 2021-08-06 | End: 2021-08-08

## 2021-08-06 RX ORDER — DIPHENHYDRAMINE HCL 50 MG
25 CAPSULE ORAL AT BEDTIME
Refills: 0 | Status: DISCONTINUED | OUTPATIENT
Start: 2021-08-06 | End: 2021-08-08

## 2021-08-06 RX ORDER — PANTOPRAZOLE SODIUM 20 MG/1
40 TABLET, DELAYED RELEASE ORAL
Refills: 0 | Status: DISCONTINUED | OUTPATIENT
Start: 2021-08-06 | End: 2021-08-08

## 2021-08-06 RX ORDER — ATORVASTATIN CALCIUM 80 MG/1
20 TABLET, FILM COATED ORAL AT BEDTIME
Refills: 0 | Status: DISCONTINUED | OUTPATIENT
Start: 2021-08-06 | End: 2021-08-08

## 2021-08-06 RX ORDER — LANOLIN ALCOHOL/MO/W.PET/CERES
10 CREAM (GRAM) TOPICAL AT BEDTIME
Refills: 0 | Status: DISCONTINUED | OUTPATIENT
Start: 2021-08-06 | End: 2021-08-08

## 2021-08-06 RX ORDER — LOSARTAN POTASSIUM 100 MG/1
50 TABLET, FILM COATED ORAL DAILY
Refills: 0 | Status: DISCONTINUED | OUTPATIENT
Start: 2021-08-06 | End: 2021-08-08

## 2021-08-06 RX ORDER — ENOXAPARIN SODIUM 100 MG/ML
40 INJECTION SUBCUTANEOUS DAILY
Refills: 0 | Status: DISCONTINUED | OUTPATIENT
Start: 2021-08-06 | End: 2021-08-08

## 2021-08-06 RX ORDER — CEFEPIME 1 G/1
2000 INJECTION, POWDER, FOR SOLUTION INTRAMUSCULAR; INTRAVENOUS EVERY 12 HOURS
Refills: 0 | Status: DISCONTINUED | OUTPATIENT
Start: 2021-08-06 | End: 2021-08-08

## 2021-08-06 RX ORDER — ACETAMINOPHEN 500 MG
650 TABLET ORAL EVERY 6 HOURS
Refills: 0 | Status: DISCONTINUED | OUTPATIENT
Start: 2021-08-06 | End: 2021-08-08

## 2021-08-06 RX ORDER — SODIUM CHLORIDE 9 MG/ML
2000 INJECTION, SOLUTION INTRAVENOUS ONCE
Refills: 0 | Status: COMPLETED | OUTPATIENT
Start: 2021-08-06 | End: 2021-08-06

## 2021-08-06 RX ADMIN — Medication 40 MILLIEQUIVALENT(S): at 13:11

## 2021-08-06 RX ADMIN — SODIUM CHLORIDE 2000 MILLILITER(S): 9 INJECTION, SOLUTION INTRAVENOUS at 07:16

## 2021-08-06 RX ADMIN — Medication 1 TABLET(S): at 16:28

## 2021-08-06 RX ADMIN — CEFEPIME 2000 MILLIGRAM(S): 1 INJECTION, POWDER, FOR SOLUTION INTRAMUSCULAR; INTRAVENOUS at 08:03

## 2021-08-06 RX ADMIN — ATORVASTATIN CALCIUM 20 MILLIGRAM(S): 80 TABLET, FILM COATED ORAL at 21:25

## 2021-08-06 RX ADMIN — Medication 10 MILLIGRAM(S): at 21:25

## 2021-08-06 RX ADMIN — PANTOPRAZOLE SODIUM 40 MILLIGRAM(S): 20 TABLET, DELAYED RELEASE ORAL at 13:13

## 2021-08-06 RX ADMIN — Medication 2 CAPSULE(S): at 17:41

## 2021-08-06 RX ADMIN — Medication 100 GRAM(S): at 11:36

## 2021-08-06 RX ADMIN — Medication 81 MILLIGRAM(S): at 13:11

## 2021-08-06 RX ADMIN — SODIUM CHLORIDE 2000 MILLILITER(S): 9 INJECTION, SOLUTION INTRAVENOUS at 06:52

## 2021-08-06 RX ADMIN — Medication 2 CAPSULE(S): at 13:11

## 2021-08-06 RX ADMIN — CEFEPIME 100 MILLIGRAM(S): 1 INJECTION, POWDER, FOR SOLUTION INTRAMUSCULAR; INTRAVENOUS at 07:32

## 2021-08-06 RX ADMIN — Medication 40 MILLIEQUIVALENT(S): at 07:42

## 2021-08-06 RX ADMIN — CEFEPIME 100 MILLIGRAM(S): 1 INJECTION, POWDER, FOR SOLUTION INTRAMUSCULAR; INTRAVENOUS at 21:25

## 2021-08-06 RX ADMIN — Medication 650 MILLIGRAM(S): at 16:28

## 2021-08-06 NOTE — H&P ADULT - ASSESSMENT
72 y/o male with PMHX of Pancreatic cancer dx 2020 s/p Whipple presently on chemo, CAD s/p PCI, HTN, HLD, GERD, DM, cerebral aneurysm s/p craniotomy and stenting who presented to  with CC of fever, fatigue, loss of appetite.      #FEVER:    Admit to [facility].    Check blood/ urine cultures.    CXR clear.  No resp complaints.    COVID/ RVP negative.    Port site appears clean.    Start cefepime pending cultures with recent chemo/ neutropenia last week.    Follow counts s/p chemo.   ID consult.       #Metastatic Pancreatic Cancer:    Initial surgery 2020.    Now on further chemo due to recurrence seen on CT scan.    ONC eval.    Last chemo was 8/4/21.    Cont pancreatic enzymes.      #Hypokalemia/ Hypomag:    Replete.   Hold HCTZ.      #HTN:    Cont losartan.  Hold HCTZ.      #CAD/ PCI:    Stable.  Cont asa/ statin.      #DM:    Sliding scale.      #GERD:  cont ppi.      #DVT Proph:  Lovenox.      #Code status:  FULL  D/w wife and patient in detail.

## 2021-08-06 NOTE — PATIENT PROFILE ADULT - NSPROSPHOSPCHAPLAINYN_GEN_A_NUR
71 year old female patient with past medical history of hypertension, Afib, hypothyroidism presented to the ED for dyspnea     The patient was admitted in January for new onset Afib with atypical pneumonia and since then has been having dry cough. She also reports palpitations that started at the end of March as well as dyspnea on exertion that started 2 weeks ago and has been getting progressively worse.     In the ED : /min, RR 26, Temp 98.1, /103, Patient was found to be in Afib with RVR and was given 10 mg IV of Cardizem and was then started on Cardizem infusion. Chest x ray was done and showed bilateral pleural effusions and small pericardial effusion.    Pt was tested for COvid 19 and was negative.    Pt remained A-fib tachy to 112-130. Was switched to amio drip then PO amio. Pulm team consullted for possible tap notindicated at this time as pt responding well to lasix. RReporting feeling much better and less short of breath.    Pt underwent a CCTA to rule out a Left atrial appendage which was negative and then went for a cardioversion which was tolerated well.        Pt is stable for d/c. no 71 year old female patient with past medical history of hypertension, Afib, hypothyroidism presented to the ED for dyspnea     The patient was admitted in January for new onset Afib with atypical pneumonia and since then has been having dry cough. She also reports palpitations that started at the end of March as well as dyspnea on exertion that started 2 weeks ago and has been getting progressively worse.     In the ED : /min, RR 26, Temp 98.1, /103, Patient was found to be in Afib with RVR and was given 10 mg IV of Cardizem and was then started on Cardizem infusion. Chest x ray was done and showed bilateral pleural effusions and small pericardial effusion.    Pt was tested for COvid 19 and was negative.    Pt remained A-fib tachy to 112-130. Was switched to amio drip then PO amio. Pulm team consullted for possible tap notindicated at this time as pt responding well to lasix. RReporting feeling much better and less short of breath.    Pt underwent a CCTA to rule out a Left atrial appendage which was negative and then went for a cardioversion which was tried 5 times and was unseuccesful. Conitnued IV lasix 3 more days will continue amio po [id number] one week then 200 daily.        Pt is stable for d/c.

## 2021-08-06 NOTE — ED PROVIDER NOTE - PSH
H/O craniotomy  for non ruptured aneurysm 2007, attempted clipping  H/O inguinal hernia repair    History of other surgery  endovascular stent placement for brain Aneurysm at [facility] - 2010  History of other surgery  for brain aneurysm - attempted coiling 2007  History of PTCA  2006 x 1 stent  History of tonsillectomy    Pancreatic disorder  Whipple for cancer  S/P colonoscopy  last - within 5 yrs

## 2021-08-06 NOTE — ED ADULT NURSE NOTE - OBJECTIVE STATEMENT
Pt A&Ox4, presents to the ED c/o fever and weakness. Pt states fever started at 3am, took Tylenol without relief. Pt received chemo on Wednesday and was admitted to  10 days ago with similar symptoms. Denies fevers, N/V/D, SOB, CP.

## 2021-08-06 NOTE — CONSULT NOTE ADULT - ASSESSMENT
70 y/o male with h/o Pancreatic cancer dx 2020 s/p Whipple presently on chemo, CAD s/p PCI, HTN, HLD, GERD, DM, cerebral aneurysm s/p craniotomy and stenting was admitted on 8/6 for fever, fatigue, loss of appetite. Patient was just discharged from  on 7/28/21. During last admission, patient developed neutropenic fever 1 week after chemo. The patient received IV abx and cultures were negative and patient was discharged on 7 more days of oral abx therapy. On the evening PTA, patient started with fatigue, loss of appetite, and fevers up to 101F overnight.  Patient notes he has had similar reactions in the past after chemo.  In the ER, the patient received cefepime.     1. Febrile syndrome. Pancreatic Ca s/p Whipple on chemotherapy. Immunocompromised host.   -no leukocytosis  -the patient has been on abx therapy PTA  -unclear cause of current febrile syndrome ?sepsis ?tumor fever  -obtain BC x 2, urine c/s  -agree with cefepime 2 gm IV q12h pending further workup  -reason for abx use and side effects reviewed with patient; monitor BMP   -old chart reviewed to assess prior cultures  -monitor temps  -f/u CBC  -supportive care  2. Other issues:   -care per medicine         72 y/o male with h/o Pancreatic cancer dx 2020 s/p Whipple presently on chemo, CAD s/p PCI, HTN, HLD, GERD, DM, cerebral aneurysm s/p craniotomy and stenting was admitted on 8/6 for fever, fatigue, loss of appetite. Patient was just discharged from  on 7/28/21. During last admission, patient developed neutropenic fever 1 week after chemo. The patient received IV abx and cultures were negative and patient was discharged on 7 more days of oral abx therapy. On the evening PTA, patient started with fatigue, loss of appetite, and fevers up to 101F overnight.  Patient notes he has had similar reactions in the past after chemo.  In the ER, the patient received cefepime.     1. Febrile syndrome. Possible acute cholangitis. Pancreatic Ca s/p Whipple on chemotherapy. Immunocompromised host.   -no leukocytosis  -the patient has been on abx therapy PTA  -unclear cause of current febrile syndrome ?sepsis ?tumor fever  -obtain BC x 2, urine c/s  -agree with cefepime 2 gm IV q12h pending further workup  -reason for abx use and side effects reviewed with patient; monitor BMP   -old chart reviewed to assess prior cultures  -monitor temps  -f/u CBC  -supportive care  2. Other issues:   -care per medicine

## 2021-08-06 NOTE — ED ADULT TRIAGE NOTE - CHIEF COMPLAINT QUOTE
Pt presents to the ED with fever and weakness, tmax 101.1. Pt receives chemo for a pancreatic cancer with last treatment on tuesday and states that he has had a similar reaction to chemo in the past. States he took tylenol at 3 am this morning with no relief. Denies N/V/D.

## 2021-08-06 NOTE — H&P ADULT - NSHPLABSRESULTS_GEN_ALL_CORE
9.9    6.49  )-----------( 170      ( 06 Aug 2021 06:17 )             28.7     08-    140  |  107  |  19  ----------------------------<  111<H>  3.0<L>   |  25  |  0.55    Ca    8.2<L>      06 Aug 2021 06:17  Mg     1.5     -    TPro  6.4  /  Alb  3.3  /  TBili  1.2  /  DBili  x   /  AST  25  /  ALT  28  /  AlkPhos  83  08-    CAPILLARY BLOOD GLUCOSE      POCT Blood Glucose.: 90 mg/dL (06 Aug 2021 10:49)    PT/INR - ( 06 Aug 2021 06:17 )   PT: 13.4 sec;   INR: 1.15 ratio         PTT - ( 06 Aug 2021 06:17 )  PTT:32.9 sec  Urinalysis Basic - ( 06 Aug 2021 08:04 )    Color: Yellow / Appearance: Clear / S.005 / pH: x  Gluc: x / Ketone: Negative  / Bili: Negative / Urobili: Negative mg/dL   Blood: x / Protein: Negative mg/dL / Nitrite: Negative   Leuk Esterase: Negative / RBC: x / WBC x   Sq Epi: x / Non Sq Epi: x / Bacteria: x    CXR:  negative

## 2021-08-06 NOTE — ED ADULT NURSE NOTE - CHIEF COMPLAINT
Phone call with daughter-in-law Tenisha.   Client currently with daughter Niki who will take her to PCP appt.   Alerted Tenisha that [facility] was unable to moved schedule to attend PCP appt.   Read portion of nephrologist's notes re: recommendation for clonazepam twice daily but that would require PCP agreement so that prescription does not run out. Caution with falls in elders explained to Tenisha who verbalized understanding.   Tenisha stated that client's culture does not openly endorse mental health such as anxiety but calls it \"palpitations.\" she does not believe any provider has spoken with her about anxiety for a behavioral health consults.   Affirmed family's use of [facility] for sliding scale providers and prescription coverage. Other option explained would be the free clinic, such as [facility]. Advantage re: free health care. Disadvantage re: waiting in line at clinic and no guarantee for consistent provider. Tenisha states that [facility] sounds like a better option.   States that lashawn Prince will be taking client to PCP appt today.     Tenisha also states that she plans to drive client to [place] to see her son but that departure plans have not been solidified at this time.     Aware that [facility] left message with lashawn Prince re: inability to attend appt and request for return call to discuss next steps.     [facility] intervention  Nephrology 6/18/19 office note submitted for inclusion in medical record.   Affirmed Tenisha's skills at coordinating services to date.   Patient education; Clonazepam.   CBCM Interventions    Authentic Engagement:    Perform complex relationship building  Assessment:    Conduct focused/targeted assessment, Perform EHR data review and synthesis  Care Planning:    Facilitation of Learning / Promotion of Self-Efficacy:     client/family to navigate the health care system(s), Affirm patient/family accomplishments in completing positive health care behaviors, Provide client/family  education  Care Coordination:    [place] [employer]:    Promote client/family understanding of the risks, benefits & outcomes, Foster comfort and hope to honor the client's mind, body and spirit connection  Collaboration:         Client Plan  Attend PCP appt 6/24/19  Tenisha to recommend lashawn Prince calling NCM.     [facility]CM plan  Follow-up re: PCP visit and outcome of clonazepam plan.   Discuss role of CBCM as health care coverage is maximized. Consider discussing potential treatment for anxiety.     Next Interaction TBD. Will await call from lashawn Prince or call her in 1 week.        The patient is a 71y Male complaining of fever.

## 2021-08-06 NOTE — H&P ADULT - HISTORY OF PRESENT ILLNESS
72 y/o male with PMHX of Pancreatic cancer dx 2020 s/p Whipple presently on chemo, CAD s/p PCI, HTN, HLD, GERD, DM, cerebral aneurysm s/p craniotomy and stenting who presented to  with CC of fever, fatigue, loss of appetite.  Patient was just discharged from  after similar admission on 7/28/21.  During last admission, patient was admitted with neutropenic fever 1 week after chemo.  Cultures were negative and patient was discharged on 7 days of ABX.  Patient still has one day left.  Last evening, patient started with fatigue, loss of appetite, and fevers up to 101 overnight.  Patient presented for evaluation.  No other sx-- no dysuria, cough, SOB, abd pain, diarrhea.  Patient notes he has had similar reactions in the past after chemo.  In the ER, CBC showed WBC of 6.  No neutorpenia.  CXR negative.  UA negative.  Patient started on cefepime and cultured.        PAST MEDICAL & SURGICAL HISTORY:  Hearing loss  right  Stented coronary artery  2006 PTCA x 1  CAD (coronary artery disease)  last stress test Fall 2018  HTN (hypertension)  Hyperlipidemia  GERD (gastroesophageal reflux disease)  Skin cancer  squamous cell - head, shin - removed  OA (osteoarthritis)  Psoriasis  Inguinal hernia  right  Brain aneurysm  non ruptured - had stent placed in 2010 after attemtped clipping and coiling in 2007  Pancreatic cancer  s/p Whipple procedure  Drug or chemical induced diabetes mellitus  H/O craniotomy  for non ruptured aneurysm 2007, attempted clipping  History of other surgery  for brain aneurysm - attempted coiling 2007  History of other surgery  endovascular stent placement for brain Aneurysm at [place] - 2010  History of tonsillectomy  S/P colonoscopy  last - within 5 yrs  History of PTCA  2006 x 1 stent  H/O inguinal hernia repair  Pancreatic disorder  Whipple for cancer      FAMILY HISTORY:  Family history of breast cancer in sister      Social History:    Lives at home with wife.    No tob/ etoh/ drug use.        Allergies:  No Known Allergies      Home Medications:  Aspirin Enteric Coated 81 mg oral delayed release tablet: 1 tab(s) orally once a day (06 Aug 2021 10:22)  diphenhydrAMINE 25 mg oral capsule: 1 cap(s) orally once a day (at bedtime) (06 Aug 2021 10:22)  HumaLOG 100 units/mL injectable solution: Use as needed after a high carb/sugar meal (06 Aug 2021 10:22)  losartan-hydrochlorothiazide 50mg-12.5mg oral tablet: 1 tab(s) orally once a day (06 Aug 2021 10:22)  Melatonin 10 mg oral capsule: 1 cap(s) orally once a day (at bedtime) (06 Aug 2021 10:22)  Multiple Vitamins oral tablet: 1 tab(s) orally once a day (06 Aug 2021 10:22)  omeprazole 20 mg oral delayed release capsule: 1 cap(s) orally 1-2 times daily (06 Aug 2021 10:22)  ondansetron 8 mg oral tablet: 1 tab(s) orally 3 times a day, As Needed - for nausea (06 Aug 2021 10:22)  rosuvastatin 5 mg oral tablet: 1 tab(s) orally once a day (in the evening) (06 Aug 2021 10:22)  Tylenol Extra Strength 500 mg oral tablet: 1 tab(s) orally every 8 hours, As Needed for pain (06 Aug 2021 10:22)  Zenpep 40,000 units-126,000 units-168,000 units oral delayed release capsule: 2 cap(s) orally 3 times a day before meals (06 Aug 2021 10:22)  Zenpep 40,000 units-126,000 units-168,000 units oral delayed release capsule: 1 cap(s) orally with snacks (06 Aug 2021 10:22)

## 2021-08-06 NOTE — H&P ADULT - NSHPPHYSICALEXAM_GEN_ALL_CORE
PEx  T(C): 36.8 (08-06-21 @ 10:51), Max: 37.2 (08-06-21 @ 05:48)  HR: 60 (08-06-21 @ 10:51) (60 - 74)  BP: 131/62 (08-06-21 @ 10:51) (122/71 - 140/64)  RR: 16 (08-06-21 @ 10:51) (16 - 18)  SpO2: 100% (08-06-21 @ 10:51) (98% - 100%)    General:  Well appearing, well nourished in no distress, no identifying marks , scars, or tattoos.  Skin: no rash or prominent lesions  Head: normocephalic, atraumatic     Sinuses: non-tender  Nose: no external lesions, mucosa non-inflamed, septum and turbinates normal  Throat: no erythema, exudates or lesions.  Neck: Supple without lymphadenopathy. Thyroid no thyromegaly, no palpable thyroid nodules, no palpable nodules or masses, carotid arteries no bruits.   Breasts: No palpable masses or lesions.  Heart: RRR, no murmur or gallop.  Normal S1, S2.  No S3, S4.   Lungs: CTA bilaterally, no wheezes, rhonchi, rales.  Breathing unlabored.   Chest wall: Normal insp   Abdomen:  Soft, NT/ND, normal bowel sounds, no HSM, no masses.  No peritoneal signs.   Back: spine normal without deformity or tenderness.  Normal ROM   : Exam normal.  no inguinal hernias.  Extremities: No deformities, clubbing, cyanosis, or edema.  Musculoskeletal: Normal gait and station. No decreased range of motion, instability, atrophy or abnormal strength or tone in the head, neck, spine, ribs, pelvis or extremities.   Neurologic: CN 2-12 normal. Sensation to pain, touch and proprioception normal. DTRs normal in upper and lower extremities. No pathologic reflexes.  Motor normal.  Psychiatric: Oriented X3, intact recent and remote memory, judgement and insight, normal mood and affect.

## 2021-08-06 NOTE — H&P ADULT - NSHPREVIEWOFSYSTEMS_GEN_ALL_CORE
ROS:  General:  fever  Skin: No rash or bothersome skin lesions  Musculoskeletal: No arthalgias, myalgias or joint swelling  Eyes: No visual changes or eye pain  Ears: No hearing loss , otorrhea or ear pain  Nose, Mouth, Throat: No nasal congestion, rhinorrhea, oral lesions, postnasal drip or sore throat  Cardio: No chest pain or palpitations. no lower extremity edema. no syncope. no claudication.   Respiratory: No cough, shortness of breath or wheezing   GI: No diarrhea, constipation, blood in stools, abdominal pain, vomiting or heartburn  : No urinary frequency, hematuria, incontinence, or dysuria  Neurologic: No headaches, parasthesias, confusion, dysarthria or gait instability  Psychiatric:  No anxiety or depression  Lymphatic:  No easy bruising, easy bleeding or swollen glands  Allergic: No itching, sneezing , watery eyes, clear rhinorrhea or recurrent infections

## 2021-08-06 NOTE — ED PROVIDER NOTE - OBJECTIVE STATEMENT
70 yo M with a PMH ampullary pancreatic cancer (s/p Whipple, on chemotherapy), medication-induced DM, CAD s/p stents, brain aneurysm s/p embolization, HTN, HLD, and GERD with recent d/c from  on 7/28/21 for neutropenic fever p/w 72 yo M with a PMH ampullary pancreatic cancer (s/p Whipple, on chemotherapy), medication-induced DM, CAD s/p stents, brain aneurysm s/p embolization, HTN, HLD, and GERD with recent d/c from  on 7/28/21 for neutropenic fever p/w fever that began at about 3 AM associated with fatigue but no other symptoms.  Pt's TMax was 101F at 5 AM.  He took 1,000 mg of Acetaminophen at about 3 AM.  Pt denies CP, SOB, cough, n/v/d.  His most recent chemo tx was 8/4/21.

## 2021-08-07 LAB
ANION GAP SERPL CALC-SCNC: 3 MMOL/L — LOW (ref 5–17)
BUN SERPL-MCNC: 12 MG/DL — SIGNIFICANT CHANGE UP (ref 7–23)
CALCIUM SERPL-MCNC: 8.5 MG/DL — SIGNIFICANT CHANGE UP (ref 8.5–10.1)
CHLORIDE SERPL-SCNC: 109 MMOL/L — HIGH (ref 96–108)
CO2 SERPL-SCNC: 26 MMOL/L — SIGNIFICANT CHANGE UP (ref 22–31)
COVID-19 SPIKE DOMAIN AB INTERP: POSITIVE
COVID-19 SPIKE DOMAIN ANTIBODY RESULT: 12.2 U/ML — HIGH
CREAT SERPL-MCNC: 0.5 MG/DL — SIGNIFICANT CHANGE UP (ref 0.5–1.3)
CULTURE RESULTS: NO GROWTH — SIGNIFICANT CHANGE UP
GLUCOSE SERPL-MCNC: 149 MG/DL — HIGH (ref 70–99)
HCT VFR BLD CALC: 28.1 % — LOW (ref 39–50)
HGB BLD-MCNC: 9.3 G/DL — LOW (ref 13–17)
MCHC RBC-ENTMCNC: 30.7 PG — SIGNIFICANT CHANGE UP (ref 27–34)
MCHC RBC-ENTMCNC: 33.1 GM/DL — SIGNIFICANT CHANGE UP (ref 32–36)
MCV RBC AUTO: 92.7 FL — SIGNIFICANT CHANGE UP (ref 80–100)
PLATELET # BLD AUTO: 164 K/UL — SIGNIFICANT CHANGE UP (ref 150–400)
POTASSIUM SERPL-MCNC: 3.7 MMOL/L — SIGNIFICANT CHANGE UP (ref 3.5–5.3)
POTASSIUM SERPL-SCNC: 3.7 MMOL/L — SIGNIFICANT CHANGE UP (ref 3.5–5.3)
RBC # BLD: 3.03 M/UL — LOW (ref 4.2–5.8)
RBC # FLD: 14.6 % — HIGH (ref 10.3–14.5)
SARS-COV-2 IGG+IGM SERPL QL IA: 12.2 U/ML — HIGH
SARS-COV-2 IGG+IGM SERPL QL IA: POSITIVE
SODIUM SERPL-SCNC: 138 MMOL/L — SIGNIFICANT CHANGE UP (ref 135–145)
SPECIMEN SOURCE: SIGNIFICANT CHANGE UP
WBC # BLD: 3.77 K/UL — LOW (ref 3.8–10.5)
WBC # FLD AUTO: 3.77 K/UL — LOW (ref 3.8–10.5)

## 2021-08-07 PROCEDURE — 99232 SBSQ HOSP IP/OBS MODERATE 35: CPT

## 2021-08-07 RX ORDER — LIPASE/PROTEASE/AMYLASE 16-48-48K
4 CAPSULE,DELAYED RELEASE (ENTERIC COATED) ORAL
Refills: 0 | Status: DISCONTINUED | OUTPATIENT
Start: 2021-08-07 | End: 2021-08-08

## 2021-08-07 RX ADMIN — CEFEPIME 100 MILLIGRAM(S): 1 INJECTION, POWDER, FOR SOLUTION INTRAMUSCULAR; INTRAVENOUS at 10:09

## 2021-08-07 RX ADMIN — ATORVASTATIN CALCIUM 20 MILLIGRAM(S): 80 TABLET, FILM COATED ORAL at 22:13

## 2021-08-07 RX ADMIN — Medication 10 MILLIGRAM(S): at 22:13

## 2021-08-07 RX ADMIN — Medication 2 CAPSULE(S): at 13:29

## 2021-08-07 RX ADMIN — Medication 2: at 17:03

## 2021-08-07 RX ADMIN — ENOXAPARIN SODIUM 40 MILLIGRAM(S): 100 INJECTION SUBCUTANEOUS at 10:09

## 2021-08-07 RX ADMIN — Medication 2 CAPSULE(S): at 08:45

## 2021-08-07 RX ADMIN — CEFEPIME 100 MILLIGRAM(S): 1 INJECTION, POWDER, FOR SOLUTION INTRAMUSCULAR; INTRAVENOUS at 22:13

## 2021-08-07 RX ADMIN — PANTOPRAZOLE SODIUM 40 MILLIGRAM(S): 20 TABLET, DELAYED RELEASE ORAL at 10:09

## 2021-08-07 RX ADMIN — LOSARTAN POTASSIUM 50 MILLIGRAM(S): 100 TABLET, FILM COATED ORAL at 10:09

## 2021-08-07 RX ADMIN — Medication 1 TABLET(S): at 10:09

## 2021-08-07 RX ADMIN — Medication 4 CAPSULE(S): at 18:19

## 2021-08-07 RX ADMIN — Medication 81 MILLIGRAM(S): at 10:09

## 2021-08-07 NOTE — CONSULT NOTE ADULT - SUBJECTIVE AND OBJECTIVE BOX
: 70yo male patient of Dr. Thomas with ampullary adenocarcinoma,  pancreatobiliary type s/p resection s/p gem and Nab paclitaxel (LD 21). He initially presented    with reports of fever to 100.8 at home and general feeling of fatigue and unwell; cultures were negative and patient was discharged home .  patient was readmitted on 2021 after having recurrent episodes fevers malaise and weakness.    Patient is T-max at home was 101.    Also patient was started on cefepime as has been afebrile.  No signs of leukocytosis.. Denies chills,  cough, or congestion. Ongoing back and rib pain relieved with tylenol or advil which he is using  infrequently. Po intake has been diminished.      Past Medical History:  Coronary artery disease  HTN  Hearing loss  Hyperlipidemia  Hypertension  Status post cardiac catheterization, PCI, left circumflex artery . Two thousand seven repeat  catheterization showed nonobstructive disease.  Skin cancer, squamous cell carcinoma  Vertigo. (1)    Past Surgical History:  Craniotomy brain aneurysm   Cerebral angiogram   Laparoscopic cholecystectomy 2020  Hernia repair   Tonsillectomy  Wrist surgery 2019 for trigger finger. (1)    Oncology History:  Ampullary adenocarcinoma status post Whipple resection,  lymph nodes involved. pT3b N2  0. IHC for MMR protein staining intact. IHC focal weak staining CDX2, CK 20-, MUC1 positive.  Negative margins. 9/15/20, Dr. Betty Sanchez, [place]  20 to 21 Treatment: adjuvant FOLFIRINOX 12 cycles  oxali held following C6 due to poor tolerance  2021 imaging with suspicion for recurrence, though not definitive, noted to have peritoneal  recurrence at time of hernia repair, biopsy-proven peritoneal disease metastatic adenocarcinoma.        Tobacco Use:  Former smoker.(1)  Details: 0.25 packs of cigarettes a day for 10 years. Quit in .  Social History:  Drink 1 glass of wine, 4-5 shots liquor per week, none since the [place]. Retired police office.    ALLERGIES:  · No known allergies:    Home Medication List:  · Zenpep 15,000 units-47,000 units-63,000 units oral delayed release capsule: 3  cap(s) orally 3 times a day x 30 days prior to meals and one cap prior to snacks  · Marinol 2.5 mg oral capsule: 1 cap(s) orally 2 times a day x 30 days MDD:2 tabs , prior  to lunch and dinner for appetite  · ondansetron 8 mg oral tablet: 1 tab(s) orally every 8 hours x 20 days  · HumaLOG 100 units/mL subcutaneous solution:  · Tums 500 mg oral tablet, chewable: 1 tab(s) orally once a day, As Needed  · aspirin 81 mg oral tablet: 81 milligram(s) orally once a day (at bedtime)  · losartan-hydrochlorothiazide 100mg-12.5mg oral tablet: 1 tab(s) orally once a day  · omeprazole 20 mg oral delayed release capsule: 1 cap(s) orally once a day  MEDICATIONS  (STANDING):  aspirin enteric coated 81 milliGRAM(s) Oral daily  atorvastatin 20 milliGRAM(s) Oral at bedtime  cefepime   IVPB 2000 milliGRAM(s) IV Intermittent every 12 hours  dextrose 40% Gel 15 Gram(s) Oral once  dextrose 5%. 1000 milliLiter(s) (50 mL/Hr) IV Continuous <Continuous>  dextrose 5%. 1000 milliLiter(s) (100 mL/Hr) IV Continuous <Continuous>  dextrose 50% Injectable 25 Gram(s) IV Push once  dextrose 50% Injectable 12.5 Gram(s) IV Push once  dextrose 50% Injectable 25 Gram(s) IV Push once  enoxaparin Injectable 40 milliGRAM(s) SubCutaneous daily  glucagon  Injectable 1 milliGRAM(s) IntraMuscular once  insulin lispro (ADMELOG) corrective regimen sliding scale   SubCutaneous three times a day before meals  insulin lispro (ADMELOG) corrective regimen sliding scale   SubCutaneous at bedtime  losartan 50 milliGRAM(s) Oral daily  melatonin 10 milliGRAM(s) Oral at bedtime  multivitamin 1 Tablet(s) Oral daily  pancrelipase (ZENPEP 20,000 Lipase Units) 2 Capsule(s) Oral four times a day with meals  pantoprazole    Tablet 40 milliGRAM(s) Oral before breakfast    No Known Allergies      ROS otherwise negative   .  LABS:                         9.9    6.49  )-----------( 170      ( 06 Aug 2021 06:17 )             28.7   Complete Blood Count + Automated Diff (21 @ 06:17)   WBC Count: 6.49 K/uL   RBC Count: 3.15 M/uL   Hemoglobin: 9.9 g/dL   Hematocrit: 28.7 %   Mean Cell Volume: 91.1 fl   Mean Cell Hemoglobin: 31.4 pg   Mean Cell Hemoglobin Conc: 34.5 gm/dL   Red Cell Distrib Width: 14.6 %   Platelet Count - Automated: 170 K/uL   Auto Neutrophil #: 5.53 K/uL   Auto Lymphocyte #: 0.58 K/uL   Auto Monocyte #: 0.35 K/uL   Auto Eosinophil #: 0.01 K/uL   Auto Basophil #: 0.00 K/uL   Auto Neutrophil %: 85.2: Differential percentages must be correlated with absolute numbers for   clinical significance. %   Auto Lymphocyte %: 8.9 %   Auto Monocyte %: 5.4 %   Auto Eosinophil %: 0.2 %   Auto Basophil %: 0.0 %   Auto Immature Granulocyte %: 0.3: (Includes meta, myelo and promyelocytes) %       Historical Values  Complete Blood Count + Automated Diff (21 @ 06:17)   WBC Count: 6.49 K/uL   RBC Count: 3.15 M/uL   Hemoglobin: 9.9 g/dL   Hematocrit: 28.7 %   Mean Cell Volume: 91.1 fl   Mean Cell Hemoglobin: 31.4 pg   Mean Cell Hemoglobin Conc: 34.5 gm/dL   Red Cell Distrib Width: 14.6 %   Platelet Count - Automated: 170 K/uL   Auto Neutrophil #: 5.53 K/uL   Auto Lymphocyte #: 0.58 K/uL   Auto Monocyte #: 0.35 K/uL   Auto Eosinophil #: 0.01 K/uL   Auto Basophil #: 0.00 K/uL   Auto Neutrophil %: 85.2: Differential percentages must be correlated with absolute numbers for   clinical significance. %   Auto Lymphocyte %: 8.9 %   Auto Monocyte %: 5.4 %   Auto Eosinophil %: 0.2 %   Auto Basophil %: 0.0 %   Auto Immature Granulocyte %: 0.3: (Includes meta, myelo and promyelocytes) %   Complete Blood Count + Automated Diff (21 @ 07:35)   WBC Count: 3.17 K/uL   RBC Count: 2.85 M/uL   Hemoglobin: 9.0 g/dL   Hematocrit: 26.0 %   Mean Cell Volume: 91.2 fl   Mean Cell Hemoglobin: 31.6 pg   Mean Cell Hemoglobin Conc: 34.6 gm/dL   Red Cell Distrib Width: 12.4 %   Platelet Count - Automated: 36 K/uL   Auto Neutrophil #: 1.62 K/uL   Auto Lymphocyte #: 1.17 K/uL   Auto Monocyte #: 0.25 K/uL   Auto Eosinophil #: 0.10 K/uL   Auto Basophil #: 0.03 K/uL   Auto Neutrophil %: 51.0: Differential percentages must be correlated with absolute numbers for   clinical significance. %   Auto Lymphocyte %: 37.0 %   Auto Monocyte %: 8.0 %   Auto Eosinophil %: 3.0 %   Auto Basophil %: 1.0 %   Nucleated RBC: N/A: Manual NRBC performed. /100 WBCs   Complete Blood Count + Automated Diff (21 @ 08:20)   WBC Count: 2.20 K/uL   RBC Count: 2.83 M/uL   Hemoglobin: 9.0 g/dL   Hematocrit: 25.7 %   Mean Cell Volume: 90.8 fl   Mean Cell Hemoglobin: 31.8 pg   Mean Cell Hemoglobin Conc: 35.0 gm/dL   Red Cell Distrib Width: 12.2 %   Platelet Count - Automated: 33 K/uL   Auto Neutrophil #: 1.30 K/uL   Auto Lymphocyte #: 0.59 K/uL   Auto Monocyte #: 0.20 K/uL   Auto Eosinophil #: 0.02 K/uL   Auto Basophil #: 0.00 K/uL   Auto Neutrophil %: 56.0: Differential percentages must be correlated with absolute numbers for   clinical significance. %   Auto Lymphocyte %: 27.0 %   Auto Monocyte %: 9.0 %   Auto Eosinophil %: 1.0 %   Auto Basophil %: 0.0 %   Nucleated RBC: N/A: Manual NRBC performed. /100 WBCs   Complete Blood Count + Automated Diff (21 @ 17:48)   WBC Count: 1.31 K/uL   RBC Count: 3.41 M/uL   Hemoglobin: 10.5 g/dL   Hematocrit: 30.9 %   Mean Cell Volume: 90.6 fl   Mean Cell Hemoglobin: 30.8 pg   Mean Cell Hemoglobin Conc: 34.0 gm/dL   Red Cell Distrib Width: 12.2 %   Platelet Count - Automated: 48 K/uL   Auto Neutrophil #: 0.60 K/uL   Auto Lymphocyte #: 0.54 K/uL   Auto Monocyte #: 0.09 K/uL   Auto Eosinophil #: 0.04 K/uL   Auto Basophil #: 0.00 K/uL   Auto Neutrophil %: 45.0: Differential percentages must be correlated with absolute numbers for   clinical significance. %   Auto Lymphocyte %: 41.0 %   Auto Monocyte %: 7.0 %   Auto Eosinophil %: 3.0 %   Auto Basophil %: 0.0 %   Nucleated RBC: N/A: Manual NRBC performed. /100 WBCs   Complete Blood Count + Automated Diff (21 @ 15:20)   WBC Count: 7.78 K/uL   RBC Count: 4.18 M/uL   Hemoglobin: 11.7 g/dL   Hematocrit: 36.5 %   Mean Cell Volume: 87.3 fl   Mean Cell Hemoglobin: 28.0 pg   Mean Cell Hemoglobin Conc: 32.1 gm/dL   Red Cell Distrib Width: 18.6 %   Platelet Count - Automated: 113 K/uL   Auto Neutrophil #: 4.82 K/uL   Auto Lymphocyte #: 1.17 K/uL   Auto Monocyte #: 1.63 K/uL   Auto Eosinophil #: 0.00 K/uL   Auto Basophil #: 0.00 K/uL   Auto Neutrophil %: 59.0: Differential percentages must be correlated with absolute numbers for   clinical significance. %   Auto Lymphocyte %: 15.0 %   Auto Monocyte %: 21.0 %   Auto Eosinophil %: 0.0 %   Auto Basophil %: 0.0 %   Nucleated RBC: N/A: Manual NRBC performed. /100 WBCs   Complete Blood Count + Automated Diff (19 @ 09:29)   WBC Count: 6.41 K/uL   RBC Count: 4.71 M/uL   Hemoglobin: 15.6 g/dL   Hematocrit: 43.4 %   Mean Cell Volume: 92.1 fl   Mean Cell Hemoglobin: 33.1 pg   Mean Cell Hemoglobin Conc: 35.9 gm/dL   Red Cell Distrib Width: 12.8 %   Platelet Count - Automated: 142 K/uL   Auto Neutrophil #: 4.19 K/uL   Auto Lymphocyte #: 1.42 K/uL   Auto Monocyte #: 0.63 K/uL   Auto Eosinophil #: 0.14 K/uL   Auto Basophil #: 0.02 K/uL   Auto Neutrophil %: 65.3: Differential percentages must be correlated with absolute numbers for   clinical significance. %   Auto Lymphocyte %: 22.2 %   Auto Monocyte %: 9.8 %   Auto Eosinophil %: 2.2 %   Auto Basophil %: 0.3 %   Auto Immature Granulocyte %: 0.2 %   Nucleated RBC: 0 /100 WBCs     -06    140  |  107  |  19  ----------------------------<  111<H>  3.0<L>   |  25  |  0.55    Ca    8.2<L>      06 Aug 2021 06:17  Mg     1.5     -    TPro  6.4  /  Alb  3.3  /  TBili  1.2  /  DBili  x   /  AST  25  /  ALT  28  /  AlkPhos  83  08-    PT/INR - ( 06 Aug 2021 06:17 )   PT: 13.4 sec;   INR: 1.15 ratio         PTT - ( 06 Aug 2021 06:17 )  PTT:32.9 sec  Urinalysis Basic - ( 06 Aug 2021 08:04 )    Color: Yellow / Appearance: Clear / S.005 / pH: x  Gluc: x / Ketone: Negative  / Bili: Negative / Urobili: Negative mg/dL   Blood: x / Protein: Negative mg/dL / Nitrite: Negative   Leuk Esterase: Negative / RBC: x / WBC x   Sq Epi: x / Non Sq Epi: x / Bacteria: x            RADIOLOGY, EKG & ADDITIONAL TESTS: Reviewed.   
Patient is a 71y old  Male who presents with a chief complaint of fever     HPI:  72 y/o male with h/o Pancreatic cancer dx 2020 s/p Whipple presently on chemo, CAD s/p PCI, HTN, HLD, GERD, DM, cerebral aneurysm s/p craniotomy and stenting was admitted on  for fever, fatigue, loss of appetite. Patient was just discharged from  on 21. During last admission, patient developed neutropenic fever 1 week after chemo. The patient received IV abx and cultures were negative and patient was discharged on 7 more days of oral abx therapy. On the evening PTA, patient started with fatigue, loss of appetite, and fevers up to 101F overnight.  Patient notes he has had similar reactions in the past after chemo.  In the ER, the patient received cefepime.     PAST MEDICAL & SURGICAL HISTORY:  Hearing loss  right  Stented coronary artery   PTCA x 1  CAD (coronary artery disease)  last stress test 2018  HTN (hypertension)  Hyperlipidemia  GERD (gastroesophageal reflux disease)  Skin cancer  squamous cell - head, shin - removed  OA (osteoarthritis)  Psoriasis  Inguinal hernia  right  Brain aneurysm  non ruptured - had stent placed in  after attemtped clipping and coiling in   Pancreatic cancer  s/p Whipple procedure  Drug or chemical induced diabetes mellitus  H/O craniotomy  for non ruptured aneurysm , attempted clipping  History of other surgery  for brain aneurysm - attempted coiling   History of other surgery  endovascular stent placement for brain Aneurysm at [place] -   History of tonsillectomy  S/P colonoscopy  last - within 5 yrs  History of PTCA  2006 x 1 stent  H/O inguinal hernia repair  Pancreatic disorder  Whipple for cancer    Meds: per reconciliation sheet, noted below  MEDICATIONS  (STANDING):  aspirin enteric coated 81 milliGRAM(s) Oral daily  atorvastatin 20 milliGRAM(s) Oral at bedtime  cefepime  Injectable. 1000 milliGRAM(s) IV Push every 12 hours  dextrose 40% Gel 15 Gram(s) Oral once  dextrose 5%. 1000 milliLiter(s) (50 mL/Hr) IV Continuous <Continuous>  dextrose 5%. 1000 milliLiter(s) (100 mL/Hr) IV Continuous <Continuous>  dextrose 50% Injectable 25 Gram(s) IV Push once  dextrose 50% Injectable 12.5 Gram(s) IV Push once  dextrose 50% Injectable 25 Gram(s) IV Push once  enoxaparin Injectable 40 milliGRAM(s) SubCutaneous daily  glucagon  Injectable 1 milliGRAM(s) IntraMuscular once  insulin lispro (ADMELOG) corrective regimen sliding scale   SubCutaneous three times a day before meals  insulin lispro (ADMELOG) corrective regimen sliding scale   SubCutaneous at bedtime  losartan 50 milliGRAM(s) Oral daily  melatonin 10 milliGRAM(s) Oral at bedtime  multivitamin 1 Tablet(s) Oral daily  pancrelipase (ZENPEP 20,000 Lipase Units) 2 Capsule(s) Oral four times a day with meals  pantoprazole    Tablet 40 milliGRAM(s) Oral before breakfast    MEDICATIONS  (PRN):  acetaminophen   Tablet .. 650 milliGRAM(s) Oral every 6 hours PRN Mild Pain (1 - 3)  aluminum hydroxide/magnesium hydroxide/simethicone Suspension 30 milliLiter(s) Oral every 4 hours PRN Dyspepsia  diphenhydrAMINE   Oral Tab/Cap - Peds 25 milliGRAM(s) Oral at bedtime PRN sleep  ondansetron Injectable 4 milliGRAM(s) IV Push every 6 hours PRN Nausea and/or Vomiting  pancrelipase (ZENPEP 20,000 Lipase Units) 1 Capsule(s) Oral two times a day with meals PRN with snacks    Allergies    No Known Allergies    Intolerances      Social: no smoking, no alcohol, no illegal drugs; no recent travel, no exposure to TB  FAMILY HISTORY:  Family history of breast cancer in sister      no history of premature cardiovascular disease in first degree relatives    ROS: the patient denies HA, no seizures, no dizziness, no sore throat, no nasal congestion, no blurry vision, no CP, no palpitations, no SOB, no cough, no abdominal pain, no diarrhea, no N/V, no dysuria, no leg pain, no claudication, no rash, no joint aches, no rectal pain or bleeding, no night sweats; has increased fatigue; has poor appetite  All other systems reviewed and are negative    Vital Signs Last 24 Hrs  T(C): 37 (06 Aug 2021 15:20), Max: 37.2 (06 Aug 2021 05:48)  T(F): 98.6 (06 Aug 2021 15:20), Max: 99 (06 Aug 2021 05:48)  HR: 71 (06 Aug 2021 15:20) (60 - 74)  BP: 119/64 (06 Aug 2021 15:20) (119/64 - 141/65)  BP(mean): 82 (06 Aug 2021 10:51) (76 - 82)  RR: 18 (06 Aug 2021 15:20) (16 - 18)  SpO2: 100% (06 Aug 2021 15:20) (98% - 100%)  Daily Height in cm: 172.72 (06 Aug 2021 05:44)    Daily     PE:    Constitutional:  No acute distress  HEENT: NC/AT, EOMI, PERRLA, conjunctivae clear; ears and nose atraumatic; pharynx benign  Neck: supple; thyroid not palpable  Back: no tenderness  Respiratory: respiratory effort normal; decreased BS at bases  Cardiovascular: S1S2 regular, no murmurs  Abdomen: soft, not tender, mild distended, positive BS; no liver or spleen organomegaly  Genitourinary: no suprapubic tenderness  Lymphatic: no LN palpable  Musculoskeletal: no muscle tenderness, no joint swelling or tenderness  Extremities: no pedal edema  Neurological/ Psychiatric: AxOx3, judgement and insight normal; moving all extremities  Skin: no rashes; no palpable lesions    Labs: all available labs reviewed                        9.9    6.49  )-----------( 170      ( 06 Aug 2021 06:17 )             28.7     08-06    140  |  107  |  19  ----------------------------<  111<H>  3.0<L>   |  25  |  0.55    Ca    8.2<L>      06 Aug 2021 06:17  Mg     1.5     08-06    TPro  6.4  /  Alb  3.3  /  TBili  1.2  /  DBili  x   /  AST  25  /  ALT  28  /  AlkPhos  83  08-06     LIVER FUNCTIONS - ( 06 Aug 2021 06:17 )  Alb: 3.3 g/dL / Pro: 6.4 gm/dL / ALK PHOS: 83 U/L / ALT: 28 U/L / AST: 25 U/L / GGT: x           Urinalysis Basic - ( 06 Aug 2021 08:04 )    Color: Yellow / Appearance: Clear / S.005 / pH: x  Gluc: x / Ketone: Negative  / Bili: Negative / Urobili: Negative mg/dL   Blood: x / Protein: Negative mg/dL / Nitrite: Negative   Leuk Esterase: Negative / RBC: x / WBC x   Sq Epi: x / Non Sq Epi: x / Bacteria: x    ( @ 06:30)  NotDete        Radiology: all available radiological tests reviewed    < from: Xray Chest 1 View-PORTABLE IMMEDIATE (21 @ 06:25) >  No acute finding or change.  < end of copied text >      Advanced directives addressed: full resuscitation

## 2021-08-07 NOTE — CONSULT NOTE ADULT - ASSESSMENT
70yo male patient of Dr. Thomas with ampullary adenocarcinoma,  pancreatobiliary type s/p resection s/p gem and Nab paclitaxel now with fever     #Febrile   -c/w cefepime  -ID eval appreciated  -f/u cultures   UA neg      We will be happy to answer any onc questions on behalf of [facility] and will be in touch with them.

## 2021-08-08 ENCOUNTER — TRANSCRIPTION ENCOUNTER (OUTPATIENT)
Age: 71
End: 2021-08-08

## 2021-08-08 VITALS
HEART RATE: 69 BPM | DIASTOLIC BLOOD PRESSURE: 75 MMHG | OXYGEN SATURATION: 100 % | RESPIRATION RATE: 18 BRPM | SYSTOLIC BLOOD PRESSURE: 151 MMHG | TEMPERATURE: 98 F

## 2021-08-08 LAB
ALBUMIN SERPL ELPH-MCNC: 3.1 G/DL — LOW (ref 3.3–5)
ALP SERPL-CCNC: 79 U/L — SIGNIFICANT CHANGE UP (ref 40–120)
ALT FLD-CCNC: 22 U/L — SIGNIFICANT CHANGE UP (ref 12–78)
ANION GAP SERPL CALC-SCNC: 6 MMOL/L — SIGNIFICANT CHANGE UP (ref 5–17)
AST SERPL-CCNC: 17 U/L — SIGNIFICANT CHANGE UP (ref 15–37)
BASOPHILS # BLD AUTO: 0 K/UL — SIGNIFICANT CHANGE UP (ref 0–0.2)
BASOPHILS NFR BLD AUTO: 0 % — SIGNIFICANT CHANGE UP (ref 0–2)
BILIRUB SERPL-MCNC: 0.9 MG/DL — SIGNIFICANT CHANGE UP (ref 0.2–1.2)
BUN SERPL-MCNC: 12 MG/DL — SIGNIFICANT CHANGE UP (ref 7–23)
CALCIUM SERPL-MCNC: 8.8 MG/DL — SIGNIFICANT CHANGE UP (ref 8.5–10.1)
CHLORIDE SERPL-SCNC: 109 MMOL/L — HIGH (ref 96–108)
CK SERPL-CCNC: 15 U/L — LOW (ref 26–308)
CO2 SERPL-SCNC: 27 MMOL/L — SIGNIFICANT CHANGE UP (ref 22–31)
CREAT SERPL-MCNC: 0.49 MG/DL — LOW (ref 0.5–1.3)
CRP SERPL-MCNC: 20 MG/L — HIGH
EOSINOPHIL # BLD AUTO: 0.03 K/UL — SIGNIFICANT CHANGE UP (ref 0–0.5)
EOSINOPHIL NFR BLD AUTO: 1 % — SIGNIFICANT CHANGE UP (ref 0–6)
GLUCOSE SERPL-MCNC: 103 MG/DL — HIGH (ref 70–99)
HCT VFR BLD CALC: 28.7 % — LOW (ref 39–50)
HGB BLD-MCNC: 9.8 G/DL — LOW (ref 13–17)
LIDOCAIN IGE QN: <10 U/L — LOW (ref 73–393)
LYMPHOCYTES # BLD AUTO: 0.57 K/UL — LOW (ref 1–3.3)
LYMPHOCYTES # BLD AUTO: 17 % — SIGNIFICANT CHANGE UP (ref 13–44)
MCHC RBC-ENTMCNC: 31.6 PG — SIGNIFICANT CHANGE UP (ref 27–34)
MCHC RBC-ENTMCNC: 34.1 GM/DL — SIGNIFICANT CHANGE UP (ref 32–36)
MCV RBC AUTO: 92.6 FL — SIGNIFICANT CHANGE UP (ref 80–100)
MONOCYTES # BLD AUTO: 0.03 K/UL — SIGNIFICANT CHANGE UP (ref 0–0.9)
MONOCYTES NFR BLD AUTO: 1 % — LOW (ref 2–14)
NEUTROPHILS # BLD AUTO: 2.71 K/UL — SIGNIFICANT CHANGE UP (ref 1.8–7.4)
NEUTROPHILS NFR BLD AUTO: 81 % — HIGH (ref 43–77)
NRBC # BLD: SIGNIFICANT CHANGE UP /100 WBCS (ref 0–0)
NT-PROBNP SERPL-SCNC: 146 PG/ML — HIGH (ref 0–125)
PLATELET # BLD AUTO: 165 K/UL — SIGNIFICANT CHANGE UP (ref 150–400)
POTASSIUM SERPL-MCNC: 4 MMOL/L — SIGNIFICANT CHANGE UP (ref 3.5–5.3)
POTASSIUM SERPL-SCNC: 4 MMOL/L — SIGNIFICANT CHANGE UP (ref 3.5–5.3)
PROCALCITONIN SERPL-MCNC: 0.06 NG/ML — SIGNIFICANT CHANGE UP (ref 0.02–0.1)
PROT SERPL-MCNC: 6.2 GM/DL — SIGNIFICANT CHANGE UP (ref 6–8.3)
RBC # BLD: 3.1 M/UL — LOW (ref 4.2–5.8)
RBC # FLD: 14.5 % — SIGNIFICANT CHANGE UP (ref 10.3–14.5)
SODIUM SERPL-SCNC: 142 MMOL/L — SIGNIFICANT CHANGE UP (ref 135–145)
TROPONIN I SERPL-MCNC: <0.015 NG/ML — SIGNIFICANT CHANGE UP (ref 0.01–0.04)
WBC # BLD: 3.35 K/UL — LOW (ref 3.8–10.5)
WBC # FLD AUTO: 3.35 K/UL — LOW (ref 3.8–10.5)

## 2021-08-08 PROCEDURE — 99239 HOSP IP/OBS DSCHRG MGMT >30: CPT

## 2021-08-08 PROCEDURE — 93010 ELECTROCARDIOGRAM REPORT: CPT

## 2021-08-08 RX ORDER — CEFUROXIME AXETIL 250 MG
1 TABLET ORAL
Qty: 20 | Refills: 0
Start: 2021-08-08 | End: 2021-08-17

## 2021-08-08 RX ADMIN — CEFEPIME 100 MILLIGRAM(S): 1 INJECTION, POWDER, FOR SOLUTION INTRAMUSCULAR; INTRAVENOUS at 09:45

## 2021-08-08 RX ADMIN — Medication 81 MILLIGRAM(S): at 09:45

## 2021-08-08 RX ADMIN — Medication 4 CAPSULE(S): at 12:40

## 2021-08-08 RX ADMIN — ENOXAPARIN SODIUM 40 MILLIGRAM(S): 100 INJECTION SUBCUTANEOUS at 09:44

## 2021-08-08 RX ADMIN — Medication 4 CAPSULE(S): at 09:01

## 2021-08-08 RX ADMIN — LOSARTAN POTASSIUM 50 MILLIGRAM(S): 100 TABLET, FILM COATED ORAL at 09:44

## 2021-08-08 RX ADMIN — PANTOPRAZOLE SODIUM 40 MILLIGRAM(S): 20 TABLET, DELAYED RELEASE ORAL at 09:44

## 2021-08-08 RX ADMIN — Medication 2: at 12:39

## 2021-08-08 RX ADMIN — Medication 1 TABLET(S): at 09:45

## 2021-08-08 NOTE — PROGRESS NOTE ADULT - ASSESSMENT
72yo male patient of Dr. Thomas with ampullary adenocarcinoma,  pancreatobiliary type s/p resection s/p gem and Nab paclitaxel now with fever     #Febrile   -c/w cefepime   would recommend descalation of abx ? augmentin   -ID eval appreciated  -cultures were negative    UA neg  - FROM MED ONC standpoint would consider d/c today     # Abnormal EKG   - reviewed EKGs from January and prior EKg suggest anterior changes that have been consistent with ekg from january.   no new changes     We will be happy to answer any onc questions on behalf of [facility] and will be in touch with them.  
70 y/o male with h/o Pancreatic cancer dx 2020 s/p Whipple presently on chemo, CAD s/p PCI, HTN, HLD, GERD, DM, cerebral aneurysm s/p craniotomy and stenting was admitted on 8/6 for fever, fatigue, loss of appetite. Patient was just discharged from  on 7/28/21. During last admission, patient developed neutropenic fever 1 week after chemo. The patient received IV abx and cultures were negative and patient was discharged on 7 more days of oral abx therapy. On the evening PTA, patient started with fatigue, loss of appetite, and fevers up to 101F overnight.  Patient notes he has had similar reactions in the past after chemo.  In the ER, the patient received cefepime.     1. Febrile syndrome improving. Possible acute cholangitis. Pancreatic Ca s/p Whipple on chemotherapy. Immunocompromised host.   -no leukocytosis  -the patient has been on abx therapy PTA  -unclear cause of current febrile syndrome ?sepsis ?tumor fever  -BC x 2, urine c/s collected  -on cefepime 2 gm IV q12h # 2  -tolerating abx well so far; no side effects noted  -f/u cultures  -continue abx coverage  -monitor temps  -f/u CBC  -supportive care  2. Other issues:   -care per medicine        
70 y/o male with PMHX of Pancreatic cancer dx 2020 s/p Whipple presently on chemo, CAD s/p PCI, HTN, HLD, GERD, DM, cerebral aneurysm s/p craniotomy and stenting who presented to  with CC of fever, fatigue, loss of appetite.      Febrile syndrome , possible acute cholangitis vs drug adverse effect vs tumor fever  c/w  cefepime pending cultures with recent chemo    CXR clear.  No resp complaints.  COVID/ RVP negative.     ID consult.     Metastatic Pancreatic Cancer  Initial surgery 2020.  Now on further chemo due to recurrence seen on CT scan.    ONC eval.  , Last chemo was 8/4/21.  , Cont pancreatic enzymes.    Hypokalemia/ Hypomagnesemia, resolved Replete.  Hold HCTZ.    HTN Cont losartan.  Hold HCTZ.    CAD s/p  PCI abnormal EKG  - repeat EKG in am,   Cont asa/ statin.  check troponin, CK, BNP   DM2   with A1C 5.5  - Sliding scale.    GERD:  cont ppi.    DVT Proph:  Lovenox.      Code status:  FULL  D/w wife and patient in detail.           
70 y/o male with h/o Pancreatic cancer dx 2020 s/p Whipple presently on chemo, CAD s/p PCI, HTN, HLD, GERD, DM, cerebral aneurysm s/p craniotomy and stenting was admitted on 8/6 for fever, fatigue, loss of appetite. Patient was just discharged from  on 7/28/21. During last admission, patient developed neutropenic fever 1 week after chemo. The patient received IV abx and cultures were negative and patient was discharged on 7 more days of oral abx therapy. On the evening PTA, patient started with fatigue, loss of appetite, and fevers up to 101F overnight.  Patient notes he has had similar reactions in the past after chemo.  In the ER, the patient received cefepime.     1. Febrile syndrome improving. Possible acute cholangitis. Pancreatic Ca s/p Whipple on chemotherapy. Immunocompromised host.   -no leukocytosis  -the patient has been on abx therapy PTA  -unclear cause of current febrile syndrome ?sepsis ?tumor fever  -BC x 2, urine c/s collected  -on cefepime 2 gm IV q12h # 3  -tolerating abx well so far; no side effects noted  -f/u cultures  -may change abx to ceftin 500 mg PO q12h for 10 more days  -monitor temps  -f/u CBC  -supportive care  2. Other issues:   -care per medicine

## 2021-08-08 NOTE — DISCHARGE NOTE PROVIDER - HOSPITAL COURSE
Patient is a 71y old  Male who presents with a chief complaint of fever    HPI:       72 y/o male with PMHX of Pancreatic cancer dx 2020 s/p Whipple presently on chemo, CAD s/p PCI, HTN, HLD, GERD, DM, cerebral aneurysm s/p craniotomy and stenting who presented to  on 8/6/21  with CC of fever, fatigue, loss of appetite.  Patient was just discharged from  after similar admission on 7/28/21.  During last admission, patient was admitted with neutropenic fever 1 week after chemo.  Cultures were negative and patient was discharged on 7 days of ABX.  Patient still has one day left.  Last evening, patient started with fatigue, loss of appetite, and fevers up to 101 overnight.  Patient presented for evaluation.  No other sx-- no dysuria, cough, SOB, abd pain, diarrhea.  Patient notes he has had similar reactions in the past after chemo.  In the ER, CBC showed WBC of 6.  No neutorpenia.  CXR negative.  UA negative.  Patient started on cefepime and cultured.      8/7    Patient seen and examined at bedside earlier today, feels well, afebrile, denies cp, dyspnea, abdominal pain, plan discussed   8/8- pt seen and examined, afebrile, denies cp, abdominal pain, denies cough, diarrhea, plan discussed    Review of system- Rest of the review of system are negative except mentioned in HPI    Objective:   T(C): 36.8 (08-08-21 @ 09:05), Max: 36.8 (08-07-21 @ 21:22)  T(F): 98.3 (08-08-21 @ 09:05), Max: 98.3 (08-08-21 @ 09:05)  HR: 62 (08-08-21 @ 09:05) (59 - 62)  BP: 150/73 (08-08-21 @ 09:05) (137/67 - 150/73)  RR: 17 (08-08-21 @ 09:05) (17 - 18)  SpO2: 99% (08-08-21 @ 09:05) (99% - 100%)  Wt(kg): --  CAPILLARY BLOOD GLUCOSE  A1C with Estimated Average Glucose (07.27.21 @ 08:20)    A1C with Estimated Average Glucose Result: 5.5:  CAPILLARY BLOOD GLUCOSE  POCT Blood Glucose.: 158 mg/dL (08 Aug 2021 12:37)  POCT Blood Glucose.: 113 mg/dL (08 Aug 2021 07:54)  POCT Blood Glucose.: 130 mg/dL (07 Aug 2021 21:01)  POCT Blood Glucose.: 176 mg/dL (07 Aug 2021 16:48)      Physical exam:   GENERAL: NAD  NERVOUS SYSTEM:  Alert & Oriented X3, non- focal exam, Motor Strength 5/5 B/L upper and lower extremities; DTRs 2+ intact and symmetric  HEAD:  Atraumatic, Normocephalic  EYES: EOMI, PERRLA, conjunctiva and sclera clear  HEENT: Moist mucous membranes  NECK: Supple, No JVD  CHEST/LUNG: Clear to auscultation bilaterally; No rales, no rhonchi, no wheezing  HEART: Regular rate and rhythm; No murmurs, no rubs or gallops  ABDOMEN: Soft, Non-tender, Non-distended; Bowel sounds present  GENITOURINARY- Voiding, no suprapubic tenderness  EXTREMITIES:  2+ Peripheral Pulses, No clubbing, cyanosis,   edema  MUSCULOSKELETAL:- No muscle tenderness, Muscle tone normal, No joint tenderness, no Joint swelling,  Joint ROM –normal   SKIN-no rash, no lesion    · Assessment    72 y/o male with PMHX of Pancreatic cancer dx 2020 s/p Whipple presently on chemo, CAD s/p PCI, HTN, HLD, GERD, DM, cerebral aneurysm s/p craniotomy and stenting who presented to  with CC of fever, fatigue, loss of appetite.      Febrile syndrome , possible acute cholangitis , can be drug adverse effect of chemotherapy   c/w  cefepime pending cultures with recent chemo    CXR clear.  No resp complaints.  COVID/ RVP negative.     ID consult.     Metastatic Pancreatic Cancer   Initial surgery 2020.  Now on further chemo due to recurrence seen on CT scan.    ONC eval.  , Last chemo was 8/4/21.  , Cont pancreatic enzymes.    Hypokalemia/ Hypomagnesemia, resolved Replete.  Hold HCTZ.    HTN Cont losartan.  Hold HCTZ.    CAD s/p  PCI abnormal EKG  - repeat EKG in am,   Cont asa/ statin.  check troponin, CK, BNP   DM2   with A1C 5.5  - Sliding scale.  deascalate diabetic meds   GERD:  cont ppi.    DVT Proph:  Lovenox.      Code status:  FULL  Disposition - medically optimized to be discharged home with close follow up with PCP  complete antibiotics  return to ED if fever, abdominal pain, nausea, vomiting, chest pain, dyspnea  Discharge plan discussed with patient, RN  Patient advised to follow up with PCP within 3-7 days  time spend 40 min  Discharge note faxed to PCP with my contact information to call me back   PCP Dr. Amaro Patient is a 71y old  Male who presents with a chief complaint of fever    HPI:       72 y/o male with PMHX of Pancreatic cancer dx 2020 s/p Whipple presently on chemo, CAD s/p PCI, HTN, HLD, GERD, DM, cerebral aneurysm s/p craniotomy and stenting who presented to  on 8/6/21  with CC of fever, fatigue, loss of appetite.  Patient was just discharged from  after similar admission on 7/28/21.  During last admission, patient was admitted with neutropenic fever 1 week after chemo.  Cultures were negative and patient was discharged on 7 days of ABX.  Patient still has one day left.  Last evening, patient started with fatigue, loss of appetite, and fevers up to 101 overnight.  Patient presented for evaluation.  No other sx-- no dysuria, cough, SOB, abd pain, diarrhea.  Patient notes he has had similar reactions in the past after chemo.  In the ER, CBC showed WBC of 6.  No neutorpenia.  CXR negative.  UA negative.  Patient started on cefepime and cultured.      8/7    Patient seen and examined at bedside earlier today, feels well, afebrile, denies cp, dyspnea, abdominal pain, plan discussed   8/8- pt seen and examined, afebrile, denies cp, abdominal pain, denies cough, diarrhea, plan discussed    Review of system- Rest of the review of system are negative except mentioned in HPI    Objective:   T(C): 36.8 (08-08-21 @ 09:05), Max: 36.8 (08-07-21 @ 21:22)  T(F): 98.3 (08-08-21 @ 09:05), Max: 98.3 (08-08-21 @ 09:05)  HR: 62 (08-08-21 @ 09:05) (59 - 62)  BP: 150/73 (08-08-21 @ 09:05) (137/67 - 150/73)  RR: 17 (08-08-21 @ 09:05) (17 - 18)  SpO2: 99% (08-08-21 @ 09:05) (99% - 100%)  Wt(kg): --  CAPILLARY BLOOD GLUCOSE  A1C with Estimated Average Glucose (07.27.21 @ 08:20)    A1C with Estimated Average Glucose Result: 5.5:  CAPILLARY BLOOD GLUCOSE  POCT Blood Glucose.: 158 mg/dL (08 Aug 2021 12:37)  POCT Blood Glucose.: 113 mg/dL (08 Aug 2021 07:54)  POCT Blood Glucose.: 130 mg/dL (07 Aug 2021 21:01)  POCT Blood Glucose.: 176 mg/dL (07 Aug 2021 16:48)      Physical exam:   GENERAL: NAD  NERVOUS SYSTEM:  Alert & Oriented X3, non- focal exam, Motor Strength 5/5 B/L upper and lower extremities; DTRs 2+ intact and symmetric  HEAD:  Atraumatic, Normocephalic  EYES: EOMI, PERRLA, conjunctiva and sclera clear  HEENT: Moist mucous membranes  NECK: Supple, No JVD  CHEST/LUNG: Clear to auscultation bilaterally; No rales, no rhonchi, no wheezing  HEART: Regular rate and rhythm; No murmurs, no rubs or gallops  ABDOMEN: Soft, Non-tender, Non-distended; Bowel sounds present  GENITOURINARY- Voiding, no suprapubic tenderness  EXTREMITIES:  2+ Peripheral Pulses, No clubbing, cyanosis,   edema  MUSCULOSKELETAL:- No muscle tenderness, Muscle tone normal, No joint tenderness, no Joint swelling,  Joint ROM –normal   SKIN-no rash, no lesion    · Assessment    72 y/o male with PMHX of Pancreatic cancer dx 2020 s/p Whipple presently on chemo, CAD s/p PCI, HTN, HLD, GERD, DM, cerebral aneurysm s/p craniotomy and stenting who presented to  with CC of fever, fatigue, loss of appetite.      Febrile syndrome , possible acute cholangitis , can be drug adverse effect of chemotherapy   c/w  cefepime pending cultures with recent chemo    CXR clear.  No resp complaints.  COVID/ RVP negative.     ID consult.     Metastatic Pancreatic Cancer   Initial surgery 2020.  Now on further chemo due to recurrence seen on CT scan.    ONC eval.  , Last chemo was 8/4/21.  , Cont pancreatic enzymes.    Anemia , leukopenia due to chemotherapy  Hypokalemia/ Hypomagnesemia, resolved Replete.  Hold HCTZ.    HTN Cont losartan.  Hold HCTZ.    CAD s/p  PCI abnormal EKG  - repeat EKG in am,   Cont asa/ statin.  check troponin, CK, BNP   DM2   with A1C 5.5  - Sliding scale.  deascalate diabetic meds   GERD:  cont ppi.    DVT Proph:  Lovenox.      Code status:  FULL  Disposition - medically optimized to be discharged home with close follow up with PCP  complete antibiotics  return to ED if fever, abdominal pain, nausea, vomiting, chest pain, dyspnea  Discharge plan discussed with patient, RN  Patient advised to follow up with PCP within 3-7 days  time spend 40 min  Discharge note faxed to PCP with my contact information to call me back   PCP Dr. Amaro

## 2021-08-08 NOTE — DISCHARGE NOTE PROVIDER - PROVIDER TOKENS
PROVIDER:[TOKEN:[[id number]],FOLLOWUP:[1 week]],PROVIDER:[TOKEN:[[id number]],FOLLOWUP:[1 week]],FREE:[LAST:[oncologist],PHONE:[(   )    -],FAX:[(   )    -],FOLLOWUP:[1 week]]

## 2021-08-08 NOTE — DISCHARGE NOTE PROVIDER - CARE PROVIDER_API CALL
Ariel Amaro  MEDICINE  [address] [place] [address]  Phone: [phone]  Fax: [phone]  Follow Up Time: 1 week    Corona Delgadillo)  Cardiovascular Disease; Internal Medicine  [address] [place] [address]  Phone: [phone]  Fax: [phone]  Follow Up Time: 1 week    oncologist,   Phone: (   )    -  Fax: (   )    -  Follow Up Time: 1 week

## 2021-08-08 NOTE — DISCHARGE NOTE PROVIDER - NSDCCPCAREPLAN_GEN_ALL_CORE_FT
PRINCIPAL DISCHARGE DIAGNOSIS  Diagnosis: Cholangitis  Assessment and Plan of Treatment: complete antibiotics , follow up with PCP and oncologist within 1 week, return to ED if fever, chills, abdominal pain, any other concerns      SECONDARY DISCHARGE DIAGNOSES  Diagnosis: Diabetes mellitus  Assessment and Plan of Treatment: based on A1C 5.5 pt does not have diabetes, likely steroids induced hyperglycemia , follow up with PCP for further preventive care, consider deescalating diabetic medication , check blood glucose once a day, follow  low carb diet, repeat A1C in 2 month    Diagnosis: Abnormal EKG  Assessment and Plan of Treatment: follow up with cardiologist within 1 week     PRINCIPAL DISCHARGE DIAGNOSIS  Diagnosis: Cholangitis  Assessment and Plan of Treatment: complete antibiotics , follow up with PCP and oncologist within 1 week, return to ED if fever, chills, abdominal pain, any other concerns      SECONDARY DISCHARGE DIAGNOSES  Diagnosis: Diabetes mellitus  Assessment and Plan of Treatment: based on A1C 5.5 pt does not have diabetes, likely steroids induced hyperglycemia , follow up with PCP for further preventive care, consider deescalating diabetic medication , check blood glucose once a day, follow  low carb diet, repeat A1C in 2 month    Diagnosis: Abnormal EKG  Assessment and Plan of Treatment: follow up with cardiologist within 1 week    Diagnosis: Pancreatic cancer  Assessment and Plan of Treatment: follow up with oncologist within 1 week    Diagnosis: Anemia in neoplastic disease  Assessment and Plan of Treatment: follow up with oncologist within 1 week

## 2021-08-08 NOTE — PROGRESS NOTE ADULT - SUBJECTIVE AND OBJECTIVE BOX
Date of service: 21 @ 08:49    Sitting in bed in NAD  Fever is down  Denies abdominal pain    ROS: denies dizziness, no HA, no SOB or cough, no diarrhea or constipation; no dysuria, no legs pain, no rashes    MEDICATIONS  (STANDING):  aspirin enteric coated 81 milliGRAM(s) Oral daily  atorvastatin 20 milliGRAM(s) Oral at bedtime  cefepime   IVPB 2000 milliGRAM(s) IV Intermittent every 12 hours  dextrose 40% Gel 15 Gram(s) Oral once  dextrose 5%. 1000 milliLiter(s) (50 mL/Hr) IV Continuous <Continuous>  dextrose 5%. 1000 milliLiter(s) (100 mL/Hr) IV Continuous <Continuous>  dextrose 50% Injectable 12.5 Gram(s) IV Push once  dextrose 50% Injectable 25 Gram(s) IV Push once  dextrose 50% Injectable 25 Gram(s) IV Push once  enoxaparin Injectable 40 milliGRAM(s) SubCutaneous daily  glucagon  Injectable 1 milliGRAM(s) IntraMuscular once  insulin lispro (ADMELOG) corrective regimen sliding scale   SubCutaneous three times a day before meals  insulin lispro (ADMELOG) corrective regimen sliding scale   SubCutaneous at bedtime  losartan 50 milliGRAM(s) Oral daily  melatonin 10 milliGRAM(s) Oral at bedtime  multivitamin 1 Tablet(s) Oral daily  pancrelipase (ZENPEP 20,000 Lipase Units) 2 Capsule(s) Oral four times a day with meals  pantoprazole    Tablet 40 milliGRAM(s) Oral before breakfast    Vital Signs Last 24 Hrs  T(C): 36.6 (06 Aug 2021 22:30), Max: 37 (06 Aug 2021 15:20)  T(F): 97.9 (06 Aug 2021 22:30), Max: 98.6 (06 Aug 2021 15:20)  HR: 71 (06 Aug 2021 15:20) (60 - 71)  BP: 124/61 (06 Aug 2021 22:30) (119/64 - 141/65)  BP(mean): 82 (06 Aug 2021 10:51) (82 - 82)  RR: 18 (06 Aug 2021 22:30) (16 - 18)  SpO2: 99% (06 Aug 2021 22:30) (99% - 100%)     Physical exam:    Constitutional:  No acute distress  HEENT: NC/AT, EOMI, PERRLA, conjunctivae clear  Neck: supple; thyroid not palpable  Back: no tenderness  Respiratory: respiratory effort normal; decreased BS at bases  Cardiovascular: S1S2 regular, no murmurs  Abdomen: soft, not tender, mild distended, positive BS  Genitourinary: no suprapubic tenderness  Lymphatic: no LN palpable  Musculoskeletal: no muscle tenderness, no joint swelling or tenderness  Extremities: no pedal edema  Neurological/ Psychiatric: AxOx3, moving all extremities  Skin: no rashes; no palpable lesions    Labs: all available labs reviewed                        9.9    6.49  )-----------( 170      ( 06 Aug 2021 06:17 )             28.7     -    140  |  107  |  19  ----------------------------<  111<H>  3.0<L>   |  25  |  0.55    Ca    8.2<L>      06 Aug 2021 06:17  Mg     1.5         TPro  6.4  /  Alb  3.3  /  TBili  1.2  /  DBili  x   /  AST  25  /  ALT  28  /  AlkPhos  83       LIVER FUNCTIONS - ( 06 Aug 2021 06:17 )  Alb: 3.3 g/dL / Pro: 6.4 gm/dL / ALK PHOS: 83 U/L / ALT: 28 U/L / AST: 25 U/L / GGT: x           Urinalysis Basic - ( 06 Aug 2021 08:04 )    Color: Yellow / Appearance: Clear / S.005 / pH: x  Gluc: x / Ketone: Negative  / Bili: Negative / Urobili: Negative mg/dL   Blood: x / Protein: Negative mg/dL / Nitrite: Negative   Leuk Esterase: Negative / RBC: x / WBC x   Sq Epi: x / Non Sq Epi: x / Bacteria: x    ( @ 06:30)  NotDete        Radiology: all available radiological tests reviewed    < from: Xray Chest 1 View-PORTABLE IMMEDIATE (21 @ 06:25) >  No acute finding or change.  < end of copied text >      Advanced directives addressed: full resuscitation
Date of service: 21 @ 11:51    Lying in bed in NAD  No further fever  No abdominal pain  Feels better    ROS: no fever or chills; denies dizziness, no HA, no SOB or cough, no abdominal pain, no diarrhea or constipation; no dysuria, no legs pain, no rashes    MEDICATIONS  (STANDING):  aspirin enteric coated 81 milliGRAM(s) Oral daily  atorvastatin 20 milliGRAM(s) Oral at bedtime  cefepime   IVPB 2000 milliGRAM(s) IV Intermittent every 12 hours  dextrose 40% Gel 15 Gram(s) Oral once  dextrose 5%. 1000 milliLiter(s) (50 mL/Hr) IV Continuous <Continuous>  dextrose 5%. 1000 milliLiter(s) (100 mL/Hr) IV Continuous <Continuous>  dextrose 50% Injectable 25 Gram(s) IV Push once  dextrose 50% Injectable 12.5 Gram(s) IV Push once  dextrose 50% Injectable 25 Gram(s) IV Push once  enoxaparin Injectable 40 milliGRAM(s) SubCutaneous daily  glucagon  Injectable 1 milliGRAM(s) IntraMuscular once  insulin lispro (ADMELOG) corrective regimen sliding scale   SubCutaneous three times a day before meals  insulin lispro (ADMELOG) corrective regimen sliding scale   SubCutaneous at bedtime  losartan 50 milliGRAM(s) Oral daily  melatonin 10 milliGRAM(s) Oral at bedtime  multivitamin 1 Tablet(s) Oral daily  pancrelipase (ZENPEP 20,000 Lipase Units) 4 Capsule(s) Oral three times a day with meals  pantoprazole    Tablet 40 milliGRAM(s) Oral before breakfast    Vital Signs Last 24 Hrs  T(C): 36.8 (08 Aug 2021 09:05), Max: 36.8 (07 Aug 2021 21:22)  T(F): 98.3 (08 Aug 2021 09:05), Max: 98.3 (08 Aug 2021 09:05)  HR: 62 (08 Aug 2021 09:05) (59 - 62)  BP: 150/73 (08 Aug 2021 09:05) (137/67 - 150/73)  BP(mean): --  RR: 17 (08 Aug 2021 09:05) (17 - 18)  SpO2: 99% (08 Aug 2021 09:05) (99% - 100%)     Physical exam:    Constitutional:  No acute distress  HEENT: NC/AT, EOMI, PERRLA, conjunctivae clear  Neck: supple; thyroid not palpable  Back: no tenderness  Respiratory: respiratory effort normal; decreased BS at bases  Cardiovascular: S1S2 regular, no murmurs  Abdomen: soft, not tender, mild distended, positive BS  Genitourinary: no suprapubic tenderness  Lymphatic: no LN palpable  Musculoskeletal: no muscle tenderness, no joint swelling or tenderness  Extremities: no pedal edema  Neurological/ Psychiatric: AxOx3, moving all extremities  Skin: no rashes; no palpable lesions    Labs: reviewed                        9.8    3.35  )-----------( 165      ( 08 Aug 2021 08:00 )             28.7     08-08    142  |  109<H>  |  12  ----------------------------<  103<H>  4.0   |  27  |  0.49<L>    Ca    8.8      08 Aug 2021 08:00    TPro  6.2  /  Alb  3.1<L>  /  TBili  0.9  /  DBili  x   /  AST  17  /  ALT  22  /  AlkPhos  79                          9.9    6.49  )-----------( 170      ( 06 Aug 2021 06:17 )             28.7     08-06    140  |  107  |  19  ----------------------------<  111<H>  3.0<L>   |  25  |  0.55    Ca    8.2<L>      06 Aug 2021 06:17  Mg     1.5     08-06    TPro  6.4  /  Alb  3.3  /  TBili  1.2  /  DBili  x   /  AST  25  /  ALT  28  /  AlkPhos  83  08-06     LIVER FUNCTIONS - ( 06 Aug 2021 06:17 )  Alb: 3.3 g/dL / Pro: 6.4 gm/dL / ALK PHOS: 83 U/L / ALT: 28 U/L / AST: 25 U/L / GGT: x           Urinalysis Basic - ( 06 Aug 2021 08:04 )    Color: Yellow / Appearance: Clear / S.005 / pH: x  Gluc: x / Ketone: Negative  / Bili: Negative / Urobili: Negative mg/dL   Blood: x / Protein: Negative mg/dL / Nitrite: Negative   Leuk Esterase: Negative / RBC: x / WBC x   Sq Epi: x / Non Sq Epi: x / Bacteria: x    ( @ 06:30)  NotDete        Radiology: all available radiological tests reviewed    < from: Xray Chest 1 View-PORTABLE IMMEDIATE (21 @ 06:25) >  No acute finding or change.  < end of copied text >      Advanced directives addressed: full resuscitation
INTERVAL HPI/OVERNIGHT EVENTS:  Patient S&E at bedside. No o/n events, patient resting comfortably. No complaints at this time.  Afebrile overnight     VITAL SIGNS:  T(F): 98.2 (08-07-21 @ 21:22)  HR: 61 (08-07-21 @ 21:22)  BP: 147/69 (08-07-21 @ 21:22)  RR: 17 (08-07-21 @ 21:22)  SpO2: 99% (08-07-21 @ 21:22)  Wt(kg): --    PHYSICAL EXAM:    Constitutional: NAD  Eyes: EOMI, sclera non-icteric  Neck: supple, no masses, no JVD  Respiratory: CTA b/l, good air entry b/l  Cardiovascular: RRR, no M/R/G  Gastrointestinal: soft, NTND, no masses palpable, + BS, no hepatosplenomegaly  Extremities: no c/c/e  Neurological: AAOx3      MEDICATIONS  (STANDING):  aspirin enteric coated 81 milliGRAM(s) Oral daily  atorvastatin 20 milliGRAM(s) Oral at bedtime  cefepime   IVPB 2000 milliGRAM(s) IV Intermittent every 12 hours  dextrose 40% Gel 15 Gram(s) Oral once  dextrose 5%. 1000 milliLiter(s) (50 mL/Hr) IV Continuous <Continuous>  dextrose 5%. 1000 milliLiter(s) (100 mL/Hr) IV Continuous <Continuous>  dextrose 50% Injectable 25 Gram(s) IV Push once  dextrose 50% Injectable 12.5 Gram(s) IV Push once  dextrose 50% Injectable 25 Gram(s) IV Push once  enoxaparin Injectable 40 milliGRAM(s) SubCutaneous daily  glucagon  Injectable 1 milliGRAM(s) IntraMuscular once  insulin lispro (ADMELOG) corrective regimen sliding scale   SubCutaneous three times a day before meals  insulin lispro (ADMELOG) corrective regimen sliding scale   SubCutaneous at bedtime  losartan 50 milliGRAM(s) Oral daily  melatonin 10 milliGRAM(s) Oral at bedtime  multivitamin 1 Tablet(s) Oral daily  pancrelipase (ZENPEP 20,000 Lipase Units) 4 Capsule(s) Oral three times a day with meals  pantoprazole    Tablet 40 milliGRAM(s) Oral before breakfast    MEDICATIONS  (PRN):  acetaminophen   Tablet .. 650 milliGRAM(s) Oral every 6 hours PRN Mild Pain (1 - 3)  aluminum hydroxide/magnesium hydroxide/simethicone Suspension 30 milliLiter(s) Oral every 4 hours PRN Dyspepsia  diphenhydrAMINE   Oral Tab/Cap - Peds 25 milliGRAM(s) Oral at bedtime PRN sleep  ondansetron Injectable 4 milliGRAM(s) IV Push every 6 hours PRN Nausea and/or Vomiting  pancrelipase (ZENPEP 20,000 Lipase Units) 1 Capsule(s) Oral two times a day with meals PRN with snacks      Allergies    No Known Allergies    Intolerances        LABS:                        9.8    3.35  )-----------( 165      ( 08 Aug 2021 08:00 )             28.7     08-08    142  |  109<H>  |  12  ----------------------------<  103<H>  4.0   |  27  |  0.49<L>    Ca    8.8      08 Aug 2021 08:00    TPro  6.2  /  Alb  3.1<L>  /  TBili  0.9  /  DBili  x   /  AST  17  /  ALT  22  /  AlkPhos  79  08-08          RADIOLOGY & ADDITIONAL TESTS:  Studies reviewed.    ASSESSMENT & PLAN: 
Subjective:  Patient is a 71y old  Male who presents with a chief complaint of fever    HPI:       70 y/o male with PMHX of Pancreatic cancer dx 2020 s/p Whipple presently on chemo, CAD s/p PCI, HTN, HLD, GERD, DM, cerebral aneurysm s/p craniotomy and stenting who presented to  on 21  with CC of fever, fatigue, loss of appetite.  Patient was just discharged from  after similar admission on 21.  During last admission, patient was admitted with neutropenic fever 1 week after chemo.  Cultures were negative and patient was discharged on 7 days of ABX.  Patient still has one day left.  Last evening, patient started with fatigue, loss of appetite, and fevers up to 101 overnight.  Patient presented for evaluation.  No other sx-- no dysuria, cough, SOB, abd pain, diarrhea.  Patient notes he has had similar reactions in the past after chemo.  In the ER, CBC showed WBC of 6.  No neutorpenia.  CXR negative.  UA negative.  Patient started on cefepime and cultured.          Patient seen and examined at bedside earlier today, feels well, afebrile, denies cp, dyspnea, abdominal pain, plan discussed     Review of system- Rest of the review of system are negative except mentioned in HPI    Objective:   T(C): 36.7 (21 @ 15:11), Max: 36.7 (21 @ 08:51)  HR: 59 (21 @ 15:11) (59 - 67)  BP: 137/67 (21 @ 15:11) (124/61 - 137/67)  RR: 18 (21 @ 15:11) (18 - 18)  SpO2: 100% (21 @ 15:11) (99% - 100%)  Wt(kg): --  Daily     Daily   CAPILLARY BLOOD GLUCOSE  A1C with Estimated Average Glucose (21 @ 08:20)    A1C with Estimated Average Glucose Result: 5.5:  POCT Blood Glucose.: 176 mg/dL (07 Aug 2021 16:48)  POCT Blood Glucose.: 136 mg/dL (07 Aug 2021 13:07)  POCT Blood Glucose.: 125 mg/dL (07 Aug 2021 08:39)  POCT Blood Glucose.: 159 mg/dL (06 Aug 2021 21:23)  POCT Blood Glucose.: 114 mg/dL (06 Aug 2021 17:31)    Physical exam:   GENERAL: NAD  NERVOUS SYSTEM:  Alert & Oriented X3, non- focal exam, Motor Strength 5/5 B/L upper and lower extremities; DTRs 2+ intact and symmetric  HEAD:  Atraumatic, Normocephalic  EYES: EOMI, PERRLA, conjunctiva and sclera clear  HEENT: Moist mucous membranes  NECK: Supple, No JVD  CHEST/LUNG: Clear to auscultation bilaterally; No rales, no rhonchi, no wheezing  HEART: Regular rate and rhythm; No murmurs, no rubs or gallops  ABDOMEN: Soft, Non-tender, Non-distended; Bowel sounds present  GENITOURINARY- Voiding, no suprapubic tenderness  EXTREMITIES:  2+ Peripheral Pulses, No clubbing, cyanosis,   edema  MUSCULOSKELETAL:- No muscle tenderness, Muscle tone normal, No joint tenderness, no Joint swelling,  Joint ROM –normal   SKIN-no rash, no lesion  LABS: all reviewed                        9.3    3.77  )-----------( 164      ( 07 Aug 2021 09:42 )             28.1     08-07    138  |  109<H>  |  12  ----------------------------<  149<H>  3.7   |  26  |  0.50    Ca    8.5      07 Aug 2021 09:42  Mg     1.5     08-06    TPro  6.4  /  Alb  3.3  /  TBili  1.2  /  DBili  x   /  AST  25  /  ALT  28  /  AlkPhos  83  08-06    PT/INR - ( 06 Aug 2021 06:17 )   PT: 13.4 sec;   INR: 1.15 ratio         PTT - ( 06 Aug 2021 06:17 )  PTT:32.9 sec        Urinalysis Basic - ( 06 Aug 2021 08:04 )    Color: Yellow / Appearance: Clear / S.005 / pH: x  Gluc: x / Ketone: Negative  / Bili: Negative / Urobili: Negative mg/dL   Blood: x / Protein: Negative mg/dL / Nitrite: Negative   Leuk Esterase: Negative / RBC: x / WBC x   Sq Epi: x / Non Sq Epi: x / Bacteria: x      RECENT CULTURES:  Culture - Urine (21 @ 08:04)    Specimen Source: Clean Catch None    Culture Results:   No growth    Culture - Blood (21 @ 07:05)    Specimen Source: .Blood Blood-Peripheral    Culture Results:   No growth to date.    Culture - Blood (21 @ 06:17)    Specimen Source: .Blood None    Culture Results:   No growth to date.      RADIOLOGY & ADDITIONAL TESTS: all reviewed EKG reviewed  < from: 12 Lead ECG (21 @ 06:30) >  Ventricular Rate 69 BPM    Atrial Rate 69 BPM    P-R Interval 146 ms    QRS Duration 84 ms    Q-T Interval 402 ms    QTC Calculation(Bazett) 430 ms    P Axis 69 degrees    R Axis -15 degrees    T Axis 37 degrees    Diagnosis Line Sinus rhythm with Premature atrial complexes  Cannot rule out Anterior infarct (cited on or before 2021)  Abnormal ECG  When compared with ECG of 2021 17:05,  Premature atrial complexes are now Present  Questionable change in initial forces of Septal leads  Nonspecific T wave abnormality now evident in Inferior leads    < end of copied text >  < from: Xray Chest 1 View-PORTABLE IMMEDIATE (21 @ 06:25) >  INTERPRETATION:  AP semierect chest on 2021 at 6:18 AM. Patient has sepsis. 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 Patient is on chemotherapy. There is history of coronary stents.    Heart size is within normal limits. Lungs remain clear. Right [id number] again noted.    Chest is similar to  of this year.    IMPRESSION: No acute finding or change.    < end of copied text >    Current medications:  acetaminophen   Tablet .. 650 milliGRAM(s) Oral every 6 hours PRN  aluminum hydroxide/magnesium hydroxide/simethicone Suspension 30 milliLiter(s) Oral every 4 hours PRN  aspirin enteric coated 81 milliGRAM(s) Oral daily  atorvastatin 20 milliGRAM(s) Oral at bedtime  cefepime   IVPB 2000 milliGRAM(s) IV Intermittent every 12 hours  dextrose 40% Gel 15 Gram(s) Oral once  dextrose 5%. 1000 milliLiter(s) IV Continuous <Continuous>  dextrose 5%. 1000 milliLiter(s) IV Continuous <Continuous>  dextrose 50% Injectable 25 Gram(s) IV Push once  dextrose 50% Injectable 12.5 Gram(s) IV Push once  dextrose 50% Injectable 25 Gram(s) IV Push once  diphenhydrAMINE   Oral Tab/Cap - Peds 25 milliGRAM(s) Oral at bedtime PRN  enoxaparin Injectable 40 milliGRAM(s) SubCutaneous daily  glucagon  Injectable 1 milliGRAM(s) IntraMuscular once  insulin lispro (ADMELOG) corrective regimen sliding scale   SubCutaneous three times a day before meals  insulin lispro (ADMELOG) corrective regimen sliding scale   SubCutaneous at bedtime  losartan 50 milliGRAM(s) Oral daily  melatonin 10 milliGRAM(s) Oral at bedtime  multivitamin 1 Tablet(s) Oral daily  ondansetron Injectable 4 milliGRAM(s) IV Push every 6 hours PRN  pancrelipase (ZENPEP 20,000 Lipase Units) 1 Capsule(s) Oral two times a day with meals PRN  pancrelipase (ZENPEP 20,000 Lipase Units) 2 Capsule(s) Oral four times a day with meals  pantoprazole    Tablet 40 milliGRAM(s) Oral before breakfast

## 2021-08-08 NOTE — DISCHARGE NOTE NURSING/CASE MANAGEMENT/SOCIAL WORK - PATIENT PORTAL LINK FT
You can access the FollowMyHealth Patient Portal offered by [facility] by registering at the following website: [email or web address]. By joining [employer]’s FollowMyHealth portal, you will also be able to view your health information using other applications (apps) compatible with our system.

## 2021-08-12 DIAGNOSIS — D70.1 AGRANULOCYTOSIS SECONDARY TO CANCER CHEMOTHERAPY: ICD-10-CM

## 2021-08-12 DIAGNOSIS — Z95.5 PRESENCE OF CORONARY ANGIOPLASTY IMPLANT AND GRAFT: ICD-10-CM

## 2021-08-12 DIAGNOSIS — E11.9 TYPE 2 DIABETES MELLITUS WITHOUT COMPLICATIONS: ICD-10-CM

## 2021-08-12 DIAGNOSIS — L40.9 PSORIASIS, UNSPECIFIED: ICD-10-CM

## 2021-08-12 DIAGNOSIS — I10 ESSENTIAL (PRIMARY) HYPERTENSION: ICD-10-CM

## 2021-08-12 DIAGNOSIS — C25.9 MALIGNANT NEOPLASM OF PANCREAS, UNSPECIFIED: ICD-10-CM

## 2021-08-12 DIAGNOSIS — C78.6 SECONDARY MALIGNANT NEOPLASM OF RETROPERITONEUM AND PERITONEUM: ICD-10-CM

## 2021-08-12 DIAGNOSIS — D64.81 ANEMIA DUE TO ANTINEOPLASTIC CHEMOTHERAPY: ICD-10-CM

## 2021-08-12 DIAGNOSIS — Z79.82 LONG TERM (CURRENT) USE OF ASPIRIN: ICD-10-CM

## 2021-08-12 DIAGNOSIS — R94.31 ABNORMAL ELECTROCARDIOGRAM [ECG] [EKG]: ICD-10-CM

## 2021-08-12 DIAGNOSIS — E78.5 HYPERLIPIDEMIA, UNSPECIFIED: ICD-10-CM

## 2021-08-12 DIAGNOSIS — D84.9 IMMUNODEFICIENCY, UNSPECIFIED: ICD-10-CM

## 2021-08-12 DIAGNOSIS — K21.9 GASTRO-ESOPHAGEAL REFLUX DISEASE WITHOUT ESOPHAGITIS: ICD-10-CM

## 2021-08-12 DIAGNOSIS — I25.10 ATHEROSCLEROTIC HEART DISEASE OF NATIVE CORONARY ARTERY WITHOUT ANGINA PECTORIS: ICD-10-CM

## 2021-08-12 DIAGNOSIS — E87.6 HYPOKALEMIA: ICD-10-CM

## 2021-08-12 DIAGNOSIS — R50.2 DRUG INDUCED FEVER: ICD-10-CM

## 2021-08-12 DIAGNOSIS — Z20.822 CONTACT WITH AND (SUSPECTED) EXPOSURE TO COVID-19: ICD-10-CM

## 2021-08-12 DIAGNOSIS — E83.42 HYPOMAGNESEMIA: ICD-10-CM

## 2021-08-12 DIAGNOSIS — K83.09 OTHER CHOLANGITIS: ICD-10-CM

## 2021-08-12 DIAGNOSIS — T45.1X5A ADVERSE EFFECT OF ANTINEOPLASTIC AND IMMUNOSUPPRESSIVE DRUGS, INITIAL ENCOUNTER: ICD-10-CM

## 2021-09-13 NOTE — PATIENT PROFILE ADULT - ARE SIGNIFICANT INDICATORS COMPLETE.
Last PAP: 2020  ASCUS/Neg    Last MM2020 Neg/dense    Last Exam: 2020 with Dr. Petit    Next Scheduled Appointment: 2021 Dr. Petit    Requests: MMG ordered already in EPIC         Yes

## 2022-01-13 ENCOUNTER — INPATIENT (INPATIENT)
Facility: HOSPITAL | Age: 72
LOS: 2 days | Discharge: HOME | DRG: 872 | End: 2022-01-16
Attending: FAMILY MEDICINE | Admitting: HOSPITALIST
Payer: MEDICARE

## 2022-01-13 VITALS — WEIGHT: 130.07 LBS | HEIGHT: 68 IN

## 2022-01-13 DIAGNOSIS — Z98.890 OTHER SPECIFIED POSTPROCEDURAL STATES: Chronic | ICD-10-CM

## 2022-01-13 DIAGNOSIS — R50.9 FEVER, UNSPECIFIED: ICD-10-CM

## 2022-01-13 DIAGNOSIS — Z98.61 CORONARY ANGIOPLASTY STATUS: Chronic | ICD-10-CM

## 2022-01-13 DIAGNOSIS — Z90.89 ACQUIRED ABSENCE OF OTHER ORGANS: Chronic | ICD-10-CM

## 2022-01-13 DIAGNOSIS — K86.9 DISEASE OF PANCREAS, UNSPECIFIED: Chronic | ICD-10-CM

## 2022-01-13 LAB
ALBUMIN SERPL ELPH-MCNC: 3.3 G/DL — SIGNIFICANT CHANGE UP (ref 3.3–5)
ALP SERPL-CCNC: 206 U/L — HIGH (ref 40–120)
ALT FLD-CCNC: 51 U/L — SIGNIFICANT CHANGE UP (ref 12–78)
ANION GAP SERPL CALC-SCNC: 7 MMOL/L — SIGNIFICANT CHANGE UP (ref 5–17)
APPEARANCE UR: CLEAR — SIGNIFICANT CHANGE UP
APTT BLD: 30.9 SEC — SIGNIFICANT CHANGE UP (ref 27.5–35.5)
AST SERPL-CCNC: 51 U/L — HIGH (ref 15–37)
BASOPHILS # BLD AUTO: 0.01 K/UL — SIGNIFICANT CHANGE UP (ref 0–0.2)
BASOPHILS NFR BLD AUTO: 0.3 % — SIGNIFICANT CHANGE UP (ref 0–2)
BILIRUB SERPL-MCNC: 1.3 MG/DL — HIGH (ref 0.2–1.2)
BILIRUB UR-MCNC: NEGATIVE — SIGNIFICANT CHANGE UP
BUN SERPL-MCNC: 20 MG/DL — SIGNIFICANT CHANGE UP (ref 7–23)
CALCIUM SERPL-MCNC: 9 MG/DL — SIGNIFICANT CHANGE UP (ref 8.5–10.1)
CHLORIDE SERPL-SCNC: 105 MMOL/L — SIGNIFICANT CHANGE UP (ref 96–108)
CO2 SERPL-SCNC: 25 MMOL/L — SIGNIFICANT CHANGE UP (ref 22–31)
COLOR SPEC: YELLOW — SIGNIFICANT CHANGE UP
CREAT SERPL-MCNC: 0.94 MG/DL — SIGNIFICANT CHANGE UP (ref 0.5–1.3)
DIFF PNL FLD: ABNORMAL
EOSINOPHIL # BLD AUTO: 0.02 K/UL — SIGNIFICANT CHANGE UP (ref 0–0.5)
EOSINOPHIL NFR BLD AUTO: 0.5 % — SIGNIFICANT CHANGE UP (ref 0–6)
GLUCOSE SERPL-MCNC: 147 MG/DL — HIGH (ref 70–99)
GLUCOSE UR QL: NEGATIVE — SIGNIFICANT CHANGE UP
HCT VFR BLD CALC: 29.3 % — LOW (ref 39–50)
HGB BLD-MCNC: 10 G/DL — LOW (ref 13–17)
IMM GRANULOCYTES NFR BLD AUTO: 0.3 % — SIGNIFICANT CHANGE UP (ref 0–1.5)
INR BLD: 1.2 RATIO — HIGH (ref 0.88–1.16)
KETONES UR-MCNC: NEGATIVE — SIGNIFICANT CHANGE UP
LACTATE SERPL-SCNC: 1.3 MMOL/L — SIGNIFICANT CHANGE UP (ref 0.7–2)
LEUKOCYTE ESTERASE UR-ACNC: ABNORMAL
LYMPHOCYTES # BLD AUTO: 0.16 K/UL — LOW (ref 1–3.3)
LYMPHOCYTES # BLD AUTO: 4.1 % — LOW (ref 13–44)
MCHC RBC-ENTMCNC: 31 PG — SIGNIFICANT CHANGE UP (ref 27–34)
MCHC RBC-ENTMCNC: 34.1 GM/DL — SIGNIFICANT CHANGE UP (ref 32–36)
MCV RBC AUTO: 90.7 FL — SIGNIFICANT CHANGE UP (ref 80–100)
MONOCYTES # BLD AUTO: 0.37 K/UL — SIGNIFICANT CHANGE UP (ref 0–0.9)
MONOCYTES NFR BLD AUTO: 9.4 % — SIGNIFICANT CHANGE UP (ref 2–14)
NEUTROPHILS # BLD AUTO: 3.38 K/UL — SIGNIFICANT CHANGE UP (ref 1.8–7.4)
NEUTROPHILS NFR BLD AUTO: 85.4 % — HIGH (ref 43–77)
NITRITE UR-MCNC: NEGATIVE — SIGNIFICANT CHANGE UP
PH UR: 7 — SIGNIFICANT CHANGE UP (ref 5–8)
PLATELET # BLD AUTO: 47 K/UL — LOW (ref 150–400)
POTASSIUM SERPL-MCNC: 3.5 MMOL/L — SIGNIFICANT CHANGE UP (ref 3.5–5.3)
POTASSIUM SERPL-SCNC: 3.5 MMOL/L — SIGNIFICANT CHANGE UP (ref 3.5–5.3)
PROT SERPL-MCNC: 7.1 GM/DL — SIGNIFICANT CHANGE UP (ref 6–8.3)
PROT UR-MCNC: 15
PROTHROM AB SERPL-ACNC: 13.9 SEC — HIGH (ref 10.6–13.6)
RAPID RVP RESULT: SIGNIFICANT CHANGE UP
RBC # BLD: 3.23 M/UL — LOW (ref 4.2–5.8)
RBC # FLD: 13.2 % — SIGNIFICANT CHANGE UP (ref 10.3–14.5)
SARS-COV-2 RNA SPEC QL NAA+PROBE: SIGNIFICANT CHANGE UP
SODIUM SERPL-SCNC: 137 MMOL/L — SIGNIFICANT CHANGE UP (ref 135–145)
SP GR SPEC: 1 — LOW (ref 1.01–1.02)
UROBILINOGEN FLD QL: 1
WBC # BLD: 3.95 K/UL — SIGNIFICANT CHANGE UP (ref 3.8–10.5)
WBC # FLD AUTO: 3.95 K/UL — SIGNIFICANT CHANGE UP (ref 3.8–10.5)

## 2022-01-13 PROCEDURE — 76700 US EXAM ABDOM COMPLETE: CPT

## 2022-01-13 PROCEDURE — 80048 BASIC METABOLIC PNL TOTAL CA: CPT

## 2022-01-13 PROCEDURE — 93005 ELECTROCARDIOGRAM TRACING: CPT

## 2022-01-13 PROCEDURE — 99222 1ST HOSP IP/OBS MODERATE 55: CPT

## 2022-01-13 PROCEDURE — 36415 COLL VENOUS BLD VENIPUNCTURE: CPT

## 2022-01-13 PROCEDURE — 71045 X-RAY EXAM CHEST 1 VIEW: CPT | Mod: 26

## 2022-01-13 PROCEDURE — 85027 COMPLETE CBC AUTOMATED: CPT

## 2022-01-13 PROCEDURE — 87040 BLOOD CULTURE FOR BACTERIA: CPT

## 2022-01-13 PROCEDURE — 93010 ELECTROCARDIOGRAM REPORT: CPT

## 2022-01-13 PROCEDURE — 99285 EMERGENCY DEPT VISIT HI MDM: CPT | Mod: CS

## 2022-01-13 RX ORDER — DRONABINOL 2.5 MG
2.5 CAPSULE ORAL DAILY
Refills: 0 | Status: DISCONTINUED | OUTPATIENT
Start: 2022-01-13 | End: 2022-01-16

## 2022-01-13 RX ORDER — LOSARTAN/HYDROCHLOROTHIAZIDE 100MG-25MG
1 TABLET ORAL
Qty: 0 | Refills: 0 | DISCHARGE

## 2022-01-13 RX ORDER — VANCOMYCIN HCL 1 G
1000 VIAL (EA) INTRAVENOUS ONCE
Refills: 0 | Status: COMPLETED | OUTPATIENT
Start: 2022-01-13 | End: 2022-01-13

## 2022-01-13 RX ORDER — LANOLIN ALCOHOL/MO/W.PET/CERES
10 CREAM (GRAM) TOPICAL AT BEDTIME
Refills: 0 | Status: DISCONTINUED | OUTPATIENT
Start: 2022-01-13 | End: 2022-01-16

## 2022-01-13 RX ORDER — DIPHENHYDRAMINE HCL 50 MG
25 CAPSULE ORAL AT BEDTIME
Refills: 0 | Status: DISCONTINUED | OUTPATIENT
Start: 2022-01-13 | End: 2022-01-16

## 2022-01-13 RX ORDER — ACETAMINOPHEN 500 MG
1 TABLET ORAL
Qty: 0 | Refills: 0 | DISCHARGE

## 2022-01-13 RX ORDER — MAGNESIUM OXIDE 400 MG ORAL TABLET 241.3 MG
400 TABLET ORAL
Refills: 0 | Status: DISCONTINUED | OUTPATIENT
Start: 2022-01-13 | End: 2022-01-16

## 2022-01-13 RX ORDER — PIPERACILLIN AND TAZOBACTAM 4; .5 G/20ML; G/20ML
3.38 INJECTION, POWDER, LYOPHILIZED, FOR SOLUTION INTRAVENOUS ONCE
Refills: 0 | Status: COMPLETED | OUTPATIENT
Start: 2022-01-13 | End: 2022-01-13

## 2022-01-13 RX ORDER — PIPERACILLIN AND TAZOBACTAM 4; .5 G/20ML; G/20ML
3.38 INJECTION, POWDER, LYOPHILIZED, FOR SOLUTION INTRAVENOUS EVERY 8 HOURS
Refills: 0 | Status: DISCONTINUED | OUTPATIENT
Start: 2022-01-13 | End: 2022-01-16

## 2022-01-13 RX ORDER — PANTOPRAZOLE SODIUM 20 MG/1
40 TABLET, DELAYED RELEASE ORAL
Refills: 0 | Status: DISCONTINUED | OUTPATIENT
Start: 2022-01-13 | End: 2022-01-16

## 2022-01-13 RX ORDER — ONDANSETRON 8 MG/1
4 TABLET, FILM COATED ORAL EVERY 8 HOURS
Refills: 0 | Status: DISCONTINUED | OUTPATIENT
Start: 2022-01-13 | End: 2022-01-16

## 2022-01-13 RX ORDER — INSULIN LISPRO 100/ML
0 VIAL (ML) SUBCUTANEOUS
Qty: 0 | Refills: 0 | DISCHARGE

## 2022-01-13 RX ORDER — CHOLECALCIFEROL (VITAMIN D3) 125 MCG
2000 CAPSULE ORAL DAILY
Refills: 0 | Status: DISCONTINUED | OUTPATIENT
Start: 2022-01-13 | End: 2022-01-16

## 2022-01-13 RX ORDER — LOSARTAN POTASSIUM 100 MG/1
50 TABLET, FILM COATED ORAL DAILY
Refills: 0 | Status: DISCONTINUED | OUTPATIENT
Start: 2022-01-13 | End: 2022-01-16

## 2022-01-13 RX ORDER — ASPIRIN/CALCIUM CARB/MAGNESIUM 324 MG
81 TABLET ORAL DAILY
Refills: 0 | Status: DISCONTINUED | OUTPATIENT
Start: 2022-01-13 | End: 2022-01-16

## 2022-01-13 RX ORDER — ATORVASTATIN CALCIUM 80 MG/1
20 TABLET, FILM COATED ORAL AT BEDTIME
Refills: 0 | Status: DISCONTINUED | OUTPATIENT
Start: 2022-01-13 | End: 2022-01-16

## 2022-01-13 RX ORDER — LIPASE/PROTEASE/AMYLASE 16-48-48K
6 CAPSULE,DELAYED RELEASE (ENTERIC COATED) ORAL
Refills: 0 | Status: DISCONTINUED | OUTPATIENT
Start: 2022-01-13 | End: 2022-01-16

## 2022-01-13 RX ORDER — METOPROLOL TARTRATE 50 MG
25 TABLET ORAL DAILY
Refills: 0 | Status: DISCONTINUED | OUTPATIENT
Start: 2022-01-13 | End: 2022-01-16

## 2022-01-13 RX ORDER — ACETAMINOPHEN 500 MG
650 TABLET ORAL EVERY 6 HOURS
Refills: 0 | Status: DISCONTINUED | OUTPATIENT
Start: 2022-01-13 | End: 2022-01-16

## 2022-01-13 RX ORDER — SODIUM CHLORIDE 9 MG/ML
1850 INJECTION INTRAMUSCULAR; INTRAVENOUS; SUBCUTANEOUS ONCE
Refills: 0 | Status: COMPLETED | OUTPATIENT
Start: 2022-01-13 | End: 2022-01-13

## 2022-01-13 RX ORDER — LIPASE/PROTEASE/AMYLASE 16-48-48K
2 CAPSULE,DELAYED RELEASE (ENTERIC COATED) ORAL
Qty: 0 | Refills: 0 | DISCHARGE

## 2022-01-13 RX ADMIN — Medication 10 MILLIGRAM(S): at 23:16

## 2022-01-13 RX ADMIN — Medication 25 MILLIGRAM(S): at 23:16

## 2022-01-13 RX ADMIN — Medication 250 MILLIGRAM(S): at 17:43

## 2022-01-13 RX ADMIN — Medication 650 MILLIGRAM(S): at 18:22

## 2022-01-13 RX ADMIN — SODIUM CHLORIDE 1850 MILLILITER(S): 9 INJECTION INTRAMUSCULAR; INTRAVENOUS; SUBCUTANEOUS at 16:26

## 2022-01-13 RX ADMIN — PIPERACILLIN AND TAZOBACTAM 200 GRAM(S): 4; .5 INJECTION, POWDER, LYOPHILIZED, FOR SOLUTION INTRAVENOUS at 16:26

## 2022-01-13 RX ADMIN — SODIUM CHLORIDE 1850 MILLILITER(S): 9 INJECTION INTRAMUSCULAR; INTRAVENOUS; SUBCUTANEOUS at 17:42

## 2022-01-13 RX ADMIN — PIPERACILLIN AND TAZOBACTAM 3.38 GRAM(S): 4; .5 INJECTION, POWDER, LYOPHILIZED, FOR SOLUTION INTRAVENOUS at 17:42

## 2022-01-13 NOTE — H&P ADULT - HISTORY OF PRESENT ILLNESS
70 yo Male PMHx Pancreatic CA, Brain aneurysm non ruptured, CAD (coronary artery disease), GERD (gastroesophageal reflux disease), presents with complain of fever. Patient states he had chills earlier today, fatigue, and fever initially 102 and then up to 104.  Had one episode of vomiting.  Denies sore throat, ear ache, congestion, cough, SOB, abdominal pain, diarrhea, constipation, rash, dysuria, or any other symptoms. Patient states fever went away on its own without medication. Patient states he's had multiple admission to hospital in past for same. Patient's last chemotherapy was on 1/4/22.  Was unable to get chemotherapy this past Tuesday secondary to low platelet count. No other complain today. He was found to be febrile in ED.

## 2022-01-13 NOTE — ED PROVIDER NOTE - OBJECTIVE STATEMENT
72 yo M PMHx Pancreatic CA, Brain aneurysm non ruptured - had stent placed in 2010 after attemtped clipping and coiling in 2007, CAD (coronary artery disease) last stress test Fall 2018, Drug or chemical induced diabetes mellitus, GERD (gastroesophageal reflux disease), Hearing loss right, presents with CC fever.  Pt states he had chills earlier today, fatigue, and fever initially 102 and then up to 104.  Had one episode of vomiting.  Denies sore throat, ear ache, congestion, cough, SOB, abdominal pain, diarrhea, constipation, rash, dysuria, or any other symptoms. Pt states fever went away on own without medication.  Pt states he's had multiple admission to hospital in past for same. Pt last chemotherapy was 1/4/22.  Was unable to get chemotherapy this past Tuesday secondary to low platelet count.  Oncologist is Dr. Thomas.

## 2022-01-13 NOTE — H&P ADULT - NSICDXPASTSURGICALHX_GEN_ALL_CORE_FT
PAST SURGICAL HISTORY:  H/O craniotomy for non ruptured aneurysm 2007, attempted clipping    H/O inguinal hernia repair     History of other surgery for brain aneurysm - attempted coiling 2007    History of other surgery endovascular stent placement for brain Aneurysm at [place] - 2010    History of PTCA 2006 x 1 stent    History of tonsillectomy     Pancreatic disorder Whipple for cancer    S/P colonoscopy last - within 5 yrs

## 2022-01-13 NOTE — H&P ADULT - ASSESSMENT
A/P:    1.  Pancreatic Cancer  Fever  Neutropenia- mild  Thrombocytopenia  -started on Empiric Antibiotics  -follow cultures and clinically  -No active bleeding-so hold off any platelet transfusion for now  -follow ID and Hem/Onc consults    2.  SCD for DVT ppx    3.  Code status: Patient is in full code status.

## 2022-01-13 NOTE — ED ADULT TRIAGE NOTE - CHIEF COMPLAINT QUOTE
Pt presents to er with 104 fever at home and weakness, took Tylenol at 13:30 and vomitted shortly after, states he last had chemotherapy Jan 4th and sees , reports he was supposed to receive chemo last Tuesday and was unable due to low platelets.  Daughter [phone]-[phone]

## 2022-01-13 NOTE — H&P ADULT - RESPIRATORY AND THORAX
Discussion/Summary   Negrita, your labs look pretty good for the most part.  The hormone levels really do not show menopause.  Your urinalysis showed a little bit of blood on the dipstick but it did not show a lot of cells in the urine culture grew out what is really sort of a skin contaminant.  I do not know where you wearing your cycle, but if you are bleeding this could have contaminated the urine.  Please let us know and we should probably recheck a urinalysis when you are well hydrated and get a midstream clean catch urine to recheck this.  I will order it and all you need to do is make a lab appointment        Verified Results  CBC WITH AUTOMATED DIFFERENTIAL [id number] 11:05AM [facility]     Test Name Result Flag Reference   WHITE BLOOD COUNT 4.2 K/mcL  4.2-11.0   RED CELL COUNT 4.75 mil/mcL  4.00-5.20   HEMOGLOBIN 13.8 g/dl  12.0-15.5   HEMATOCRIT 45.1 %  36.0-46.5   MEAN CORPUSCULAR VOLUME 94.9 fL  78.0-100.0   MEAN CORPUSCULAR HEMOGLOBIN 29.1 pg  26.0-34.0   MEAN CORPUSCULAR HGB CONC 30.6 g/dl L 32.0-36.5   RDW-CV 12.7 %  11.0-15.0   PLATELET COUNT 239 K/mcL  140-450   ONUR% 50 %     LYM% 37 %     MON% 10 %     EOS% 2 %     BASO% 1 %     ONUR ABS 2.1 K/mcL  1.8-7.7   LYM ABS 1.6 K/mcL  1.0-4.8   MON ABS 0.4 K/mcL  0.3-0.9   EOS ABS 0.1 K/mcL  0.1-0.5   BASO ABS 0.0 K/mcL  0.0-0.3   DIFF TYPE      AUTOMATED DIFFERENTIAL   NRBC 0 /100 WBC  0   PERCENT IMMATURE GRANULOCYTES 0 %     ABSOLUTE IMMATURE GRANULOCYTES 0.0 K/mcL  0-0.2     COMP METABOLIC PNL [id number] 11:05AM [facility]     Test Name Result Flag Reference   SODIUM 140 mmol/L  135-145   POTASSIUM 4.2 mmol/L  3.4-5.1   CHLORIDE 106 mmol/L     CARBON DIOXIDE 27 mmol/L  21-32   ANION GAP 11 mmol/L  10-20   GLUCOSE 79 mg/dl  65-99   BUN 9 mg/dl  6-20   CREATININE 0.69 mg/dl  0.51-0.95   GFR EST.AFRICAN AMER >90     eGFR results = or >90 mL/min/1.73m2 = Normal kidney function.   GFR EST.NONAFRI AMER >90     eGFR results = or >90 mL/min/1.73m2  = Normal kidney function.   BUN/CREATININE RATIO 13  7-25   BILIRUBIN TOTAL 0.5 mg/dl  0.2-1.0   GOT/AST 20 Units/L  <38   ALKALINE PHOSPHATASE 62 Units/L     ALBUMIN 4.3 g/dl  3.6-5.1   TOTAL PROTEIN 7.4 g/dl  6.4-8.2   GLOBULIN (CALCULATED) 3.1 g/dl  2.0-4.0   A/G RATIO 1.4  1.0-2.4   CALCIUM 8.9 mg/dl  8.4-10.2   GPT/ALT 28 Units/L  <79   FASTING STATUS UNKNOWN hrs       LIPID PNL [id number] 11:05AM [facility]     Test Name Result Flag Reference   FASTING STATUS 12 hrs     CHOLESTEROL 191 mg/dl  <200   Desirable            <200  Borderline High      200 to 239  High                 >=240   HDL CHOLESTEROL 66 mg/dl  >49   Low            <40  Borderline Low 40 to 49  Near Optimal   50 to 59  Optimal        >=60   TRIGLYCERIDES 71 mg/dl  <150   Normal                   <150  Borderline High          150 to 199  High                     200 to 499  Very High                >=500   LDL CHOLESTEROL (CALCULATED) 111 mg/dl  <130   OPTIMAL               <100  NEAR OPTIMAL          100-129  BORDERLINE HIGH       130-159  HIGH                  160-189  VERY HIGH             >=190   NON-HDL CHOLESTEROL 125 mg/dl     Therapeutic Target:  CHD and risk equivalents <130  Multiple risk factors    <160  0 to 1 risk factors      <190   CHOLESTEROL/HDL RATIO 2.9  <4.5     TSH WITH REFLEX [id number] 11:05AM [facility]     Test Name Result Flag Reference   TSH 0.930 mcUnits/mL  0.350-5.000   Findings most consistent with euthyroid state, no additional testing suggested. TSH may be normal in patients with thyroid dysfunction and pituitary disease. Clinical correlation recommended.  (Reflex TSH algorithm is not recommended in hospitalized patients. A variety of drugs, as well as serious acute and chronic illnesses may alter thyroid function tests. Commonly implicated drugs include glucocorticoids, dopamine, carbamazepine, iodine, amiodarone, lithium and heparin.)     URINALYSIS, COMPLETE INCLUDING MICROSCOPIC AND URINE  CULTURE REFLEX [id number] 11:05AM [facility]     Test Name Result Flag Reference   URINE COLOR STRAW A YELLOW   APPEARANCE, URINE CLEAR     URINE SPECIFIC GRAVITY 1.005  1.005-1.030   PH-URINE 7.0 Units  5.0-7.0   URINE PROTEIN NEGATIVE mg/dl  NEGATIVE   URINE GLUCOSE NEGATIVE mg/dl  NEGATIVE   KETONES NEGATIVE mg/dl  NEGATIVE   UROBILINOGEN-URINE 0.2 mg/dl  0.0-1.0   URINE BILIRUBIN NEGATIVE  NEGATIVE   RBC-URINE MODERATE A NEGATIVE   NITRITE NEGATIVE  NEGATIVE   WBC-URINE TRACE A NEGATIVE   Culture indicated, results to follow.   SQUAMOUS EPITHELIAL CELLS 1 to 5 /HPF  0-5   ERYTHROCYTES 1 to 3 /HPF  0-3   LEUKOCYTES 1 to 5 /HPF  0-5   BACTERIA FEW /HPF A NONE SEEN   HYALINE CASTS NONE SEEN /LPF  0-5   SPECIMEN TYPE      URINE, CLEAN CATCH/MIDSTREAM   MUCUS PRESENT       VITAMIN D,25 HYDROXY [id number] 11:05AM [facility]     Test Name Result Flag Reference   VIT D,25 HYDROXY 26.2 ng/ml L 30.0-100.0   <20  ng/mL=Vitamin D deficiency  20-29  ng/mL=Vitamin D insufficiency   ng/mL=Optimal Vitamin D  >150 ng/mL=Possible toxicity     FSH [id number] 11:05AM [facility]     Test Name Result Flag Reference   FSH 8.3 mUnits/mL     Follicular Phase   2.5 to 10.2 mUnits/mL    Midcycle Peak      3.4 to 33.4 mUnits/mL    Luteal Phase       1.5 to 9.1 mUnits/mL    Pregnant           0.0 to 0.2 mUnits/mL    Postmenopausal     23.0 to 116.3 mUnits/mL     MAGNESIUM [id number] 11:05AM [facility]     Test Name Result Flag Reference   MAGNESIUM 2.4 mg/dl  1.7-2.4     URINE CULTURE [id number] 11:05AM [facility]     Test Name Result Flag Reference   URINE CULTURE  O    SPECIMEN DESCRIPTION (SDES): URINE, CLEAN CATCH/MIDSTREAM  CULTURE (CULT):        10,000 TO 50,000 CFU/mL STREPTOCOCCUS AGALACTIAE (STREP GROUP B)  REPORT STATUS (RPT):     FINAL 09/16/2018     ESTRADIOL [id number] 12:01AM [facility]     Test Name Result Flag Reference   ESTRADIOL 59 pg/ml     FEMALE NORMALS:  FOLLICULAR PHASE:     pg/mL  MIDCYCLE PHASE:      pg/mL  LUTEAL PHASE:        pg/mL  POST MENOPAUSAL:  (UNTREATED):        <33 pg/mL  MALE NORMALS:       <41 pg/mL        details…

## 2022-01-13 NOTE — ED PROVIDER NOTE - NSICDXPASTSURGICALHX_GEN_ALL_CORE_FT
PAST SURGICAL HISTORY:  H/O craniotomy for non ruptured aneurysm 2007, attempted clipping    H/O inguinal hernia repair     History of other surgery for brain aneurysm - attempted coiling 2007    History of other surgery endovascular stent placement for brain Aneurysm at [place] - 2010    History of PTCA 2006 x 1 stent    History of tonsillectomy     Pancreatic disorder Whipple for cancer    S/P colonoscopy last - within 5 yrs

## 2022-01-13 NOTE — H&P ADULT - NSHPPHYSICALEXAM_GEN_ALL_CORE
Vital Signs Last 24 Hrs  T(C): 36.6 (13 Jan 2022 21:49), Max: 38.3 (13 Jan 2022 17:44)  T(F): 97.9 (13 Jan 2022 21:49), Max: 100.9 (13 Jan 2022 17:44)  HR: 64 (13 Jan 2022 21:49) (64 - 97)  BP: 135/63 (13 Jan 2022 21:49) (133/57 - 136/54)  BP(mean): 80 (13 Jan 2022 21:49) (75 - 88)  RR: 17 (13 Jan 2022 21:49) (17 - 20)  SpO2: 98% (13 Jan 2022 21:49) (97% - 98%)

## 2022-01-13 NOTE — ED PROVIDER NOTE - PROGRESS NOTE DETAILS
Joseph Frankel PGY3: Labs thus far non actionable. CXR clear. RVP and urine pending. Will admit. Patient stable.

## 2022-01-13 NOTE — ED ADULT NURSE NOTE - CHIEF COMPLAINT QUOTE
Pt presents to er with 104 fever at home and weakness, took Tylenol at 13:30 and vomitted shortly after, states he last had chemotherapy Jan 4th and sees , reports he was supposed to receive chemo last Tuesday and was unable due to low platelets.  Daughter [phone]

## 2022-01-13 NOTE — ED ADULT NURSE NOTE - OBJECTIVE STATEMENT
pt presents to ed ambulatory for evaluation of weakness and fever at home. pt has pancreatic cancer, on chemo, unable to receive treatment due to weakness. pt vitals stable, ekg done, cardiac monitor

## 2022-01-14 LAB
ANION GAP SERPL CALC-SCNC: 5 MMOL/L — SIGNIFICANT CHANGE UP (ref 5–17)
BUN SERPL-MCNC: 15 MG/DL — SIGNIFICANT CHANGE UP (ref 7–23)
CALCIUM SERPL-MCNC: 8.5 MG/DL — SIGNIFICANT CHANGE UP (ref 8.5–10.1)
CHLORIDE SERPL-SCNC: 111 MMOL/L — HIGH (ref 96–108)
CO2 SERPL-SCNC: 26 MMOL/L — SIGNIFICANT CHANGE UP (ref 22–31)
CREAT SERPL-MCNC: 0.75 MG/DL — SIGNIFICANT CHANGE UP (ref 0.5–1.3)
CULTURE RESULTS: SIGNIFICANT CHANGE UP
E COLI DNA BLD POS QL NAA+NON-PROBE: SIGNIFICANT CHANGE UP
GLUCOSE SERPL-MCNC: 104 MG/DL — HIGH (ref 70–99)
GRAM STN FLD: SIGNIFICANT CHANGE UP
HCT VFR BLD CALC: 25.8 % — LOW (ref 39–50)
HGB BLD-MCNC: 8.4 G/DL — LOW (ref 13–17)
MCHC RBC-ENTMCNC: 30.2 PG — SIGNIFICANT CHANGE UP (ref 27–34)
MCHC RBC-ENTMCNC: 32.6 GM/DL — SIGNIFICANT CHANGE UP (ref 32–36)
MCV RBC AUTO: 92.8 FL — SIGNIFICANT CHANGE UP (ref 80–100)
METHOD TYPE: SIGNIFICANT CHANGE UP
PLATELET # BLD AUTO: 47 K/UL — LOW (ref 150–400)
POTASSIUM SERPL-MCNC: 3.7 MMOL/L — SIGNIFICANT CHANGE UP (ref 3.5–5.3)
POTASSIUM SERPL-SCNC: 3.7 MMOL/L — SIGNIFICANT CHANGE UP (ref 3.5–5.3)
RBC # BLD: 2.78 M/UL — LOW (ref 4.2–5.8)
RBC # FLD: 13.4 % — SIGNIFICANT CHANGE UP (ref 10.3–14.5)
SODIUM SERPL-SCNC: 142 MMOL/L — SIGNIFICANT CHANGE UP (ref 135–145)
SPECIMEN SOURCE: SIGNIFICANT CHANGE UP
SPECIMEN SOURCE: SIGNIFICANT CHANGE UP
WBC # BLD: 4.39 K/UL — SIGNIFICANT CHANGE UP (ref 3.8–10.5)
WBC # FLD AUTO: 4.39 K/UL — SIGNIFICANT CHANGE UP (ref 3.8–10.5)

## 2022-01-14 PROCEDURE — 99233 SBSQ HOSP IP/OBS HIGH 50: CPT | Mod: GC

## 2022-01-14 PROCEDURE — 93010 ELECTROCARDIOGRAM REPORT: CPT

## 2022-01-14 RX ADMIN — PIPERACILLIN AND TAZOBACTAM 25 GRAM(S): 4; .5 INJECTION, POWDER, LYOPHILIZED, FOR SOLUTION INTRAVENOUS at 21:37

## 2022-01-14 RX ADMIN — PANTOPRAZOLE SODIUM 40 MILLIGRAM(S): 20 TABLET, DELAYED RELEASE ORAL at 09:41

## 2022-01-14 RX ADMIN — PIPERACILLIN AND TAZOBACTAM 25 GRAM(S): 4; .5 INJECTION, POWDER, LYOPHILIZED, FOR SOLUTION INTRAVENOUS at 06:01

## 2022-01-14 RX ADMIN — ATORVASTATIN CALCIUM 20 MILLIGRAM(S): 80 TABLET, FILM COATED ORAL at 21:36

## 2022-01-14 RX ADMIN — Medication 6 CAPSULE(S): at 08:54

## 2022-01-14 RX ADMIN — MAGNESIUM OXIDE 400 MG ORAL TABLET 400 MILLIGRAM(S): 241.3 TABLET ORAL at 18:40

## 2022-01-14 RX ADMIN — Medication 6 CAPSULE(S): at 18:40

## 2022-01-14 RX ADMIN — PIPERACILLIN AND TAZOBACTAM 25 GRAM(S): 4; .5 INJECTION, POWDER, LYOPHILIZED, FOR SOLUTION INTRAVENOUS at 14:48

## 2022-01-14 RX ADMIN — Medication 2.5 MILLIGRAM(S): at 09:41

## 2022-01-14 RX ADMIN — Medication 25 MILLIGRAM(S): at 21:35

## 2022-01-14 RX ADMIN — Medication 6 CAPSULE(S): at 13:12

## 2022-01-14 RX ADMIN — LOSARTAN POTASSIUM 50 MILLIGRAM(S): 100 TABLET, FILM COATED ORAL at 09:41

## 2022-01-14 RX ADMIN — Medication 10 MILLIGRAM(S): at 21:36

## 2022-01-14 RX ADMIN — Medication 2000 UNIT(S): at 09:41

## 2022-01-14 RX ADMIN — MAGNESIUM OXIDE 400 MG ORAL TABLET 400 MILLIGRAM(S): 241.3 TABLET ORAL at 08:54

## 2022-01-14 RX ADMIN — Medication 650 MILLIGRAM(S): at 05:58

## 2022-01-14 RX ADMIN — Medication 650 MILLIGRAM(S): at 20:26

## 2022-01-14 RX ADMIN — Medication 81 MILLIGRAM(S): at 09:41

## 2022-01-14 NOTE — CHART NOTE - NSCHARTNOTEFT_GEN_A_CORE
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 CHRIS PERAZA at [facility] [place]   - LAST TREATMENT ON 1/4 WITH GEM + ABRAXANE   - Patient's most recent chemo was held secondary to thrombocytopenia   - Patient now on ZOSYN   - Agree with broad spectrum Abx   Tmax outpatient 104   = await cutlures   = Dr. 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 [facility] [place] LAST TREATMENT ON 1/4 WITH GEM + ABRAXANE   - Patient's most recent chemo was held secondary to thrombocytopenia   - Patient now on ZOSYN   - Agree with broad spectrum Abx   Tmax outpatient 104   ICU Vital Signs Last 24 Hrs  T(C): 36.6 (14 Jan 2022 00:23), Max: 38.3 (13 Jan 2022 17:44)  T(F): 97.8 (14 Jan 2022 00:23), Max: 100.9 (13 Jan 2022 17:44)  HR: 58 (14 Jan 2022 00:23) (58 - 97)  BP: 142/67 (14 Jan 2022 00:23) (122/60 - 142/67)  BP(mean): 80 (13 Jan 2022 21:49) (75 - 88)  ABP: --  ABP(mean): --  RR: 18 (14 Jan 2022 00:23) (17 - 20)  SpO2: 99% (14 Jan 2022 00:23) (97% - 99%)    = await cutlures   = Dr. SIMPSON TO FOLLOW up in AM

## 2022-01-14 NOTE — CONSULT NOTE ADULT - SUBJECTIVE AND OBJECTIVE BOX
Patient is a 71y old  Male who presents with a chief complaint of Fever    HPI:  70 y/o Male h/o Pancreatic CA, Brain aneurysm non ruptured, CAD (coronary artery disease), GERD was admitted on  for fever. Patient states he had chills earlier PTA, fatigue, and fever up to 104F.  Had one episode of vomiting. Patient states fever went away on its own without medication. Patient states he's had multiple admission to hospital in past for same. Patient's last chemotherapy was on 22.  Was unable to get chemotherapy this past Tuesday secondary to low platelet count. No other complain today. He was found to be febrile in ED. In ER he received zosyn.     PMH: as above  PSH: as above  Meds: per reconciliation sheet, noted below  MEDICATIONS  (STANDING):  aspirin enteric coated 81 milliGRAM(s) Oral daily  atorvastatin 20 milliGRAM(s) Oral at bedtime  cholecalciferol 2000 Unit(s) Oral daily  diphenhydrAMINE 25 milliGRAM(s) Oral at bedtime  dronabinol 2.5 milliGRAM(s) Oral daily  losartan 50 milliGRAM(s) Oral daily  magnesium oxide 400 milliGRAM(s) Oral two times a day with meals  melatonin 10 milliGRAM(s) Oral at bedtime  metoprolol succinate ER 25 milliGRAM(s) Oral daily  pancrelipase (ZENPEP 20,000 Lipase Units) 6 Capsule(s) Oral three times a day with meals  pantoprazole    Tablet 40 milliGRAM(s) Oral before breakfast  piperacillin/tazobactam IVPB.. 3.375 Gram(s) IV Intermittent every 8 hours    MEDICATIONS  (PRN):  acetaminophen     Tablet .. 650 milliGRAM(s) Oral every 6 hours PRN Temp greater or equal to 38C (100.4F), Mild Pain (1 - 3)  aluminum hydroxide/magnesium hydroxide/simethicone Suspension 30 milliLiter(s) Oral every 4 hours PRN Dyspepsia  ondansetron Injectable 4 milliGRAM(s) IV Push every 8 hours PRN Nausea and/or Vomiting    Allergies    No Known Allergies    Intolerances      Social: no smoking, no alcohol, no illegal drugs; no recent travel, no exposure to TB  FAMILY HISTORY:  Family history of breast cancer in sister      no history of premature cardiovascular disease in first degree relatives    ROS: the patient denies HA, no seizures, no dizziness, no sore throat, no nasal congestion, no blurry vision, no CP, no palpitations, no SOB, no cough, no abdominal pain, no diarrhea, has N/V, no dysuria, no leg pain, no claudication, no rash, no joint aches, no rectal pain or bleeding, no night sweats  All other systems reviewed and are negative    Vital Signs Last 24 Hrs  T(C): 36.5 (2022 08:29), Max: 38.3 (2022 17:44)  T(F): 97.7 (2022 08:29), Max: 100.9 (2022 17:44)  HR: 55 (2022 08:29) (55 - 97)  BP: 144/63 (2022 08:29) (122/60 - 144/63)  BP(mean): 80 (2022 21:49) (75 - 88)  RR: 18 (2022 08:29) (17 - 20)  SpO2: 100% (2022 08:29) (97% - 100%)  Daily Height in cm: 172.72 (2022 15:12)    Daily     PE:    Constitutional:  No acute distress  HEENT: NC/AT, EOMI, PERRLA, conjunctivae clear; ears and nose atraumatic; pharynx benign  Neck: supple; thyroid not palpable  Back: no tenderness  Respiratory: respiratory effort normal; decreased BS at bases  Cardiovascular: S1S2 regular, no murmurs  Abdomen: soft, not tender, not distended, positive BS; no liver or spleen organomegaly  Genitourinary: no suprapubic tenderness  Lymphatic: no LN palpable  Musculoskeletal: no muscle tenderness, no joint swelling or tenderness  Extremities: no pedal edema  Neurological/ Psychiatric: AxOx3, judgement and insight normal; moving all extremities  Skin: no rashes; no palpable lesions    Labs: all available labs reviewed                        8.4    4.39  )-----------( 47       ( 2022 05:42 )             25.8     -14    142  |  111<H>  |  15  ----------------------------<  104<H>  3.7   |  26  |  0.75    Ca    8.5      2022 05:42    TPro  7.1  /  Alb  3.3  /  TBili  1.3<H>  /  DBili  x   /  AST  51<H>  /  ALT  51  /  AlkPhos  206<H>  01     LIVER FUNCTIONS - ( 2022 16:11 )  Alb: 3.3 g/dL / Pro: 7.1 gm/dL / ALK PHOS: 206 U/L / ALT: 51 U/L / AST: 51 U/L / GGT: x           Urinalysis Basic - ( 2022 17:49 )    Color: Yellow / Appearance: Clear / S.005 / pH: x  Gluc: x / Ketone: Negative  / Bili: Negative / Urobili: 1   Blood: x / Protein: 15 / Nitrite: Negative   Leuk Esterase: Trace / RBC: 6-10 /HPF / WBC 3-5   Sq Epi: x / Non Sq Epi: Occasional / Bacteria: Few      Culture - Blood (collected 2022 16:11)  Source: .Blood None  Gram Stain (2022 08:33):    Growth in anaerobic bottle: Gram Negative Rods  Preliminary Report (2022 08:33):    Growth in anaerobic bottle: Gram Negative Rods    COVID ( @ 16:11)  NotDete    Radiology: all available radiological tests reviewed    < from: Xray Chest 1 View- PORTABLE-Urgent (22 @ 16:16) >  Right IJ port in place  Lungs are clear.  No large effusions or pneumothoraces  Cardiomediastinal silhouette is within normal limits.  Osseous structures are unremarkable for age.  < end of copied text >      Advanced directives addressed: full resuscitation

## 2022-01-14 NOTE — CONSULT NOTE ADULT - NSICDXPILOT_GEN_ALL_CORE
No relief after meds,will po challenge after xray     Jonna Ty, RN  01/14/22 [id number]
Pt refused xray stating the she is burping and passing gas and is feeling better, pt given ice water to drink and dr costello updated     Karin Paul, RN  01/14/22 1024
[place]

## 2022-01-14 NOTE — CONSULT NOTE ADULT - SUBJECTIVE AND OBJECTIVE BOX
HPI:  72 yo Male PMHx metastatic ampullary cancer, pancreatobiliary type s/p whipple resection 9/15/20, 5/18 LN involved [id number] and received 12 cycles of mFOLFIRINOX for adjuvant therapy completed 4/27/21, then with metastatic recurrence involving periotoneum at time of ventral hernia repair, and started on gemcitabine, nab-paclitaxel weekly started 7/13/21, last dose 1/4/22 presents for fever 104*F at home.     Patient states he had chills earlier today, fatigue, and fever initially 102 and then up to 104.  Had one episode of vomiting.  Denies sore throat, ear ache, congestion, cough, SOB, abdominal pain, diarrhea, constipation, rash, dysuria, or any other symptoms. Patient states fever went away on its own without medication. Patient states he's had multiple admission to hospital in past for same. Patient's last chemotherapy was on 1/4/22.  Was unable to get chemotherapy this past Tuesday secondary to low platelet count. No other complain today. He was found to be febrile in ED.      PAST MEDICAL & SURGICAL HISTORY:  Hearing loss  right    Stented coronary artery  2006 PTCA x 1    CAD (coronary artery disease)  last stress test Fall 2018    HTN (hypertension)    Hyperlipidemia    GERD (gastroesophageal reflux disease)    Skin cancer  squamous cell - head, shin - removed    OA (osteoarthritis)    Psoriasis    Inguinal hernia  right    Brain aneurysm  non ruptured - had stent placed in 2010 after attemtped clipping and coiling in 2007    Pancreatic cancer  s/p Whipple procedure    Drug or chemical induced diabetes mellitus    H/O craniotomy  for non ruptured aneurysm 2007, attempted clipping    History of other surgery  for brain aneurysm - attempted coiling 2007    History of other surgery  endovascular stent placement for brain Aneurysm at [place] - 2010    History of tonsillectomy    S/P colonoscopy  last - within 5 yrs    History of PTCA  2006 x 1 stent    H/O inguinal hernia repair    Pancreatic disorder  Whipple for cancer        Allergies    No Known Allergies    Intolerances        MEDICATIONS  (STANDING):  aspirin enteric coated 81 milliGRAM(s) Oral daily  atorvastatin 20 milliGRAM(s) Oral at bedtime  cholecalciferol 2000 Unit(s) Oral daily  diphenhydrAMINE 25 milliGRAM(s) Oral at bedtime  dronabinol 2.5 milliGRAM(s) Oral daily  losartan 50 milliGRAM(s) Oral daily  magnesium oxide 400 milliGRAM(s) Oral two times a day with meals  melatonin 10 milliGRAM(s) Oral at bedtime  metoprolol succinate ER 25 milliGRAM(s) Oral daily  pancrelipase (ZENPEP 20,000 Lipase Units) 6 Capsule(s) Oral three times a day with meals  pantoprazole    Tablet 40 milliGRAM(s) Oral before breakfast  piperacillin/tazobactam IVPB.. 3.375 Gram(s) IV Intermittent every 8 hours    MEDICATIONS  (PRN):  acetaminophen     Tablet .. 650 milliGRAM(s) Oral every 6 hours PRN Temp greater or equal to 38C (100.4F), Mild Pain (1 - 3)  aluminum hydroxide/magnesium hydroxide/simethicone Suspension 30 milliLiter(s) Oral every 4 hours PRN Dyspepsia  ondansetron Injectable 4 milliGRAM(s) IV Push every 8 hours PRN Nausea and/or Vomiting      FAMILY HISTORY:  Family history of breast cancer in sister        SOCIAL HISTORY: No EtOH, no tobacco    REVIEW OF SYSTEMS:    CONSTITUTIONAL: No weakness, fevers or chills  EYES/ENT: No visual changes;  No vertigo or throat pain   NECK: No pain or stiffness  RESPIRATORY: No cough, wheezing, hemoptysis; No shortness of breath  CARDIOVASCULAR: No chest pain or palpitations  GASTROINTESTINAL: No abdominal or epigastric pain. No nausea, vomiting, or hematemesis; No diarrhea or constipation. No melena or hematochezia.  GENITOURINARY: No dysuria, frequency or hematuria  NEUROLOGICAL: No numbness or weakness  SKIN: No itching, burning, rashes, or lesions   All other review of systems is negative unless indicated above.    Height (cm): 172.7 (01-13 @ 15:12)  Weight (kg): 59.4 (01-14 @ 00:23)  BMI (kg/m2): 19.9 (01-14 @ 00:23)  BSA (m2): 1.71 (01-14 @ 00:23)    T(F): 97.7 (01-14-22 @ 08:29), Max: 100.9 (01-13-22 @ 17:44)  HR: 55 (01-14-22 @ 08:29)  BP: 144/63 (01-14-22 @ 08:29)  RR: 18 (01-14-22 @ 08:29)  SpO2: 100% (01-14-22 @ 08:29)  Wt(kg): --    GENERAL: NAD, well-developed  HEAD:  Atraumatic, Normocephalic  CHEST/LUNG: Clear to auscultation bilaterally; No wheeze  HEART: Regular rate and rhythm; No murmurs, rubs, or gallops  ABDOMEN: Soft, Nontender, Nondistended; hernia   EXTREMITIES:  2+ Peripheral Pulses, No clubbing, cyanosis, or edema  NEUROLOGY: non-focal  SKIN: No rashes or lesions                          8.4    4.39  )-----------( 47       ( 14 Jan 2022 05:42 )             25.8       01-14    142  |  111<H>  |  15  ----------------------------<  104<H>  3.7   |  26  |  0.75    Ca    8.5      14 Jan 2022 05:42    TPro  7.1  /  Alb  3.3  /  TBili  1.3<H>  /  DBili  x   /  AST  51<H>  /  ALT  51  /  AlkPhos  206<H>  01-13          PT/INR - ( 13 Jan 2022 16:11 )   PT: 13.9 sec;   INR: 1.20 ratio         PTT - ( 13 Jan 2022 16:11 )  PTT:30.9 sec    .Blood None  01-13 @ 16:11   Growth in anaerobic bottle: Gram Negative Rods  ***Blood Panel PCR results on this specimen are available  approximately 3 hours after the Gram stain result.***  Gram stain, PCR, and/or culture results may not always  correspond due to difference in methodologies.  ************************************************************  This PCR assay was performed by multiplex PCR. This  Assay tests for 66 bacterial and resistance gene targets.  Please refer to the [facility] test directory  at [email or web address] for details.  --  Blood Culture PCR

## 2022-01-14 NOTE — CONSULT NOTE ADULT - ASSESSMENT
70 y/o Male h/o Pancreatic CA, Brain aneurysm non ruptured, CAD (coronary artery disease), GERD was admitted on 1/13 for fever. Patient states he had chills earlier PTA, fatigue, and fever up to 104F.  Had one episode of vomiting. Patient states fever went away on its own without medication. Patient states he's had multiple admission to hospital in past for same. Patient's last chemotherapy was on 1/4/22.  Was unable to get chemotherapy this past Tuesday secondary to low platelet count. No other complain today. He was found to be febrile in ED. In ER he received zosyn.     1. Febrile syndrome. Sepsis with GNR. ?source ?intraabdominal. Pancreas Ca on chemotherapy. Immunocompromised host.   -obtain BC x 2, urine c/s  -agree with zosyn 3.375 gm IV q8h  -reason for abx use and side effects reviewed with patient; monitor BMP   -old chart reviewed to assess prior cultures  -monitor temps  -f/u CBC  -supportive care  2. Other issues:   -care per medicine     72 y/o Male h/o Pancreatic CA, Brain aneurysm non ruptured, CAD (coronary artery disease), GERD was admitted on 1/13 for fever. Patient states he had chills earlier PTA, fatigue, and fever up to 104F.  Had one episode of vomiting. Patient states fever went away on its own without medication. Patient states he's had multiple admission to hospital in past for same. Patient's last chemotherapy was on 1/4/22.  Was unable to get chemotherapy this past Tuesday secondary to low platelet count. No other complain today. He was found to be febrile in ED. In ER he received zosyn.     1. Febrile syndrome. Sepsis with GNR. ?source ?intraabdominal. Pancreas Ca s/p surgery on chemotherapy. Immunocompromised host.   -obtain BC x 2, urine c/s  -agree with zosyn 3.375 gm IV q8h  -reason for abx use and side effects reviewed with patient; monitor BMP   -old chart reviewed to assess prior cultures  -monitor temps  -f/u CBC  -supportive care  2. Other issues:   -care per medicine    d/w Dr. Gil

## 2022-01-14 NOTE — CONSULT NOTE ADULT - ASSESSMENT
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, 5/18 LN involved [id number] and received 12 cycles of mFOLFIRINOX for adjuvant therapy completed 4/27/21, then with metastatic recurrence involving periotoneum at time of ventral hernia repair, and started on gemcitabine, nab-paclitaxel weekly started 7/13/21, last dose 1/4/22 presents for fever 104*F at home, found to be bacteremic with gram negative rods.     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 Didier Walker  cell: [phone]  Weekends and nights please call [phone] for MD on call  [facility]  Hematology/Oncology

## 2022-01-14 NOTE — PATIENT PROFILE ADULT - FALL HARM RISK - UNIVERSAL INTERVENTIONS
Bed in lowest position, wheels locked, appropriate side rails in place/Call bell, personal items and telephone in reach/Instruct patient to call for assistance before getting out of bed or chair/Non-slip footwear when patient is out of bed/[place] to call system/Physically safe environment - no spills, clutter or unnecessary equipment/Purposeful Proactive Rounding/Room/bathroom lighting operational, light cord in reach

## 2022-01-14 NOTE — PROGRESS NOTE ADULT - ATTENDING COMMENTS
71 year old patient with h/o Pancreatic ampullary carcinoma, Brain aneurysm, CAD, GERD who was admitted for fever and found to have E. Coli sepsis.     #fever  - neutropenic and thrombocytopenic   - fevers of 104F at home per pt.   - BCx+ for Gram Negative E. Coli  - Infectious disease consult with Dr. Correia: c/w Zosyn 3.375 gm IV q8hrs   - daily CBC  - CXR unremarkable

## 2022-01-15 LAB
HCT VFR BLD CALC: 27.2 % — LOW (ref 39–50)
HGB BLD-MCNC: 8.8 G/DL — LOW (ref 13–17)
MCHC RBC-ENTMCNC: 30.7 PG — SIGNIFICANT CHANGE UP (ref 27–34)
MCHC RBC-ENTMCNC: 32.4 GM/DL — SIGNIFICANT CHANGE UP (ref 32–36)
MCV RBC AUTO: 94.8 FL — SIGNIFICANT CHANGE UP (ref 80–100)
PLATELET # BLD AUTO: 59 K/UL — LOW (ref 150–400)
RBC # BLD: 2.87 M/UL — LOW (ref 4.2–5.8)
RBC # FLD: 13.3 % — SIGNIFICANT CHANGE UP (ref 10.3–14.5)
WBC # BLD: 3.39 K/UL — LOW (ref 3.8–10.5)
WBC # FLD AUTO: 3.39 K/UL — LOW (ref 3.8–10.5)

## 2022-01-15 PROCEDURE — 99233 SBSQ HOSP IP/OBS HIGH 50: CPT

## 2022-01-15 RX ORDER — SENNA PLUS 8.6 MG/1
2 TABLET ORAL AT BEDTIME
Refills: 0 | Status: DISCONTINUED | OUTPATIENT
Start: 2022-01-15 | End: 2022-01-16

## 2022-01-15 RX ADMIN — Medication 650 MILLIGRAM(S): at 09:49

## 2022-01-15 RX ADMIN — Medication 2.5 MILLIGRAM(S): at 09:48

## 2022-01-15 RX ADMIN — LOSARTAN POTASSIUM 50 MILLIGRAM(S): 100 TABLET, FILM COATED ORAL at 09:48

## 2022-01-15 RX ADMIN — PIPERACILLIN AND TAZOBACTAM 25 GRAM(S): 4; .5 INJECTION, POWDER, LYOPHILIZED, FOR SOLUTION INTRAVENOUS at 05:33

## 2022-01-15 RX ADMIN — Medication 81 MILLIGRAM(S): at 09:48

## 2022-01-15 RX ADMIN — SENNA PLUS 2 TABLET(S): 8.6 TABLET ORAL at 18:38

## 2022-01-15 RX ADMIN — Medication 6 CAPSULE(S): at 12:27

## 2022-01-15 RX ADMIN — Medication 6 CAPSULE(S): at 17:47

## 2022-01-15 RX ADMIN — PIPERACILLIN AND TAZOBACTAM 25 GRAM(S): 4; .5 INJECTION, POWDER, LYOPHILIZED, FOR SOLUTION INTRAVENOUS at 15:05

## 2022-01-15 RX ADMIN — ATORVASTATIN CALCIUM 20 MILLIGRAM(S): 80 TABLET, FILM COATED ORAL at 21:15

## 2022-01-15 RX ADMIN — Medication 2000 UNIT(S): at 09:48

## 2022-01-15 RX ADMIN — Medication 6 CAPSULE(S): at 07:47

## 2022-01-15 RX ADMIN — MAGNESIUM OXIDE 400 MG ORAL TABLET 400 MILLIGRAM(S): 241.3 TABLET ORAL at 07:47

## 2022-01-15 RX ADMIN — PANTOPRAZOLE SODIUM 40 MILLIGRAM(S): 20 TABLET, DELAYED RELEASE ORAL at 09:48

## 2022-01-15 RX ADMIN — MAGNESIUM OXIDE 400 MG ORAL TABLET 400 MILLIGRAM(S): 241.3 TABLET ORAL at 17:47

## 2022-01-15 RX ADMIN — PIPERACILLIN AND TAZOBACTAM 25 GRAM(S): 4; .5 INJECTION, POWDER, LYOPHILIZED, FOR SOLUTION INTRAVENOUS at 21:15

## 2022-01-15 RX ADMIN — Medication 25 MILLIGRAM(S): at 09:48

## 2022-01-16 ENCOUNTER — TRANSCRIPTION ENCOUNTER (OUTPATIENT)
Age: 72
End: 2022-01-16

## 2022-01-16 VITALS
RESPIRATION RATE: 16 BRPM | OXYGEN SATURATION: 100 % | TEMPERATURE: 98 F | DIASTOLIC BLOOD PRESSURE: 75 MMHG | HEART RATE: 57 BPM | SYSTOLIC BLOOD PRESSURE: 139 MMHG

## 2022-01-16 LAB
-  AMIKACIN: SIGNIFICANT CHANGE UP
-  AMPICILLIN/SULBACTAM: SIGNIFICANT CHANGE UP
-  AMPICILLIN: SIGNIFICANT CHANGE UP
-  AZTREONAM: SIGNIFICANT CHANGE UP
-  CEFAZOLIN: SIGNIFICANT CHANGE UP
-  CEFEPIME: SIGNIFICANT CHANGE UP
-  CEFOXITIN: SIGNIFICANT CHANGE UP
-  CEFTRIAXONE: SIGNIFICANT CHANGE UP
-  CIPROFLOXACIN: SIGNIFICANT CHANGE UP
-  ERTAPENEM: SIGNIFICANT CHANGE UP
-  GENTAMICIN: SIGNIFICANT CHANGE UP
-  IMIPENEM: SIGNIFICANT CHANGE UP
-  LEVOFLOXACIN: SIGNIFICANT CHANGE UP
-  MEROPENEM: SIGNIFICANT CHANGE UP
-  PIPERACILLIN/TAZOBACTAM: SIGNIFICANT CHANGE UP
-  TOBRAMYCIN: SIGNIFICANT CHANGE UP
-  TRIMETHOPRIM/SULFAMETHOXAZOLE: SIGNIFICANT CHANGE UP
CULTURE RESULTS: SIGNIFICANT CHANGE UP
HCT VFR BLD CALC: 26.3 % — LOW (ref 39–50)
HGB BLD-MCNC: 8.6 G/DL — LOW (ref 13–17)
MCHC RBC-ENTMCNC: 30.8 PG — SIGNIFICANT CHANGE UP (ref 27–34)
MCHC RBC-ENTMCNC: 32.7 GM/DL — SIGNIFICANT CHANGE UP (ref 32–36)
MCV RBC AUTO: 94.3 FL — SIGNIFICANT CHANGE UP (ref 80–100)
METHOD TYPE: SIGNIFICANT CHANGE UP
ORGANISM # SPEC MICROSCOPIC CNT: SIGNIFICANT CHANGE UP
PLATELET # BLD AUTO: 65 K/UL — LOW (ref 150–400)
RBC # BLD: 2.79 M/UL — LOW (ref 4.2–5.8)
RBC # FLD: 13.2 % — SIGNIFICANT CHANGE UP (ref 10.3–14.5)
SPECIMEN SOURCE: SIGNIFICANT CHANGE UP
WBC # BLD: 3.68 K/UL — LOW (ref 3.8–10.5)
WBC # FLD AUTO: 3.68 K/UL — LOW (ref 3.8–10.5)

## 2022-01-16 PROCEDURE — 99238 HOSP IP/OBS DSCHRG MGMT 30/<: CPT

## 2022-01-16 PROCEDURE — 76700 US EXAM ABDOM COMPLETE: CPT | Mod: 26

## 2022-01-16 RX ORDER — CEFUROXIME AXETIL 250 MG
500 TABLET ORAL EVERY 12 HOURS
Refills: 0 | Status: COMPLETED | OUTPATIENT
Start: 2022-01-16 | End: 2022-01-16

## 2022-01-16 RX ORDER — CEFUROXIME AXETIL 250 MG
1 TABLET ORAL
Qty: 20 | Refills: 0
Start: 2022-01-16 | End: 2022-01-25

## 2022-01-16 RX ADMIN — Medication 2000 UNIT(S): at 09:54

## 2022-01-16 RX ADMIN — Medication 650 MILLIGRAM(S): at 05:40

## 2022-01-16 RX ADMIN — MAGNESIUM OXIDE 400 MG ORAL TABLET 400 MILLIGRAM(S): 241.3 TABLET ORAL at 17:20

## 2022-01-16 RX ADMIN — Medication 6 CAPSULE(S): at 17:20

## 2022-01-16 RX ADMIN — Medication 6 CAPSULE(S): at 08:12

## 2022-01-16 RX ADMIN — PANTOPRAZOLE SODIUM 40 MILLIGRAM(S): 20 TABLET, DELAYED RELEASE ORAL at 09:55

## 2022-01-16 RX ADMIN — Medication 25 MILLIGRAM(S): at 01:59

## 2022-01-16 RX ADMIN — Medication 10 MILLIGRAM(S): at 01:59

## 2022-01-16 RX ADMIN — LOSARTAN POTASSIUM 50 MILLIGRAM(S): 100 TABLET, FILM COATED ORAL at 09:54

## 2022-01-16 RX ADMIN — PIPERACILLIN AND TAZOBACTAM 25 GRAM(S): 4; .5 INJECTION, POWDER, LYOPHILIZED, FOR SOLUTION INTRAVENOUS at 05:40

## 2022-01-16 RX ADMIN — PIPERACILLIN AND TAZOBACTAM 25 GRAM(S): 4; .5 INJECTION, POWDER, LYOPHILIZED, FOR SOLUTION INTRAVENOUS at 13:25

## 2022-01-16 RX ADMIN — MAGNESIUM OXIDE 400 MG ORAL TABLET 400 MILLIGRAM(S): 241.3 TABLET ORAL at 08:12

## 2022-01-16 RX ADMIN — Medication 6 CAPSULE(S): at 12:33

## 2022-01-16 RX ADMIN — Medication 2.5 MILLIGRAM(S): at 09:54

## 2022-01-16 RX ADMIN — Medication 650 MILLIGRAM(S): at 06:10

## 2022-01-16 RX ADMIN — SENNA PLUS 2 TABLET(S): 8.6 TABLET ORAL at 05:40

## 2022-01-16 RX ADMIN — Medication 500 MILLIGRAM(S): at 19:55

## 2022-01-16 RX ADMIN — Medication 81 MILLIGRAM(S): at 09:55

## 2022-01-16 NOTE — DISCHARGE NOTE NURSING/CASE MANAGEMENT/SOCIAL WORK - PATIENT PORTAL LINK FT
You can access the FollowMyHealth Patient Portal offered by [facility] by registering at the following website: [email or web address]. By joining [facility]’s FollowMyHealth portal, you will also be able to view your health information using other applications (apps) compatible with our system.

## 2022-01-16 NOTE — DISCHARGE NOTE PROVIDER - NSDCCPCAREPLAN_GEN_ALL_CORE_FT
PRINCIPAL DISCHARGE DIAGNOSIS  Diagnosis: Sepsis  Assessment and Plan of Treatment: You presented to the ED with fevers of 104. You were found ot have a low white blood cell count and a low platelet count. Infectious disease was consulted and you were found to have sepsis secondary to E. Coli growth upon blood culture testing. You received 3 days of IV Zoysn therapy. At time of discharge you were presecribed Ceftin 500mg whihc you will take by mouth, twice a day for 10 days in order to complete 2 complete weeks of therapy.      SECONDARY DISCHARGE DIAGNOSES  Diagnosis: Ampullary carcinoma  Assessment and Plan of Treatment: You have been diagnosed with ampullary carcinoma of the pancreas. Your next chemo cycle was post-poned due to this current hospitalization. You will follow up with Dr. Walker in order to schedule your next appointment.     PRINCIPAL DISCHARGE DIAGNOSIS  Diagnosis: Sepsis  Assessment and Plan of Treatment: You presented to the ED with fevers of 104. You were found ot have a low white blood cell count and a low platelet count. Infectious disease was consulted and you were found to have sepsis secondary to E. Coli growth upon blood culture testing. You received 3 days of IV Zoysn therapy. At time of discharge you were presecribed Ceftin 500mg which you will take by mouth, twice a day for 10 days in order to complete 2 complete weeks of therapy.      SECONDARY DISCHARGE DIAGNOSES  Diagnosis: Ampullary carcinoma  Assessment and Plan of Treatment: You have been diagnosed with ampullary carcinoma of the pancreas. Your next chemo cycle was postponed due to this current hospitalization. You will follow up with Dr. Walker in order to schedule your next appointment.

## 2022-01-16 NOTE — DISCHARGE NOTE PROVIDER - NSDCMRMEDTOKEN_GEN_ALL_CORE_FT
Aspirin Enteric Coated 81 mg oral delayed release tablet: 1 tab(s) orally once a day  azelastine 137 mcg/inh (0.1%) nasal spray: 2 spray(s) nasal 2 times a day, As Needed - for congestion  cholecalciferol 50 mcg (2000 intl units) oral tablet: 1 tab(s) orally once a day  diphenhydrAMINE 25 mg oral capsule: 1 cap(s) orally once a day (at bedtime)  dronabinol 2.5 mg oral capsule: 1 cap(s) orally once a day  losartan 50 mg oral tablet: 1 tab(s) orally once a day  magnesium oxide 400 mg (240 mg elemental magnesium) oral tablet (obsolete): 1 tab(s) orally 2 times a day  Melatonin 10 mg oral capsule: 1 cap(s) orally once a day (at bedtime)  metoprolol succinate 25 mg oral tablet, extended release: 1 tab(s) orally once a day  omeprazole 20 mg oral delayed release capsule: 1 cap(s) orally once a day  ondansetron 8 mg oral tablet: 1 tab(s) orally 3 times a day, As Needed - for nausea  Pfizer-BioNTech COVID-19 Vaccine PF 30 mcg/0.3 mL intramuscular suspension: 0.3 milliliter(s) intramuscular once  *****Booster as of 11/21***  rosuvastatin 5 mg oral tablet: 1 tab(s) orally once a day (in the evening)  Tylenol Extra Strength 500 mg oral tablet: 2 tab(s) orally 2 times a day  Zenpep 40,000 units-126,000 units-168,000 units oral delayed release capsule: 3 cap(s) orally 3 times a day  Zenpep 40,000 units-126,000 units-168,000 units oral delayed release capsule: 1 cap(s) orally with snacks   Aspirin Enteric Coated 81 mg oral delayed release tablet: 1 tab(s) orally once a day  azelastine 137 mcg/inh (0.1%) nasal spray: 2 spray(s) nasal 2 times a day, As Needed - for congestion  cefuroxime 500 mg oral tablet: 1 tab(s) orally every 12 hours. Take first tablet morning of 1/17/22  cholecalciferol 50 mcg (2000 intl units) oral tablet: 1 tab(s) orally once a day  diphenhydrAMINE 25 mg oral capsule: 1 cap(s) orally once a day (at bedtime)  dronabinol 2.5 mg oral capsule: 1 cap(s) orally once a day  losartan 50 mg oral tablet: 1 tab(s) orally once a day  magnesium oxide 400 mg (240 mg elemental magnesium) oral tablet (obsolete): 1 tab(s) orally 2 times a day  Melatonin 10 mg oral capsule: 1 cap(s) orally once a day (at bedtime)  metoprolol succinate 25 mg oral tablet, extended release: 1 tab(s) orally once a day  omeprazole 20 mg oral delayed release capsule: 1 cap(s) orally once a day  ondansetron 8 mg oral tablet: 1 tab(s) orally 3 times a day, As Needed - for nausea  PfizerKash COVID-19 Vaccine PF 30 mcg/0.3 mL intramuscular suspension: 0.3 milliliter(s) intramuscular once  *****Booster as of 11/21***  rosuvastatin 5 mg oral tablet: 1 tab(s) orally once a day (in the evening)  Tylenol Extra Strength 500 mg oral tablet: 2 tab(s) orally 2 times a day  Zenpep 40,000 units-126,000 units-168,000 units oral delayed release capsule: 3 cap(s) orally 3 times a day  Zenpep 40,000 units-126,000 units-168,000 units oral delayed release capsule: 1 cap(s) orally with snacks

## 2022-01-16 NOTE — PROGRESS NOTE ADULT - SUBJECTIVE AND OBJECTIVE BOX
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, 5/18 LN involved [id number] and received 12 cycles of mFOLFIRINOX for adjuvant therapy completed 4/27/21, then with metastatic recurrence involving periotoneum at time of ventral hernia repair, and started on gemcitabine, nab-paclitaxel weekly started 7/13/21, last dose 1/4/22 presents for fever 104*F at home.     Patient states he had chills earlier today, fatigue, and fever initially 102 and then up to 104.  Had one episode of vomiting.  Denies sore throat, ear ache, congestion, cough, SOB, abdominal pain, diarrhea, constipation, rash, dysuria, or any other symptoms. Patient states fever went away on its own without medication. Patient states he's had multiple admission to hospital in past for same. Patient's last chemotherapy was on 1/4/22.  Was unable to get chemotherapy this past Tuesday secondary to low platelet count. No other complain today. He was found to be febrile in ED.    Subjective: Patient is seen and examined at bedside. He is sitting upright and in NAD at this time.    REVIEW OF SYSTEMS:    CONSTITUTIONAL: No weakness or chills. No fevers at this tie.   EYES/ENT: No visual changes;  No vertigo or throat pain   NECK: No pain or stiffness  RESPIRATORY: No cough, wheezing, hemoptysis; No shortness of breath  CARDIOVASCULAR: No chest pain or palpitations  GASTROINTESTINAL: No abdominal or epigastric pain. No nausea, vomiting, or hematemesis; No diarrhea or constipation. No melena or hematochezia.  GENITOURINARY: No dysuria, frequency or hematuria  NEUROLOGICAL: No numbness or weakness  SKIN: No itching, burning, rashes, or lesions   All other review of systems is negative unless indicated above.    GENERAL: NAD, well-developed  HEAD:  Atraumatic, Normocephalic  CHEST/LUNG: Clear to auscultation bilaterally; No wheeze  HEART: Regular rate and rhythm; No murmurs, rubs, or gallops  ABDOMEN: Soft, Nontender, Nondistended; hernia   EXTREMITIES:  2+ Peripheral Pulses, No clubbing, cyanosis, or edema  NEUROLOGY: non-focal  SKIN: No rashes or lesions      PHYSICAL EXAM:  Vital Signs Last 24 Hrs  T(C): 36.4 (15 Riccardo 2022 05:18), Max: 36.5 (14 Jan 2022 08:29)  T(F): 97.5 (15 Riccardo 2022 05:18), Max: 97.7 (14 Jan 2022 08:29)  HR: 51 (15 Riccardo 2022 05:18) (51 - 63)  BP: 143/70 (15 Riccardo 2022 05:18) (143/70 - 156/71)  RR: 18 (15 Riccardo 2022 05:18) (18 - 18)  SpO2: 99% (15 Riccardo 2022 05:18) (99% - 100%)    MEDICATIONS:  MEDICATIONS  (STANDING):  aspirin enteric coated 81 milliGRAM(s) Oral daily  atorvastatin 20 milliGRAM(s) Oral at bedtime  cholecalciferol 2000 Unit(s) Oral daily  diphenhydrAMINE 25 milliGRAM(s) Oral at bedtime  dronabinol 2.5 milliGRAM(s) Oral daily  losartan 50 milliGRAM(s) Oral daily  magnesium oxide 400 milliGRAM(s) Oral two times a day with meals  melatonin 10 milliGRAM(s) Oral at bedtime  metoprolol succinate ER 25 milliGRAM(s) Oral daily  pancrelipase (ZENPEP 20,000 Lipase Units) 6 Capsule(s) Oral three times a day with meals  pantoprazole    Tablet 40 milliGRAM(s) Oral before breakfast  piperacillin/tazobactam IVPB.. 3.375 Gram(s) IV Intermittent every 8 hours      LABS: All Labs Reviewed:             Vital Signs Last 24 Hrs  T(C): 36.4 (15 Riccardo 2022 05:18), Max: 36.5 (14 Jan 2022 08:29)  T(F): 97.5 (15 Riccardo 2022 05:18), Max: 97.7 (14 Jan 2022 08:29)  HR: 51 (15 Riccardo 2022 05:18) (51 - 63)  BP: 143/70 (15 Riccardo 2022 05:18) (143/70 - 156/71)  BP(mean): --  RR: 18 (15 Riccardo 2022 05:18) (18 - 18)  SpO2: 99% (15 Riccardo 2022 05:18) (99% - 100%)    Blood Culture: 01-13 @ 16:11  Organism Blood Culture PCR  Gram Stain Blood -- Gram Stain   Growth in anaerobic bottle: Gram Negative Rods  Specimen Source .Blood None  Culture-Blood --      
72 yo Male PMHx metastatic ampullary cancer, pancreatobiliary type s/p whipple resection 9/15/20, 5/18 LN involved [id number] and received 12 cycles of mFOLFIRINOX for adjuvant therapy completed 4/27/21, then with metastatic recurrence involving periotoneum at time of ventral hernia repair, and started on gemcitabine, nab-paclitaxel weekly started 7/13/21, last dose 1/4/22 presents for fever 104*F at home.     Patient states he had chills earlier today, fatigue, and fever initially 102 and then up to 104.  Had one episode of vomiting.  Denies sore throat, ear ache, congestion, cough, SOB, abdominal pain, diarrhea, constipation, rash, dysuria, or any other symptoms. Patient states fever went away on its own without medication. Patient states he's had multiple admission to hospital in past for same. Patient's last chemotherapy was on 1/4/22.  Was unable to get chemotherapy this past Tuesday secondary to low platelet count. No other complain today. He was found to be febrile in ED.    Subjective: Patient is seen and examined at bedside. He is sitting upright and in NAD at this time. Patient states he was planning on travelling to [place] on Monday for the next several months for chemotherapy but states he will postpone that appointment at this time.     REVIEW OF SYSTEMS:    CONSTITUTIONAL: No weakness or chills. No fevers at this tie.   EYES/ENT: No visual changes;  No vertigo or throat pain   NECK: No pain or stiffness  RESPIRATORY: No cough, wheezing, hemoptysis; No shortness of breath  CARDIOVASCULAR: No chest pain or palpitations  GASTROINTESTINAL: No abdominal or epigastric pain. No nausea, vomiting, or hematemesis; No diarrhea or constipation. No melena or hematochezia.  GENITOURINARY: No dysuria, frequency or hematuria  NEUROLOGICAL: No numbness or weakness  SKIN: No itching, burning, rashes, or lesions   All other review of systems is negative unless indicated above.    GENERAL: NAD, well-developed  HEAD:  Atraumatic, Normocephalic  CHEST/LUNG: Clear to auscultation bilaterally; No wheeze  HEART: Regular rate and rhythm; No murmurs, rubs, or gallops  ABDOMEN: Soft, Nontender, Nondistended; hernia   EXTREMITIES:  2+ Peripheral Pulses, No clubbing, cyanosis, or edema  NEUROLOGY: non-focal  SKIN: No rashes or lesions      PHYSICAL EXAM:  Vital Signs Last 24 Hrs  T(C): 36.3 (14 Jan 2022 15:16), Max: 38.3 (13 Jan 2022 17:44)  T(F): 97.3 (14 Jan 2022 15:16), Max: 100.9 (13 Jan 2022 17:44)  HR: 60 (14 Jan 2022 15:16) (55 - 91)  BP: 153/69 (14 Jan 2022 15:16) (122/60 - 153/69)  BP(mean): 80 (13 Jan 2022 21:49) (80 - 88)  RR: 18 (14 Jan 2022 15:16) (17 - 20)  SpO2: 100% (14 Jan 2022 15:16) (97% - 100%)      MEDICATIONS:  MEDICATIONS  (STANDING):  aspirin enteric coated 81 milliGRAM(s) Oral daily  atorvastatin 20 milliGRAM(s) Oral at bedtime  cholecalciferol 2000 Unit(s) Oral daily  diphenhydrAMINE 25 milliGRAM(s) Oral at bedtime  dronabinol 2.5 milliGRAM(s) Oral daily  losartan 50 milliGRAM(s) Oral daily  magnesium oxide 400 milliGRAM(s) Oral two times a day with meals  melatonin 10 milliGRAM(s) Oral at bedtime  metoprolol succinate ER 25 milliGRAM(s) Oral daily  pancrelipase (ZENPEP 20,000 Lipase Units) 6 Capsule(s) Oral three times a day with meals  pantoprazole    Tablet 40 milliGRAM(s) Oral before breakfast  piperacillin/tazobactam IVPB.. 3.375 Gram(s) IV Intermittent every 8 hours      LABS: All Labs Reviewed:                        8.4    4.39  )-----------( 47       ( 14 Jan 2022 05:42 )             25.8     01-14    142  |  111<H>  |  15  ----------------------------<  104<H>  3.7   |  26  |  0.75    Ca    8.5      14 Jan 2022 05:42    TPro  7.1  /  Alb  3.3  /  TBili  1.3<H>  /  DBili  x   /  AST  51<H>  /  ALT  51  /  AlkPhos  206<H>  01-13    PT/INR - ( 13 Jan 2022 16:11 )   PT: 13.9 sec;   INR: 1.20 ratio         PTT - ( 13 Jan 2022 16:11 )  PTT:30.9 sec      Blood Culture: 01-13 @ 16:11  Organism Blood Culture PCR  Gram Stain Blood -- Gram Stain   Growth in anaerobic bottle: Gram Negative Rods  Specimen Source .Blood None  Culture-Blood --      
INTERVAL HPI/OVERNIGHT EVENTS:  Patient S&E at bedside. No o/n events, patient resting comfortably.     VITAL SIGNS:  T(F): 97.5 (01-16-22 @ 15:15)  HR: 57 (01-16-22 @ 15:15)  BP: 139/75 (01-16-22 @ 15:15)  RR: 16 (01-16-22 @ 15:15)  SpO2: 100% (01-16-22 @ 15:15)  Wt(kg): --    PHYSICAL EXAM:    Constitutional: NAD  Eyes: EOMI, sclera non-icteric  Neck: supple, no masses, no JVD  Respiratory: unlabored breathing   Cardiovascular: RRR, no M/R/G  Gastrointestinal: soft, NTND, no masses palpable, + BS, no hepatosplenomegaly  Extremities: no c/c/e  Neurological: AAOx3      MEDICATIONS  (STANDING):  aspirin enteric coated 81 milliGRAM(s) Oral daily  atorvastatin 20 milliGRAM(s) Oral at bedtime  cholecalciferol 2000 Unit(s) Oral daily  diphenhydrAMINE 25 milliGRAM(s) Oral at bedtime  dronabinol 2.5 milliGRAM(s) Oral daily  losartan 50 milliGRAM(s) Oral daily  magnesium oxide 400 milliGRAM(s) Oral two times a day with meals  melatonin 10 milliGRAM(s) Oral at bedtime  metoprolol succinate ER 25 milliGRAM(s) Oral daily  pancrelipase (ZENPEP 20,000 Lipase Units) 6 Capsule(s) Oral three times a day with meals  pantoprazole    Tablet 40 milliGRAM(s) Oral before breakfast  piperacillin/tazobactam IVPB.. 3.375 Gram(s) IV Intermittent every 8 hours    MEDICATIONS  (PRN):  acetaminophen     Tablet .. 650 milliGRAM(s) Oral every 6 hours PRN Temp greater or equal to 38C (100.4F), Mild Pain (1 - 3)  aluminum hydroxide/magnesium hydroxide/simethicone Suspension 30 milliLiter(s) Oral every 4 hours PRN Dyspepsia  ondansetron Injectable 4 milliGRAM(s) IV Push every 8 hours PRN Nausea and/or Vomiting  senna 2 Tablet(s) Oral at bedtime PRN Constipation      Allergies    No Known Allergies    Intolerances        LABS:                        8.6    3.68  )-----------( 65       ( 16 Jan 2022 06:09 )             26.3                 RADIOLOGY & ADDITIONAL TESTS:  Studies reviewed.    ASSESSMENT & PLAN: 
INTERVAL HPI/OVERNIGHT EVENTS:  Patient S&E at bedside. No o/n events, patient resting comfortably. No complaints at this time. Patient denies fever, chills, dizziness, weakness, CP, palpitations, SOB, cough, N/V/D/C, dysuria, changes in bowel movements, LE edema.    VITAL SIGNS:  Vital Signs Last 24 Hrs  T(C): 36.6 (15 Riccardo 2022 15:40), Max: 36.6 (15 Riccardo 2022 15:40)  T(F): 97.8 (15 Riccardo 2022 15:40), Max: 97.8 (15 Riccardo 2022 15:40)  HR: 52 (15 Riccardo 2022 15:40) (51 - 63)  BP: 141/70 (15 Riccardo 2022 15:40) (141/70 - 156/71)  BP(mean): --  RR: 18 (15 Riccardo 2022 15:40) (18 - 18)  SpO2: 100% (15 Riccardo 2022 15:40) (99% - 100%)  PHYSICAL EXAM:    Constitutional: NAD  Eyes: EOMI, sclera non-icteric  Neck: supple, no masses, no JVD  Respiratory: CTA b/l, good air entry b/l  Cardiovascular: RRR, no M/R/G  Gastrointestinal: soft, NTND, no masses palpable, + BS, no hepatosplenomegaly  Extremities: no c/c/e  Neurological: AAOx3      MEDICATIONS  (STANDING):  aspirin enteric coated 81 milliGRAM(s) Oral daily  atorvastatin 20 milliGRAM(s) Oral at bedtime  cholecalciferol 2000 Unit(s) Oral daily  diphenhydrAMINE 25 milliGRAM(s) Oral at bedtime  dronabinol 2.5 milliGRAM(s) Oral daily  losartan 50 milliGRAM(s) Oral daily  magnesium oxide 400 milliGRAM(s) Oral two times a day with meals  melatonin 10 milliGRAM(s) Oral at bedtime  metoprolol succinate ER 25 milliGRAM(s) Oral daily  pancrelipase (ZENPEP 20,000 Lipase Units) 6 Capsule(s) Oral three times a day with meals  pantoprazole    Tablet 40 milliGRAM(s) Oral before breakfast  piperacillin/tazobactam IVPB.. 3.375 Gram(s) IV Intermittent every 8 hours    MEDICATIONS  (PRN):  acetaminophen     Tablet .. 650 milliGRAM(s) Oral every 6 hours PRN Temp greater or equal to 38C (100.4F), Mild Pain (1 - 3)  aluminum hydroxide/magnesium hydroxide/simethicone Suspension 30 milliLiter(s) Oral every 4 hours PRN Dyspepsia  ondansetron Injectable 4 milliGRAM(s) IV Push every 8 hours PRN Nausea and/or Vomiting  senna 2 Tablet(s) Oral at bedtime PRN Constipation      Allergies    No Known Allergies    Intolerances        LABS:                        8.8    3.39  )-----------( 59       ( 15 Riccardo 2022 07:09 )             27.2     01-14    142  |  111<H>  |  15  ----------------------------<  104<H>  3.7   |  26  |  0.75    Ca    8.5      2022 05:42        Urinalysis Basic - ( 2022 17:49 )    Color: Yellow / Appearance: Clear / S.005 / pH: x  Gluc: x / Ketone: Negative  / Bili: Negative / Urobili: 1   Blood: x / Protein: 15 / Nitrite: Negative   Leuk Esterase: Trace / RBC: 6-10 /HPF / WBC 3-5   Sq Epi: x / Non Sq Epi: Occasional / Bacteria: Few        RADIOLOGY & ADDITIONAL TESTS:  Studies reviewed.    ASSESSMENT & PLAN: 
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, 5/18 LN involved [id number] and received 12 cycles of mFOLFIRINOX for adjuvant therapy completed 4/27/21, then with metastatic recurrence involving periotoneum at time of ventral hernia repair, and started on gemcitabine, nab-paclitaxel weekly started 7/13/21, last dose 1/4/22 presents for fever 104*F at home.     Patient states he had chills earlier today, fatigue, and fever initially 102 and then up to 104.  Had one episode of vomiting.  Denies sore throat, ear ache, congestion, cough, SOB, abdominal pain, diarrhea, constipation, rash, dysuria, or any other symptoms. Patient states fever went away on its own without medication. Patient states he's had multiple admission to hospital in past for same. Patient's last chemotherapy was on 1/4/22.  Was unable to get chemotherapy this past Tuesday secondary to low platelet count. No other complain today. He was found to be febrile in ED.    Subjective: Patient is seen and examined at bedside. He is sitting upright and in NAD at this time. Patient states he feels well at this time and is looking forward to discharge.     REVIEW OF SYSTEMS:    CONSTITUTIONAL: No weakness or chills. No fevers at this tie.   EYES/ENT: No visual changes;  No vertigo or throat pain   NECK: No pain or stiffness  RESPIRATORY: No cough, wheezing, hemoptysis; No shortness of breath  CARDIOVASCULAR: No chest pain or palpitations  GASTROINTESTINAL: No abdominal or epigastric pain. No nausea, vomiting, or hematemesis; No diarrhea or constipation. No melena or hematochezia.  GENITOURINARY: No dysuria, frequency or hematuria  NEUROLOGICAL: No numbness or weakness  SKIN: No itching, burning, rashes, or lesions   All other review of systems is negative unless indicated above.    GENERAL: NAD, well-developed  HEAD:  Atraumatic, Normocephalic  CHEST/LUNG: Clear to auscultation bilaterally; No wheeze  HEART: Regular rate and rhythm; No murmurs, rubs, or gallops  ABDOMEN: Soft, Nontender, Nondistended; hernia   EXTREMITIES:  2+ Peripheral Pulses, No clubbing, cyanosis, or edema  NEUROLOGY: non-focal  SKIN: No rashes or lesions    PHYSICAL EXAM:  Vital Signs Last 24 Hrs  T(C): 36.7 (16 Jan 2022 09:49), Max: 36.7 (16 Jan 2022 09:49)  T(F): 98 (16 Jan 2022 09:49), Max: 98 (16 Jan 2022 09:49)  HR: 54 (16 Jan 2022 09:49) (50 - 54)  BP: 162/73 (16 Jan 2022 09:49) (133/70 - 162/73)  RR: 18 (16 Jan 2022 09:49) (18 - 18)  SpO2: 100% (16 Jan 2022 09:49) (100% - 100%)      MEDICATIONS:  MEDICATIONS  (STANDING):  aspirin enteric coated 81 milliGRAM(s) Oral daily  atorvastatin 20 milliGRAM(s) Oral at bedtime  cholecalciferol 2000 Unit(s) Oral daily  diphenhydrAMINE 25 milliGRAM(s) Oral at bedtime  dronabinol 2.5 milliGRAM(s) Oral daily  losartan 50 milliGRAM(s) Oral daily  magnesium oxide 400 milliGRAM(s) Oral two times a day with meals  melatonin 10 milliGRAM(s) Oral at bedtime  metoprolol succinate ER 25 milliGRAM(s) Oral daily  pancrelipase (ZENPEP 20,000 Lipase Units) 6 Capsule(s) Oral three times a day with meals  pantoprazole    Tablet 40 milliGRAM(s) Oral before breakfast  piperacillin/tazobactam IVPB.. 3.375 Gram(s) IV Intermittent every 8 hours      LABS: All Labs Reviewed:                        8.6    3.68  )-----------( 65       ( 16 Jan 2022 06:09 )             26.3                 Blood Culture: 01-15 @ 07:09  Organism --  Gram Stain Blood -- Gram Stain --  Specimen Source .Blood None  Culture-Blood --    01-13 @ 17:49  Organism --  Gram Stain Blood -- Gram Stain --  Specimen Source Clean Catch None  Culture-Blood --    01-13 @ 16:11  Organism Blood Culture PCR  Gram Stain Blood -- Gram Stain   Growth in anaerobic bottle: Gram Negative Rods  Specimen Source .Blood None  Culture-Blood --

## 2022-01-16 NOTE — DISCHARGE NOTE PROVIDER - HOSPITAL COURSE
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, 5/18 LN involved [id number] and received 12 cycles of folfirinox for adjuvant therapy completed 4/27/21, then with metastatic recurrence involving peritoneum at time of ventral hernia repair, and started on gemcitabine, nab-paclitaxel weekly started 7/13/21, last dose 1/4/22 presented for fever 104*F at home. Infectious disease was consulted and patient was seen by Dr. Correia. Blood cultures grew E. Coli and patient was diagnosed with sepsis. An Abdominal u/s was performed to r/o any intra-abdominal sources of infection. Abdominal workup was negative for any bacterial nidus. Patient was started on IV Zosyn 3.375 gm q8hrs and received 3 days of Abx therapy. Patient will be discharged on PO Ceftin 50 BID and continue for 11 days to complete a total 2 weeks of antibiotics. Patient was also found to have thrombocytopenia with a Plt count of 47. Patient was seen by Dr. Walker and Dr. Castro who indicated there was no need for platelet transfusion. Per Dr. Walker patient will follow up after discharge for scheduling of next chemotherapy cycle.      Subjective: At this time patient is afebrile and in no distress. He is medically optimized and cleared for discharge. He states he in no distress and has no acute complaints at this time.   REVIEW OF SYSTEMS:    CONSTITUTIONAL: No weakness or chills. No fevers at this tie.   EYES/ENT: No visual changes;  No vertigo or throat pain   NECK: No pain or stiffness  RESPIRATORY: No cough, wheezing, hemoptysis; No shortness of breath  CARDIOVASCULAR: No chest pain or palpitations  GASTROINTESTINAL: No abdominal or epigastric pain. No nausea, vomiting, or hematemesis; No diarrhea or constipation. No melena or hematochezia.  GENITOURINARY: No dysuria, frequency or hematuria  NEUROLOGICAL: No numbness or weakness  SKIN: No itching, burning, rashes, or lesions   All other review of systems is negative unless indicated above.    GENERAL: NAD, well-developed  HEAD:  Atraumatic, Normocephalic  CHEST/LUNG: Clear to auscultation bilaterally; No wheeze  HEART: Regular rate and rhythm; No murmurs, rubs, or gallops  ABDOMEN: Soft, Nontender, Nondistended; hernia   EXTREMITIES:  2+ Peripheral Pulses, No clubbing, cyanosis, or edema  NEUROLOGY: non-focal  SKIN: No rashes or lesions    PHYSICAL EXAM:  Vital Signs Last 24 Hrs  T(C): 36.4 (16 Jan 2022 15:15), Max: 36.7 (16 Jan 2022 09:49)  T(F): 97.5 (16 Jan 2022 15:15), Max: 98 (16 Jan 2022 09:49)  HR: 57 (16 Jan 2022 15:15) (50 - 57)  BP: 139/75 (16 Jan 2022 15:15) (133/70 - 162/73)  RR: 16 (16 Jan 2022 15:15) (16 - 18)  SpO2: 100% (16 Jan 2022 15:15) (100% - 100%)        LABS: All Labs Reviewed:                        8.6    3.68  )-----------( 65       ( 16 Jan 2022 06:09 )             26.3       Culture - Blood (01.13.22 @ 16:11)   - Escherichia coli: Detec   Gram Stain:   Growth in anaerobic bottle: Gram Negative Rods   - Amikacin: S <=16   - Ampicillin: S <=8 These ampicillin results predict results for amoxicillin   - Ampicillin/Sulbactam: S <=4/2 Enterobacter, Klebsiella aerogenes, Citrobacter, and Serratia may develop resistance during prolonged therapy (3-4 days)   - Aztreonam: S <=4   - Cefazolin: S <=2 Enterobacter, Klebsiella aerogenes, Citrobacter, and Serratia may develop resistance during prolonged therapy (3-4 days)   - Cefepime: S <=2   - Cefoxitin: S <=8   - Ceftriaxone: S <=1 Enterobacter, Klebsiella aerogenes, Citrobacter, and Serratia may develop resistance during prolonged therapy   - Ciprofloxacin: S <=0.25   - Ertapenem: S <=0.5   - Gentamicin: S <=2   - Imipenem: S <=1   - Levofloxacin: S <=0.5   - Meropenem: S <=1   - Piperacillin/Tazobactam: S <=8   - Tobramycin: S <=2   - Trimethoprim/Sulfamethoxazole: S <=0.5/9.5   Specimen Source: .Blood None   Organism: Blood Culture PCR   Organism: Escherichia coli   Culture Results:   Growth in anaerobic bottle: Escherichia coli   ***Blood Panel PCR results on this specimen are available   approximately 3 hours after the Gram stain result.***   Gram stain, PCR, and/or culture results may not always   correspond due to difference in methodologies.   ************************************************************   This PCR assay was performed by multiplex PCR. This   Assay tests for 66 bacterial and resistance gene targets.   Please refer to the [facility] test directory   at [email or web address] for details.   Organism Identification: Blood Culture PCR   Escherichia coli   Method Type: PCR   Method Type: MARTHA       Historical Values  Culture - Blood (01.13.22 @ 16:11)   Specimen Source: .Blood None   Culture Results:   No growth to date.   Culture - Blood (01.13.22 @ 16:11)        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, 5/18 LN involved [id number] and received 12 cycles of folfirinox for adjuvant therapy completed 4/27/21, then with metastatic recurrence involving peritoneum at time of ventral hernia repair, and started on gemcitabine, nab-paclitaxel weekly started 7/13/21, last dose 1/4/22 presented for fever 104*F at home. Infectious disease was consulted and patient was seen by Dr. Correia. Blood cultures grew E. Coli and patient was diagnosed with sepsis. An Abdominal u/s was performed to r/o any intra-abdominal sources of infection. Abdominal workup was negative for any bacterial nidus. Patient was started on IV Zosyn 3.375 gm q8hrs and received 3 days of Abx therapy. Patient will be discharged on PO Ceftin 50 BID and continue for 11 days to complete a total 2 weeks of antibiotics. Patient was also found to have thrombocytopenia with a Plt count of 47. Patient was seen by Dr. Walker and Dr. Castro who indicated there was no need for platelet transfusion. Per Dr. Walker patient will follow up after discharge for scheduling of next chemotherapy cycle.  Abd US showing mild R hydronephrosis and mild splenomegaly to f/u with PCP.    Subjective: At this time patient is afebrile and in no distress. He is medically optimized and cleared for discharge. He states he in no distress and has no acute complaints at this time.   REVIEW OF SYSTEMS:    CONSTITUTIONAL: No weakness or chills. No fevers at this tie.   EYES/ENT: No visual changes;  No vertigo or throat pain   NECK: No pain or stiffness  RESPIRATORY: No cough, wheezing, hemoptysis; No shortness of breath  CARDIOVASCULAR: No chest pain or palpitations  GASTROINTESTINAL: No abdominal or epigastric pain. No nausea, vomiting, or hematemesis; No diarrhea or constipation. No melena or hematochezia.  GENITOURINARY: No dysuria, frequency or hematuria  NEUROLOGICAL: No numbness or weakness  SKIN: No itching, burning, rashes, or lesions   All other review of systems is negative unless indicated above.    GENERAL: NAD, well-developed  HEAD:  Atraumatic, Normocephalic  CHEST/LUNG: Clear to auscultation bilaterally; No wheeze  HEART: Regular rate and rhythm; No murmurs, rubs, or gallops  ABDOMEN: Soft, Nontender, Nondistended; hernia   EXTREMITIES:  2+ Peripheral Pulses, No clubbing, cyanosis, or edema  NEUROLOGY: non-focal  SKIN: No rashes or lesions    PHYSICAL EXAM:  Vital Signs Last 24 Hrs  T(C): 36.4 (16 Jan 2022 15:15), Max: 36.7 (16 Jan 2022 09:49)  T(F): 97.5 (16 Jan 2022 15:15), Max: 98 (16 Jan 2022 09:49)  HR: 57 (16 Jan 2022 15:15) (50 - 57)  BP: 139/75 (16 Jan 2022 15:15) (133/70 - 162/73)  RR: 16 (16 Jan 2022 15:15) (16 - 18)  SpO2: 100% (16 Jan 2022 15:15) (100% - 100%)    LABS: All Labs Reviewed:                        8.6    3.68  )-----------( 65       ( 16 Jan 2022 06:09 )             26.3       Culture - Blood (01.13.22 @ 16:11)   - Escherichia coli: Detec   Gram Stain:   Growth in anaerobic bottle: Gram Negative Rods   - Amikacin: S <=16   - Ampicillin: S <=8 These ampicillin results predict results for amoxicillin   - Ampicillin/Sulbactam: S <=4/2 Enterobacter, Klebsiella aerogenes, Citrobacter, and Serratia may develop resistance during prolonged therapy (3-4 days)   - Aztreonam: S <=4   - Cefazolin: S <=2 Enterobacter, Klebsiella aerogenes, Citrobacter, and Serratia may develop resistance during prolonged therapy (3-4 days)   - Cefepime: S <=2   - Cefoxitin: S <=8   - Ceftriaxone: S <=1 Enterobacter, Klebsiella aerogenes, Citrobacter, and Serratia may develop resistance during prolonged therapy   - Ciprofloxacin: S <=0.25   - Ertapenem: S <=0.5   - Gentamicin: S <=2   - Imipenem: S <=1   - Levofloxacin: S <=0.5   - Meropenem: S <=1   - Piperacillin/Tazobactam: S <=8   - Tobramycin: S <=2   - Trimethoprim/Sulfamethoxazole: S <=0.5/9.5   Specimen Source: .Blood None   Organism: Blood Culture PCR   Organism: Escherichia coli   Culture Results:   Growth in anaerobic bottle: Escherichia coli   ***Blood Panel PCR results on this specimen are available   approximately 3 hours after the Gram stain result.***   Gram stain, PCR, and/or culture results may not always   correspond due to difference in methodologies.   ************************************************************   This PCR assay was performed by multiplex PCR. This   Assay tests for 66 bacterial and resistance gene targets.   Please refer to the [facility] test directory   at [email or web address] for details.   Organism Identification: Blood Culture PCR   Escherichia coli   Method Type: PCR   Method Type: MARTHA       Historical Values  Culture - Blood (01.13.22 @ 16:11)   Specimen Source: .Blood None   Culture Results:   No growth to date.   Culture - Blood (01.13.22 @ 16:11)

## 2022-01-16 NOTE — DISCHARGE NOTE PROVIDER - CARE PROVIDER_API CALL
Didier Walker)  Medicine  [address], [place] [address]  Phone: [phone]  Fax: [phone]  Established Patient  Follow Up Time: 1-3 days

## 2022-01-16 NOTE — DISCHARGE NOTE NURSING/CASE MANAGEMENT/SOCIAL WORK - NSDCPEFALRISK_GEN_ALL_CORE
For information on Fall & Injury Prevention, visit: [email or web address] OR  [email or web address] OR  https://www.cdc.gov/steadi/patient.html

## 2022-01-16 NOTE — PROGRESS NOTE ADULT - ASSESSMENT
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   - CXR negative      Wally Castro MD   Hematology/ Oncology   [facility]   [facility]   C: [phone]  [address] [place] [address]  P:[phone]  F:[phone]  and [phone]  [address] [place], [place]   P:[phone]  F:[phone]
71 year old patient with h/o Pancreatic ampullary carcinoma, Brain aneurysm, CAD, GERD who was admitted for fever and found to have E. Coli sepsis.     #fever  - neutropenic and thrombocytopenic   - fevers of 104F at home per pt.   - BCx+ for Gram Negative E. Coli  - Infectious disease consult with Dr. Correia: c/w Zosyn 3.375 gm IV q8hrs   - daily CBC  - CXR unremarkable     #metastatic ampullary cancer   - last dose of gem/abraxane was 1/4/22  - wbc 4.39, Hb 8.4, plt 47k likely 2/2 chemotherapy   - Hem/Onc consult with Dr. Walker and Dr. Castro: no indication for Platelet transfusion at this point.   - Dr. Didier Walker cell: [phone]    Weekends and nights please call [phone] for MD on call    [facility]    Hematology/Oncology   
71 year old patient with h/o Pancreatic ampullary carcinoma, Brain aneurysm, CAD, GERD who was admitted for fever and found to have E. Coli sepsis.     #fever  - neutropenic and thrombocytopenic   - fevers of 104F at home per pt.   - BCx+ for Gram Negative E. Coli.  - Infectious disease consult with Dr. Correia: c/w Zosyn 3.375 gm IV q8hrs   - Spoke with Dr. Oriana SHEARER regarding discharge; patient can safely be discharged on a 2 week course of Ceftin  BID as long as GI etiology is ruled out. Discharge pending Abd. U/S  - daily CBC  - CXR unremarkable     #metastatic ampullary cancer   - last dose of gem/abraxane was 1/4/22  - wbc 4.39, Hb 8.4, plt 47k likely 2/2 chemotherapy   - Hem/Onc consult with Dr. Walker and Dr. Castro: no indication for Platelet transfusion at this point.   - Dr. Didier Walker cell: [phone]    Weekends and nights please call [phone] for MD on call    [facility]    Hematology/Oncology       d/w Dr. Thomas
71 year old patient with h/o Pancreatic ampullary carcinoma, Brain aneurysm, CAD, GERD who was admitted for fever and found to have E. Coli sepsis.     #fever  - neutropenic and thrombocytopenic   - fevers of 104F at home per pt.   - BCx+ for Gram Negative E. Coli. Awaiting sensitivities  - Infectious disease consult with Dr. Correia: c/w Zosyn 3.375 gm IV q8hrs   - repeat blood cultures drawn today   - daily CBC  - CXR unremarkable     #metastatic ampullary cancer   - last dose of gem/abraxane was 1/4/22  - wbc 4.39, Hb 8.4, plt 47k likely 2/2 chemotherapy   - Hem/Onc consult with Dr. Walker and Dr. Castro: no indication for Platelet transfusion at this point.   - Dr. Didier Walker cell: [phone]    Weekends and nights please call [phone] for MD on call    [facility]    Hematology/Oncology       d/w Dr. Thomas  
72 yo Male PMHx metastatic ampullary cancer, pancreatobiliary type s/p whipple resection 9/15/20, 5/18 LN involved [id number] and received 12 cycles of mFOLFIRINOX for adjuvant therapy completed 4/27/21, then with metastatic recurrence involving periotoneum at time of ventral hernia repair, and started on gemcitabine, nab-paclitaxel weekly started 7/13/21, last dose 1/4/22 presents for fever 104*F at home, found to be bacteremic with ECOLI     # metastatic ampullary cancer  - ampullary adenoca pancreatobiliary type s/p resection now with recurrent peritoneal disease   - last dose of gem/abraxane was 1/4/22    # bacteremia- ECOLI   - c/w zosyn D2   - ID Following   - sensitivities suggestive of sensitive to CEFTIN   - ok for discharge on 2 weeks of CEFTIN  - CXR negative      DC home    Wally Castro MD   Hematology/ Oncology   [facility]   [facility]   C: [phone]  [address] [place], [place]  P:[phone]  F:[phone]  and [phone]  [address] [place], [place]   P:[phone]  F:[phone]

## 2022-01-18 ENCOUNTER — APPOINTMENT (OUTPATIENT)
Dept: DISASTER EMERGENCY | Facility: HOSPITAL | Age: 72
End: 2022-01-18

## 2022-01-18 ENCOUNTER — TRANSCRIPTION ENCOUNTER (OUTPATIENT)
Age: 72
End: 2022-01-18

## 2022-01-18 ENCOUNTER — OUTPATIENT (OUTPATIENT)
Dept: OUTPATIENT SERVICES | Facility: HOSPITAL | Age: 72
LOS: 1 days | End: 2022-01-18
Payer: MEDICARE

## 2022-01-18 VITALS
DIASTOLIC BLOOD PRESSURE: 77 MMHG | HEIGHT: 67 IN | HEART RATE: 67 BPM | TEMPERATURE: 97 F | WEIGHT: 190.04 LBS | RESPIRATION RATE: 18 BRPM | SYSTOLIC BLOOD PRESSURE: 168 MMHG | OXYGEN SATURATION: 99 %

## 2022-01-18 VITALS
TEMPERATURE: 98 F | HEART RATE: 62 BPM | SYSTOLIC BLOOD PRESSURE: 159 MMHG | DIASTOLIC BLOOD PRESSURE: 81 MMHG | OXYGEN SATURATION: 100 % | RESPIRATION RATE: 18 BRPM

## 2022-01-18 DIAGNOSIS — Z98.890 OTHER SPECIFIED POSTPROCEDURAL STATES: Chronic | ICD-10-CM

## 2022-01-18 DIAGNOSIS — Z90.89 ACQUIRED ABSENCE OF OTHER ORGANS: Chronic | ICD-10-CM

## 2022-01-18 DIAGNOSIS — K86.9 DISEASE OF PANCREAS, UNSPECIFIED: Chronic | ICD-10-CM

## 2022-01-18 DIAGNOSIS — U07.1 COVID-19: ICD-10-CM

## 2022-01-18 DIAGNOSIS — Z98.61 CORONARY ANGIOPLASTY STATUS: Chronic | ICD-10-CM

## 2022-01-18 LAB
CULTURE RESULTS: SIGNIFICANT CHANGE UP
SPECIMEN SOURCE: SIGNIFICANT CHANGE UP

## 2022-01-18 PROCEDURE — M0247: CPT

## 2022-01-18 RX ORDER — SODIUM CHLORIDE 9 MG/ML
250 INJECTION INTRAMUSCULAR; INTRAVENOUS; SUBCUTANEOUS
Refills: 0 | Status: COMPLETED | OUTPATIENT
Start: 2022-01-18 | End: 2022-01-18

## 2022-01-18 RX ORDER — SOTROVIMAB 62.5 MG/ML
500 INJECTION, SOLUTION, CONCENTRATE INTRAVENOUS ONCE
Refills: 0 | Status: COMPLETED | OUTPATIENT
Start: 2022-01-18 | End: 2022-01-18

## 2022-01-18 RX ADMIN — SODIUM CHLORIDE 100 MILLILITER(S): 9 INJECTION INTRAMUSCULAR; INTRAVENOUS; SUBCUTANEOUS at 15:39

## 2022-01-18 RX ADMIN — SOTROVIMAB 116 MILLIGRAM(S): 62.5 INJECTION, SOLUTION, CONCENTRATE INTRAVENOUS at 15:00

## 2022-01-18 NOTE — CHART NOTE - NSCHARTNOTEFT_GEN_A_CORE
CC: Monoclonal Antibody Infusion/COVID 19 Positive n 1/18/22      History: Patient presents for infusion of monoclonal antibody infusion. Patient has been screened and was deemed to be a candidate.    Symptoms/ Criteria: Fever, malaise, body aches, HA, loss of taste/ smell, GI Symptoms    Risk Profile includes:Age > 65, HTN, CAD requiring stents, Brain Aneurysm, CA-Ampulla of Vater receiving chemotherapy    sodium chloride 0.9%. 250 milliLiter(s) IV Continuous <Continuous>  sotrovimab (EUA) IVPB 500 milliGRAM(s) IV Intermittent once      PMHx:  Infection due to severe acute respiratory syndrome coronavirus 2 (SARS-CoV-2)    Family history of breast cancer in sister    Hearing loss    Stented coronary artery    CAD (coronary artery disease)    HTN (hypertension)    Hyperlipidemia    GERD (gastroesophageal reflux disease)    Skin cancer    OA (osteoarthritis)    Psoriasis    Inguinal hernia    Brain aneurysm    Pancreatic cancer    Drug or chemical induced diabetes mellitus    Pancreatic cancer    H/O craniotomy    History of other surgery    History of other surgery    History of tonsillectomy    S/P colonoscopy    History of PTCA    H/O inguinal hernia repair    Pancreatic disorder          T(F): 97.4  HR: 59  BP: 168/74  RR: 20  SpO2: 97%      PE:   Appearance: NAD	  HEENT:   Normal oral mucosa.   Lymphatic: No lymphadenopathy  Cardiovascular: Normal S1 S2, No JVD, No murmurs, No edema  Respiratory: Lungs clear to auscultation	  Gastrointestinal:  Soft, Non-tender. No guarding   Skin: warm and dry  Neurologic: Non-focal  Extremities: Normal range of motion.     ASSESSMENT:  Pt is a 71y year old Male Covid +  referred to the infusion center for Monoclonal antibody infusion (Sotrovimab).  COVID Vaccination Status: Fully vaccinated and boosterd with Pfizer last dose 11/2021      PLAN:  - infusion procedure explained to patient   - Consent for monoclonal antibody infusion obtained   - Risk & benefits discussed/all questions answered  - infuse Sotrovimab over 30 minutes  - will observe patient for one hour post infusion  and then if stable discharge home with outpt follow up as planned by PMD.    POST INFUSION ASSESSMENT:   DISCHARGE at approximately  1  hour post infusion    - Patient tolerated infusion well denies complaints of chest pain/SOB/dizziness/ palps  - VSS for discharge home  - D/C instructions given/ fact sheet included.  - Patient to follow-up with PCP as needed.

## 2022-01-20 NOTE — ED PROVIDER NOTE - QRS
Emergency Department Encounter  Location: [address] [place] [address] EMERGENCY DEPARTMENT    Patient: Trinity Sy  MRN: [id number]  : 2003  Date of evaluation: 2022  ED Provider: Efrain Ashraf MD    0500:a.m. Trinity Sy was checked out to me by Annmarie Kaur. Please see his/her initial documentation for details of the patient's initial ED presentation, physical exam and completed studies. In brief, Trinity Sy is a 25 y.o. female that presented to the emergency department with nausea and vomiting in pregnancy worsening over the past 2 weeks.     I have reviewed and interpreted all of the currently available lab results and diagnostics from this visit:  Results for orders placed or performed during the hospital encounter of 22   CBC Auto Differential   Result Value Ref Range    WBC 12.2 (H) 4.0 - 10.5 K/CU MM    RBC 5.47 (H) 4.2 - 5.4 M/CU MM    Hemoglobin 16.3 (H) 12.5 - 16.0 GM/DL    Hematocrit 47.0 37 - 47 %    MCV 85.9 78 - 100 FL    MCH 29.8 27 - 31 PG    MCHC 34.7 32.0 - 36.0 %    RDW 12.8 11.7 - 14.9 %    Platelets 469 329 - 941 K/CU MM    MPV 12.2 (H) 7.5 - 11.1 FL    Differential Type AUTOMATED DIFFERENTIAL     Segs Relative 69.4 (H) 34 - 64 %    Lymphocytes % 20.9 (L) 25 - 45 %    Monocytes % 8.5 (H) 0 - 4 %    Eosinophils % 0.4 0 - 3 %    Basophils % 0.5 0 - 1 %    Segs Absolute 8.5 K/CU MM    Lymphocytes Absolute 2.6 K/CU MM    Monocytes Absolute 1.0 K/CU MM    Eosinophils Absolute 0.1 K/CU MM    Basophils Absolute 0.1 K/CU MM    Nucleated RBC % 0.0 %    Total Nucleated RBC 0.0 K/CU MM    Total Immature Neutrophil 0.04 K/CU MM    Immature Neutrophil % 0.3 0 - 0.43 %   Comprehensive Metabolic Panel w/ Reflex to MG   Result Value Ref Range    Sodium 137 135 - 145 MMOL/L    Potassium 3.9 3.5 - 5.1 MMOL/L    Chloride 99 99 - 110 mMol/L    CO2 16 (L) 21 - 32 MMOL/L    BUN 11 6 - 23 MG/DL    CREATININE 0.7 0.6 - 1.1 MG/DL    Glucose 91 70 - 99 MG/DL Calcium 10.4 8.3 - 10.6 MG/DL    Albumin 4.6 3.4 - 5.0 GM/DL    Total Protein 9.4 (H) 6.4 - 8.2 GM/DL    Total Bilirubin 0.7 0.0 - 1.0 MG/DL     (H) 10 - 40 U/L    AST 61 (H) 15 - 37 IU/L    Alkaline Phosphatase 52 40 - 128 IU/L    GFR Non-African American >60 >60 mL/min/1.73m2    GFR African American >60 >60 mL/min/1.73m2    Anion Gap 22 (H) 4 - 16     No results found. Final ED Course and MDM: In brief, Harjit Rich is a 25 y.o. female whose care was signed out to me by the outgoing provider. Patient's presentation consistent with hyperemesis gravidarum. She does have mild transaminitis likely secondary to this diagnosis. No other significant electrolyte abnormalities. Renal function is normal.  She does have a ketosis. She is given 2 L of lactated Ringer's here and a dose of Phenergan for symptoms. 0600:a.m.  I have signed out [id number] Emergency Department care to Dr. Alpesh Grady. We discussed the pertinent history, physical exam, completed/pending test results (if applicable) and current treatment plan. Please refer to his/her chart for the patients remaining Emergency Department course and final disposition. ED Medication Orders (From admission, onward)    Start Ordered     Status Ordering Provider    01/20/22 0530 01/20/22 0516  promethazine (PHENERGAN) injection 25 mg  AILYN Yusuf    01/20/22 0515 01/20/22 0510  lactated ringers bolus  ONCE         Acknowledged Angelica Lucia          Final Impression      1. Hyperemesis gravidarum with metabolic disturbance, antepartum    2.  Transaminitis        DISPOSITION       (Please note that portions of this note may have been completed with a voice recognition program. Efforts were made to edit the dictations but occasionally words are mis-transcribed.)    MD Ollie Castano MD  01/20/22 2872 Q waves III, aVF

## 2022-01-21 DIAGNOSIS — D69.6 THROMBOCYTOPENIA, UNSPECIFIED: ICD-10-CM

## 2022-01-21 DIAGNOSIS — I10 ESSENTIAL (PRIMARY) HYPERTENSION: ICD-10-CM

## 2022-01-21 DIAGNOSIS — C78.6 SECONDARY MALIGNANT NEOPLASM OF RETROPERITONEUM AND PERITONEUM: ICD-10-CM

## 2022-01-21 DIAGNOSIS — I67.1 CEREBRAL ANEURYSM, NONRUPTURED: ICD-10-CM

## 2022-01-21 DIAGNOSIS — I25.10 ATHEROSCLEROTIC HEART DISEASE OF NATIVE CORONARY ARTERY WITHOUT ANGINA PECTORIS: ICD-10-CM

## 2022-01-21 DIAGNOSIS — Z95.5 PRESENCE OF CORONARY ANGIOPLASTY IMPLANT AND GRAFT: ICD-10-CM

## 2022-01-21 DIAGNOSIS — Z85.828 PERSONAL HISTORY OF OTHER MALIGNANT NEOPLASM OF SKIN: ICD-10-CM

## 2022-01-21 DIAGNOSIS — N13.30 UNSPECIFIED HYDRONEPHROSIS: ICD-10-CM

## 2022-01-21 DIAGNOSIS — H91.91 UNSPECIFIED HEARING LOSS, RIGHT EAR: ICD-10-CM

## 2022-01-21 DIAGNOSIS — R16.1 SPLENOMEGALY, NOT ELSEWHERE CLASSIFIED: ICD-10-CM

## 2022-01-21 DIAGNOSIS — D84.9 IMMUNODEFICIENCY, UNSPECIFIED: ICD-10-CM

## 2022-01-21 DIAGNOSIS — E78.5 HYPERLIPIDEMIA, UNSPECIFIED: ICD-10-CM

## 2022-01-21 DIAGNOSIS — A41.51 SEPSIS DUE TO ESCHERICHIA COLI [E. COLI]: ICD-10-CM

## 2022-01-21 DIAGNOSIS — D70.9 NEUTROPENIA, UNSPECIFIED: ICD-10-CM

## 2022-01-21 DIAGNOSIS — K21.9 GASTRO-ESOPHAGEAL REFLUX DISEASE WITHOUT ESOPHAGITIS: ICD-10-CM

## 2022-01-21 DIAGNOSIS — C25.9 MALIGNANT NEOPLASM OF PANCREAS, UNSPECIFIED: ICD-10-CM

## 2022-01-21 DIAGNOSIS — Z20.822 CONTACT WITH AND (SUSPECTED) EXPOSURE TO COVID-19: ICD-10-CM

## 2022-02-16 ENCOUNTER — INPATIENT (INPATIENT)
Facility: HOSPITAL | Age: 72
LOS: 7 days | Discharge: HOME HEALTH | DRG: 862 | End: 2022-02-24
Attending: FAMILY MEDICINE | Admitting: INTERNAL MEDICINE
Payer: MEDICARE

## 2022-02-16 VITALS
OXYGEN SATURATION: 98 % | HEART RATE: 100 BPM | DIASTOLIC BLOOD PRESSURE: 65 MMHG | HEIGHT: 67 IN | TEMPERATURE: 99 F | RESPIRATION RATE: 18 BRPM | SYSTOLIC BLOOD PRESSURE: 123 MMHG | WEIGHT: 134.92 LBS

## 2022-02-16 DIAGNOSIS — Z98.890 OTHER SPECIFIED POSTPROCEDURAL STATES: Chronic | ICD-10-CM

## 2022-02-16 DIAGNOSIS — K56.609 UNSPECIFIED INTESTINAL OBSTRUCTION, UNSPECIFIED AS TO PARTIAL VERSUS COMPLETE OBSTRUCTION: ICD-10-CM

## 2022-02-16 DIAGNOSIS — Z90.89 ACQUIRED ABSENCE OF OTHER ORGANS: Chronic | ICD-10-CM

## 2022-02-16 DIAGNOSIS — K86.9 DISEASE OF PANCREAS, UNSPECIFIED: Chronic | ICD-10-CM

## 2022-02-16 DIAGNOSIS — Z98.61 CORONARY ANGIOPLASTY STATUS: Chronic | ICD-10-CM

## 2022-02-16 LAB
ALBUMIN SERPL ELPH-MCNC: 3 G/DL — LOW (ref 3.3–5)
ALP SERPL-CCNC: 850 U/L — HIGH (ref 40–120)
ALT FLD-CCNC: 31 U/L — SIGNIFICANT CHANGE UP (ref 12–78)
ANION GAP SERPL CALC-SCNC: 8 MMOL/L — SIGNIFICANT CHANGE UP (ref 5–17)
APPEARANCE UR: CLEAR — SIGNIFICANT CHANGE UP
APTT BLD: 30.7 SEC — SIGNIFICANT CHANGE UP (ref 27.5–35.5)
AST SERPL-CCNC: 51 U/L — HIGH (ref 15–37)
BASOPHILS # BLD AUTO: 0 K/UL — SIGNIFICANT CHANGE UP (ref 0–0.2)
BASOPHILS NFR BLD AUTO: 0 % — SIGNIFICANT CHANGE UP (ref 0–2)
BILIRUB SERPL-MCNC: 2.3 MG/DL — HIGH (ref 0.2–1.2)
BILIRUB UR-MCNC: NEGATIVE — SIGNIFICANT CHANGE UP
BUN SERPL-MCNC: 30 MG/DL — HIGH (ref 7–23)
CALCIUM SERPL-MCNC: 9.2 MG/DL — SIGNIFICANT CHANGE UP (ref 8.5–10.1)
CHLORIDE SERPL-SCNC: 108 MMOL/L — SIGNIFICANT CHANGE UP (ref 96–108)
CO2 SERPL-SCNC: 21 MMOL/L — LOW (ref 22–31)
COLOR SPEC: YELLOW — SIGNIFICANT CHANGE UP
CREAT SERPL-MCNC: 1.32 MG/DL — HIGH (ref 0.5–1.3)
DIFF PNL FLD: ABNORMAL
EOSINOPHIL # BLD AUTO: 0 K/UL — SIGNIFICANT CHANGE UP (ref 0–0.5)
EOSINOPHIL NFR BLD AUTO: 0 % — SIGNIFICANT CHANGE UP (ref 0–6)
GLUCOSE SERPL-MCNC: 111 MG/DL — HIGH (ref 70–99)
GLUCOSE UR QL: NEGATIVE — SIGNIFICANT CHANGE UP
HCT VFR BLD CALC: 28.8 % — LOW (ref 39–50)
HGB BLD-MCNC: 9.7 G/DL — LOW (ref 13–17)
INR BLD: 1.23 RATIO — HIGH (ref 0.88–1.16)
KETONES UR-MCNC: NEGATIVE — SIGNIFICANT CHANGE UP
LACTATE SERPL-SCNC: 1.1 MMOL/L — SIGNIFICANT CHANGE UP (ref 0.7–2)
LEUKOCYTE ESTERASE UR-ACNC: ABNORMAL
LYMPHOCYTES # BLD AUTO: 0.48 K/UL — LOW (ref 1–3.3)
LYMPHOCYTES # BLD AUTO: 4 % — LOW (ref 13–44)
MCHC RBC-ENTMCNC: 29.5 PG — SIGNIFICANT CHANGE UP (ref 27–34)
MCHC RBC-ENTMCNC: 33.7 GM/DL — SIGNIFICANT CHANGE UP (ref 32–36)
MCV RBC AUTO: 87.5 FL — SIGNIFICANT CHANGE UP (ref 80–100)
MONOCYTES # BLD AUTO: 0.48 K/UL — SIGNIFICANT CHANGE UP (ref 0–0.9)
MONOCYTES NFR BLD AUTO: 4 % — SIGNIFICANT CHANGE UP (ref 2–14)
NEUTROPHILS # BLD AUTO: 10.99 K/UL — HIGH (ref 1.8–7.4)
NEUTROPHILS NFR BLD AUTO: 92 % — HIGH (ref 43–77)
NITRITE UR-MCNC: NEGATIVE — SIGNIFICANT CHANGE UP
NRBC # BLD: SIGNIFICANT CHANGE UP /100 WBCS (ref 0–0)
PH UR: 5 — SIGNIFICANT CHANGE UP (ref 5–8)
PLATELET # BLD AUTO: 224 K/UL — SIGNIFICANT CHANGE UP (ref 150–400)
POTASSIUM SERPL-MCNC: 3.6 MMOL/L — SIGNIFICANT CHANGE UP (ref 3.5–5.3)
POTASSIUM SERPL-SCNC: 3.6 MMOL/L — SIGNIFICANT CHANGE UP (ref 3.5–5.3)
PROT SERPL-MCNC: 7.4 GM/DL — SIGNIFICANT CHANGE UP (ref 6–8.3)
PROT UR-MCNC: NEGATIVE — SIGNIFICANT CHANGE UP
PROTHROM AB SERPL-ACNC: 14.1 SEC — HIGH (ref 10.6–13.6)
RAPID RVP RESULT: SIGNIFICANT CHANGE UP
RBC # BLD: 3.29 M/UL — LOW (ref 4.2–5.8)
RBC # FLD: 13.8 % — SIGNIFICANT CHANGE UP (ref 10.3–14.5)
SARS-COV-2 RNA SPEC QL NAA+PROBE: SIGNIFICANT CHANGE UP
SODIUM SERPL-SCNC: 137 MMOL/L — SIGNIFICANT CHANGE UP (ref 135–145)
SP GR SPEC: 1.01 — SIGNIFICANT CHANGE UP (ref 1.01–1.02)
UROBILINOGEN FLD QL: NEGATIVE — SIGNIFICANT CHANGE UP
WBC # BLD: 11.95 K/UL — HIGH (ref 3.8–10.5)
WBC # FLD AUTO: 11.95 K/UL — HIGH (ref 3.8–10.5)

## 2022-02-16 PROCEDURE — 99285 EMERGENCY DEPT VISIT HI MDM: CPT

## 2022-02-16 PROCEDURE — 71045 X-RAY EXAM CHEST 1 VIEW: CPT | Mod: 26

## 2022-02-16 PROCEDURE — 97161 PT EVAL LOW COMPLEX 20 MIN: CPT | Mod: GP

## 2022-02-16 PROCEDURE — 99221 1ST HOSP IP/OBS SF/LOW 40: CPT

## 2022-02-16 PROCEDURE — 80048 BASIC METABOLIC PNL TOTAL CA: CPT

## 2022-02-16 PROCEDURE — 85730 THROMBOPLASTIN TIME PARTIAL: CPT

## 2022-02-16 PROCEDURE — C1887: CPT

## 2022-02-16 PROCEDURE — 83036 HEMOGLOBIN GLYCOSYLATED A1C: CPT

## 2022-02-16 PROCEDURE — 82550 ASSAY OF CK (CPK): CPT

## 2022-02-16 PROCEDURE — 87635 SARS-COV-2 COVID-19 AMP PRB: CPT

## 2022-02-16 PROCEDURE — 87040 BLOOD CULTURE FOR BACTERIA: CPT

## 2022-02-16 PROCEDURE — 82607 VITAMIN B-12: CPT

## 2022-02-16 PROCEDURE — 82728 ASSAY OF FERRITIN: CPT

## 2022-02-16 PROCEDURE — 83880 ASSAY OF NATRIURETIC PEPTIDE: CPT

## 2022-02-16 PROCEDURE — C1729: CPT

## 2022-02-16 PROCEDURE — 83735 ASSAY OF MAGNESIUM: CPT

## 2022-02-16 PROCEDURE — C1769: CPT

## 2022-02-16 PROCEDURE — 80053 COMPREHEN METABOLIC PANEL: CPT

## 2022-02-16 PROCEDURE — 83550 IRON BINDING TEST: CPT

## 2022-02-16 PROCEDURE — G1004: CPT

## 2022-02-16 PROCEDURE — 36415 COLL VENOUS BLD VENIPUNCTURE: CPT

## 2022-02-16 PROCEDURE — C1894: CPT

## 2022-02-16 PROCEDURE — C1726: CPT

## 2022-02-16 PROCEDURE — 97530 THERAPEUTIC ACTIVITIES: CPT | Mod: GP

## 2022-02-16 PROCEDURE — 93005 ELECTROCARDIOGRAM TRACING: CPT

## 2022-02-16 PROCEDURE — 74177 CT ABD & PELVIS W/CONTRAST: CPT | Mod: 26,MG

## 2022-02-16 PROCEDURE — 83010 ASSAY OF HAPTOGLOBIN QUANT: CPT

## 2022-02-16 PROCEDURE — 76705 ECHO EXAM OF ABDOMEN: CPT | Mod: 26

## 2022-02-16 PROCEDURE — 82746 ASSAY OF FOLIC ACID SERUM: CPT

## 2022-02-16 PROCEDURE — 84100 ASSAY OF PHOSPHORUS: CPT

## 2022-02-16 PROCEDURE — 93010 ELECTROCARDIOGRAM REPORT: CPT

## 2022-02-16 PROCEDURE — 97116 GAIT TRAINING THERAPY: CPT | Mod: GP

## 2022-02-16 PROCEDURE — 85025 COMPLETE CBC W/AUTO DIFF WBC: CPT

## 2022-02-16 PROCEDURE — 80061 LIPID PANEL: CPT

## 2022-02-16 PROCEDURE — 85610 PROTHROMBIN TIME: CPT

## 2022-02-16 PROCEDURE — 83540 ASSAY OF IRON: CPT

## 2022-02-16 PROCEDURE — 84484 ASSAY OF TROPONIN QUANT: CPT

## 2022-02-16 PROCEDURE — 99223 1ST HOSP IP/OBS HIGH 75: CPT

## 2022-02-16 PROCEDURE — 85027 COMPLETE CBC AUTOMATED: CPT

## 2022-02-16 PROCEDURE — 84134 ASSAY OF PREALBUMIN: CPT

## 2022-02-16 PROCEDURE — 47536 EXCHANGE BILIARY DRG CATH: CPT

## 2022-02-16 PROCEDURE — 50432 PLMT NEPHROSTOMY CATHETER: CPT | Mod: RT

## 2022-02-16 RX ORDER — ROSUVASTATIN CALCIUM 5 MG/1
1 TABLET ORAL
Qty: 0 | Refills: 0 | DISCHARGE

## 2022-02-16 RX ORDER — ASPIRIN/CALCIUM CARB/MAGNESIUM 324 MG
1 TABLET ORAL
Qty: 0 | Refills: 0 | DISCHARGE

## 2022-02-16 RX ORDER — CHOLECALCIFEROL (VITAMIN D3) 125 MCG
2000 CAPSULE ORAL DAILY
Refills: 0 | Status: DISCONTINUED | OUTPATIENT
Start: 2022-02-16 | End: 2022-02-24

## 2022-02-16 RX ORDER — OMEPRAZOLE 10 MG/1
1 CAPSULE, DELAYED RELEASE ORAL
Qty: 0 | Refills: 0 | DISCHARGE

## 2022-02-16 RX ORDER — PIPERACILLIN AND TAZOBACTAM 4; .5 G/20ML; G/20ML
3.38 INJECTION, POWDER, LYOPHILIZED, FOR SOLUTION INTRAVENOUS ONCE
Refills: 0 | Status: COMPLETED | OUTPATIENT
Start: 2022-02-16 | End: 2022-02-16

## 2022-02-16 RX ORDER — LIPASE/PROTEASE/AMYLASE 16-48-48K
4 CAPSULE,DELAYED RELEASE (ENTERIC COATED) ORAL
Refills: 0 | Status: DISCONTINUED | OUTPATIENT
Start: 2022-02-16 | End: 2022-02-24

## 2022-02-16 RX ORDER — ATORVASTATIN CALCIUM 80 MG/1
20 TABLET, FILM COATED ORAL AT BEDTIME
Refills: 0 | Status: DISCONTINUED | OUTPATIENT
Start: 2022-02-16 | End: 2022-02-24

## 2022-02-16 RX ORDER — LANOLIN ALCOHOL/MO/W.PET/CERES
10 CREAM (GRAM) TOPICAL AT BEDTIME
Refills: 0 | Status: DISCONTINUED | OUTPATIENT
Start: 2022-02-16 | End: 2022-02-23

## 2022-02-16 RX ORDER — PANTOPRAZOLE SODIUM 20 MG/1
40 TABLET, DELAYED RELEASE ORAL DAILY
Refills: 0 | Status: DISCONTINUED | OUTPATIENT
Start: 2022-02-16 | End: 2022-02-24

## 2022-02-16 RX ORDER — METOPROLOL TARTRATE 50 MG
1 TABLET ORAL
Qty: 0 | Refills: 0 | DISCHARGE

## 2022-02-16 RX ORDER — METOPROLOL TARTRATE 50 MG
12.5 TABLET ORAL AT BEDTIME
Refills: 0 | Status: DISCONTINUED | OUTPATIENT
Start: 2022-02-16 | End: 2022-02-24

## 2022-02-16 RX ORDER — HYDROMORPHONE HYDROCHLORIDE 2 MG/ML
1 INJECTION INTRAMUSCULAR; INTRAVENOUS; SUBCUTANEOUS EVERY 4 HOURS
Refills: 0 | Status: DISCONTINUED | OUTPATIENT
Start: 2022-02-16 | End: 2022-02-16

## 2022-02-16 RX ORDER — ACETAMINOPHEN 500 MG
2 TABLET ORAL
Qty: 0 | Refills: 0 | DISCHARGE

## 2022-02-16 RX ORDER — LANOLIN ALCOHOL/MO/W.PET/CERES
1 CREAM (GRAM) TOPICAL
Qty: 0 | Refills: 0 | DISCHARGE

## 2022-02-16 RX ORDER — HYDRALAZINE HCL 50 MG
10 TABLET ORAL EVERY 4 HOURS
Refills: 0 | Status: DISCONTINUED | OUTPATIENT
Start: 2022-02-16 | End: 2022-02-23

## 2022-02-16 RX ORDER — GABAPENTIN 400 MG/1
100 CAPSULE ORAL EVERY 8 HOURS
Refills: 0 | Status: DISCONTINUED | OUTPATIENT
Start: 2022-02-16 | End: 2022-02-24

## 2022-02-16 RX ORDER — HYDROMORPHONE HYDROCHLORIDE 2 MG/ML
0.5 INJECTION INTRAMUSCULAR; INTRAVENOUS; SUBCUTANEOUS EVERY 6 HOURS
Refills: 0 | Status: DISCONTINUED | OUTPATIENT
Start: 2022-02-16 | End: 2022-02-17

## 2022-02-16 RX ORDER — ONDANSETRON 8 MG/1
4 TABLET, FILM COATED ORAL ONCE
Refills: 0 | Status: COMPLETED | OUTPATIENT
Start: 2022-02-16 | End: 2022-02-16

## 2022-02-16 RX ORDER — SODIUM CHLORIDE 9 MG/ML
1000 INJECTION INTRAMUSCULAR; INTRAVENOUS; SUBCUTANEOUS
Refills: 0 | Status: DISCONTINUED | OUTPATIENT
Start: 2022-02-16 | End: 2022-02-17

## 2022-02-16 RX ORDER — HYDROMORPHONE HYDROCHLORIDE 2 MG/ML
1 INJECTION INTRAMUSCULAR; INTRAVENOUS; SUBCUTANEOUS ONCE
Refills: 0 | Status: DISCONTINUED | OUTPATIENT
Start: 2022-02-16 | End: 2022-02-16

## 2022-02-16 RX ORDER — HYDROMORPHONE HYDROCHLORIDE 2 MG/ML
1 INJECTION INTRAMUSCULAR; INTRAVENOUS; SUBCUTANEOUS
Refills: 0 | Status: DISCONTINUED | OUTPATIENT
Start: 2022-02-16 | End: 2022-02-17

## 2022-02-16 RX ORDER — DIPHENHYDRAMINE HCL 50 MG
1 CAPSULE ORAL
Qty: 0 | Refills: 0 | DISCHARGE

## 2022-02-16 RX ORDER — GABAPENTIN 400 MG/1
1 CAPSULE ORAL
Qty: 0 | Refills: 0 | DISCHARGE

## 2022-02-16 RX ORDER — LANOLIN ALCOHOL/MO/W.PET/CERES
3 CREAM (GRAM) TOPICAL AT BEDTIME
Refills: 0 | Status: DISCONTINUED | OUTPATIENT
Start: 2022-02-16 | End: 2022-02-16

## 2022-02-16 RX ORDER — MAGNESIUM OXIDE 400 MG ORAL TABLET 241.3 MG
1 TABLET ORAL
Qty: 0 | Refills: 0 | DISCHARGE

## 2022-02-16 RX ORDER — CHOLECALCIFEROL (VITAMIN D3) 125 MCG
1 CAPSULE ORAL
Qty: 0 | Refills: 0 | DISCHARGE

## 2022-02-16 RX ORDER — METOPROLOL TARTRATE 50 MG
0.5 TABLET ORAL
Qty: 0 | Refills: 0 | DISCHARGE

## 2022-02-16 RX ORDER — SODIUM CHLORIDE 9 MG/ML
1900 INJECTION INTRAMUSCULAR; INTRAVENOUS; SUBCUTANEOUS ONCE
Refills: 0 | Status: COMPLETED | OUTPATIENT
Start: 2022-02-16 | End: 2022-02-16

## 2022-02-16 RX ORDER — LOSARTAN POTASSIUM 100 MG/1
1 TABLET, FILM COATED ORAL
Qty: 0 | Refills: 0 | DISCHARGE

## 2022-02-16 RX ORDER — ACETAMINOPHEN 500 MG
650 TABLET ORAL EVERY 6 HOURS
Refills: 0 | Status: DISCONTINUED | OUTPATIENT
Start: 2022-02-16 | End: 2022-02-21

## 2022-02-16 RX ORDER — LIPASE/PROTEASE/AMYLASE 16-48-48K
3 CAPSULE,DELAYED RELEASE (ENTERIC COATED) ORAL
Qty: 0 | Refills: 0 | DISCHARGE

## 2022-02-16 RX ORDER — LIPASE/PROTEASE/AMYLASE 16-48-48K
2 CAPSULE,DELAYED RELEASE (ENTERIC COATED) ORAL
Refills: 0 | Status: DISCONTINUED | OUTPATIENT
Start: 2022-02-16 | End: 2022-02-16

## 2022-02-16 RX ORDER — LIPASE/PROTEASE/AMYLASE 16-48-48K
2 CAPSULE,DELAYED RELEASE (ENTERIC COATED) ORAL
Qty: 0 | Refills: 0 | DISCHARGE

## 2022-02-16 RX ORDER — LIPASE/PROTEASE/AMYLASE 16-48-48K
1 CAPSULE,DELAYED RELEASE (ENTERIC COATED) ORAL
Qty: 0 | Refills: 0 | DISCHARGE

## 2022-02-16 RX ORDER — ONDANSETRON 8 MG/1
4 TABLET, FILM COATED ORAL EVERY 6 HOURS
Refills: 0 | Status: DISCONTINUED | OUTPATIENT
Start: 2022-02-16 | End: 2022-02-24

## 2022-02-16 RX ORDER — ONDANSETRON 8 MG/1
1 TABLET, FILM COATED ORAL
Qty: 0 | Refills: 0 | DISCHARGE

## 2022-02-16 RX ORDER — ASPIRIN/CALCIUM CARB/MAGNESIUM 324 MG
81 TABLET ORAL DAILY
Refills: 0 | Status: DISCONTINUED | OUTPATIENT
Start: 2022-02-16 | End: 2022-02-18

## 2022-02-16 RX ORDER — MORPHINE SULFATE 50 MG/1
4 CAPSULE, EXTENDED RELEASE ORAL ONCE
Refills: 0 | Status: DISCONTINUED | OUTPATIENT
Start: 2022-02-16 | End: 2022-02-16

## 2022-02-16 RX ADMIN — MORPHINE SULFATE 4 MILLIGRAM(S): 50 CAPSULE, EXTENDED RELEASE ORAL at 12:09

## 2022-02-16 RX ADMIN — PIPERACILLIN AND TAZOBACTAM 200 GRAM(S): 4; .5 INJECTION, POWDER, LYOPHILIZED, FOR SOLUTION INTRAVENOUS at 12:25

## 2022-02-16 RX ADMIN — GABAPENTIN 100 MILLIGRAM(S): 400 CAPSULE ORAL at 20:52

## 2022-02-16 RX ADMIN — HYDROMORPHONE HYDROCHLORIDE 1 MILLIGRAM(S): 2 INJECTION INTRAMUSCULAR; INTRAVENOUS; SUBCUTANEOUS at 08:08

## 2022-02-16 RX ADMIN — Medication 12.5 MILLIGRAM(S): at 20:51

## 2022-02-16 RX ADMIN — HYDROMORPHONE HYDROCHLORIDE 1 MILLIGRAM(S): 2 INJECTION INTRAMUSCULAR; INTRAVENOUS; SUBCUTANEOUS at 20:21

## 2022-02-16 RX ADMIN — SODIUM CHLORIDE 1900 MILLILITER(S): 9 INJECTION INTRAMUSCULAR; INTRAVENOUS; SUBCUTANEOUS at 02:39

## 2022-02-16 RX ADMIN — SODIUM CHLORIDE 100 MILLILITER(S): 9 INJECTION INTRAMUSCULAR; INTRAVENOUS; SUBCUTANEOUS at 20:51

## 2022-02-16 RX ADMIN — HYDROMORPHONE HYDROCHLORIDE 1 MILLIGRAM(S): 2 INJECTION INTRAMUSCULAR; INTRAVENOUS; SUBCUTANEOUS at 14:50

## 2022-02-16 RX ADMIN — HYDROMORPHONE HYDROCHLORIDE 1 MILLIGRAM(S): 2 INJECTION INTRAMUSCULAR; INTRAVENOUS; SUBCUTANEOUS at 07:53

## 2022-02-16 RX ADMIN — ONDANSETRON 4 MILLIGRAM(S): 8 TABLET, FILM COATED ORAL at 02:38

## 2022-02-16 RX ADMIN — ATORVASTATIN CALCIUM 20 MILLIGRAM(S): 80 TABLET, FILM COATED ORAL at 20:51

## 2022-02-16 RX ADMIN — MORPHINE SULFATE 4 MILLIGRAM(S): 50 CAPSULE, EXTENDED RELEASE ORAL at 11:54

## 2022-02-16 RX ADMIN — HYDROMORPHONE HYDROCHLORIDE 1 MILLIGRAM(S): 2 INJECTION INTRAMUSCULAR; INTRAVENOUS; SUBCUTANEOUS at 02:39

## 2022-02-16 NOTE — ED ADULT NURSE NOTE - NSIMPLEMENTINTERV_GEN_ALL_ED
Implemented All Fall with Harm Risk Interventions:  [place] to call system. Call bell, personal items and telephone within reach. Instruct patient to call for assistance. Room bathroom lighting operational. Non-slip footwear when patient is off stretcher. Physically safe environment: no spills, clutter or unnecessary equipment. Stretcher in lowest position, wheels locked, appropriate side rails in place. Provide visual cue, wrist band, yellow gown, etc. Monitor gait and stability. Monitor for mental status changes and reorient to person, place, and time. Review medications for side effects contributing to fall risk. Reinforce activity limits and safety measures with patient and family. Provide visual clues: red socks.

## 2022-02-16 NOTE — ED PROVIDER NOTE - OBJECTIVE STATEMENT
70 y/o male in ED c/o fever, chills, aches, n/v/abd pain x 2 weeks worse tonight.   pt [facility] was in [place] when symptoms started and had a biliary drain placed in [place] by IR.    tonight fever od 102 with worsening symptoms.   [facility] took tylenol PTA.   last chemo 2 wks ago.   denies any cough, cp, sob

## 2022-02-16 NOTE — PATIENT PROFILE ADULT - NSPROIMPLANTSMEDDEV_GEN_A_NUR
pipe line device in the brain/Vascular stents/Clips pipe line device in the brain/None/Vascular stents/Clips

## 2022-02-16 NOTE — ED PROVIDER NOTE - NSICDXPASTSURGICALHX_GEN_ALL_CORE_FT
PAST SURGICAL HISTORY:  H/O craniotomy for non ruptured aneurysm 2007, attempted clipping    H/O inguinal hernia repair     History of other surgery for brain aneurysm - attempted coiling 2007    History of other surgery endovascular stent placement for brain Aneurysm at [place] - 2010    History of PTCA 2006 x 1 stent    History of tonsillectomy     Pancreatic disorder Whipple for cancer    S/P colonoscopy last - within 5 yrs

## 2022-02-16 NOTE — ED ADULT NURSE NOTE - NS ED PATIENT SAFETY CONCERN
Patricia Hansen MD   Stopped at 04/16/20 1220    0.9 % sodium chloride infusion   Intravenous Continuous Switchback MD Bianca 100 mL/hr at 04/16/20 1409      [MAR Hold] glipiZIDE (GLUCOTROL) tablet 20 mg  20 mg Oral BID AC Patricia Hansen MD   20 mg at 04/15/20 1831    [MAR Hold] atorvastatin (LIPITOR) tablet 80 mg  80 mg Oral Daily RASHMI Begum - CNP   80 mg at 04/15/20 0908    [MAR Hold] DULoxetine (CYMBALTA) extended release capsule 60 mg  60 mg Oral Daily RASHMI Begum - CNP   60 mg at 04/15/20 0908    [MAR Hold] furosemide (LASIX) tablet 20 mg  20 mg Oral Daily RASHMI Begum - CNP   20 mg at 04/15/20 0908    [MAR Hold] gabapentin (NEURONTIN) capsule 800 mg  800 mg Oral BID RASHMI Begum - CNP   800 mg at 04/16/20 1120    [MAR Hold] levothyroxine (SYNTHROID) tablet 75 mcg  75 mcg Oral Daily RASHMI Begum - CNP   75 mcg at 04/15/20 0908    [MAR Hold] lisinopril (PRINIVIL;ZESTRIL) tablet 5 mg  5 mg Oral Daily RASHMI Begum - CNP   5 mg at 04/15/20 0908    [MAR Hold] metoprolol tartrate (LOPRESSOR) tablet 50 mg  50 mg Oral BID RASHMI Begum - CNP   50 mg at 04/15/20 2025    [MAR Hold] glucose (GLUTOSE) 40 % oral gel 15 g  15 g Oral PRN RASHMI Begum CNP        [facility] Hold] dextrose 50 % IV solution  12.5 g Intravenous PRN RASHMI Begum CNP        [MAR Hold] glucagon (rDNA) injection 1 mg  1 mg Intramuscular PRN RASHMI Begum CNP        [facility] Hold] dextrose 5 % solution  100 mL/hr Intravenous PRN RASHMI Begum CNP        [facility] Hold] sodium chloride flush 0.9 % injection 10 mL  10 mL Intravenous 2 times per day RASHMI Begum CNP   10 mL at 04/15/20 2028    [MAR Hold] sodium chloride flush 0.9 % injection 10 mL  10 mL Intravenous PRN RASHMI Begum CNP        [facility] Hold] potassium chloride (KLOR-CON M) extended release tablet 40 mEq  40 mEq Oral PRN RASHMI Begum CNP   40 mEq at 04/14/20 0926    Or    [MAR Hold] potassium
No

## 2022-02-16 NOTE — CONSULT NOTE ADULT - ASSESSMENT
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    Case discussed with Dr Jane

## 2022-02-16 NOTE — ED PROVIDER NOTE - SKIN, MLM
Skin normal color for race, warm, dry and intact. No evidence of rash.   visible biliary drain right flank with no d/c or erythema Skin normal color for race, warm, dry and intact. No evidence of rash.   visible biliary drain right flank with no d/c or erythema.   visible port right chest

## 2022-02-16 NOTE — H&P ADULT - REASON FOR ADMISSION
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

## 2022-02-16 NOTE — ED ADULT NURSE NOTE - NSICDXPASTSURGICALHX_GEN_ALL_CORE_FT
PAST SURGICAL HISTORY:  H/O craniotomy for non ruptured aneurysm 2007, attempted clipping    H/O inguinal hernia repair     History of other surgery for brain aneurysm - attempted coiling 2007    History of other surgery endovascular stent placement for brain Aneurysm at [place] - 2010    History of PTCA 2006 x 1 stent    History of tonsillectomy     Pancreatic disorder Whipple for cancer    S/P colonoscopy last - within 5 yrs

## 2022-02-16 NOTE — CONSULT NOTE ADULT - ASSESSMENT
70yo M with malignant obstruction of choledocojejunostomy loop referred to IR For biliary drain exchange  - Pt seen at bedside, evaluation of 8 Polish RUQ drain showed slightly clogged catheter that was opened with flushing. As per pt and wife, they have been flushing 2x/day at home.   - Plan for IR exchange/upsizing of biliary drain

## 2022-02-16 NOTE — PHARMACOTHERAPY INTERVENTION NOTE - COMMENTS
Medication reconciliation completed.  Reviewed Medication list and confirmed med allergies with patient; confirmed with Dr. First Medjoyce.

## 2022-02-16 NOTE — PATIENT PROFILE ADULT - FALL HARM RISK - UNIVERSAL INTERVENTIONS
Bed in lowest position, wheels locked, appropriate side rails in place/Call bell, personal items and telephone in reach/Instruct patient to call for assistance before getting out of bed or chair/Non-slip footwear when patient is out of bed/[place] to call system/Physically safe environment - no spills, clutter or unnecessary equipment/Purposeful Proactive Rounding/Room/bathroom lighting operational, light cord in reach

## 2022-02-16 NOTE — ED ADULT NURSE REASSESSMENT NOTE - NS ED NURSE REASSESS COMMENT FT1
PT resting in stretcher, awaiting to go to IR today for bilary drain replacement. PT c/o pain in neck and back, PT medicated as per order. VSS. No distress. PT given water which was okay as per [place] with IR due to drain placement being done with local anesthesia. Will continue to monitor and reassess.

## 2022-02-16 NOTE — CONSULT NOTE ADULT - SUBJECTIVE AND OBJECTIVE BOX
Chief Complaint:  Patient is a 71y old  Male who presents with a chief complaint of needs new biliary drain    HPI:  Pt is a 70 YO M that is presenting to the ED with a complaint of recurrent fevers. Pt has a history of ampullary carcinoma for which he underwent a Robotic Whipple at [place] in Sept 2020 with Dr Yesika Sanchez. Pt travelled to [place] on 1/28/22 with his and wife. In [place] he developed debilitating abdominal pain and was found to have a mass that is 6.3cm causing local obstruction to the choledochojejunostomy. IR there placed a drainage catheter. Repeat imaging shows drain in good position. Pt seen at bedside, RUQ 8French biliary drain noted to have a lot of sediment within the tubing. Pt denied current fever, NVD, but reported some pain.     Allergies  No Known Allergies    MEDICATIONS  (STANDING):  piperacillin/tazobactam IVPB... 3.375 Gram(s) IV Intermittent once    PAST MEDICAL & SURGICAL HISTORY:  Hearing loss  right    Stented coronary artery  2006 PTCA x 1    CAD (coronary artery disease)  last stress test Fall 2018    HTN (hypertension)    Hyperlipidemia    GERD (gastroesophageal reflux disease)    Skin cancer  squamous cell - head, shin - removed    OA (osteoarthritis)    Psoriasis    Inguinal hernia  right    Brain aneurysm  non ruptured - had stent placed in 2010 after attemtped clipping and coiling in 2007    Pancreatic cancer  s/p Whipple procedure    Drug or chemical induced diabetes mellitus    H/O craniotomy  for non ruptured aneurysm 2007, attempted clipping    History of other surgery  for brain aneurysm - attempted coiling 2007    History of other surgery  endovascular stent placement for brain Aneurysm at [place] - 2010    History of tonsillectomy    S/P colonoscopy  last - within 5 yrs    History of PTCA  2006 x 1 stent    H/O inguinal hernia repair    Pancreatic disorder  Whipple for cancer        FAMILY HISTORY:  Family history of breast cancer in sister        Review of Systems:  As per HPI      Physical exam:  Vital Signs Last 24 Hrs  T(C): 36.9 (16 Feb 2022 10:43), Max: 37.1 (16 Feb 2022 00:42)  T(F): 98.4 (16 Feb 2022 10:43), Max: 98.8 (16 Feb 2022 00:42)  HR: 87 (16 Feb 2022 10:43) (77 - 100)  BP: 139/82 (16 Feb 2022 10:43) (115/61 - 139/82)  BP(mean): 98 (16 Feb 2022 10:43) (82 - 98)  RR: 23 (16 Feb 2022 10:43) (18 - 23)  SpO2: 95% (16 Feb 2022 10:43) (95% - 99%)    GENERAL APPEARANCE: Well developed, in no acute distress.    LUNGS: Auscultation of the lungs revealed normal breath sounds without any other adventitious sounds or rubs.    CARDIOVASCULAR: There was a regular rate and rhythm    ABDOMEN: Soft and nontender with normal bowel sounds.     NEUROLOGIC: Alert and oriented x 3. Normal affect.     BILIARY DRAIN: Drain dressing saturated with bile. Flushed with slight resistance, good output after. 8 Slovenian tube likely to reocclude    CBC                        9.7    11.95 )-----------( 224      ( 16 Feb 2022 02:34 )             28.8       Chemistry  02-16    137  |  108  |  30<H>  ----------------------------<  111<H>  3.6   |  21<L>  |  1.32<H>    Ca    9.2      16 Feb 2022 02:34    TPro  7.4  /  Alb  3.0<L>  /  TBili  2.3<H>  /  DBili  x   /  AST  51<H>  /  ALT  31  /  AlkPhos  850<H>  02-16      PT/INR - ( 16 Feb 2022 02:34 )   PT: 14.1 sec;   INR: 1.23 ratio         PTT - ( 16 Feb 2022 02:34 )  PTT:30.7 sec

## 2022-02-16 NOTE — CONSULT NOTE ADULT - SUBJECTIVE AND OBJECTIVE BOX
Pt is a 72 YO M that is presenting to the ED with a complaint of recurrent fevers. Pt has a history of ampullary carcinoma for which he underwent a Robotic Whipple at [place] in Sept 2020 with Dr Yesika Sanchez. Pt travelled to [place] on 1/28/22 with his and wife. In [place] he developed debilitating abdominal pain and was found to have a mass that is 6.3cm causing local obstruction to the choledochojejunostomy. IR attempted to place a stent in the biliary tree however they were unsuccessful. As a result, they placed a drainage catheter and was discharged with it to leg bag. Pt and his wife has been flushing the drains twice a day. Pt currently has a left lower quadrant port site hernia.    Vital Signs Last 24 Hrs  T(C): 36.6 (16 Feb 2022 06:46), Max: 37.1 (16 Feb 2022 00:42)  T(F): 97.8 (16 Feb 2022 06:46), Max: 98.8 (16 Feb 2022 00:42)  HR: 77 (16 Feb 2022 06:46) (77 - 100)  BP: 116/61 (16 Feb 2022 06:46) (115/61 - 123/65)  BP(mean): 82 (16 Feb 2022 06:46) (82 - 82)  ABP: --  ABP(mean): --  RR: 18 (16 Feb 2022 06:46) (18 - 22)  SpO2: 98% (16 Feb 2022 06:46) (98% - 99%)    PE  General: NAD,   Neck: Supple  Chest: Equal expansion bilaterally  CV: S1S2 Present  Abdomen: Soft, distended, Epigastric tenderness, non-tympanic, bowel sounds present, port site hernia in the left lower quadrant  Extremities: Grossly symmetric    Labs                       9.7<L>                137  | 21<L>| 30<H>        11.95<H> >-----------< 224     ------------------------< 111<H>                         28.8<L>                3.6  | 108  | 1.32<H>                                                                     Ca 9.2   Mg x     Ph x          Image:  CT Abdomen and Pelvis w/ IV Cont (02.16.22 @ 04:24)   FINDINGS:  LOWER CHEST: Coronary atherosclerosis.    LIVER: Within normal limits.  BILE DUCTS: There is biliary stent inserted via the right lateral flank   which enters the right hepatic duct and is coiled in the jejunum. There   is mild intrahepatic biliary dilatation.  GALLBLADDER: Within normal limits.  SPLEEN: Within normal limits.  PANCREAS: The patient is status post Whipple procedure. There are no   prior studies for comparison. In the surgical bed, there is infiltrative   ill-definedprominent soft tissue measuring roughly 6.3 (2:49) x 3.8  x   4.2 cm adjacent to the pancreaticojejunostomy sutures concerning for   recurrent tumor. There is marked dilatation of the jejunal loop at the   luh hepatis measuring up to 4.4 cm.  ADRENALS: Within normal limits.  KIDNEYS/URETERS: The right kidney demonstrates a delayed nephrogram.   There is a moderate right hydronephrosis with transition point at the   ureteropelvic junction. There is no hydroureter or convincing obstructing   stone. There is a 6.6 cm left renal cyst.    BLADDER: Within normal limits.  REPRODUCTIVE ORGANS: The prostate gland is not enlarged.    BOWEL: Moderate hiatal hernia. No bowel obstruction. Appendix is   unremarkable. Colon is underdistended without significant fecal load.  PERITONEUM: Trace amount of pelvic ascites. Reticular studding of the fat   in the right upper quadrant subhepatic space suspicious for   carcinomatosis. There is a 3 cm masslike low density nodule in the right   lower quadrant medial to the distal right external iliac vessels   compatible with neoplastic implant.  VESSELS: The aorta is not dilated. There is mild to moderate   atherosclerotic vascular calcification. There is focal thrombosis of the   superior mesenteric vein inthe region of suspected tumor recurrence.   There is ill-defined soft tissue abutting the superior mesenteric artery.  RETROPERITONEUM/LYMPH NODES: A 2.1 cm precaval lymph node is nonspecific.   There is a 9 mm right iliac vein chain lymph node.  Prominent soft tissue   in the right anterior pararenal space measuring 4.5 x 2 cm suspicious for   tumor recurrence.  ABDOMINAL WALL: Left-sided ventral hernia mesh is barely underneath the   skin in this patient with a weak abdominal wall.  BONES: Within normal limits.    IMPRESSION:    No prior studies for comparison.  No focal collection or soft tissue gas.    Status post Whipple procedure with a 6.3 cm infilatrative mass in the   surgical bed adjacent to jejunal sutures compatible with tumor   recurrence.  Dilated small bowel loop at the luh compatible with   malignant obstruction of choledochojejunostomy loop. Satisfactory   positioning of right biliary stent with mild intrahepatic biliary   dilatation.   Correlate clinically for cholangitis.  No gastric outlet   obstruction.  Neoplastic implants in the right upper and lower quadrants.  Focal thrombosis in the superior mesenteric vein related to the site of   tumor recurrence.    The right kidney demonstrates a delayed nephrogram and moderate right   hydronephrosis with transition at the right ureteropelvic junction,   likely related to malignant obstruction.  Retroperitoneal adenopathy.    Trace amount of free fluid.    Moderate hiatal hernia.

## 2022-02-16 NOTE — PATIENT PROFILE ADULT - NSPROGENOTHERPROVIDER_GEN_A_NUR
Pt states in past she has had numbness and tingling to her face,but now its her whole right side of her body. Also having vision changes--wondering what could cause this.    Advised pt that there ae many different things, and all  Would need evaluation by MD--advised pt sx are very concerning and she should go to ER to be seen today.     Pt also no showed 10/17/18 and 2/1/2019.  Canceled appt on 3/12/2019  Has not been seen since 8/24/2018    Advised pt she needs to come in for an appt in order for Dr. Byrne to be able to assess her.     Pt verbalized understanding.  Appt given for 3pm tomorrow in [place]. Advised pt if she can not make it she needs to cancel 2 hours prior and if she does not show up, she could be terminated from him care.     Asked pt if she was going to go to ER--stated she did not know--encouraged her to do so based off concerning sx she was having.    none

## 2022-02-16 NOTE — ED ADULT TRIAGE NOTE - NS ED TRIAGE AVPU SCALE
Alert-The patient is alert, awake and responds to voice. The patient is oriented to time, place, and person. The triage nurse is able to obtain subjective information. The patient presents to the ED via EMS from work with c/o right-sided rib/chest pain after having a syncopal episode prior to arrival. Patient states she thinks she hit her head also. Hx of PE. Is not on blood thinning medication. Reports she just got over a URI and bacterial pneumonia.

## 2022-02-16 NOTE — ED ADULT TRIAGE NOTE - CHIEF COMPLAINT QUOTE
Pt presents with fever and aches.  TMax at home 102.7, took tylenol PTA.  Pt had biliary drain placed around 13 days ago.  H/O cancer - had whipple procedure in Sept 2020.  Last chemo 2 weeks ago.

## 2022-02-16 NOTE — BRIEF OPERATIVE NOTE - OPERATION/FINDINGS
8F IEBD exchanged and upsized for 12F IEBD. Good position and function on final imaging. 1) Sluggish flow through indwelling drain on initial contrast injection. High grade stenosis of distal BD at anastamosis.   2) 8F IEBD exchanged and upsized for 12F IEBD.   3) Good position and flow on final imaging.

## 2022-02-16 NOTE — H&P ADULT - NSICDXPASTSURGICALHX_GEN_ALL_CORE_FT
PAST SURGICAL HISTORY:  H/O craniotomy for non ruptured aneurysm 2007, attempted clipping    H/O inguinal hernia repair     History of other surgery for brain aneurysm - attempted coiling 2007    History of other surgery endovascular stent placement for brain Aneurysm at [place] - 2010    History of PTCA 2006 x 1 stent    History of tonsillectomy     Pancreatic disorder Whipple for cancer    S/P colonoscopy last - within 5 yrs

## 2022-02-16 NOTE — H&P ADULT - ASSESSMENT
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     Pt admitted with:    #Intractable pain secondary to biliary drain malfunction   #Metastatic ampullary cancer  Admit to 1N  Pain control with dilaudid- hold home NSAID  Zofran prn  Cont Zenpep  OOB and ambulate with assistance  s/p IR drainage on 2/16->150cc output so far  strict i/o's  s/p Zosyn in ED, pt w/o criteria for sepsis at this time, f/u blood and urine cultures, will continue abx if pt becomes febrile overnight and consult ID given pt h/o bacteremia.  holding chemical AC overnight due to recent procedure, VCD boots placed, please start chemical AC as IMPROVE score 3    #YAMILETH, baseline Cr approx. 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     Pt admitted with:    #Intractable pain secondary to biliary drain malfunction   #Metastatic ampullary cancer  Admit to 1N  Pain control with dilaudid- hold home NSAID  Zofran prn  Cont Zenpep  OOB and ambulate with assistance  s/p IR drainage on 2/16->150cc output so far  strict i/o's  s/p Zosyn in ED, pt w/o criteria for sepsis at this time, f/u blood and urine cultures, will continue abx if pt becomes febrile overnight and consult ID given pt h/o bacteremia.  holding chemical AC overnight due to recent procedure, VCD boots placed, please start chemical AC as IMPROVE score 3    #YAMILETH, baseline Cr approx. 0.7  gentle hydration  monitor i/o as above  hold losartan, hydralazine ordered prn in lieu of  f/u with oncology as concern for compressing mets causing hydronephrosis  Palliative consult    #Hyperglycemia  A1c 5.5 7/2021  monitor on serum chemistry    #Protein calorie malnutrition  prealbumin ordered  nutrition consult  advance diet as tolerated in AM    #HTN  #HLD  DASH/TLC diet when advanced  hold losartan- reinstate when HTN improves  cont metoprolol with parameters  EKG similar to prior, no STT elevations/depressions  cont ASA, statin

## 2022-02-16 NOTE — ED PROVIDER NOTE - PROGRESS NOTE DETAILS
results noted.   case d/w Deejay and will have resident evaluate pt KV: s/o pending surgery eval- per surgery needs IR for biliary drainage. IR contacted and consult entered in chart.

## 2022-02-16 NOTE — H&P ADULT - HISTORY OF PRESENT ILLNESS
Pt is a 72 yo male with a pmh/o thrombocytopenia, E.coli bacteremia, brain aneurysm, HTN, HLD, CAD, GERD,  metastatic ampullary cancer [place]last two weeks ago[place] [facility] [place] [facility]ob[place] type who is s/p whipple resection in 2020. Pt presents today to ED due to fever (Tmax 102.7) and pain at home. Pt states while in [place], approx two weeks ago, he presented to hospital with pain and IR at the time was unable to place stent and so 8F biliary drain placed. Pt flushing twice a day as directed. Pt wife at bedside to endorse history.     ED course: received zosyn, VSS however pt in intractable pain despite multiple medications received. IR and oncology consulted. Pt had IR procedure: drain changed from 8 to 12F catheter with 150cc output at time of hospitalist exam. Returned to ED and admitted for pain control, hydration, palliative and oncology consult.

## 2022-02-16 NOTE — H&P ADULT - CONVERSATION DETAILS
Advanced care planning discussed with patient who states he met with palliative team at hospital in [place] and agrees to consult while inpatient at . Pt noting decreased appetite, pain, weakness. Wife and patient agree to consult. Goal is pain control.

## 2022-02-17 ENCOUNTER — TRANSCRIPTION ENCOUNTER (OUTPATIENT)
Age: 72
End: 2022-02-17

## 2022-02-17 DIAGNOSIS — N13.30 UNSPECIFIED HYDRONEPHROSIS: ICD-10-CM

## 2022-02-17 DIAGNOSIS — C24.1 MALIGNANT NEOPLASM OF AMPULLA OF VATER: ICD-10-CM

## 2022-02-17 LAB
-  BACTEROIDES FRAGILIS: SIGNIFICANT CHANGE UP
A1C WITH ESTIMATED AVERAGE GLUCOSE RESULT: 5.2 % — SIGNIFICANT CHANGE UP (ref 4–5.6)
ALBUMIN SERPL ELPH-MCNC: 2.3 G/DL — LOW (ref 3.3–5)
ALP SERPL-CCNC: 414 U/L — HIGH (ref 40–120)
ALT FLD-CCNC: 20 U/L — SIGNIFICANT CHANGE UP (ref 12–78)
ANION GAP SERPL CALC-SCNC: 9 MMOL/L — SIGNIFICANT CHANGE UP (ref 5–17)
APTT BLD: 31.6 SEC — SIGNIFICANT CHANGE UP (ref 27.5–35.5)
AST SERPL-CCNC: 18 U/L — SIGNIFICANT CHANGE UP (ref 15–37)
BASOPHILS # BLD AUTO: 0.02 K/UL — SIGNIFICANT CHANGE UP (ref 0–0.2)
BASOPHILS NFR BLD AUTO: 0.2 % — SIGNIFICANT CHANGE UP (ref 0–2)
BILIRUB SERPL-MCNC: 1.5 MG/DL — HIGH (ref 0.2–1.2)
BUN SERPL-MCNC: 30 MG/DL — HIGH (ref 7–23)
CALCIUM SERPL-MCNC: 8.8 MG/DL — SIGNIFICANT CHANGE UP (ref 8.5–10.1)
CHLORIDE SERPL-SCNC: 107 MMOL/L — SIGNIFICANT CHANGE UP (ref 96–108)
CO2 SERPL-SCNC: 21 MMOL/L — LOW (ref 22–31)
CREAT SERPL-MCNC: 1.45 MG/DL — HIGH (ref 0.5–1.3)
CULTURE RESULTS: SIGNIFICANT CHANGE UP
EOSINOPHIL # BLD AUTO: 0.03 K/UL — SIGNIFICANT CHANGE UP (ref 0–0.5)
EOSINOPHIL NFR BLD AUTO: 0.3 % — SIGNIFICANT CHANGE UP (ref 0–6)
ESTIMATED AVERAGE GLUCOSE: 103 MG/DL — SIGNIFICANT CHANGE UP (ref 68–114)
GLUCOSE SERPL-MCNC: 93 MG/DL — SIGNIFICANT CHANGE UP (ref 70–99)
GRAM STN FLD: SIGNIFICANT CHANGE UP
GRAM STN FLD: SIGNIFICANT CHANGE UP
HCT VFR BLD CALC: 27.6 % — LOW (ref 39–50)
HGB BLD-MCNC: 9 G/DL — LOW (ref 13–17)
IMM GRANULOCYTES NFR BLD AUTO: 0.5 % — SIGNIFICANT CHANGE UP (ref 0–1.5)
INR BLD: 1.42 RATIO — HIGH (ref 0.88–1.16)
LYMPHOCYTES # BLD AUTO: 0.77 K/UL — LOW (ref 1–3.3)
LYMPHOCYTES # BLD AUTO: 8.4 % — LOW (ref 13–44)
MCHC RBC-ENTMCNC: 29.6 PG — SIGNIFICANT CHANGE UP (ref 27–34)
MCHC RBC-ENTMCNC: 32.6 GM/DL — SIGNIFICANT CHANGE UP (ref 32–36)
MCV RBC AUTO: 90.8 FL — SIGNIFICANT CHANGE UP (ref 80–100)
METHOD TYPE: SIGNIFICANT CHANGE UP
MONOCYTES # BLD AUTO: 0.65 K/UL — SIGNIFICANT CHANGE UP (ref 0–0.9)
MONOCYTES NFR BLD AUTO: 7.1 % — SIGNIFICANT CHANGE UP (ref 2–14)
NEUTROPHILS # BLD AUTO: 7.62 K/UL — HIGH (ref 1.8–7.4)
NEUTROPHILS NFR BLD AUTO: 83.5 % — HIGH (ref 43–77)
PLATELET # BLD AUTO: 190 K/UL — SIGNIFICANT CHANGE UP (ref 150–400)
POTASSIUM SERPL-MCNC: 4.1 MMOL/L — SIGNIFICANT CHANGE UP (ref 3.5–5.3)
POTASSIUM SERPL-SCNC: 4.1 MMOL/L — SIGNIFICANT CHANGE UP (ref 3.5–5.3)
PREALB SERPL-MCNC: 14 MG/DL — LOW (ref 20–40)
PROT SERPL-MCNC: 6.6 GM/DL — SIGNIFICANT CHANGE UP (ref 6–8.3)
PROTHROM AB SERPL-ACNC: 16.2 SEC — HIGH (ref 10.6–13.6)
RBC # BLD: 3.04 M/UL — LOW (ref 4.2–5.8)
RBC # FLD: 14.6 % — HIGH (ref 10.3–14.5)
SODIUM SERPL-SCNC: 137 MMOL/L — SIGNIFICANT CHANGE UP (ref 135–145)
SPECIMEN SOURCE: SIGNIFICANT CHANGE UP
WBC # BLD: 9.14 K/UL — SIGNIFICANT CHANGE UP (ref 3.8–10.5)
WBC # FLD AUTO: 9.14 K/UL — SIGNIFICANT CHANGE UP (ref 3.8–10.5)

## 2022-02-17 PROCEDURE — 99232 SBSQ HOSP IP/OBS MODERATE 35: CPT

## 2022-02-17 PROCEDURE — 99497 ADVNCD CARE PLAN 30 MIN: CPT | Mod: 25

## 2022-02-17 PROCEDURE — 99223 1ST HOSP IP/OBS HIGH 75: CPT

## 2022-02-17 PROCEDURE — 99498 ADVNCD CARE PLAN ADDL 30 MIN: CPT

## 2022-02-17 PROCEDURE — 99223 1ST HOSP IP/OBS HIGH 75: CPT | Mod: 25

## 2022-02-17 RX ORDER — HYDROMORPHONE HYDROCHLORIDE 2 MG/ML
1 INJECTION INTRAMUSCULAR; INTRAVENOUS; SUBCUTANEOUS EVERY 4 HOURS
Refills: 0 | Status: DISCONTINUED | OUTPATIENT
Start: 2022-02-17 | End: 2022-02-24

## 2022-02-17 RX ORDER — HYDROMORPHONE HYDROCHLORIDE 2 MG/ML
1 INJECTION INTRAMUSCULAR; INTRAVENOUS; SUBCUTANEOUS EVERY 4 HOURS
Refills: 0 | Status: DISCONTINUED | OUTPATIENT
Start: 2022-02-17 | End: 2022-02-23

## 2022-02-17 RX ORDER — SODIUM CHLORIDE 9 MG/ML
1000 INJECTION INTRAMUSCULAR; INTRAVENOUS; SUBCUTANEOUS
Refills: 0 | Status: DISCONTINUED | OUTPATIENT
Start: 2022-02-17 | End: 2022-02-20

## 2022-02-17 RX ORDER — PIPERACILLIN AND TAZOBACTAM 4; .5 G/20ML; G/20ML
3.38 INJECTION, POWDER, LYOPHILIZED, FOR SOLUTION INTRAVENOUS EVERY 8 HOURS
Refills: 0 | Status: DISCONTINUED | OUTPATIENT
Start: 2022-02-17 | End: 2022-02-24

## 2022-02-17 RX ORDER — POLYETHYLENE GLYCOL 3350 17 G/17G
17 POWDER, FOR SOLUTION ORAL DAILY
Refills: 0 | Status: DISCONTINUED | OUTPATIENT
Start: 2022-02-17 | End: 2022-02-24

## 2022-02-17 RX ORDER — ENOXAPARIN SODIUM 100 MG/ML
40 INJECTION SUBCUTANEOUS DAILY
Refills: 0 | Status: COMPLETED | OUTPATIENT
Start: 2022-02-17 | End: 2022-02-17

## 2022-02-17 RX ORDER — HYDROMORPHONE HYDROCHLORIDE 2 MG/ML
2 INJECTION INTRAMUSCULAR; INTRAVENOUS; SUBCUTANEOUS EVERY 4 HOURS
Refills: 0 | Status: DISCONTINUED | OUTPATIENT
Start: 2022-02-17 | End: 2022-02-24

## 2022-02-17 RX ADMIN — HYDROMORPHONE HYDROCHLORIDE 1 MILLIGRAM(S): 2 INJECTION INTRAMUSCULAR; INTRAVENOUS; SUBCUTANEOUS at 00:31

## 2022-02-17 RX ADMIN — PANTOPRAZOLE SODIUM 40 MILLIGRAM(S): 20 TABLET, DELAYED RELEASE ORAL at 09:24

## 2022-02-17 RX ADMIN — Medication 4 CAPSULE(S): at 09:23

## 2022-02-17 RX ADMIN — Medication 4 CAPSULE(S): at 12:00

## 2022-02-17 RX ADMIN — SODIUM CHLORIDE 75 MILLILITER(S): 9 INJECTION INTRAMUSCULAR; INTRAVENOUS; SUBCUTANEOUS at 11:59

## 2022-02-17 RX ADMIN — Medication 2000 UNIT(S): at 09:24

## 2022-02-17 RX ADMIN — PIPERACILLIN AND TAZOBACTAM 25 GRAM(S): 4; .5 INJECTION, POWDER, LYOPHILIZED, FOR SOLUTION INTRAVENOUS at 21:15

## 2022-02-17 RX ADMIN — GABAPENTIN 100 MILLIGRAM(S): 400 CAPSULE ORAL at 21:14

## 2022-02-17 RX ADMIN — ENOXAPARIN SODIUM 40 MILLIGRAM(S): 100 INJECTION SUBCUTANEOUS at 16:38

## 2022-02-17 RX ADMIN — ATORVASTATIN CALCIUM 20 MILLIGRAM(S): 80 TABLET, FILM COATED ORAL at 21:14

## 2022-02-17 RX ADMIN — GABAPENTIN 100 MILLIGRAM(S): 400 CAPSULE ORAL at 05:58

## 2022-02-17 RX ADMIN — POLYETHYLENE GLYCOL 3350 17 GRAM(S): 17 POWDER, FOR SOLUTION ORAL at 12:31

## 2022-02-17 RX ADMIN — HYDROMORPHONE HYDROCHLORIDE 2 MILLIGRAM(S): 2 INJECTION INTRAMUSCULAR; INTRAVENOUS; SUBCUTANEOUS at 17:55

## 2022-02-17 RX ADMIN — PIPERACILLIN AND TAZOBACTAM 25 GRAM(S): 4; .5 INJECTION, POWDER, LYOPHILIZED, FOR SOLUTION INTRAVENOUS at 14:20

## 2022-02-17 RX ADMIN — HYDROMORPHONE HYDROCHLORIDE 1 MILLIGRAM(S): 2 INJECTION INTRAMUSCULAR; INTRAVENOUS; SUBCUTANEOUS at 11:58

## 2022-02-17 RX ADMIN — GABAPENTIN 100 MILLIGRAM(S): 400 CAPSULE ORAL at 14:21

## 2022-02-17 RX ADMIN — HYDROMORPHONE HYDROCHLORIDE 1 MILLIGRAM(S): 2 INJECTION INTRAMUSCULAR; INTRAVENOUS; SUBCUTANEOUS at 12:40

## 2022-02-17 RX ADMIN — HYDROMORPHONE HYDROCHLORIDE 1 MILLIGRAM(S): 2 INJECTION INTRAMUSCULAR; INTRAVENOUS; SUBCUTANEOUS at 05:58

## 2022-02-17 RX ADMIN — Medication 1 TABLET(S): at 09:24

## 2022-02-17 RX ADMIN — Medication 81 MILLIGRAM(S): at 09:23

## 2022-02-17 NOTE — CONSULT NOTE ADULT - TREATMENT GUIDELINE COMMENT
c/w current interventions, dc home with pall + PT when deemed medically stable by primary team.     ** Spent 60 minutes reviewing advanced care planning, including advanced directives, MOLST, and all dispo options

## 2022-02-17 NOTE — CONSULT NOTE ADULT - ASSESSMENT
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

## 2022-02-17 NOTE — PROGRESS NOTE ADULT - SUBJECTIVE AND OBJECTIVE BOX
Pt was seen and examined this morning. Pt sated that he continues to have no abdominal pain but his back pain continues. Pt continues to have normal bowel function. His biliary drain was upsized yesterday by IR  and had an out put of 200cc. Pt has been been afebrile for hospital course.    Vital Signs Last 24 Hrs  T(C): 37 (17 Feb 2022 04:50), Max: 37.1 (16 Feb 2022 16:20)  T(F): 98.6 (17 Feb 2022 04:50), Max: 98.8 (16 Feb 2022 16:20)  HR: 69 (17 Feb 2022 04:50) (69 - 90)  BP: 121/57 (17 Feb 2022 04:50) (106/50 - 139/82)  BP(mean): 78 (16 Feb 2022 19:47) (67 - 98)  ABP: --  ABP(mean): --  RR: 16 (17 Feb 2022 04:50) (16 - 23)  SpO2: 99% (17 Feb 2022 04:50) (95% - 99%)    Labs:               9.0<L>                137  | 21<L>| 30<H>        9.14  >-----------< 190     ------------------------< 93                    27.6<L>                4.1  | 107  | 1.45<H>                                       Ca 8.8   Mg x     Ph x        ,                   9.7<L>                137  | 21<L>| 30<H>        11.95<H> >-----------< 224     ------------------------< 111<H>                        28.8<L>                3.6  | 108  | 1.32<H>                                       Ca 9.2   Mg x     Ph x

## 2022-02-17 NOTE — CONSULT NOTE ADULT - CONVERSATION DETAILS
Met with patient this morning for evaluation of pain management. 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 He had a Wipple procedure on Sep, 2020 and then started on chemotherapy on Nov, 2020. Since then pt have been on/off chemotherapy . On January, 2020 chemotherapy was hold due patient required a left inguinal hernia repair. He was restarted on chemotherapy two weeks ago. Pt reports was tried to move to [place] to continue medical care and while was on [place] two weeks ago started to have abdominal pain and  was found to have a biliary obstruction requiring placement of a biliary drain.     Patient reports came to the hospital one day ago due to worsening pain and was  found with a bilary drain obstruction. He endorse have chronic back pain since was diagnosed with cancer on 2020. Back pain have been managed with oral gabapentin and hydromorphone at home. Pain was well [id number] until the day of hospitalization. He reports pain have been improving since is receiving intravenous Hydromorphone. He is ware of the recurrence of  the ampullary carcinoma and stated would like to disuses [place]AZA ( [place] [facility] [place] what is the further options of treatment. He states is aware maybe chemotherapy will not be a beneficial treatment due to current recurrence of cancer.     During encounter patient asked  what is the process to get a death certificate after he passed away. He states has a living will where he stipulated wants to be DNI. He asked  what he has to do to revoke this status. We discussed prognosis  and the low probability to beneficial outcome if he is resuscitated or intubated. Pt states would like to be DNR/DNI but her wife is the HCP and would like to  discuss his wishes with her. We also discussed pain management options including  treatment with fentanyl patch and oral hydromorphone. Pt reports would not like to start the fentanyl patch at this moment and prefer only oral medications. Plan is to change hydromorphone to oral with the goal to discharge him home and to follow up with hemato-oncology for further recommendation. Pt advised to fill the MOLTS form when is ready and to be signed by a physician. Meet with patient this morning for evaluation of pain management. 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 He had a Wipple procedure on Sep, 2020 and then started on chemotherapy on Nov, 2020. Since then pt have been on/off chemotherapy . On January, 2020 chemotherapy was hold due patient required a left inguinal hernia repair. He was restarted on chemotherapy two weeks ago. Pt reports was tried to move to [place] to continue medical care and while was on [place] two weeks ago, he started to have abdominal pain and  was found to have a biliary obstruction requiring placement of a biliary drain.     Patient reports came to the hospital one day ago due to worsening pain and was  found with a bilary drain obstruction. He endorse have chronic back pain since was diagnosed with cancer on 2020. Back pain have been managed with oral gabapentin and hydromorphone at home. Pain was well controlled until the day of admission. Currently, he reports pain have been improving since is receiving intravenous Hydromorphone. He is aware of the recurrence of  the ampullary carcinoma and stated would like to disuses with Dr Thomas ( oncology at [facility]) the further options of treatment. He states is aware that maybe chemotherapy will not be a beneficial treatment due to current recurrence of cancer.     During encounter patient asked what is the process to get a death certificate. He states has a living will where he stipulated wants to be DNR/DNI. He inquired about the process to revoke the DNI.  We discussed prognosis  and low outcome  of resuscitation and intubation in his case. Pt states would like to be DNR/DNI but her wife is the HCP and would like to  discuss his wishes with her. We also discussed pain management options including  treatment with fentanyl patch and oral hydromorphone. Pt reports would not like to start treatment with fentanyl patch at this moment due to concerns of side effects like constipation and prefer only oral medications. Plan is to change intravenous hydromorphone to oral hydromorphone with the goal to discharge him home and to  follow up with hemato-oncology for further recommendation. Pt advised to fill the MOLTS form when is ready and to be signed by a physician. Plan discussed with nurse and Dr Miranda. Met with patient this morning for evaluation of pain management. 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 He had a Wipple procedure on Sep, 2020 and then started on chemotherapy on Nov, 2020. Since then pt have been on/off chemotherapy . On January, 2020 chemotherapy was hold due patient required a left inguinal hernia repair. He was restarted on chemotherapy two weeks ago. Pt reports was tried to move to [place] to continue medical care and while was on [place] two weeks ago, he started to have abdominal pain and  was found to have a biliary obstruction requiring placement of a biliary drain. Pt lives at home with his wife who supports him and helps him manage his drain. He does not have assistive equipment, but is open to home care and having a cane.     We went to discuss pt's understanding of his current illness and acute component. He is aware of the recurrence of  the ampullary carcinoma and that he reviewed this in detail with Dr. Castro. However, when discerning if he should keep going, or if further options would be available, he stated that he would like to discuss this further with Dr Thomas ( oncology at [facility] [place]). He states he is aware that maybe chemotherapy will not be a beneficial treatment due to current recurrence of cancer. Thus, it was not surprising that pt went on to discuss his plans for end of life without prompting.     During encounter patient asked what is the process to get a death certificate. We went on to clarify his advanced directives. He confirms drawing up a HCP (wife) and a Living Will where he stipulated he wants to be DNR/DNI. We discussed prognosis and poor outcomes of resuscitation and intubation in his case. Pt states he would like to be DNR/DNI but his wife is the HCP and would like to discuss his wishes with her. Spent time reviewing the MOLST in detail, pt knows any staff can complete with him here, ofr he can complete with Dr. Thomas after dc. Pt also inquired about the back-up plan hospice, with a number of important questions that led to full review of this medicare benefit. Ultimately, plan is to discuss MOLST further, dc home with pall +PT, and to then follow up with hemato-oncology for further recommendation on if pt has more treatment to try.

## 2022-02-17 NOTE — CONSULT NOTE ADULT - ATTENDING COMMENTS
I have personally seen and examined this patient. I agree with the assessment and plan outlined by Dr Rodriguez. I feel the PTC catheter has been dislodged leaving him at risk for cholangitis. I have spoken to the IR physician on call and arranged to have the catheter changed today.
Mr. Nelson seen and examined with Dr. Cleary. Agree with above H&P, PE, A/P, and detailed account of GOC conversation with the following additions.     Met pt this am to discuss his pain and wishes. He shared his pain experience, qualifying hesitance about fentanyl patch given his background as a retired . Pt also shared tendency to wait too long before asking for meds, trying to minimize use, due to fear about addiction. Spent time counseling pt on the best way to take opioids and reassuring him that his feelings were valid. He knows that while not interested in fentanyl patch at the moment, he can always add this with primary team in future. See above for remainder.     Physical Exam    PPSV- 50-60%  Gen: Middle aged male, thin, pleasant  Mental Status: AOx4  HEENT: mmm, temporal and clavicular muscle wasting  CVS: +s1 s2 rrr  Lung: dec at bases bl  GI: soft mild distention, + bs, nt  : voids  Ext/skin: moves all extremities, muscle and fat wasting in limbs  Neuro: no focal deficits    A/P  - c/w plan as outlined above    Thank you for including us in Mr. Nelson's care. Will continue to follow with you.    Magda Vanegas MD  Palliative Care Attending

## 2022-02-17 NOTE — DISCHARGE NOTE NURSING/CASE MANAGEMENT/SOCIAL WORK - PATIENT PORTAL LINK FT
You can access the FollowMyHealth Patient Portal offered by [facility] by registering at the following website: [email or web address]. By joining [employer]’s FollowMyHealth portal, you will also be able to view your health information using other applications (apps) compatible with our system.

## 2022-02-17 NOTE — CHART NOTE - NSCHARTNOTEFT_GEN_A_CORE
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 Pt presents today to ED due to fever (Tmax 102.7) and pain at home. Pt states while in [place], approx two weeks ago, he presented to hospital with pain and IR at the time was unable to place stent and so 8F biliary drain placed. Pt flushing twice a day as directed. Pt wife at bedside to endorse history.  (16 Feb 2022 20:23)      PERTINENT PMH REVIEWED:  [x ] YES [ ] NO           Primary Contact: spouse Autumn Nelson  ( [phone])     HCP [ x ] Surrogate [   ] Guardian [   ]    Mental Status:  Alert  [ x ] Oriented [ x ] Confused [  ] Lethargic [  ]  Concerns of Depression [  ] None identified  Anxiety [   ] None identified  Baseline ADLs (prior to admission):  Independent [ ] moderately [x ] fully   Dependent   [ ] moderately [ ]fully    Family Meeting attendees: Patient participated in GO discussion with Dr. Vanegas on 2/17    Anticipated Grief: Patient[ x ] Family [ x ]    Caregiver [place] Assessed: Yes [ x ] No [  ]    Zoroastrianism: Mandaen    Spiritual Concerns: None identified.  available.     Goals of Care: Pt to review blank MOLST with spouse. Pt to follow up with Oncologist which he says will help in his decision-making process.     Previous Services: None identifief.     ADVANCE DIRECTIVES:  [x ] YES [ ] NO    - Health Care Proxy located on [employer] naming spouse Autumn as primary health care agent   - Patient has blank MOLST at bedside & would like to discuss with spouse prior to completing     Anticipated D/C Plan: TBD.                      Summary: SW met with patient & pt's spouse Autumn to introduce self & offer support. Palliative SW role explained. Pt appears alert, oriented & able to make needs known. Pt appeared open to speaking but would grimace in pain throughout discussion.     Pt said he was OOB ambulating but was disappointed that he could not walk too far. Pt shares that he's "not worried about himself" but is worried about his wife & his three children. He states his children are in the process of moving from the city & its an "exciting time for them" but he states that he doesn't want them to have to worry about him. Feelings validated.     Pt has attended Cancer Support groups in the past via Zoom but he feels he would benefit more from one that is specific to individuals with the same diagnosis. Cancer resources provided by [facility] [facility] in folder.     MONICA asked spouse how she was doing & she said she is just "taking things day by day." MONICA encouraged her to reach out to our team if she needs anything as we are here to support her as well.     Pt to review blank MOLST with spouse. Emotional support provided. SW relayed availability & our team will continue to follow. 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 Pt presents today to ED due to fever (Tmax 102.7) and pain at home. Pt states while in [place], approx two weeks ago, he presented to hospital with pain and IR at the time was unable to place stent and so 8F biliary drain placed. Pt flushing twice a day as directed. Pt wife at bedside to endorse history.  (16 Feb 2022 20:23)      PERTINENT PMH REVIEWED:  [x ] YES [ ] NO           Primary Contact: spouse Autumn Nelson  ( [phone])     HCP [ x ] Surrogate [   ] Guardian [   ]    Mental Status:  Alert  [ x ] Oriented [ x ] Confused [  ] Lethargic [  ]  Concerns of Depression [  ] None identified  Anxiety [   ] None identified  Baseline ADLs (prior to admission):  Independent [ ] moderately [x ] fully   Dependent   [ ] moderately [ ]fully    Family Meeting attendees: Patient participated in GO discussion with Dr. Vanegas on 2/17    Anticipated Grief: Patient[ x ] Family [ x ]    Caregiver [place] Assessed: Yes [ x ] No [  ]    Nondenominational: Voodoo    Spiritual Concerns: None identified.  available.     Goals of Care: Pt to review blank MOLST with spouse. Pt to follow up with Oncologist which he says will help in his decision-making process.     Previous Services: None identifief.     ADVANCE DIRECTIVES:  [x ] YES [ ] NO    - Health Care Proxy located on [employer] naming spouse Autumn as primary health care agent   - Patient has blank MOLST at bedside & would like to discuss with spouse prior to completing     Anticipated D/C Plan: TBD.                      Summary: SW met with patient & pt's spouse Autumn to introduce self & offer support. Palliative SW role explained. Pt appears alert, oriented & able to make needs known. Pt appeared open to speaking but would grimace in pain throughout discussion. Pt acknowledged discussion with Dr. Vanegas & appreciated how thorough she was.     Pt said he was OOB ambulating but was disappointed that he could not walk too far. Pt shares that he's "not worried about himself" but is worried about his wife & his three children. He states his children are in the process of moving from the city & its an "exciting time for them" but he states that he doesn't want them to have to worry about him. Feelings validated.     Pt has attended Cancer Support groups in the past via Zoom but he feels he would benefit more from one that is specific to individuals with the same diagnosis. Cancer resources provided by floor MONICA in folder.     SW asked spouse how she was doing & she said she is just "taking things day by day." SW encouraged her to reach out to our team if she needs anything as we are here to support her as well.     Pt to review sagar GERARDO with spouse. Emotional support provided. SW relayed availability & our team will continue to follow.

## 2022-02-17 NOTE — CONSULT NOTE ADULT - SUBJECTIVE AND OBJECTIVE BOX
CHIEF COMPLAINT:  Hydronephrosis    HISTORY OF PRESENT ILLNESS:  72 yo male admitted to the hospital after presenting with fever and pain at home.  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  On CT scan:   right kidney demonstrates a delayed nephrogram and moderate right   hydronephrosis with transition at the right ureteropelvic junction,   likely related to malignant obstruction.  Retroperitoneal adenopathy.    PAST MEDICAL & SURGICAL HISTORY:  Hearing loss  right    Stented coronary artery  2006 PTCA x 1    CAD (coronary artery disease)  last stress test 2018    HTN (hypertension)    Hyperlipidemia    GERD (gastroesophageal reflux disease)    Skin cancer  squamous cell - head, shin - removed    OA (osteoarthritis)    Psoriasis    Inguinal hernia  right    Brain aneurysm  non ruptured - had stent placed in  after attemtped clipping and coiling in     Pancreatic cancer  s/p Whipple procedure    Drug or chemical induced diabetes mellitus    H/O craniotomy  for non ruptured aneurysm , attempted clipping    History of other surgery  for brain aneurysm - attempted coiling     History of other surgery  endovascular stent placement for brain Aneurysm at [place] -     History of tonsillectomy    S/P colonoscopy  last - within 5 yrs    History of PTCA  2006 x 1 stent    H/O inguinal hernia repair    Pancreatic disorder  Whipple for cancer        REVIEW OF SYSTEMS:  All other review of systems is negative unless indicated above.    MEDICATIONS  (STANDING):  aspirin enteric coated 81 milliGRAM(s) Oral daily  atorvastatin 20 milliGRAM(s) Oral at bedtime  cholecalciferol 2000 Unit(s) Oral daily  gabapentin 100 milliGRAM(s) Oral every 8 hours  metoprolol succinate ER 12.5 milliGRAM(s) Oral at bedtime  multivitamin 1 Tablet(s) Oral daily  pancrelipase (ZENPEP 20,000 Lipase Units) 4 Capsule(s) Oral three times a day with meals  pantoprazole    Tablet 40 milliGRAM(s) Oral daily  piperacillin/tazobactam IVPB.. 3.375 Gram(s) IV Intermittent every 8 hours  polyethylene glycol 3350 17 Gram(s) Oral daily  sodium chloride 0.9%. 1000 milliLiter(s) (75 mL/Hr) IV Continuous <Continuous>    MEDICATIONS  (PRN):  acetaminophen     Tablet .. 650 milliGRAM(s) Oral every 6 hours PRN Temp greater or equal to 38C (100.4F), Mild Pain (1 - 3)  aluminum hydroxide/magnesium hydroxide/simethicone Suspension 30 milliLiter(s) Oral every 4 hours PRN Dyspepsia  hydrALAZINE Injectable 10 milliGRAM(s) IV Push every 4 hours PRN SBP>170  HYDROmorphone   Tablet 2 milliGRAM(s) Oral every 4 hours PRN Severe Pain (7 - 10)  HYDROmorphone   Tablet 1 milliGRAM(s) Oral every 4 hours PRN Moderate Pain (4 - 6)  HYDROmorphone  Injectable 1 milliGRAM(s) IV Push every 4 hours PRN breathrough pain  melatonin 10 milliGRAM(s) Oral at bedtime PRN Insomnia  ondansetron Injectable 4 milliGRAM(s) IV Push every 6 hours PRN Nausea and/or Vomiting      Allergies    No Known Allergies    Intolerances        SOCIAL HISTORY:    FAMILY HISTORY:  Family history of breast cancer in sister        Vital Signs Last 24 Hrs  T(C): 36.5 (2022 15:30), Max: 37 (2022 04:50)  T(F): 97.7 (2022 15:30), Max: 98.6 (2022 04:50)  HR: 74 (2022 15:30) (66 - 78)  BP: 142/48 (2022 15:30) (120/67 - 142/48)  BP(mean): --  RR: 18 (2022 15:30) (16 - 18)  SpO2: 100% (2022 15:30) (97% - 100%)    PHYSICAL EXAM:    Constitutional: c/o back and abdominal pain   HEENT: EOMI, Normal Hearing  Neck: Supple  Back: No costovertebral angle tenderness  Respiratory: Normal respiratory effort    Cardiovascular: Normal peripheral circulation   Abd: Soft, biliary drain in RUQ, non tender  Extremities: No peripheral edema  Neurological: No focal deficits  Psychiatric: Normal mood, normal affect  Musculoskeletal: Moving all 4 extremities  Skin: No rashes    LABS:                        9.0    9.14  )-----------( 190      ( 2022 06:39 )             27.6     -    137  |  107  |  30<H>  ----------------------------<  93  4.1   |  21<L>  |  1.45<H>    Ca    8.8      2022 06:39    TPro  6.6  /  Alb  2.3<L>  /  TBili  1.5<H>  /  DBili  x   /  AST  18  /  ALT  20  /  AlkPhos  414<H>  02-17    PT/INR - ( 2022 06:39 )   PT: 16.2 sec;   INR: 1.42 ratio         PTT - ( 2022 06:39 )  PTT:31.6 sec  Urinalysis Basic - ( 2022 10:07 )    Color: Yellow / Appearance: Clear / S.015 / pH: x  Gluc: x / Ketone: Negative  / Bili: Negative / Urobili: Negative   Blood: x / Protein: Negative / Nitrite: Negative   Leuk Esterase: Trace / RBC: 0-2 /HPF / WBC 0-2   Sq Epi: x / Non Sq Epi: Occasional / Bacteria: Occasional    < from: CT Abdomen and Pelvis w/ IV Cont (22 @ 04:24) >  CT ABDOMEN AND PELVIS IC                          PROCEDURE DATE:  2022          INTERPRETATION:  CLINICAL INFORMATION: Fever, abdominal pain, history of   pancreatic cancer    COMPARISON: None.    CONTRAST/COMPLICATIONS:  IV Contrast: Omnipaque 300  90 cc administered   10 cc discarded  Oral Contrast: NONE  Complications: None reported at time of study completion    PROCEDURE:  CT of the Abdomen and Pelvis was performed.  Sagittal and coronal reformats were performed.    FINDINGS:  LOWER CHEST: Coronary atherosclerosis.    LIVER: Within normal limits.  BILE DUCTS: There is biliary stent inserted via the right lateral flank   which enters the right hepatic duct and is coiled in the jejunum. There   is mild intrahepatic biliary dilatation.  GALLBLADDER: Within normal limits.  SPLEEN: Within normal limits.  PANCREAS: The patient is status post Whipple procedure. There are no   prior studies for comparison. In the surgical bed, there is infiltrative   ill-definedprominent soft tissue measuring roughly 6.3 (2:49) x 3.8  x   4.2 cm adjacent to the pancreaticojejunostomy sutures concerning for   recurrent tumor. There is marked dilatation of the jejunal loop at the   luh hepatis measuring up to 4.4 cm.  ADRENALS: Within normal limits.  KIDNEYS/URETERS: The right kidney demonstrates a delayed nephrogram.   There is a moderate right hydronephrosis with transition point at the   ureteropelvic junction. There is no hydroureter or convincing obstructing   stone. There is a 6.6 cm left renal cyst.    BLADDER: Within normal limits.  REPRODUCTIVE ORGANS: The prostate gland is not enlarged.    BOWEL: Moderate hiatal hernia. No bowel obstruction. Appendix is   unremarkable. Colon is underdistended without significant fecal load.  PERITONEUM: Trace amount of pelvic ascites. Reticular studding of the fat   in the right upper quadrant subhepatic space suspicious for   carcinomatosis. There is a 3 cm masslike low density nodule in the right   lower quadrant medial to the distal right external iliac vessels   compatible with neoplastic implant.  VESSELS: The aorta is not dilated. There is mild to moderate   atherosclerotic vascular calcification. There is focal thrombosis of the   superior mesenteric vein inthe region of suspected tumor recurrence.   There is ill-defined soft tissue abutting the superior mesenteric artery.  RETROPERITONEUM/LYMPH NODES: A 2.1 cm precaval lymph node is nonspecific.   There is a 9 mm right iliac vein chain lymph node.  Prominent soft tissue   in the right anterior pararenal space measuring 4.5 x 2 cm suspicious for   tumor recurrence.  ABDOMINAL WALL: Left-sided ventral hernia mesh is barely underneath the   skin in this patient with a weak abdominal wall.  BONES: Within normal limits.    IMPRESSION:    No prior studies for comparison.  No focal collection or soft tissue gas.    Status post Whipple procedure with a 6.3 cm infilatrative mass in the   surgical bed adjacent to jejunal sutures compatible with tumor   recurrence.  Dilated small bowel loop at the luh compatible with   malignant obstruction of choledochojejunostomy loop. Satisfactory   positioning of right biliary stent with mild intrahepatic biliary   dilatation.   Correlate clinically for cholangitis.  No gastric outlet   obstruction.  Neoplastic implants in the right upper and lower quadrants.  Focal thrombosis in the superior mesenteric vein related to the site of   tumor recurrence.    The right kidney demonstrates a delayed nephrogram and moderate right   hydronephrosis with transition at the right ureteropelvic junction,   likely related to malignant obstruction.  Retroperitoneal adenopathy.    Trace amount of free fluid.    Moderate hiatal hernia.    < end of copied text >

## 2022-02-17 NOTE — PROGRESS NOTE ADULT - ATTENDING COMMENTS
Patient seen and examined  on rounds with NP Florence Winters .  I was physically present for the key portion of the evaluation and management service provided, I  examined the patient myself and reviewed the plan of care with the patient and  NP Florence Winters , which I have reviewed and edited .  Medical records reviewed. History, review of systems, physical findings and lab results as documented confirmed , except as modified by me.  Agree with the assessment and plan of as stated and discussed, except as modified below.    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     A/P   1. Intractable pain due to  malignant obstruction of choledochojejunostomy loop s/p biliary drain exchange with underlying metastatic ampullary cancer s/p whipple 2020 - drain care, oncology consult , pain management   2. Gram negative bacteremia possible cholangitis - IV zosyn, ID consult, f/u cultures  3. YAMILETH with moderate right hydronephrosis - IV fluids, urology evaluation for need of nephrostomy vs stent   4. Superior mesenteric vein thrombosis - awaiting AC recs as per oncology hematology team   5. Anemia chemotherapy induced - monitor  6. CAD, HTN - c/w ASA, statin, BB, losartan d/justin , hydralazine prn     Dispo - IV fluids, IV abx, ID and urology evaluation

## 2022-02-17 NOTE — CONSULT NOTE ADULT - SUBJECTIVE AND OBJECTIVE BOX
HPI:  Pt is a 72 yo male with a pmh/o thrombocytopenia, E.coli bacteremia, brain aneurysm, HTN, HLD, CAD, GERD,  metastatic ampullary cancer [place]last two weeks ago[place] [facility] [place] who is s/p whipple resection in 2020. Pt presents today to ED due to fever (Tmax 102.7) and pain at home. Pt states while in [place], approx two weeks ago, he presented to hospital with pain and IR at the time was unable to place stent and so 8F biliary drain placed. Pt flushing twice a day as directed. Pt wife at bedside to endorse history.     ED course: received zosyn, VSS however pt in intractable pain despite multiple medications received. IR and oncology consulted. Pt had IR procedure: drain changed from 8 to 12F catheter with 150cc output at time of hospitalist exam. Returned to ED and admitted for pain control, hydration, palliative and oncology consult.  (16 Feb 2022 20:23)      PAST MEDICAL & SURGICAL HISTORY:  Hearing loss  right    Stented coronary artery  2006 PTCA x 1    CAD (coronary artery disease)  last stress test Fall 2018    HTN (hypertension)    Hyperlipidemia    GERD (gastroesophageal reflux disease)    Skin cancer  squamous cell - head, shin - removed    OA (osteoarthritis)    Psoriasis    Inguinal hernia  right    Brain aneurysm  non ruptured - had stent placed in 2010 after attemtped clipping and coiling in 2007    Pancreatic cancer  s/p Whipple procedure    Drug or chemical induced diabetes mellitus    H/O craniotomy  for non ruptured aneurysm 2007, attempted clipping    History of other surgery  for brain aneurysm - attempted coiling 2007    History of other surgery  endovascular stent placement for brain Aneurysm at [facility] - 2010    History of tonsillectomy    S/P colonoscopy  last - within 5 yrs    History of PTCA  2006 x 1 stent    H/O inguinal hernia repair    Pancreatic disorder  Whipple for cancer        Allergies    No Known Allergies    Intolerances        MEDICATIONS  (STANDING):  aspirin enteric coated 81 milliGRAM(s) Oral daily  atorvastatin 20 milliGRAM(s) Oral at bedtime  cholecalciferol 2000 Unit(s) Oral daily  gabapentin 100 milliGRAM(s) Oral every 8 hours  metoprolol succinate ER 12.5 milliGRAM(s) Oral at bedtime  multivitamin 1 Tablet(s) Oral daily  pancrelipase (ZENPEP 20,000 Lipase Units) 4 Capsule(s) Oral three times a day with meals  pantoprazole    Tablet 40 milliGRAM(s) Oral daily  piperacillin/tazobactam IVPB.. 3.375 Gram(s) IV Intermittent every 8 hours  polyethylene glycol 3350 17 Gram(s) Oral daily  sodium chloride 0.9%. 1000 milliLiter(s) (75 mL/Hr) IV Continuous <Continuous>    MEDICATIONS  (PRN):  acetaminophen     Tablet .. 650 milliGRAM(s) Oral every 6 hours PRN Temp greater or equal to 38C (100.4F), Mild Pain (1 - 3)  aluminum hydroxide/magnesium hydroxide/simethicone Suspension 30 milliLiter(s) Oral every 4 hours PRN Dyspepsia  hydrALAZINE Injectable 10 milliGRAM(s) IV Push every 4 hours PRN SBP>170  HYDROmorphone   Tablet 2 milliGRAM(s) Oral every 4 hours PRN Severe Pain (7 - 10)  HYDROmorphone   Tablet 1 milliGRAM(s) Oral every 4 hours PRN Moderate Pain (4 - 6)  HYDROmorphone  Injectable 1 milliGRAM(s) IV Push every 4 hours PRN breathrough pain  melatonin 10 milliGRAM(s) Oral at bedtime PRN Insomnia  ondansetron Injectable 4 milliGRAM(s) IV Push every 6 hours PRN Nausea and/or Vomiting      FAMILY HISTORY:  Family history of breast cancer in sister        SOCIAL HISTORY: No EtOH, no tobacco    REVIEW OF SYSTEMS:    CONSTITUTIONAL: No weakness, fevers or chills  EYES/ENT: No visual changes;  No vertigo or throat pain   NECK: No pain or stiffness  RESPIRATORY: No cough, wheezing, hemoptysis; No shortness of breath  CARDIOVASCULAR: No chest pain or palpitations  GASTROINTESTINAL: No abdominal or epigastric pain. No nausea, vomiting, or hematemesis; No diarrhea or constipation. No melena or hematochezia.  GENITOURINARY: No dysuria, frequency or hematuria  NEUROLOGICAL: No numbness or weakness  SKIN: No itching, burning, rashes, or lesions   All other review of systems is negative unless indicated above.        T(F): 97.7 (02-17-22 @ 15:30), Max: 98.6 (02-17-22 @ 04:50)  HR: 74 (02-17-22 @ 15:30)  BP: 142/48 (02-17-22 @ 15:30)  RR: 18 (02-17-22 @ 15:30)  SpO2: 100% (02-17-22 @ 15:30)  Wt(kg): --     NO acute distress   frail and cachetic   no abdominal tenderness   no edema   bitemporal wasting   Biliary drain in place no signs of erythema                           9.0    9.14  )-----------( 190      ( 17 Feb 2022 06:39 )             27.6       02-17    137  |  107  |  30<H>  ----------------------------<  93  4.1   |  21<L>  |  1.45<H>    Ca    8.8      17 Feb 2022 06:39    TPro  6.6  /  Alb  2.3<L>  /  TBili  1.5<H>  /  DBili  x   /  AST  18  /  ALT  20  /  AlkPhos  414<H>  02-17          PT/INR - ( 17 Feb 2022 06:39 )   PT: 16.2 sec;   INR: 1.42 ratio         PTT - ( 17 Feb 2022 06:39 )  PTT:31.6 sec    Clean Catch None  02-16 @ 10:07   <10,000 CFU/mL Normal Urogenital Emily  --  --      .Blood None  02-16 @ 02:34   Growth in anaerobic bottle: Gram Negative Rods  ***Blood Panel PCR results on this specimen are available  approximately 3 hours after the Gram stain result.***  Gram stain, PCR, and/or culture results may not always  correspond due to difference in methodologies.  ************************************************************  This PCR assay was performed by multiplex PCR. This  Assay tests for 66 bacterial and resistance gene targets.  Please refer to the [facility] test directory  at [email or web address] for details.  --  Blood Culture PCR      .Blood None  01-15 @ 07:09   No Growth Final  --  --      Clean Catch None  01-13 @ 17:49   <10,000 CFU/mL Normal Urogenital Emily  --  --      .Blood None  01-13 @ 16:11   Growth in anaerobic bottle: Escherichia coli  ***Blood Panel PCR results on this specimen are available  approximately 3 hours after the Gram stain result.***  Gram stain, PCR, and/or culture results may not always  correspond due to difference in methodologies.  ************************************************************  This PCR assay was performed by multiplex PCR. This  Assay tests for 66 bacterial and resistance gene targets.  Please refer to the [facility] test directory  at [email or web address] for details.  --  Blood Culture PCR  Escherichia coli

## 2022-02-17 NOTE — PROGRESS NOTE ADULT - ASSESSMENT
70 YO M with Local recurrence of ampullary carcinoma of 6.3cm causing obstruction of the choledochojejunostomy with no bowel obstruction    Recommend diet advancement  Recommend discharge home today   Recommend drain care at home  Pt already has established Surgical Oncologist at [facility] Dr Yesika Sanchez; he will follow up with her and his Oncologist.      Case discussed with Dr Jane

## 2022-02-17 NOTE — PROGRESS NOTE ADULT - ATTENDING COMMENTS
Patient was seen and examined. He has hydronephrosis noted on the most recent CT. Will consult Urology to evaluate for stent placement. Blood cultures are negative so far.

## 2022-02-17 NOTE — CONSULT NOTE ADULT - ASSESSMENT
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 Pt states while in [place], approx. two weeks ago, he presented to hospital with pain  secondary to biliary obstruction   and IR at the time was unable to place stent and so 8F biliary drain placed. Pt reports the day of admission was unable to flush the drain and due to above symptoms presented to the ED. At the ED pt had a CT abdomen that showed tumor recurrence and obstruction of the choledojejunostomy loop. IR  consulted and preformed a drain change from 8 to 12F catheter with 150cc output. Pt with chronic back  pain most likely viscera with cute worsening requiring multiple dose of IV hydromorphone Palliative consulted to help in management of symptoms.     #Acute on chronic pain in the setting of malignancy   - Pain mostly visceral due to current malignancy   - Mostly located in the  middle to lower back since was diagnosed with cancer on 2020  with an current acute exacerbation due to billary drain obstruction.   - Previous to hospitalization pain was managed with Hydromorphone 1 mg PO PRN (took it approximately 3 times daily) , Tylenol and Gabapentin 100 mg PO every 8 hrs  with well control of  pain   - While hospitalized patient have received Hydromorphone 1 mg IV x 5 times and Morphine 4 mg IV x once int he last 24 hrs   - Need of pain medication calculated  and patient could benefit of a Fentanyl patch 25mcg every 72 hrs and Hydromorphone 2 mg PO every three hrs PRN.  Medication options discussed with patient but  would like to continue only in oral hydromorphone at this moment.    - Start Hydromorphone 2 mg PO every 4 hrs PRN severe pain   - Start Hydromorphone 1 mg PO every 4 hrs PRN moderate pain   - Continue Hydromorphone 1 mg IV every 4 hrs PRN  for breakthrough pain ( only after oral medication).   - Continue Tylenol PRN mild pain   - Recommend  bowel regimen while in on opioids. Miralax daily ordered   - Plan is to discharge pt on oral hydromorphone and him to follow up with hemato-oncology at [facility] [place] for optimization of medication and initiation of fentanyl patch if needed.    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 Pt states while in [place], approx. two weeks ago, he presented to hospital with pain  secondary to biliary obstruction   and IR at the time was unable to place stent and so 8F biliary drain placed. Pt reports the day of admission was unable to flush the drain and due to above symptoms presented to the ED. At the ED pt had a CT abdomen that showed tumor recurrence and obstruction of the choledojejunostomy loop. IR  consulted and preformed a drain change from 8 to 12F catheter with 150cc output. Pt with chronic back  pain most likely viscera with cute worsening requiring multiple dose of IV hydromorphone Palliative consulted to help in management of symptoms.     #Acute on chronic pain in the setting of malignancy   - Pain mostly visceral due to current malignancy   - Mostly located in the  middle to lower back since was diagnosed with cancer on 2020  with an current acute exacerbation due to billary drain obstruction.   - Previous to hospitalization pain was managed with Hydromorphone 1 mg PO PRN (took it approximately 3 times daily) , Tylenol and Gabapentin 100 mg PO every 8 hrs  with well control of  pain   - While hospitalized patient have received Hydromorphone 1 mg IV x 5 times and Morphine 4 mg IV x once int he last 24 hrs   - Need of pain medication calculated  and patient could benefit of a Fentanyl patch 25mcg every 72 hrs and Hydromorphone 2 mg PO every three hrs PRN.  Medication options discussed with patient but  would like to continue only in oral hydromorphone at this moment.    - Start Hydromorphone 2 mg PO every 4 hrs PRN severe pain   - Start Hydromorphone 1 mg PO every 4 hrs PRN moderate pain   - Continue Hydromorphone 1 mg IV every 4 hrs PRN  for breakthrough pain ( only after oral medication).   - Continue Tylenol PRN mild pain   - Recommend  bowel regimen while in on opioids. Miralax daily ordered   - Plan is to discharge pt on oral hydromorphone and him to [place] up [place] [facility] [place] of medication and initiation of fentanyl patch if needed.    #Biliary drain malfunction   -s/p IR drainage on 2/16->150cc output so far  -strict i/o's  -s/p Zosyn in ED, pt w/o criteria for sepsis at this time  - f/u blood and urine cultures    #Recurrent metastatic ampullary carcinoma   - Dx  on Aug 2020   - s/o Wipple on Sep, 2020   - Follow up with [place] Thomas at [facility]   -CT abdomen that showed tumor recurrence and obstruction of the choledojejunostomy loop.   - Currently on chemotherapy being the last one two weeks ago   - Pt have a schedule appointment with Dr Thomas to discuss treatment options   - Pt was evaluated by Dr Castro  and per patient was recommended to discuses treatment option with  Dr Thomas   - Continue pain management     #Debility   - Recommend PT consult     #Protein calorie malnutrition  - Albumin 2.3   -prealbumin ordered by primary team   -Recommend nutrition consult  -Advance diet as tolerated     #Prognosis   - Likely very poor  - PPSV2: 40 %  - Pt with an advanced metastatic and recurrent ampullary carcinoma that have fail chemotherapy  - Albumin 2.3   - Due to above criteria patient will benefit of hospice  if there is no more additional treatment offered by hemato-oncology     #Advance directives   - Pt have a living will where he stipulated is DNR/DNI but would like to further discuss it with wife   - HCP is wife Autumn Nelson  ( [phone])   - MOLTS form discussed with patient and would like to further discuss his wishes with  his wife. Pt instructed to fill the form when is ready in order to be sign for a physician and to keep it with him and to bring it every time he cames to the hospital.  - Please refer to GOC section     Case discussed with Dr Vanegas    Pt is a 71y old Male with PMHx thrombocytopenia, E.coli bacteremia, brain aneurysm, HTN, HLD, CAD, GERD,  metastatic ampullary cancer (diagnosed on Aug, 2020) [place]last two [place] [facility] pancreatobiliary type who is s/p whipple resection in 2020 presented to the ED on 2/16/22 for evaluation fever (Tmax 102.7), nauseas and increased pain at home . Pt states while in [place], approx. two weeks ago, he presented to hospital with pain secondary to biliary obstruction and IR at the time was unable to place stent and so 8F biliary drain placed. Pt reports the day of admission was unable to flush the drain and due to above symptoms presented to the ED. At the ED pt had a CT abdomen that showed tumor recurrence and obstruction of the choledojejunostomy loop. IR  consulted and preformed a drain change from 8 to 12F catheter with 150cc output. Pt with chronic back  pain most likely visceral with acute worsening requiring multiple doses of IV hydromorphone. Palliative consulted to help in management of symptoms.     #Acute on chronic pain in the setting of malignancy   - Pain mostly visceral due to current malignancy   - Mostly located in the  middle to lower back since was diagnosed with cancer on 2020. Pt with an acute exacerbation due to billary drain obstruction.   - Previous to hospitalization pain was managed with Hydromorphone 1 mg PO PRN (took it approximately 3 times daily) , Tylenol and Gabapentin 100 mg PO every 8 hrs with well control of pain.   - While hospitalized patient have received Hydromorphone 1 mg IV x 5 times and Morphine 4 mg IV x once in the last 24 hrs.   - The need of pain medication calculated  and patient could benefit of  treatment with a Fentanyl patch 25mcg every 72 hrs and Hydromorphone 2 mg PO every three hrs PRN.  Medication options discussed with patient but he  would like to continue only in oral hydromorphone at this moment.    - Start Hydromorphone 2 mg PO every 4 hrs PRN severe pain   - Start Hydromorphone 1 mg PO every 4 hrs PRN moderate pain   - Continue Hydromorphone 1 mg IV every 4 hrs PRN  for breakthrough pain (only after oral medication).   - Continue Tylenol PRN mild pain   - Recommend  bowel regimen while in on opioids. Miralax daily ordered   - Plan is to discharge pt in oral hydromorphone and him to follow up with hemato-oncology at [facility] [place] for optimization of medication and initiation of fentanyl patch if needed.    #Biliary drain malfunction/obstruction   -s/p IR drainage on 2/16->150cc output so far  -strict i/o's  -s/p Zosyn in ED, pt w/o criteria for sepsis at this time  - f/u blood and urine cultures    #Recurrent metastatic ampullary carcinoma   - Dx on Aug 2020   - s/p Whipple on Sep, 2020   - Follow up with HILDA Thomas at [facility]   -CT abdomen t showed tumor recurrence and obstruction of the choledojejunostomy loop.   - Currently on chemotherapy, being the last one two weeks ago   - Pt have a schedule appointment with Dr Thomas to discuss treatment options   - Pt was evaluated by Dr Castro  and per patient was recommended to discuss treatment options with  Dr Thomas   - Continue pain management     #Debility   - Recommend PT consult     #Protein calorie malnutrition  -Albumin 2.3   -prealbumin ordered by primary team   -Recommend nutrition consult  -Advance diet as tolerated     #Prognosis   - Likely very poor  - PPSV2: 40 %  - Pt with an advanced metastatic and recurrent ampullary carcinoma that have fail chemotherapy  - Albumin 2.3   - Due to above criteria patient will benefit of hospice  if there is no more additional treatment offered by hemato-oncology     #Advance directives   - Pt have a living will where he stipulated is DNR/DNI but would like to further discuss it with wife   - HCP is wife Autumn Nelson  ( [phone])   - MOLTS form discussed with patient and would like to further discuss his wishes with his wife. Pt instructed to fill the form when he is ready in order to be sign for a physician and to keep it with him and to bring it every time he came to the hospital.  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 Now admitted on 2/16/22 for evaluation fever (Tmax 102.7), nauseas and increased pain at home, associated with inability to fully flush biliary drain. Of note, while in [place], approx two weeks ago, he presented to hospital with pain secondary to biliary obstruction and IR at the time was unable to place stent and so 8F biliary drain placed. Found here to have YAMILETH and mild leukocytosis due to CT AP showing tumor recurrence, obstruction of the choledojejunostomy loop, and R hydro likely also due to malignant obstruction; s/p IR upsizing of biliary drain. Palliative consulted to help in management of symptoms.     1) Acute on chronic pain in the setting of malignancy   - Pain mostly visceral due to progression of underlying malignancy  - pt with chronic back pain, with an acute exacerbation due to billary drain obstruction.   - Previous to hospitalization pain was managed with Hydromorphone 1 mg PO PRN (took it approximately 3 times daily) , Tylenol and Gabapentin 100 mg PO every 8 hrs with well control of pain.   - While hospitalized patient have received Hydromorphone 1 mg IV x 5 times and Morphine 4 mg IV x once in the last 24 hrs.   - 24 hr opioid need calculated and following regimen recommended: Fentanyl patch 12mcg (could use 25mcg but prefer low and slow approach) every 72 hrs and Hydromorphone 2 mg PO every four hrs PRN.    - Medication options discussed with patient but he  would like to continue only in oral hydromorphone at this moment.    - Start Hydromorphone 2 mg PO every 4 hrs PRN severe pain   - Start Hydromorphone 1 mg PO every 4 hrs PRN moderate pain   - Continue Hydromorphone 1 mg IV every 4 hrs PRN  for breakthrough pain (only after oral medication).   - Continue Tylenol PRN mild pain   - ordered bowel regimen while in on opioids: Miralax daily   - Plan is to discharge pt in oral hydromorphone and him to follow up with hemato-oncology at [facility] [place] for optimization of medication and initiation of fentanyl patch if needed.  - will monitor and adjust accordingly    2) Biliary drain malfunction/obstruction   -s/p IR drainage on 2/16->150cc output so far  -strict i/o's  -s/p Zosyn in ED, pt w/o criteria for sepsis at this time  - f/u blood and urine cultures  - surgical consult appreciated- they agreed with suggestion for IR adjustment of tube    3) Recurrent metastatic ampullary carcinoma   - Dx in Aug 2020   - s/p Whipple on Sep, 2020 at [place]  - Follow up with Dr. Thomas at [facility] [place]   - CT abdomen showed tumor recurrence and obstruction of the choledojejunostomy loop and also R hydro.   - Currently on chemotherapy, last one two weeks ago   - Pt has appointment with Dr Thomas to discuss treatment options   - Pt was evaluated by Dr Castro and per patient was recommended to discuss treatment options (or lack thereof) with Dr Thomas     4) Debility/#Protein calorie malnutrition  - PPSV 50-60%  - care coordinator note appreciated- lives with wife, would like a cane, open to home care  - Recommend PT consult   - recommend dietary evaluation, considering shared history of significant weight loss and evidence of muscle/fat wasting on exam  -Albumin 2.3     5) Prognosis   - Likely very poor  - PPSV2 approaching 40 %, which would be the point functionally where hospice should be considered  - Pt with an advanced metastatic and recurrent ampullary carcinoma that has progressed despite optimized disease-focused treatment  - Albumin 2.3 (<2.5 purports a higher likelihood of mortality when combined with significant advanced illness)  - Due to above criteria patient will benefit of hospice only if there is no more additional treatment offered by hemato-oncology or when pt feels treatment is no longer supporting optimal QOL     6) GOC/Advance directives   - pt able to demonstrate capacity for decision making  - HCP is wife Autumn Nelson  ([phone])   - Pt have a living will where he stipulated is DNR/DNI but would like to further discuss it with wife--> MOLST reviewed   - MOLTS form discussed with patient and would like to further discuss his wishes with his wife. Pt instructed to fill the form when he is ready in order to be sign for a physician and to keep it with him and to bring it every time he came to the hospital.  - Please refer to GOC section     Case discussed with Dr Vanegas

## 2022-02-17 NOTE — PROGRESS NOTE ADULT - ASSESSMENT
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         Intractable pain secondary to biliary drain malfunction   Metastatic ampullary cancer    Pain control with dilaudid  Zofran prn  Cont Zenpep  OOB and ambulate with assistance  s/p IR drainage on 2/16->150cc output so far  strict i/o's  s/p Zosyn in ED, pt w/o criteria for sepsis at this time, f/u blood and urine cultures, will continue abx if pt becomes febrile overnight and consult ID given pt h/o bacteremia.  holding chemical AC overnight due to recent procedure, VCD boots placed, please start chemical AC as IMPROVE score 3    YAMILETH with baseline SCr 0.7, multifactorial; Medication induced (ketorolac) and Right hydronephrosis 2/2 malignant obstruction   -   gentle hydration  monitor i/o as above  hold losartan, hydralazine ordered prn in lieu of  f/u with oncology as concern for   Palliative consult    #Hyperglycemia  A1c 5.5 7/2021  monitor on serum chemistry    #Protein calorie malnutrition  prealbumin ordered  nutrition consult  advance diet as tolerated in AM    #HTN  #HLD  DASH/TLC diet when advanced  hold losartan- reinstate when HTN improves  cont metoprolol with parameters  EKG similar to prior, no STT elevations/depressions  cont ASA, statin   71M with PMHx of metastatic ampullary cancer on chemotherapy who presents with intractable abdominal pain after with biliary drain placed two weeks ago, found to have malfunction due to blockage, YAMILETH due to dehydration, malnutrition, and hydronephrosis likely due to metastatic lesion.     Intractable pain secondary to biliary drain malfunction  Metastatic ampullary cancer s/p whipple 2020  - Biliary drain exchanged and upsized for 12F  - Pain well controlled   Zofran prn  Cont Zenpep  OOB and ambulate with assistance  s/p IR drainage on 2/16->150cc output so far  strict i/o's  s/p Zosyn in ED, pt w/o criteria for sepsis at this time, f/u blood and urine cultures, will continue abx if pt becomes febrile overnight and consult ID given pt h/o bacteremia.  holding chemical AC overnight due to recent procedure, VCD boots placed, please start chemical AC as IMPROVE score 3    YAMILETH with baseline SCr 0.7, multifactorial; Medication induced (ketorolac & Losartan) and Right hydronephrosis 2/2 malignant obstruction   - IV Hydration  - Hold NSAIDs and Losartan  - Check post void residual   - Strict I&O  - Urology consulted, Dr. Kennedy      Hyperglycemia  A1c 5.2  - trend     CAD  - continue aspirin and statin    Hypoalbuminemia 2/2 Protein calorie malnutrition  - nutrition consult  - advance diet as tolerated     HTN  - Losartan stopped 2/2 renal function  - continue BB  - Hydralazine PRN for SBP > 170    HLD  -continue statin    DVT PPX SCDs     DISPO: start abx, urology and ID consulted       71M with PMHx of metastatic ampullary cancer on chemotherapy who presents with intractable abdominal pain after with biliary drain placed two weeks ago, found to have malfunction due to blockage, YAMILETH due to dehydration, malnutrition, and hydronephrosis likely due to metastatic lesion.     Intractable pain secondary to biliary drain malfunction  Metastatic ampullary cancer s/p whipple 2020  - s/p Zosyn in ED, pt w/o criteria for sepsis at this time  - s/p exchange/upsizing of biliary drain   - Pain well controlled   - Zofran prn  - Cont pancreatic enzymes  OOB and ambulate with assistance  s/p IR drainage on 2/16->150cc output so far    Bacteremia (GNR) likely due to cholangitis   - s/p Zosyn in ED, pt w/o criteria for sepsis at this time  - Pt remains afebrile, Lactate benign  - Blood culture with GNR from anaerobic bottle; await species and sensitivity  - f/u urine cx  - Started on Zosyn  - ID consulted  - IR consulted    Superior mesenteric vein thrombosis   - Dr. Castro following, awaiting recs to start AC    YAMILETH with baseline SCr 0.7, multifactorial; Medication induced (ketorolac & Losartan) and Right hydronephrosis 2/2 malignant obstruction   - IV Hydration  - Hold NSAIDs and Losartan  - Check post void residual   - Strict I&O  - Urology consulted, Dr. Kennedy    Chemotherapy induced anemia   - trend cbc     Hyperglycemia  A1c 5.2  - trend     CAD  - continue aspirin and statin    Hypoalbuminemia 2/2 Protein calorie malnutrition  - nutrition consult  - advance diet as tolerated     HTN  - Losartan stopped 2/2 renal function  - continue BB  - Hydralazine PRN for SBP > 170    HLD  -continue statin    DVT PPX SCDs     DISPO: start abx, urology and ID consulted       71M with PMHx of metastatic ampullary cancer on chemotherapy who presents with intractable abdominal pain after with biliary drain placed two weeks ago, found to have malfunction due to blockage, YAMILETH due to dehydration, malnutrition, and hydronephrosis likely due to metastatic lesion.     Intractable pain due to  malignant obstruction of choledochojejunostomy loop s/p biliary drain exchange .   Metastatic ampullary cancer s/p whipple 2020  - s/p Zosyn in ED, pt w/o criteria for sepsis at this time  - s/p IR drainage on 2/16->150cc output so far by IR  Dr. Juan   - s/p exchange/upsizing of biliary drain   - Pain well controlled   - Zofran prn,  Cont pancreatic enzymes  - OOB and ambulate with assistance    Bacteremia (GNR) likely due to cholangitis   - s/p Zosyn in ED, pt w/o criteria for sepsis at this time  - Pt remains afebrile, Lactate benign  - Blood culture with GNR from anaerobic bottle; await species and sensitivity  - f/u urine cx  - 2/17  Started on Zosyn  - ID consulted    Superior mesenteric vein thrombosis   - Dr. Castro following, awaiting recs to start AC  - lovenox 40 today x 1 dose     YAMILETH with baseline SCr 0.7, multifactorial; Medication induced (ketorolac & Losartan) associated with   moderate Right hydronephrosis due to  malignant obstruction   - IV Hydration  - Hold NSAIDs and Losartan  - Check post void residual ,  Strict I&O  - Urology consulted, Dr. Kennedy - ? stent vs nephrostomy     Chemotherapy induced anemia - trend cbc     Hyperglycemia  A1c 5.2,  trend     CAD - continue aspirin and statin    Hypoalbuminemia 2/2 Protein calorie malnutrition  - nutrition consult,  advance diet as tolerated     HTN  - Losartan stopped 2/2 renal function  - continue BB  - Hydralazine PRN for SBP > 170    HLD  -continue statin    DVT PPX SCDs     DISPO: start abx, urology and ID consulted

## 2022-02-17 NOTE — CONSULT NOTE ADULT - SUBJECTIVE AND OBJECTIVE BOX
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 Pt states while in [place], approx two weeks ago, he presented to hospital with pain  secondary to biliary obstruction   and IR at the time was unable to place stent and so 8F biliary drain placed. Pt reports the day of admission was unable to flush the drain and due to above symptoms presented to the ED.At the ED pt had a CT abdomen that showed tumor recurrence and obstruction of the choledojejunostomy loop. IR  consulted and preformed a drain change from 8 to 12F catheter with 150cc output. Pt with chronic back  pain most likely viscera with cute worsening requiring multiple dose of IV hydromorphone Palliative consulted to help in management of symptoms.     Subjective: Pt was evaluated at [facility] this morning and reported chronic middle to lower back since was diagnosed with metastatic ampullary cancer on 2020. Previous to the placement of the biliary drain on 2/3/22 pain was managed only with Tylenol and Motrin. After the last hospitalization in [place] he was started on Hydromorphone 1 mg PO PRN and gabapentin 100 mg every 8 hrs. Pts states was using only Hydromorphone 1 mg PO three times day, tylenols PRN and Gabapentin with controlled pain.  Pt states doesn't like to use narcotic and opioids due to the risk of side effects including constipation. Pt reports current pain is  sharp, of and intensity 6/10 and exacerbated by movement and better controlled with IV Hydromorphone. He also endorse on/off  decreased appetite. He denies nausea, vomits, SOB, chest pain and any other complaints.     PAIN: (x )Yes   ( )No  Level:6/10  Location: Middle thoracic to lumbar back   Intensity:  6  /10  Quality: sharp   Aggravating Factors: movements   Alleviating Factors: pain medications and  resting   Radiation: none   Duration/Timing: constant   Impact on ADLs: none     DYSPNEA: ( ) Yes  ( x) No      PAST MEDICAL & SURGICAL HISTORY:  Hearing loss  right    Stented coronary artery  2006 PTCA x 1    CAD (coronary artery disease)  last stress test Fall 2018    HTN (hypertension)    Hyperlipidemia    GERD (gastroesophageal reflux disease)    Skin cancer  squamous cell - head, shin - removed    OA (osteoarthritis)    Psoriasis    Inguinal hernia  right    Brain aneurysm  non ruptured - had stent placed in 2010 after attemtped clipping and coiling in 2007    Pancreatic cancer  s/p Whipple procedure    Drug or chemical induced diabetes mellitus    H/O craniotomy  for non ruptured aneurysm 2007, attempted clipping    History of other surgery  for brain aneurysm - attempted coiling 2007    History of other surgery  endovascular stent placement for brain Aneurysm at [place] - 2010    History of tonsillectomy    S/P colonoscopy  last - within 5 yrs    History of PTCA  2006 x 1 stent    H/O inguinal hernia repair    Pancreatic disorder  Whipple for cancer        SOCIAL HX:    Hx opiate tolerance ( )YES  ( x)NO    Baseline ADLs  (Prior to Admission)  ( x) Independent   ( )Dependent    FAMILY HISTORY:  Family history of breast cancer in sister        Review of Systems:    Anxiety- no   Depression- no  Physical Discomfort- back pain   Dyspnea-no   Constipation- no  Diarrhea- loose stool   Nausea- no  Vomiting-no   Anorexia-yes   Weight Loss- yes , approximately 50 pounds since was diagnosed with cancer on 2020   Cough- none   Secretions- none   Fatigue- none   Weakness- yes   Delirium-no     All other systems reviewed and negative    PHYSICAL EXAM:    Vital Signs Last 24 Hrs  T(C): 36.2 (17 Feb 2022 09:42), Max: 37.1 (16 Feb 2022 16:20)  T(F): 97.2 (17 Feb 2022 09:42), Max: 98.8 (16 Feb 2022 16:20)  HR: 66 (17 Feb 2022 09:42) (66 - 90)  BP: 129/62 (17 Feb 2022 09:42) (106/50 - 136/61)  BP(mean): 78 (16 Feb 2022 19:47) (67 - 78)  RR: 18 (17 Feb 2022 09:42) (16 - 18)  SpO2: 100% (17 Feb 2022 09:42) (96% - 100%)  Daily     Daily     PPSV2: 60 %    Physical Exam   Gen: NAD, comfortable  HENT: atraumatic head and ears, no gross abnormalities of ears, mucous membranes moist, no oral lesions, cachexia   CV: RRR, nl s1/s2, no M/R/G  Pulm: nl respiratory effort, CTAB, no wheezes/crackles/rhonchi  Back: no scoliosis, lordosis, or kyphosis. No midline tenderness but  discomfort to palpation of the bilateral paraspinal muscles   Abd: normoactive bowel sounds in all 4 quadrants, soft, nontender, nondistended, no rebound, no guarding. LUQ reducible incisional hernia   Extremities: no pedal edema, pedal pulses palpable   Skin: nl warm and dry, no wounds   Neuro: A&Ox3, answering questions appropriately, PERRL, EOMI, face symmetric, sensation equal bilaterally in face, tongue midline, no dysarthria, 5/5 strength in upper and lower extremities bilaterally shin   Pysch: no depression, no SI, no HI      LABS:                        9.0    9.14  )-----------( 190      ( 17 Feb 2022 06:39 )             27.6     02-17    137  |  107  |  30<H>  ----------------------------<  93  4.1   |  21<L>  |  1.45<H>    Ca    8.8      17 Feb 2022 06:39    TPro  6.6  /  Alb  2.3<L>  /  TBili  1.5<H>  /  DBili  x   /  AST  18  /  ALT  20  /  AlkPhos  414<H>  02-17    PT/INR - ( 17 Feb 2022 06:39 )   PT: 16.2 sec;   INR: 1.42 ratio         PTT - ( 17 Feb 2022 06:39 )  PTT:31.6 sec  Albumin: Albumin, Serum: 2.3 g/dL (02-17 @ 06:39)      Allergies    No Known Allergies    Intolerances      MEDICATIONS  (STANDING):  aspirin enteric coated 81 milliGRAM(s) Oral daily  atorvastatin 20 milliGRAM(s) Oral at bedtime  cholecalciferol 2000 Unit(s) Oral daily  gabapentin 100 milliGRAM(s) Oral every 8 hours  metoprolol succinate ER 12.5 milliGRAM(s) Oral at bedtime  multivitamin 1 Tablet(s) Oral daily  pancrelipase (ZENPEP 20,000 Lipase Units) 4 Capsule(s) Oral three times a day with meals  pantoprazole    Tablet 40 milliGRAM(s) Oral daily  polyethylene glycol 3350 17 Gram(s) Oral daily  sodium chloride 0.9%. 1000 milliLiter(s) (75 mL/Hr) IV Continuous <Continuous>    MEDICATIONS  (PRN):  acetaminophen     Tablet .. 650 milliGRAM(s) Oral every 6 hours PRN Temp greater or equal to 38C (100.4F), Mild Pain (1 - 3)  aluminum hydroxide/magnesium hydroxide/simethicone Suspension 30 milliLiter(s) Oral every 4 hours PRN Dyspepsia  hydrALAZINE Injectable 10 milliGRAM(s) IV Push every 4 hours PRN SBP>170  HYDROmorphone   Tablet 2 milliGRAM(s) Oral every 4 hours PRN Severe Pain (7 - 10)  HYDROmorphone   Tablet 1 milliGRAM(s) Oral every 4 hours PRN Moderate Pain (4 - 6)  HYDROmorphone  Injectable 1 milliGRAM(s) IV Push every 4 hours PRN breathrough pain  melatonin 10 milliGRAM(s) Oral at bedtime PRN Insomnia  ondansetron Injectable 4 milliGRAM(s) IV Push every 6 hours PRN Nausea and/or Vomiting      RADIOLOGY/ADDITIONAL STUDIES:    < from: CT Abdomen and Pelvis w/ IV Cont (02.16.22 @ 04:24) >    IMPRESSION:    No prior studies for comparison.  No focal collection or soft tissue gas.    Status post Whipple procedure with a 6.3 cm infilatrative mass in the   surgical bed adjacent to jejunal sutures compatible with tumor   recurrence.  Dilated small bowel loop at the luh compatible with   malignant obstruction of choledochojejunostomy loop. Satisfactory   positioning of right biliary stent with mild intrahepatic biliary   dilatation.   Correlate clinically for cholangitis.  No gastric outlet   obstruction.  Neoplastic implants in the right upper and lower quadrants.  Focal thrombosis in the superior mesenteric vein related to the site of   tumor recurrence.    The right kidney demonstrates a delayed nephrogram and moderate right   hydronephrosis with transition at the right ureteropelvic junction,   likely related to malignant obstruction.  Retroperitoneal adenopathy.    Trace amount of free fluid.    Moderate hiatal hernia.    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 Pt states while in [place], approx two weeks ago, he presented to hospital with pain  secondary to biliary obstruction   and IR at the time was unable to place stent and so 8F biliary drain placed. Pt reports the day of admission was unable to flush the drain and due to above symptoms presented to the ED.At the ED pt had a CT abdomen that showed tumor recurrence and obstruction of the choledojejunostomy loop. IR  consulted and preformed a drain change from 8 to 12F catheter with 150cc output. Pt with chronic back  pain most likely viscera with cute worsening requiring multiple dose of IV hydromorphone Palliative consulted to help in management of symptoms.     Subjective: Pt was evaluated at [facility] this morning and reported chronic middle to lower back since was diagnosed with metastatic ampullary cancer on 2020. Previous to the placement of the biliary drain on 2/3/22 pain was managed only with Tylenol and Motrin. After the last hospitalization in [place] he was started on Hydromorphone 1 mg PO PRN and gabapentin 100 mg every 8 hrs. Pts states was using only Hydromorphone 1 mg PO three times day, tylenols PRN and Gabapentin with controlled pain.  Pt states doesn't like to use narcotic and opioids due to the risk of side effects including constipation. Pt reports current pain is  sharp, of and intensity 6/10 and exacerbated by movement and better controlled with IV Hydromorphone. He also endorse on/off  decreased appetite. He denies nausea, vomits, SOB, chest pain and any other complaints.     PAIN: (x )Yes   ( )No  Level:6/10  Location: Middle thoracic to lumbar back   Intensity:  6  /10  Quality: sharp   Aggravating Factors: movements   Alleviating Factors: pain medications and  resting   Radiation: none   Duration/Timing: constant   Impact on ADLs: none     DYSPNEA: ( ) Yes  ( x) No      PAST MEDICAL & SURGICAL HISTORY:  Hearing loss  right    Stented coronary artery  2006 PTCA x 1    CAD (coronary artery disease)  last stress test Fall 2018    HTN (hypertension)    Hyperlipidemia    GERD (gastroesophageal reflux disease)    Skin cancer  squamous cell - head, shin - removed    OA (osteoarthritis)    Psoriasis    Inguinal hernia  right    Brain aneurysm  non ruptured - had stent placed in 2010 after attemtped clipping and coiling in 2007    Pancreatic cancer  s/p Whipple procedure    Drug or chemical induced diabetes mellitus    H/O craniotomy  for non ruptured aneurysm 2007, attempted clipping    History of other surgery  for brain aneurysm - attempted coiling 2007    History of other surgery  endovascular stent placement for brain Aneurysm at [place] - 2010    History of tonsillectomy    S/P colonoscopy  last - within 5 yrs    History of PTCA  2006 x 1 stent    H/O inguinal hernia repair    Pancreatic disorder  Whipple for cancer        SOCIAL HX:    Hx opiate tolerance ( )YES  ( x)NO    Baseline ADLs  (Prior to Admission)  ( x) Independent   ( )Dependent    FAMILY HISTORY:  Family history of breast cancer in sister        Review of Systems:    Anxiety- no   Depression- no  Physical Discomfort- back pain   Dyspnea-no   Constipation- no  Diarrhea- loose stool   Nausea- no  Vomiting-no   Anorexia-yes   Weight Loss- yes , approximately 50 pounds since was diagnosed with cancer on 2020   Cough- none   Secretions- none   Fatigue- none   Weakness- yes   Delirium-no     All other systems reviewed and negative    PHYSICAL EXAM:    Vital Signs Last 24 Hrs  T(C): 36.2 (17 Feb 2022 09:42), Max: 37.1 (16 Feb 2022 16:20)  T(F): 97.2 (17 Feb 2022 09:42), Max: 98.8 (16 Feb 2022 16:20)  HR: 66 (17 Feb 2022 09:42) (66 - 90)  BP: 129/62 (17 Feb 2022 09:42) (106/50 - 136/61)  BP(mean): 78 (16 Feb 2022 19:47) (67 - 78)  RR: 18 (17 Feb 2022 09:42) (16 - 18)  SpO2: 100% (17 Feb 2022 09:42) (96% - 100%)  Daily     Daily     PPSV2: 40 %    Physical Exam   Gen: NAD, comfortable  HENT: atraumatic head and ears, no gross abnormalities of ears, mucous membranes moist, no oral lesions, cachexia   CV: RRR, nl s1/s2, no M/R/G  Pulm: nl respiratory effort, CTAB, no wheezes/crackles/rhonchi  Back: no scoliosis, lordosis, or kyphosis. No midline tenderness but  discomfort to palpation of the bilateral paraspinal muscles   Abd: normoactive bowel sounds in all 4 quadrants, soft, nontender, nondistended, no rebound, no guarding. LUQ reducible incisional hernia   Extremities: no pedal edema, pedal pulses palpable   Skin: nl warm and dry, no wounds   Neuro: A&Ox3, answering questions appropriately, PERRL, EOMI, face symmetric, sensation equal bilaterally in face, tongue midline, no dysarthria, 5/5 strength in upper and lower extremities bilaterally shin   Pysch: no depression, no SI, no HI      LABS:                        9.0    9.14  )-----------( 190      ( 17 Feb 2022 06:39 )             27.6     02-17    137  |  107  |  30<H>  ----------------------------<  93  4.1   |  21<L>  |  1.45<H>    Ca    8.8      17 Feb 2022 06:39    TPro  6.6  /  Alb  2.3<L>  /  TBili  1.5<H>  /  DBili  x   /  AST  18  /  ALT  20  /  AlkPhos  414<H>  02-17    PT/INR - ( 17 Feb 2022 06:39 )   PT: 16.2 sec;   INR: 1.42 ratio         PTT - ( 17 Feb 2022 06:39 )  PTT:31.6 sec  Albumin: Albumin, Serum: 2.3 g/dL (02-17 @ 06:39)      Allergies    No Known Allergies    Intolerances      MEDICATIONS  (STANDING):  aspirin enteric coated 81 milliGRAM(s) Oral daily  atorvastatin 20 milliGRAM(s) Oral at bedtime  cholecalciferol 2000 Unit(s) Oral daily  gabapentin 100 milliGRAM(s) Oral every 8 hours  metoprolol succinate ER 12.5 milliGRAM(s) Oral at bedtime  multivitamin 1 Tablet(s) Oral daily  pancrelipase (ZENPEP 20,000 Lipase Units) 4 Capsule(s) Oral three times a day with meals  pantoprazole    Tablet 40 milliGRAM(s) Oral daily  polyethylene glycol 3350 17 Gram(s) Oral daily  sodium chloride 0.9%. 1000 milliLiter(s) (75 mL/Hr) IV Continuous <Continuous>    MEDICATIONS  (PRN):  acetaminophen     Tablet .. 650 milliGRAM(s) Oral every 6 hours PRN Temp greater or equal to 38C (100.4F), Mild Pain (1 - 3)  aluminum hydroxide/magnesium hydroxide/simethicone Suspension 30 milliLiter(s) Oral every 4 hours PRN Dyspepsia  hydrALAZINE Injectable 10 milliGRAM(s) IV Push every 4 hours PRN SBP>170  HYDROmorphone   Tablet 2 milliGRAM(s) Oral every 4 hours PRN Severe Pain (7 - 10)  HYDROmorphone   Tablet 1 milliGRAM(s) Oral every 4 hours PRN Moderate Pain (4 - 6)  HYDROmorphone  Injectable 1 milliGRAM(s) IV Push every 4 hours PRN breathrough pain  melatonin 10 milliGRAM(s) Oral at bedtime PRN Insomnia  ondansetron Injectable 4 milliGRAM(s) IV Push every 6 hours PRN Nausea and/or Vomiting      RADIOLOGY/ADDITIONAL STUDIES:    < from: CT Abdomen and Pelvis w/ IV Cont (02.16.22 @ 04:24) >    IMPRESSION:    No prior studies for comparison.  No focal collection or soft tissue gas.    Status post Whipple procedure with a 6.3 cm infilatrative mass in the   surgical bed adjacent to jejunal sutures compatible with tumor   recurrence.  Dilated small bowel loop at the luh compatible with   malignant obstruction of choledochojejunostomy loop. Satisfactory   positioning of right biliary stent with mild intrahepatic biliary   dilatation.   Correlate clinically for cholangitis.  No gastric outlet   obstruction.  Neoplastic implants in the right upper and lower quadrants.  Focal thrombosis in the superior mesenteric vein related to the site of   tumor recurrence.    The right kidney demonstrates a delayed nephrogram and moderate right   hydronephrosis with transition at the right ureteropelvic junction,   likely related to malignant obstruction.  Retroperitoneal adenopathy.    Trace amount of free fluid.    Moderate hiatal hernia.    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 Pt states while in [place], approx two weeks ago, he presented to hospital with pain  secondary to biliary obstruction   and IR at the time was unable to place stent and so 8F biliary drain placed. Pt reports the day of admission was unable to flush the drain and due to above symptoms presented to the ED.At the ED pt had a CT abdomen that showed tumor recurrence and obstruction of the choledojejunostomy loop. IR  consulted and preformed a drain change from 8 to 12F catheter with 150cc output. Pt with chronic back  pain most likely viscera with cute worsening requiring multiple dose of IV hydromorphone. Palliative consulted to help in management of symptoms.     Subjective: Pt was evaluated at [facility] this morning and reported chronic middle to lower back since was diagnosed with metastatic ampullary cancer on 2020. Previous to the placement of the biliary drain on 2/3/22 pain was managed only with Tylenol and Motrin. After the last hospitalization in [place] he was started on Hydromorphone 1 mg PO PRN and gabapentin 100 mg every 8 hrs. Pts states was using only Hydromorphone 1 mg PO three times day, tylenols PRN and Gabapentin with controlled pain.  Pt states doesn't like to use narcotic and opioids due to the risk of side effects including constipation. Pt reports current pain is  sharp, of and intensity 6/10 and exacerbated by movement and better controlled with IV Hydromorphone. He also endorse on/off  decreased appetite. He denies nausea, vomits, SOB, chest pain and any other complaints.     PAIN: (x )Yes   ( )No  Level:6/10  Location: Middle thoracic to lumbar back   Intensity:  6  /10  Quality: sharp   Aggravating Factors: movements   Alleviating Factors: pain medications and  resting   Radiation: none   Duration/Timing: constant   Impact on ADLs: none     DYSPNEA: ( ) Yes  ( x) No      PAST MEDICAL & SURGICAL HISTORY:  Hearing loss  right    Stented coronary artery  2006 PTCA x 1    CAD (coronary artery disease)  last stress test Fall 2018    HTN (hypertension)    Hyperlipidemia    GERD (gastroesophageal reflux disease)    Skin cancer  squamous cell - head, shin - removed    OA (osteoarthritis)    Psoriasis    Inguinal hernia  right    Brain aneurysm  non ruptured - had stent placed in 2010 after attemtped clipping and coiling in 2007    Pancreatic cancer  s/p Whipple procedure    Drug or chemical induced diabetes mellitus    H/O craniotomy  for non ruptured aneurysm 2007, attempted clipping    History of other surgery  for brain aneurysm - attempted coiling 2007    History of other surgery  endovascular stent placement for brain Aneurysm at [place] - 2010    History of tonsillectomy    S/P colonoscopy  last - within 5 yrs    History of PTCA  2006 x 1 stent    H/O inguinal hernia repair    Pancreatic disorder  Whipple for cancer        SOCIAL HX:    Hx opiate tolerance ( )YES  ( x)NO    Baseline ADLs  (Prior to Admission)  ( x) Independent   ( )Dependent    FAMILY HISTORY:  Family history of breast cancer in sister        Review of Systems:    Anxiety- no   Depression- no  Physical Discomfort- back pain   Dyspnea-no   Constipation- no  Diarrhea- loose stool   Nausea- no  Vomiting-no   Anorexia-yes   Weight Loss- yes , approximately 50 pounds since was diagnosed with cancer on 2020   Cough- none   Secretions- none   Fatigue- none   Weakness- yes   Delirium-no     All other systems reviewed and negative    PHYSICAL EXAM:    Vital Signs Last 24 Hrs  T(C): 36.2 (17 Feb 2022 09:42), Max: 37.1 (16 Feb 2022 16:20)  T(F): 97.2 (17 Feb 2022 09:42), Max: 98.8 (16 Feb 2022 16:20)  HR: 66 (17 Feb 2022 09:42) (66 - 90)  BP: 129/62 (17 Feb 2022 09:42) (106/50 - 136/61)  BP(mean): 78 (16 Feb 2022 19:47) (67 - 78)  RR: 18 (17 Feb 2022 09:42) (16 - 18)  SpO2: 100% (17 Feb 2022 09:42) (96% - 100%)  Daily     Daily     PPSV2: 40 %    Physical Exam   Gen: NAD, comfortable  HENT: atraumatic head and ears, no gross abnormalities of ears, mucous membranes moist, no oral lesions, cachexia   CV: RRR, nl s1/s2, no M/R/G  Pulm: nl respiratory effort, CTAB, no wheezes/crackles/rhonchi  Back: no scoliosis, lordosis, or kyphosis. No midline tenderness but  discomfort to palpation of the bilateral paraspinal muscles   Abd: normoactive bowel sounds in all 4 quadrants, soft, nontender, nondistended, no rebound, no guarding. LUQ reducible incisional hernia   Extremities: no pedal edema, pedal pulses palpable   Skin: nl warm and dry, no wounds   Neuro: A&Ox3, answering questions appropriately, PERRL, EOMI, face symmetric, sensation equal bilaterally in face, tongue midline, no dysarthria, 5/5 strength in upper and lower extremities bilaterally shin   Pysch: no depression, no SI, no HI      LABS:                        9.0    9.14  )-----------( 190      ( 17 Feb 2022 06:39 )             27.6     02-17    137  |  107  |  30<H>  ----------------------------<  93  4.1   |  21<L>  |  1.45<H>    Ca    8.8      17 Feb 2022 06:39    TPro  6.6  /  Alb  2.3<L>  /  TBili  1.5<H>  /  DBili  x   /  AST  18  /  ALT  20  /  AlkPhos  414<H>  02-17    PT/INR - ( 17 Feb 2022 06:39 )   PT: 16.2 sec;   INR: 1.42 ratio         PTT - ( 17 Feb 2022 06:39 )  PTT:31.6 sec  Albumin: Albumin, Serum: 2.3 g/dL (02-17 @ 06:39)      Allergies    No Known Allergies    Intolerances      MEDICATIONS  (STANDING):  aspirin enteric coated 81 milliGRAM(s) Oral daily  atorvastatin 20 milliGRAM(s) Oral at bedtime  cholecalciferol 2000 Unit(s) Oral daily  gabapentin 100 milliGRAM(s) Oral every 8 hours  metoprolol succinate ER 12.5 milliGRAM(s) Oral at bedtime  multivitamin 1 Tablet(s) Oral daily  pancrelipase (ZENPEP 20,000 Lipase Units) 4 Capsule(s) Oral three times a day with meals  pantoprazole    Tablet 40 milliGRAM(s) Oral daily  polyethylene glycol 3350 17 Gram(s) Oral daily  sodium chloride 0.9%. 1000 milliLiter(s) (75 mL/Hr) IV Continuous <Continuous>    MEDICATIONS  (PRN):  acetaminophen     Tablet .. 650 milliGRAM(s) Oral every 6 hours PRN Temp greater or equal to 38C (100.4F), Mild Pain (1 - 3)  aluminum hydroxide/magnesium hydroxide/simethicone Suspension 30 milliLiter(s) Oral every 4 hours PRN Dyspepsia  hydrALAZINE Injectable 10 milliGRAM(s) IV Push every 4 hours PRN SBP>170  HYDROmorphone   Tablet 2 milliGRAM(s) Oral every 4 hours PRN Severe Pain (7 - 10)  HYDROmorphone   Tablet 1 milliGRAM(s) Oral every 4 hours PRN Moderate Pain (4 - 6)  HYDROmorphone  Injectable 1 milliGRAM(s) IV Push every 4 hours PRN breathrough pain  melatonin 10 milliGRAM(s) Oral at bedtime PRN Insomnia  ondansetron Injectable 4 milliGRAM(s) IV Push every 6 hours PRN Nausea and/or Vomiting      RADIOLOGY/ADDITIONAL STUDIES:    < from: CT Abdomen and Pelvis w/ IV Cont (02.16.22 @ 04:24) >    IMPRESSION:    No prior studies for comparison.  No focal collection or soft tissue gas.    Status post Whipple procedure with a 6.3 cm infilatrative mass in the   surgical bed adjacent to jejunal sutures compatible with tumor   recurrence.  Dilated small bowel loop at the luh compatible with   malignant obstruction of choledochojejunostomy loop. Satisfactory   positioning of right biliary stent with mild intrahepatic biliary   dilatation.   Correlate clinically for cholangitis.  No gastric outlet   obstruction.  Neoplastic implants in the right upper and lower quadrants.  Focal thrombosis in the superior mesenteric vein related to the site of   tumor recurrence.    The right kidney demonstrates a delayed nephrogram and moderate right   hydronephrosis with transition at the right ureteropelvic junction,   likely related to malignant obstruction.  Retroperitoneal adenopathy.    Trace amount of free fluid.    Moderate hiatal hernia.    < end of copied text >     HPI: Mr. 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 Now admitted on 2/16/22 for evaluation fever (Tmax 102.7), nauseas and increased pain at home, associated with inability to fully flush biliary drain. Of note, while in [place], approx two weeks ago, he presented to hospital with pain secondary to biliary obstruction and IR at the time was unable to place stent and so 8F biliary drain placed. Found here to have YAMILETH and mild leukocytosis due to CT AP showing tumor recurrence, obstruction of the choledojejunostomy loop, and R hydro likely also due to malignant obstruction; s/p IR upsizing of biliary drain. Palliative consulted to help in management of symptoms.     Pt was evaluated at [facility] this morning and reported chronic middle to lower back since was diagnosed with metastatic ampullary cancer on 2020. Previous to the placement of the biliary drain on 2/3/22 pain was managed only with Tylenol and Motrin. After the last hospitalization in [place] he was started on Hydromorphone 1 mg PO PRN and gabapentin 100 mg every 8 hrs. Pts states was using only Hydromorphone 1 mg PO three times day, tylenols PRN and Gabapentin with controlled pain.  Pt states doesn't like to use narcotic and opioids due to the risk of side effects including constipation. Pt reports current pain is sharp, 6/10, exacerbated by movement, and better controlled with IV Hydromorphone. He also endorsed decreased appetite. He denies any other complaints.     Without prompting pt began to discuss his GOC, thus we reviewed them by answering his questions as outlined in GOC section below.     PAIN: (x )Yes   ( )No  Level: moderate  Location: Middle thoracic to lumbar back   Intensity:  6  /10  Quality: sharp   Aggravating Factors: movements   Alleviating Factors: pain medications and  resting   Radiation: none   Duration/Timing: constant   Impact on ADLs: none     DYSPNEA: ( ) Yes  ( x) No      PAST MEDICAL & SURGICAL HISTORY:  Hearing loss  right    Stented coronary artery  2006 PTCA x 1    CAD (coronary artery disease)  last stress test Fall 2018    HTN (hypertension)    Hyperlipidemia    GERD (gastroesophageal reflux disease)    Skin cancer  squamous cell - head, shin - removed    OA (osteoarthritis)    Psoriasis    Inguinal hernia  right    Brain aneurysm  non ruptured - had stent placed in 2010 after attemtped clipping and coiling in 2007    Pancreatic cancer  s/p Whipple procedure    Drug or chemical induced diabetes mellitus    H/O craniotomy  for non ruptured aneurysm 2007, attempted clipping    History of other surgery  for brain aneurysm - attempted coiling 2007    History of other surgery  endovascular stent placement for brain Aneurysm at [facility] - 2010    History of tonsillectomy    S/P colonoscopy  last - within 5 yrs    History of PTCA  2006 x 1 stent    H/O inguinal hernia repair    Pancreatic disorder  Whipple for cancer        SOCIAL HX: , retired , 3 children    Hx opiate tolerance (x )YES  ( )NO    Baseline ADLs  (Prior to Admission)  ( x) Independent   ( )Dependent    FAMILY HISTORY:  Family history of breast cancer in sister        Review of Systems:    Anxiety- no   Depression- no, "I'm a pretty easy going richard"  Constipation- no  Diarrhea- loose stool   Nausea- no  Vomiting-no   Anorexia-yes   Weight Loss- yes , approximately 50 pounds since was diagnosed with cancer on 2020   Cough- none   Secretions- none   Fatigue- none   Weakness- yes   Delirium-no     All other systems reviewed and negative    PHYSICAL EXAM:    Vital Signs Last 24 Hrs  T(C): 36.2 (17 Feb 2022 09:42), Max: 37.1 (16 Feb 2022 16:20)  T(F): 97.2 (17 Feb 2022 09:42), Max: 98.8 (16 Feb 2022 16:20)  HR: 66 (17 Feb 2022 09:42) (66 - 90)  BP: 129/62 (17 Feb 2022 09:42) (106/50 - 136/61)  BP(mean): 78 (16 Feb 2022 19:47) (67 - 78)  RR: 18 (17 Feb 2022 09:42) (16 - 18)  SpO2: 100% (17 Feb 2022 09:42) (96% - 100%)  Daily     Daily     PPSV2: 50-60 %    Physical Exam   Gen: NAD, comfortable  Mental Status: AOx4  HEENT: atraumatic head and ears, no gross abnormalities of ears, mucous membranes moist, no oral lesions, cachexia   CV: RRR, nl s1/s2, no M/R/G  Pulm: nl respiratory effort, CTAB, no wheezes/crackles/rhonchi  Back: No midline tenderness but  discomfort to palpation of the bilateral paraspinal muscles   Abd: normoactive bowel sounds in all 4 quadrants, soft, nontender, nondistended, no rebound, no guarding. LUQ reducible incisional hernia   : voids  Extremities: no pedal edema, pedal pulses palpable, muscle and fat wasting in extremities  Skin: nl warm and dry, no wounds   Neuro: no focal deficits       LABS:                        9.0    9.14  )-----------( 190      ( 17 Feb 2022 06:39 )             27.6     02-17    137  |  107  |  30<H>  ----------------------------<  93  4.1   |  21<L>  |  1.45<H>    Ca    8.8      17 Feb 2022 06:39    TPro  6.6  /  Alb  2.3<L>  /  TBili  1.5<H>  /  DBili  x   /  AST  18  /  ALT  20  /  AlkPhos  414<H>  02-17    PT/INR - ( 17 Feb 2022 06:39 )   PT: 16.2 sec;   INR: 1.42 ratio         PTT - ( 17 Feb 2022 06:39 )  PTT:31.6 sec  Albumin: Albumin, Serum: 2.3 g/dL (02-17 @ 06:39)      Allergies    No Known Allergies    Intolerances      MEDICATIONS  (STANDING):  aspirin enteric coated 81 milliGRAM(s) Oral daily  atorvastatin 20 milliGRAM(s) Oral at bedtime  cholecalciferol 2000 Unit(s) Oral daily  gabapentin 100 milliGRAM(s) Oral every 8 hours  metoprolol succinate ER 12.5 milliGRAM(s) Oral at bedtime  multivitamin 1 Tablet(s) Oral daily  pancrelipase (ZENPEP 20,000 Lipase Units) 4 Capsule(s) Oral three times a day with meals  pantoprazole    Tablet 40 milliGRAM(s) Oral daily  polyethylene glycol 3350 17 Gram(s) Oral daily  sodium chloride 0.9%. 1000 milliLiter(s) (75 mL/Hr) IV Continuous <Continuous>    MEDICATIONS  (PRN):  acetaminophen     Tablet .. 650 milliGRAM(s) Oral every 6 hours PRN Temp greater or equal to 38C (100.4F), Mild Pain (1 - 3)  aluminum hydroxide/magnesium hydroxide/simethicone Suspension 30 milliLiter(s) Oral every 4 hours PRN Dyspepsia  hydrALAZINE Injectable 10 milliGRAM(s) IV Push every 4 hours PRN SBP>170  HYDROmorphone   Tablet 2 milliGRAM(s) Oral every 4 hours PRN Severe Pain (7 - 10)  HYDROmorphone   Tablet 1 milliGRAM(s) Oral every 4 hours PRN Moderate Pain (4 - 6)  HYDROmorphone  Injectable 1 milliGRAM(s) IV Push every 4 hours PRN breathrough pain  melatonin 10 milliGRAM(s) Oral at bedtime PRN Insomnia  ondansetron Injectable 4 milliGRAM(s) IV Push every 6 hours PRN Nausea and/or Vomiting      RADIOLOGY/ADDITIONAL STUDIES:    < from: CT Abdomen and Pelvis w/ IV Cont (02.16.22 @ 04:24) >    IMPRESSION:    No prior studies for comparison.  No focal collection or soft tissue gas.    Status post Whipple procedure with a 6.3 cm infilatrative mass in the   surgical bed adjacent to jejunal sutures compatible with tumor   recurrence.  Dilated small bowel loop at the luh compatible with   malignant obstruction of choledochojejunostomy loop. Satisfactory   positioning of right biliary stent with mild intrahepatic biliary   dilatation.   Correlate clinically for cholangitis.  No gastric outlet   obstruction.  Neoplastic implants in the right upper and lower quadrants.  Focal thrombosis in the superior mesenteric vein related to the site of   tumor recurrence.    The right kidney demonstrates a delayed nephrogram and moderate right   hydronephrosis with transition at the right ureteropelvic junction,   likely related to malignant obstruction.  Retroperitoneal adenopathy.    Trace amount of free fluid.    Moderate hiatal hernia.    < end of copied text >

## 2022-02-17 NOTE — PROGRESS NOTE ADULT - SUBJECTIVE AND OBJECTIVE BOX
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 Pt presents today to ED due to fever (Tmax 102.7) and pain at home. Pt states while in [place], approx two weeks ago, he presented to hospital with pain and IR at the time was unable to place stent and so 8F biliary drain placed. Pt flushing twice a day as directed. Pt wife at bedside to endorse history.     ED course: received zosyn, VSS however pt in intractable pain despite multiple medications received. IR and oncology consulted. Pt had IR procedure: drain changed from 8 to 12F catheter with 150cc output at time of hospitalist exam. Returned to ED and admitted for pain control, hydration, palliative and oncology consult.     Interval History: 22    REVIEW OF SYSTEMS: All other review of systems is negative unless indicated above         PHYSICAL EXAM:  Vital Signs Last 24 Hrs: all reviewed  T(C): 36.2 (2022 09:42), Max: 37.1 (2022 16:20)  T(F): 97.2 (2022 09:42), Max: 98.8 (2022 16:20)  HR: 66 (2022 09:42) (66 - 90)  BP: 129/62 (2022 09:42) (106/50 - 139/82)  BP(mean): 78 (2022 19:47) (67 - 98)  RR: 18 (2022 09:42) (16 - 23)  SpO2: 100% (2022 09:42) (95% - 100%)    Constitutional: NAD, awake and alert  HEENT: PERR, EOMI  Neck: Soft and supple,  No JVD  Respiratory: Breath sounds are clear bilaterally, No wheezing, rales or rhonchi  Cardiovascular: S1 and S2, regular rate and rhythm, no Murmurs  Gastrointestinal: Bowel Sounds present, soft, nontender, nondistended  Extremities: No peripheral edema  Vascular: 2+ peripheral pulses  Neurological: A/O x 3, no focal deficits  Musculoskeletal: 5/5 strength b/l upper and lower extremities  Skin: No rashes      LABS: All Labs Reviewed:                        9.0    9.14  )-----------( 190      ( 2022 06:39 )             27.6         137  |  107  |  30<H>  ----------------------------<  93  4.1   |  21<L>  |  1.45<H>    Ca    8.8      2022 06:39    TPro  6.6  /  Alb  2.3<L>  /  TBili  1.5<H>  /  DBili  x   /  AST  18  /  ALT  20  /  AlkPhos  414<H>      PT/INR - ( 2022 06:39 )   PT: 16.2 sec;   INR: 1.42 ratio    PTT - ( 2022 06:39 )  PTT:31.6 sec  (22 @ 02:34) Lactate, Blood: 1.1 mmol/L   Urinalysis Basic - ( 2022 10:07 )    Color: Yellow / Appearance: Clear / S.015 / pH: x  Gluc: x / Ketone: Negative  / Bili: Negative / Urobili: Negative   Blood: x / Protein: Negative / Nitrite: Negative   Leuk Esterase: Trace / RBC: 0-2 /HPF / WBC 0-2   Sq Epi: x / Non Sq Epi: Occasional / Bacteria: Occasional  MICRO:  Culture - Blood (22 @ 02:34) NGTD  Respiratory Viral Panel with COVID-19 by [facility] (22 @ 02:34) Rapid RVP Result: Not Detected SARS-CoV-2: Not Detected   RADIOLOGY/EKG:    US Abdomen Upper Quadrant Right (22 @ 01:57)   FINDINGS:  Liver: Within normal limits.  Bile ducts: Common bile duct stent in place. Dilated common bile duct measuring up to 12 mm. Mildintrahepatic biliary ductal dilatation.  Gallbladder: Surgically absent.  Pancreas: Poorly visualized.  Right kidney: 11.3 cm. Mild to moderate right-sided hydronephrosis, slightly increased compared with prior exam from 2022..  Ascites: Traceperihepatic free fluid.  IVC: Visualized portions are within normal limits.  IMPRESSION:  Common bile duct stent in place. Mild intrahepatic and extra hepatic biliary ductal dilatation with common bile duct measuring up to 12 mm.  Mild to moderate right-sided hydronephrosis, slightly increased compared with prior exam from 2022.  Trace perihepatic ascites.  < end of copied text >    CT Abdomen and Pelvis w/ IV Cont (22 @ 04:24)   FINDINGS:  LOWER CHEST: Coronary atherosclerosis.  LIVER: Within normal limits.  BILE DUCTS: There is biliary stent inserted via the right lateral flank which enters the right hepatic duct and is coiled in the jejunum. There is mild intrahepatic biliary dilatation.  GALLBLADDER: Within normal limits.  SPLEEN: Within normal limits.  PANCREAS: The patient is status post Whipple procedure. There are no prior studies for comparison. In the surgical bed, there is infiltrative ill-defined prominent soft tissue measuring roughly 6.3 (2:49) x 3.8  x 4.2 cm adjacent to the pancreaticojejunostomy sutures concerning for recurrent tumor. There is marked dilatation of the jejunal loop at the luh hepatis measuring up to 4.4 cm.  ADRENALS: Within normal limits.  KIDNEYS/URETERS: The right kidney demonstrates a delayed nephrogram. There is a moderate right hydronephrosis with transition point at the ureteropelvic junction. There is no hydroureter or convincing obstructing stone. There is a 6.6 cm left renal cyst.  BLADDER: Within normal limits.  REPRODUCTIVE ORGANS: The prostate gland is not enlarged.  BOWEL: Moderate hiatal hernia. No bowel obstruction. Appendix is unremarkable. Colon is underdistended without significant fecal load.  PERITONEUM: Trace amount of pelvic ascites. Reticular studding of the fat in the right upper quadrant subhepatic space suspicious for carcinomatosis. There is a 3 cm masslike low density nodule in the right lower quadrant medial to the distal right external iliac vessels compatible with neoplastic implant.  VESSELS: The aorta is not dilated. There is mild to moderate atherosclerotic vascular calcification. There is focal thrombosis of the superior mesenteric vein in the region of suspected tumor recurrence. There is ill-defined soft tissue abutting the superior mesenteric artery.  RETROPERITONEUM/LYMPH NODES: A 2.1 cm precaval lymph node is nonspecific. There is a 9 mm right iliac vein chain lymph node.  Prominent soft tissue   in the right anterior pararenal space measuring 4.5 x 2 cm suspicious for tumor recurrence.  ABDOMINAL WALL: Left-sided ventral hernia mesh is barely underneath the skin in this patient with a weak abdominal wall.  BONES: Within normal limits.  IMPRESSION:  No prior studies for comparison.  No focal collection or soft tissue gas.  Status post Whipple procedure with a 6.3 cm infilatrative mass in the surgical bed adjacent to jejunal sutures compatible with tumor recurrence.  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 Satisfactory positioning of right biliary stent with mild intrahepatic biliary dilatation.   Correlate clinically for cholangitis.  No gastric outlet obstruction. Neoplastic implants in the right upper and lower quadrants.  Focal thrombosis in the superior mesenteric vein related to the site of tumor recurrence.  The right kidney demonstrates a delayed nephrogram and moderate right hydronephrosis with transition at the right ureteropelvic junction, likely related to malignant obstruction.  Retroperitoneal adenopathy.  Trace amount of free fluid.  Moderate hiatal hernia.  < end of copied text >    Xray Chest 1 View- PORTABLE-Urgent (22 @ 07:42)   Heart size is within normal limits. Small hiatal hernia noted. Right MediPort again seen.  Lungs are clear and possibly under circulated.  Chest is similar to .  IMPRESSION: No acute finding or change.  < end of copied text >    MEDICATIONS:  MEDICATIONS  (STANDING):  aspirin enteric coated 81 milliGRAM(s) Oral daily  atorvastatin 20 milliGRAM(s) Oral at bedtime  cholecalciferol 2000 Unit(s) Oral daily  gabapentin 100 milliGRAM(s) Oral every 8 hours  metoprolol succinate ER 12.5 milliGRAM(s) Oral at bedtime  multivitamin 1 Tablet(s) Oral daily  pancrelipase (ZENPEP 20,000 Lipase Units) 4 Capsule(s) Oral three times a day with meals  pantoprazole    Tablet 40 milliGRAM(s) Oral daily  polyethylene glycol 3350 17 Gram(s) Oral daily  sodium chloride 0.9%. 1000 milliLiter(s) (75 mL/Hr) IV Continuous <Continuous>    MEDICATIONS  (PRN):  acetaminophen     Tablet .. 650 milliGRAM(s) Oral every 6 hours PRN Temp greater or equal to 38C (100.4F), Mild Pain (1 - 3)  aluminum hydroxide/magnesium hydroxide/simethicone Suspension 30 milliLiter(s) Oral every 4 hours PRN Dyspepsia  hydrALAZINE Injectable 10 milliGRAM(s) IV Push every 4 hours PRN SBP>170  HYDROmorphone  Injectable 1 milliGRAM(s) IV Push every 3 hours PRN Severe Pain (7 - 10)  HYDROmorphone  Injectable 0.5 milliGRAM(s) IV Push every 6 hours PRN Moderate Pain (4 - 6)  melatonin 10 milliGRAM(s) Oral at bedtime PRN Insomnia  ondansetron Injectable 4 milliGRAM(s) IV Push every 6 hours PRN Nausea and/or Vomiting    Home Medications:  Aspirin Enteric Coated 81 mg oral delayed release tablet: 1 tab(s) orally once a day (in the morning) (2022 19:57)  azelastine 137 mcg/inh (0.1%) nasal spray: 2 spray(s) nasal 2 times a day, As Needed - for congestion (2022 19:57)  cholecalciferol 50 mcg (2000 intl units) oral tablet: 1 tab(s) orally once a day (2022 19:57)  dronabinol 2.5 mg oral capsule: 1 cap(s) orally once a day in the afternoon (2022 19:57)  gabapentin 100 mg oral capsule: 1 cap(s) orally every 8 hours (2022 19:57)  HYDROmorphone 2 mg oral tablet: 0.5 tab(s) orally every 3 hours, As Needed (2022 19:57)  ketorolac 10 mg oral tablet: 1 tab(s) orally every 8 hours, As Needed (2022 19:57)  losartan 50 mg oral tablet: 1 tab(s) orally once a day (in the morning) (2022 19:57)  Melatonin 10 mg oral capsule: 1-2 cap(s) orally once a day (at bedtime) (2022 19:57)  metoprolol succinate 25 mg oral tablet, extended release: 0.5 tab(s) orally once a day (at bedtime) (2022 19:57)  Multiple Vitamins oral tablet: 1 tab(s) orally once a day (2022 19:57)  omeprazole 20 mg oral delayed release capsule: 1 cap(s) orally once a day (in the morning) (2022 19:57)  ondansetron 8 mg oral tablet: 1 tab(s) orally 3 times a day, As Needed - for nausea (2022 19:57)  Pfizer-BioNTech COVID-19 Vaccine PF 30 mcg/0.3 mL intramuscular suspension: 0.3 milliliter(s) intramuscular once  *****Booster as of *** (2022 19:57)  rosuvastatin 5 mg oral tablet: 1 tab(s) orally once a day (in the evening) (2022 19:57)  Zenpep 40,000 units-126,000 units-168,000 units oral delayed release capsule: 1 cap(s) orally with snacks (2022 19:57)  Zenpep 40,000 units-126,000 units-168,000 units oral delayed release capsule: 2 cap(s) orally 3 times a day (with meals) (2022 19:57)         CHIEF COMPLAINT: back pain    HPI: 71M with PMHx of thrombocytopenia, E.coli bacteremia, brain aneurysm, HTN, HLD, CAD, GERD,  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 Pt presents today to ED due to fever (Tmax 102.7) and pain at home. Pt states while in [place], approx two weeks ago, he presented to hospital with pain and IR at the time was unable to place stent and so 8F biliary drain placed. Pt flushing twice a day as directed. Pt wife at bedside to endorse history.     ED course: received zosyn, VSS however pt in intractable pain despite multiple medications received. IR and oncology consulted. Pt had IR procedure: drain changed from 8 to 12F catheter with 150cc output at time of hospitalist exam. Returned to ED and admitted for pain control, hydration, palliative and oncology consult.     Interval History: 22 pt seen and examined at bedside, reports feeling better, tolerating PO without nausea or vomiting. c/o chronic back pain. Right biliary drain functioning. Denies CP, palps, sob, HA, dizziness, dysuria, fever, or chills.   REVIEW OF SYSTEMS: All other review of systems is negative unless indicated above     PHYSICAL EXAM:  Vital Signs Last 24 Hrs: all reviewed  T(C): 36.2 (2022 09:42), Max: 37.1 (2022 16:20)  T(F): 97.2 (2022 09:42), Max: 98.8 (2022 16:20)  HR: 66 (2022 09:42) (66 - 90)  BP: 129/62 (2022 09:42) (106/50 - 139/82)  BP(mean): 78 (2022 19:47) (67 - 98)  RR: 18 (2022 09:42) (16 - 23)  SpO2: 100% (2022 09:42) (95% - 100%)    Constitutional: NAD, awake and alert  HEENT: PERR, EOMI  Neck: Soft and supple,  No JVD  Respiratory: Breath sounds are clear bilaterally, No wheezing, rales or rhonchi  Cardiovascular: S1 and S2, regular rate and rhythm, no Murmurs  Gastrointestinal: Bowel Sounds present, soft, nontender, nondistended  Extremities: No peripheral edema  Vascular: 2+ peripheral pulses  Neurological: A/O x 3, no focal deficits  Musculoskeletal: 5/5 strength b/l upper and lower extremities  Skin: No rashes      LABS: All Labs Reviewed:                        9.0    9.14  )-----------( 190      ( 2022 06:39 )             27.6         137  |  107  |  30<H>  ----------------------------<  93  4.1   |  21<L>  |  1.45<H>    Ca    8.8      2022 06:39    TPro  6.6  /  Alb  2.3<L>  /  TBili  1.5<H>  /  DBili  x   /  AST  18  /  ALT  20  /  AlkPhos  414<H>      PT/INR - ( 2022 06:39 )   PT: 16.2 sec;   INR: 1.42 ratio    PTT - ( 2022 06:39 )  PTT:31.6 sec  (22 @ 02:34) Lactate, Blood: 1.1 mmol/L   Urinalysis Basic - ( 2022 10:07 )    Color: Yellow / Appearance: Clear / S.015 / pH: x  Gluc: x / Ketone: Negative  / Bili: Negative / Urobili: Negative   Blood: x / Protein: Negative / Nitrite: Negative   Leuk Esterase: Trace / RBC: 0-2 /HPF / WBC 0-2   Sq Epi: x / Non Sq Epi: Occasional / Bacteria: Occasional  MICRO:  Culture - Blood (22 @ 02:34) NGTD  Respiratory Viral Panel with COVID-19 by [facility] (22 @ 02:34) Rapid RVP Result: Not Detected SARS-CoV-2: Not Detected   RADIOLOGY/EKG:    US Abdomen Upper Quadrant Right (22 @ 01:57)   FINDINGS:  Liver: Within normal limits.  Bile ducts: Common bile duct stent in place. Dilated common bile duct measuring up to 12 mm. Mildintrahepatic biliary ductal dilatation.  Gallbladder: Surgically absent.  Pancreas: Poorly visualized.  Right kidney: 11.3 cm. Mild to moderate right-sided hydronephrosis, slightly increased compared with prior exam from 2022..  Ascites: Traceperihepatic free fluid.  IVC: Visualized portions are within normal limits.  IMPRESSION:  Common bile duct stent in place. Mild intrahepatic and extra hepatic biliary ductal dilatation with common bile duct measuring up to 12 mm.  Mild to moderate right-sided hydronephrosis, slightly increased compared with prior exam from 2022.  Trace perihepatic ascites.  < end of copied text >    CT Abdomen and Pelvis w/ IV Cont (22 @ 04:24)   FINDINGS:  LOWER CHEST: Coronary atherosclerosis.  LIVER: Within normal limits.  BILE DUCTS: There is biliary stent inserted via the right lateral flank which enters the right hepatic duct and is coiled in the jejunum. There is mild intrahepatic biliary dilatation.  GALLBLADDER: Within normal limits.  SPLEEN: Within normal limits.  PANCREAS: The patient is status post Whipple procedure. There are no prior studies for comparison. In the surgical bed, there is infiltrative ill-defined prominent soft tissue measuring roughly 6.3 (2:49) x 3.8  x 4.2 cm adjacent to the pancreaticojejunostomy sutures concerning for recurrent tumor. There is marked dilatation of the jejunal loop at the luh hepatis measuring up to 4.4 cm.  ADRENALS: Within normal limits.  KIDNEYS/URETERS: The right kidney demonstrates a delayed nephrogram. There is a moderate right hydronephrosis with transition point at the ureteropelvic junction. There is no hydroureter or convincing obstructing stone. There is a 6.6 cm left renal cyst.  BLADDER: Within normal limits.  REPRODUCTIVE ORGANS: The prostate gland is not enlarged.  BOWEL: Moderate hiatal hernia. No bowel obstruction. Appendix is unremarkable. Colon is underdistended without significant fecal load.  PERITONEUM: Trace amount of pelvic ascites. Reticular studding of the fat in the right upper quadrant subhepatic space suspicious for carcinomatosis. There is a 3 cm masslike low density nodule in the right lower quadrant medial to the distal right external iliac vessels compatible with neoplastic implant.  VESSELS: The aorta is not dilated. There is mild to moderate atherosclerotic vascular calcification. There is focal thrombosis of the superior mesenteric vein in the region of suspected tumor recurrence. There is ill-defined soft tissue abutting the superior mesenteric artery.  RETROPERITONEUM/LYMPH NODES: A 2.1 cm precaval lymph node is nonspecific. There is a 9 mm right iliac vein chain lymph node.  Prominent soft tissue   in the right anterior pararenal space measuring 4.5 x 2 cm suspicious for tumor recurrence.  ABDOMINAL WALL: Left-sided ventral hernia mesh is barely underneath the skin in this patient with a weak abdominal wall.  BONES: Within normal limits.  IMPRESSION:  No prior studies for comparison.  No focal collection or soft tissue gas.  Status post Whipple procedure with a 6.3 cm infilatrative mass in the surgical bed adjacent to jejunal sutures compatible with tumor recurrence.  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 Satisfactory positioning of right biliary stent with mild intrahepatic biliary dilatation.   Correlate clinically for cholangitis.  No gastric outlet obstruction. Neoplastic implants in the right upper and lower quadrants.  Focal thrombosis in the superior mesenteric vein related to the site of tumor recurrence.  The right kidney demonstrates a delayed nephrogram and moderate right hydronephrosis with transition at the right ureteropelvic junction, likely related to malignant obstruction.  Retroperitoneal adenopathy.  Trace amount of free fluid.  Moderate hiatal hernia.  < end of copied text >    Xray Chest 1 View- PORTABLE-Urgent (22 @ 07:42)   Heart size is within normal limits. Small hiatal hernia noted. Right MediPort again seen.  Lungs are clear and possibly under circulated.  Chest is similar to .  IMPRESSION: No acute finding or change.  < end of copied text >    MEDICATIONS:  MEDICATIONS  (STANDING):  aspirin enteric coated 81 milliGRAM(s) Oral daily  atorvastatin 20 milliGRAM(s) Oral at bedtime  cholecalciferol 2000 Unit(s) Oral daily  gabapentin 100 milliGRAM(s) Oral every 8 hours  metoprolol succinate ER 12.5 milliGRAM(s) Oral at bedtime  multivitamin 1 Tablet(s) Oral daily  pancrelipase (ZENPEP 20,000 Lipase Units) 4 Capsule(s) Oral three times a day with meals  pantoprazole    Tablet 40 milliGRAM(s) Oral daily  polyethylene glycol 3350 17 Gram(s) Oral daily  sodium chloride 0.9%. 1000 milliLiter(s) (75 mL/Hr) IV Continuous <Continuous>    MEDICATIONS  (PRN):  acetaminophen     Tablet .. 650 milliGRAM(s) Oral every 6 hours PRN Temp greater or equal to 38C (100.4F), Mild Pain (1 - 3)  aluminum hydroxide/magnesium hydroxide/simethicone Suspension 30 milliLiter(s) Oral every 4 hours PRN Dyspepsia  hydrALAZINE Injectable 10 milliGRAM(s) IV Push every 4 hours PRN SBP>170  HYDROmorphone  Injectable 1 milliGRAM(s) IV Push every 3 hours PRN Severe Pain (7 - 10)  HYDROmorphone  Injectable 0.5 milliGRAM(s) IV Push every 6 hours PRN Moderate Pain (4 - 6)  melatonin 10 milliGRAM(s) Oral at bedtime PRN Insomnia  ondansetron Injectable 4 milliGRAM(s) IV Push every 6 hours PRN Nausea and/or Vomiting    Home Medications:  Aspirin Enteric Coated 81 mg oral delayed release tablet: 1 tab(s) orally once a day (in the morning) (2022 19:57)  azelastine 137 mcg/inh (0.1%) nasal spray: 2 spray(s) nasal 2 times a day, As Needed - for congestion (2022 19:57)  cholecalciferol 50 mcg (2000 intl units) oral tablet: 1 tab(s) orally once a day (2022 19:57)  dronabinol 2.5 mg oral capsule: 1 cap(s) orally once a day in the afternoon (2022 19:57)  gabapentin 100 mg oral capsule: 1 cap(s) orally every 8 hours (2022 19:57)  HYDROmorphone 2 mg oral tablet: 0.5 tab(s) orally every 3 hours, As Needed (2022 19:57)  ketorolac 10 mg oral tablet: 1 tab(s) orally every 8 hours, As Needed (2022 19:57)  losartan 50 mg oral tablet: 1 tab(s) orally once a day (in the morning) (2022 19:57)  Melatonin 10 mg oral capsule: 1-2 cap(s) orally once a day (at bedtime) (2022 19:57)  metoprolol succinate 25 mg oral tablet, extended release: 0.5 tab(s) orally once a day (at bedtime) (2022 19:57)  Multiple Vitamins oral tablet: 1 tab(s) orally once a day (2022 19:57)  omeprazole 20 mg oral delayed release capsule: 1 cap(s) orally once a day (in the morning) (2022 19:57)  ondansetron 8 mg oral tablet: 1 tab(s) orally 3 times a day, As Needed - for nausea (2022 19:57)  Pfizer-BioNTech COVID-19 Vaccine PF 30 mcg/0.3 mL intramuscular suspension: 0.3 milliliter(s) intramuscular once  *****Booster as of *** (2022 19:57)  rosuvastatin 5 mg oral tablet: 1 tab(s) orally once a day (in the evening) (2022 19:57)  Zenpep 40,000 units-126,000 units-168,000 units oral delayed release capsule: 1 cap(s) orally with snacks (2022 19:57)  Zenpep 40,000 units-126,000 units-168,000 units oral delayed release capsule: 2 cap(s) orally 3 times a day (with meals) (2022 19:57)

## 2022-02-17 NOTE — CONSULT NOTE ADULT - ASSESSMENT
Wife at bedside.   Discussed treatment options: Observation, had Palliative care consult Vs Cystoscopy and ureteral stent placement(Uro) Vs Nephrostomy tube placement(IR).   Discussed risks, benefits of each.   Discussed concern that concern for malignant obstruction secondary to tumor recurrence and that ureteral stent may not work.   Patient will like to discuss with Dr Castro and then decide.   ELLIO reinier JOHN.

## 2022-02-17 NOTE — DISCHARGE NOTE NURSING/CASE MANAGEMENT/SOCIAL WORK - NSDCPEFALRISK_GEN_ALL_CORE
For information on Fall & Injury Prevention, visit: [email or web address] OR  [email or web address] OR  https://www.cdc.gov/steadi/patient.html

## 2022-02-18 LAB
ANION GAP SERPL CALC-SCNC: 7 MMOL/L — SIGNIFICANT CHANGE UP (ref 5–17)
BUN SERPL-MCNC: 24 MG/DL — HIGH (ref 7–23)
CALCIUM SERPL-MCNC: 8.5 MG/DL — SIGNIFICANT CHANGE UP (ref 8.5–10.1)
CHLORIDE SERPL-SCNC: 107 MMOL/L — SIGNIFICANT CHANGE UP (ref 96–108)
CO2 SERPL-SCNC: 19 MMOL/L — LOW (ref 22–31)
CREAT SERPL-MCNC: 1.4 MG/DL — HIGH (ref 0.5–1.3)
CULTURE RESULTS: SIGNIFICANT CHANGE UP
CULTURE RESULTS: SIGNIFICANT CHANGE UP
FERRITIN SERPL-MCNC: 216 NG/ML — SIGNIFICANT CHANGE UP (ref 30–400)
FOLATE SERPL-MCNC: 17.1 NG/ML — SIGNIFICANT CHANGE UP
GLUCOSE SERPL-MCNC: 100 MG/DL — HIGH (ref 70–99)
HAPTOGLOB SERPL-MCNC: 190 MG/DL — SIGNIFICANT CHANGE UP (ref 34–200)
HCT VFR BLD CALC: 26 % — LOW (ref 39–50)
HCT VFR BLD CALC: 27.2 % — LOW (ref 39–50)
HGB BLD-MCNC: 8.6 G/DL — LOW (ref 13–17)
HGB BLD-MCNC: 8.9 G/DL — LOW (ref 13–17)
IRON SATN MFR SERPL: 12 UG/DL — LOW (ref 45–165)
IRON SATN MFR SERPL: 6 % — LOW (ref 16–55)
MCHC RBC-ENTMCNC: 29.5 PG — SIGNIFICANT CHANGE UP (ref 27–34)
MCHC RBC-ENTMCNC: 29.6 PG — SIGNIFICANT CHANGE UP (ref 27–34)
MCHC RBC-ENTMCNC: 32.7 GM/DL — SIGNIFICANT CHANGE UP (ref 32–36)
MCHC RBC-ENTMCNC: 33.1 GM/DL — SIGNIFICANT CHANGE UP (ref 32–36)
MCV RBC AUTO: 89.3 FL — SIGNIFICANT CHANGE UP (ref 80–100)
MCV RBC AUTO: 90.1 FL — SIGNIFICANT CHANGE UP (ref 80–100)
ORGANISM # SPEC MICROSCOPIC CNT: SIGNIFICANT CHANGE UP
ORGANISM # SPEC MICROSCOPIC CNT: SIGNIFICANT CHANGE UP
PLATELET # BLD AUTO: 175 K/UL — SIGNIFICANT CHANGE UP (ref 150–400)
PLATELET # BLD AUTO: 185 K/UL — SIGNIFICANT CHANGE UP (ref 150–400)
POTASSIUM SERPL-MCNC: 3.9 MMOL/L — SIGNIFICANT CHANGE UP (ref 3.5–5.3)
POTASSIUM SERPL-SCNC: 3.9 MMOL/L — SIGNIFICANT CHANGE UP (ref 3.5–5.3)
RBC # BLD: 2.91 M/UL — LOW (ref 4.2–5.8)
RBC # BLD: 3.02 M/UL — LOW (ref 4.2–5.8)
RBC # FLD: 14.6 % — HIGH (ref 10.3–14.5)
RBC # FLD: 14.7 % — HIGH (ref 10.3–14.5)
SODIUM SERPL-SCNC: 133 MMOL/L — LOW (ref 135–145)
SPECIMEN SOURCE: SIGNIFICANT CHANGE UP
SPECIMEN SOURCE: SIGNIFICANT CHANGE UP
TIBC SERPL-MCNC: 193 UG/DL — LOW (ref 220–430)
UIBC SERPL-MCNC: 181 UG/DL — SIGNIFICANT CHANGE UP (ref 110–370)
VIT B12 SERPL-MCNC: 414 PG/ML — SIGNIFICANT CHANGE UP (ref 232–1245)
WBC # BLD: 8.38 K/UL — SIGNIFICANT CHANGE UP (ref 3.8–10.5)
WBC # BLD: 8.77 K/UL — SIGNIFICANT CHANGE UP (ref 3.8–10.5)
WBC # FLD AUTO: 8.38 K/UL — SIGNIFICANT CHANGE UP (ref 3.8–10.5)
WBC # FLD AUTO: 8.77 K/UL — SIGNIFICANT CHANGE UP (ref 3.8–10.5)

## 2022-02-18 PROCEDURE — 99233 SBSQ HOSP IP/OBS HIGH 50: CPT

## 2022-02-18 PROCEDURE — 99232 SBSQ HOSP IP/OBS MODERATE 35: CPT

## 2022-02-18 PROCEDURE — 99221 1ST HOSP IP/OBS SF/LOW 40: CPT

## 2022-02-18 RX ORDER — HEPARIN SODIUM 5000 [USP'U]/ML
2500 INJECTION INTRAVENOUS; SUBCUTANEOUS EVERY 6 HOURS
Refills: 0 | Status: DISCONTINUED | OUTPATIENT
Start: 2022-02-18 | End: 2022-02-21

## 2022-02-18 RX ORDER — HEPARIN SODIUM 5000 [USP'U]/ML
INJECTION INTRAVENOUS; SUBCUTANEOUS
Qty: 25000 | Refills: 0 | Status: DISCONTINUED | OUTPATIENT
Start: 2022-02-18 | End: 2022-02-21

## 2022-02-18 RX ORDER — HEPARIN SODIUM 5000 [USP'U]/ML
5000 INJECTION INTRAVENOUS; SUBCUTANEOUS EVERY 6 HOURS
Refills: 0 | Status: DISCONTINUED | OUTPATIENT
Start: 2022-02-18 | End: 2022-02-18

## 2022-02-18 RX ORDER — HEPARIN SODIUM 5000 [USP'U]/ML
5000 INJECTION INTRAVENOUS; SUBCUTANEOUS EVERY 6 HOURS
Refills: 0 | Status: DISCONTINUED | OUTPATIENT
Start: 2022-02-18 | End: 2022-02-21

## 2022-02-18 RX ORDER — HEPARIN SODIUM 5000 [USP'U]/ML
5000 INJECTION INTRAVENOUS; SUBCUTANEOUS ONCE
Refills: 0 | Status: COMPLETED | OUTPATIENT
Start: 2022-02-18 | End: 2022-02-18

## 2022-02-18 RX ORDER — HEPARIN SODIUM 5000 [USP'U]/ML
INJECTION INTRAVENOUS; SUBCUTANEOUS
Qty: 25000 | Refills: 0 | Status: DISCONTINUED | OUTPATIENT
Start: 2022-02-18 | End: 2022-02-18

## 2022-02-18 RX ORDER — FERROUS SULFATE 325(65) MG
325 TABLET ORAL DAILY
Refills: 0 | Status: DISCONTINUED | OUTPATIENT
Start: 2022-02-18 | End: 2022-02-24

## 2022-02-18 RX ORDER — HEPARIN SODIUM 5000 [USP'U]/ML
2500 INJECTION INTRAVENOUS; SUBCUTANEOUS EVERY 6 HOURS
Refills: 0 | Status: DISCONTINUED | OUTPATIENT
Start: 2022-02-18 | End: 2022-02-18

## 2022-02-18 RX ADMIN — HEPARIN SODIUM 5000 UNIT(S): 5000 INJECTION INTRAVENOUS; SUBCUTANEOUS at 17:52

## 2022-02-18 RX ADMIN — PIPERACILLIN AND TAZOBACTAM 25 GRAM(S): 4; .5 INJECTION, POWDER, LYOPHILIZED, FOR SOLUTION INTRAVENOUS at 14:10

## 2022-02-18 RX ADMIN — PIPERACILLIN AND TAZOBACTAM 25 GRAM(S): 4; .5 INJECTION, POWDER, LYOPHILIZED, FOR SOLUTION INTRAVENOUS at 05:44

## 2022-02-18 RX ADMIN — SODIUM CHLORIDE 75 MILLILITER(S): 9 INJECTION INTRAMUSCULAR; INTRAVENOUS; SUBCUTANEOUS at 14:13

## 2022-02-18 RX ADMIN — HYDROMORPHONE HYDROCHLORIDE 1 MILLIGRAM(S): 2 INJECTION INTRAMUSCULAR; INTRAVENOUS; SUBCUTANEOUS at 23:44

## 2022-02-18 RX ADMIN — HYDROMORPHONE HYDROCHLORIDE 1 MILLIGRAM(S): 2 INJECTION INTRAMUSCULAR; INTRAVENOUS; SUBCUTANEOUS at 17:01

## 2022-02-18 RX ADMIN — HYDROMORPHONE HYDROCHLORIDE 1 MILLIGRAM(S): 2 INJECTION INTRAMUSCULAR; INTRAVENOUS; SUBCUTANEOUS at 08:08

## 2022-02-18 RX ADMIN — Medication 12.5 MILLIGRAM(S): at 21:54

## 2022-02-18 RX ADMIN — HYDROMORPHONE HYDROCHLORIDE 1 MILLIGRAM(S): 2 INJECTION INTRAMUSCULAR; INTRAVENOUS; SUBCUTANEOUS at 23:26

## 2022-02-18 RX ADMIN — GABAPENTIN 100 MILLIGRAM(S): 400 CAPSULE ORAL at 21:54

## 2022-02-18 RX ADMIN — ATORVASTATIN CALCIUM 20 MILLIGRAM(S): 80 TABLET, FILM COATED ORAL at 21:57

## 2022-02-18 RX ADMIN — HYDROMORPHONE HYDROCHLORIDE 1 MILLIGRAM(S): 2 INJECTION INTRAMUSCULAR; INTRAVENOUS; SUBCUTANEOUS at 13:18

## 2022-02-18 RX ADMIN — Medication 4 CAPSULE(S): at 18:00

## 2022-02-18 RX ADMIN — Medication 325 MILLIGRAM(S): at 17:00

## 2022-02-18 RX ADMIN — POLYETHYLENE GLYCOL 3350 17 GRAM(S): 17 POWDER, FOR SOLUTION ORAL at 17:02

## 2022-02-18 RX ADMIN — HYDROMORPHONE HYDROCHLORIDE 2 MILLIGRAM(S): 2 INJECTION INTRAMUSCULAR; INTRAVENOUS; SUBCUTANEOUS at 02:21

## 2022-02-18 RX ADMIN — HYDROMORPHONE HYDROCHLORIDE 2 MILLIGRAM(S): 2 INJECTION INTRAMUSCULAR; INTRAVENOUS; SUBCUTANEOUS at 22:50

## 2022-02-18 RX ADMIN — GABAPENTIN 100 MILLIGRAM(S): 400 CAPSULE ORAL at 05:44

## 2022-02-18 RX ADMIN — PIPERACILLIN AND TAZOBACTAM 25 GRAM(S): 4; .5 INJECTION, POWDER, LYOPHILIZED, FOR SOLUTION INTRAVENOUS at 21:55

## 2022-02-18 RX ADMIN — HYDROMORPHONE HYDROCHLORIDE 2 MILLIGRAM(S): 2 INJECTION INTRAMUSCULAR; INTRAVENOUS; SUBCUTANEOUS at 21:54

## 2022-02-18 RX ADMIN — HEPARIN SODIUM 1100 UNIT(S)/HR: 5000 INJECTION INTRAVENOUS; SUBCUTANEOUS at 17:55

## 2022-02-18 NOTE — CONSULT NOTE ADULT - ASSESSMENT
70 y/o male with h/o metastatic ampullary cancer - pancreatobiliary type s/p Whipple resection in 2020, [place] [facility] [place], prior E.coli bacteremia, brain aneurysm, HTN, HLD, CAD, GERD was admitted on 2/16 for fever (Tmax 102.7F) and abdominal pain. Pt states while visiting [place], approx two weeks PTA, he presented to hospital with abdominal pain and IR placed a 8F biliary drain. Pt flushing twice a day as directed. In ER he was noted febrile and he received zosyn.     1. Sepsis with bacteroides fragilis. Probable acute cholangitis s/p recent biliary drain. Infiltrative intraabdominal mass ?underlying necrosis. Pancreatic ampullary Ca s/p Whipple on chemotherapy. Immunocompromised host. CRF stage 3.   -cultures reviewed  -agree with zosyn 3.375 gm IV q8h  -reason for abx use and side effects reviewed with patient; monitor BMP   -old chart reviewed to assess prior cultures  -IR evaluation appreciated  -repeat BC x 2  -monitor temps  -f/u CBC  -supportive care  2. Other issues:   -care per medicine   70 y/o male with h/o metastatic ampullary cancer - pancreatobiliary type s/p Whipple resection in 2020, on [place] (last two weeks ago) [place] [facility] [place], prior E.coli bacteremia, brain aneurysm, HTN, HLD, CAD, GERD was admitted on 2/16 for fever (Tmax 102.7F) and abdominal pain. Pt states while visiting [place], approx two weeks PTA, he presented to hospital with abdominal pain and IR placed a 8F biliary drain. Pt flushing twice a day as directed. In ER he was noted febrile and he received zosyn.     1. Sepsis with bacteroides fragilis. Probable acute cholangitis s/p recent biliary drain. Infiltrative intraabdominal mass ?underlying necrosis. Pancreatic ampullary Ca s/p Whipple on chemotherapy. Immunocompromised host. CRF stage 3.   -cultures reviewed  -agree with zosyn 3.375 gm IV q8h  -reason for abx use and side effects reviewed with patient; monitor BMP   -old chart reviewed to assess prior cultures  -IR evaluation appreciated  -urology consulted for possible obstructive hydronephrosis   -repeat BC x 2  -monitor temps  -f/u CBC  -supportive care  2. Other issues:   -care per medicine

## 2022-02-18 NOTE — PROGRESS NOTE ADULT - SUBJECTIVE AND OBJECTIVE BOX
Pt was seen and examined this morning. Pt sated that he continues to have no abdominal pain but his back pain continues. Pt continues to have normal bowel function.  IR drain had an out put of 725cc. Pt has been  afebrile for hospital course.     Vital Signs Last 24 Hrs  T(C): 36.8 (17 Feb 2022 21:15), Max: 36.8 (17 Feb 2022 21:15)  T(F): 98.3 (17 Feb 2022 21:15), Max: 98.3 (17 Feb 2022 21:15)  HR: 79 (17 Feb 2022 21:15) (66 - 79)  BP: 103/76 (17 Feb 2022 21:15) (103/76 - 142/48)  BP(mean): --  ABP: --  ABP(mean): --  RR: 18 (17 Feb 2022 21:15) (18 - 18)  SpO2: 98% (17 Feb 2022 21:15) (98% - 100%)    Labs:              8.6<L>                133<L>| 19<L>| 24<H>        8.38  >-----------< 175     ------------------------< 100<H>                 26.0<L>                3.9  | 107  | 1.40<H>                                                                     Ca 8.5   Mg x     Ph x        ,             9.0<L>                137  | 21<L>| 30<H>        9.14  >-----------< 190     ------------------------< 93                    27.6<L>                4.1  | 107  | 1.45<H>                                                                     Ca 8.8   Mg x     Ph x

## 2022-02-18 NOTE — PROGRESS NOTE ADULT - ASSESSMENT
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 Now admitted on 2/16/22 for evaluation fever (Tmax 102.7), nauseas and increased pain at home, associated with inability to fully flush biliary drain. Of note, while in [place], approx two weeks ago, he presented to hospital with pain secondary to biliary obstruction and IR at the time was unable to place stent and so 8F biliary drain placed. Found here to have YAMILETH and mild leukocytosis due to CT AP showing tumor recurrence, obstruction of the choledojejunostomy loop, and R hydro likely also due to malignant obstruction; s/p IR upsizing of biliary drain. Palliative consulted to help in management of symptoms.     1) Acute on chronic pain in the setting of malignancy   - Pain mostly visceral due to progression of underlying malignancy  - pt with chronic back pain, with an acute exacerbation due to billary drain obstruction.   - Previous to hospitalization pain was managed with Hydromorphone 1 mg PO PRN (took it approximately 3 times daily) , Tylenol and Gabapentin 100 mg PO every 8 hrs with well control of pain.   - While hospitalized patient have received Hydromorphone 1 mg IV x 5 times and Morphine 4 mg IV x once in the last 24 hrs.   - 24 hr opioid need calculated and following regimen recommended: Fentanyl patch 12mcg (could use 25mcg but prefer low and slow approach) every 72 hrs and Hydromorphone 2 mg PO every four hrs PRN.    - Medication options discussed with patient but he  would like to continue only in oral hydromorphone at this moment.    - Start Hydromorphone 2 mg PO every 4 hrs PRN severe pain   - Start Hydromorphone 1 mg PO every 4 hrs PRN moderate pain   - Continue Hydromorphone 1 mg IV every 4 hrs PRN  for breakthrough pain (only after oral medication).   - Continue Tylenol PRN mild pain   - ordered bowel regimen while in on opioids: Miralax daily   - Plan is to discharge pt in oral hydromorphone and him to follow up with hemato-oncology at [facility] [place] for optimization of medication and initiation of fentanyl patch if needed.  - will monitor and adjust accordingly    2) Biliary drain malfunction/obstruction   -s/p IR drainage on 2/16->150cc output so far  -strict i/o's  -s/p Zosyn in ED, pt w/o criteria for sepsis at this time  - f/u blood and urine cultures  - surgical consult appreciated- they agreed with suggestion for IR adjustment of tube    3) Recurrent metastatic ampullary carcinoma   - Dx in Aug 2020   - s/p Whipple on Sep, 2020 at [place]  - Follow up with Dr. Thomas at [facility]   - CT abdomen showed tumor recurrence and obstruction of the choledojejunostomy loop and also R hydro.   - Currently on chemotherapy, last one two weeks ago   - Pt has appointment with Dr Thomas to discuss treatment options   - Pt was evaluated by Dr Castro and per patient was recommended to discuss treatment options (or lack thereof) with Dr Thomas     4) R hydro  - due to malignant obstruction  - urology on board  - considering procedure    5) Debility/#Protein calorie malnutrition  - PPSV 50-60%  - care coordinator note appreciated- lives with wife, would like a cane, open to home care  - Recommend PT consult   - recommend dietary evaluation, considering shared history of significant weight loss and evidence of muscle/fat wasting on exam  -Albumin 2.3     6) Prognosis   - Likely very poor  - PPSV2 approaching 40 %, which would be the point functionally where hospice should be considered  - Pt with an advanced metastatic and recurrent ampullary carcinoma that has progressed despite optimized disease-focused treatment  - Albumin 2.3 (<2.5 purports a higher likelihood of mortality when combined with significant advanced illness)  - Due to above criteria patient will benefit of hospice only if there is no more additional treatment offered by hemato-oncology or when pt feels treatment is no longer supporting optimal QOL     7) GOC/Advance directives   - pt able to demonstrate capacity for decision making  - HCP is wife Autumn Nelson  ([phone])   - Pt have a living will where he stipulated is DNR/DNI but would like to further discuss it with wife--> MOLST reviewed   - MOLTS form discussed with patient and would like to further discuss his wishes with his wife. Pt instructed to fill the form when he is ready in order to be sign for a physician and to keep it with him and to bring it every time he came to the hospital.  - Please refer to GOC section in consult for details- plan for home with PT    Thank you for including us in Mr. Nelson's care. Will continue to follow with you.    Magda Vanegas MD  Palliative Care Attending

## 2022-02-18 NOTE — DIETITIAN INITIAL EVALUATION ADULT. - OTHER INFO
70yo male with PMH significant for thrombocytopenia, E.coli bacteremia, brain aneurysm, HTN, HLD, CAD, GERD, [place] [facility] [place] type [place]/p whipple in 2020.  p/w fever and pain at home.  admitted with intractable pain 2/2 malignant obstruction of choledochojejunostomy loop s/p biliary drain exchange.  bacteremia 2/2 cholangitis. YAMILETH 2/2 medication.  DNR/DNI.  seen by palliative.

## 2022-02-18 NOTE — DIETITIAN INITIAL EVALUATION ADULT. - PERTINENT LABORATORY DATA
02-18    133<L>  |  107  |  24<H>  ----------------------------<  100<H>  3.9   |  19<L>  |  1.40<H>    Ca    8.5      18 Feb 2022 06:31    TPro  6.6  /  Alb  2.3<L>  /  TBili  1.5<H>  /  DBili  x   /  AST  18  /  ALT  20  /  AlkPhos  414<H>  02-17  BMI: BMI (kg/m2): 21.1 (02-16-22 @ 00:42)  HbA1c: A1C with Estimated Average Glucose Result: 5.2 % (02-17-22 @ 06:39)    Glucose: POCT Blood Glucose.: 158 mg/dL (08-08-21 @ 12:37)

## 2022-02-18 NOTE — CHART NOTE - NSCHARTNOTEFT_GEN_A_CORE
72yo M with new right hydronephrosis/YAMILETH 2/2 obstruction. IR consulted for nephrostomy placement. Case reviewed with Dr. Bonilla, discussed with Dr. Castro, Dr. Weeks. Plan for IR placement of right nephrostomy today.  - Keep pt NPO  - Covid negative, within range  - Labs otherwise OK for procedure, VSS

## 2022-02-18 NOTE — PROGRESS NOTE ADULT - ATTENDING COMMENTS
Patient seen and examined  on rounds with NP Florence Winters .  I was physically present for the key portion of the evaluation and management service provided, I  examined the patient myself and reviewed the plan of care with the patient and  NP Florence Winters , which I have reviewed and edited .  Medical records reviewed. History, review of systems, physical findings and lab results as documented confirmed , except as modified by me.  Agree with the assessment and plan of as stated and discussed, except as modified below.    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     A/P   1. Intractable pain due to  malignant obstruction of choledochojejunostomy loop s/p biliary drain exchange with underlying metastatic ampullary cancer s/p whipple 2020 - drain care, oncology consult , pain management   2. Gram negative bacteremia possible cholangitis - IV zosyn, ID consult, f/u cultures  3. YAMILETH with moderate right hydronephrosis - IV fluids, urology evaluation for need of nephrostomy vs stent   4. Superior mesenteric vein thrombosis - awaiting AC recs as per oncology hematology team   5. Anemia chemotherapy induced - monitor  6. CAD, HTN - c/w ASA, statin, BB, losartan d/justin , hydralazine prn     Dispo - IV fluids, IV abx, ID and urology evaluation Patient seen and examined  on rounds with NP Florence Winters .  I was physically present for the key portion of the evaluation and management service provided, I  examined the patient myself and reviewed the plan of care with the patient and  NP Florence Winters , which I have reviewed and edited .  Medical records reviewed. History, review of systems, physical findings and lab results as documented confirmed , except as modified by me.  Agree with the assessment and plan of as stated and discussed, except as modified below.    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     A/P   1. Intractable pain due to  malignant obstruction of choledochojejunostomy loop s/p biliary drain exchange with underlying metastatic ampullary cancer s/p whipple 2020 - drain care, oncology consult , pain management   2. Sepsis POA with bacteroides Fragilis due to probable acute cholangitis , Infiltrative  6.3cm  intraabdominal mass in surgical bed possible underlying necrosis  - IV zosyn, ID consult, f/u cultures  3. YAMILETH with moderate right hydronephrosis - IV fluids, urology evaluation for need of nephrostomy , IR consult for nephrostomy placement   4. Superior mesenteric vein thrombosis - needs full  AC  as per oncology hematology team , 48 hours after nephrostomy placement   5. Anemia chemotherapy induced and iron deficiency - monitor, start ferrous sulfate   6. CAD, HTN - hold ASA, c/w statin, BB, losartan d/justin , hydralazine prn     Dispo - IV fluids, IV abx, ID and urology evaluation, IR for nephrostomy placement Patient seen and examined  on rounds with NP Florence Winters .  I was physically present for the key portion of the evaluation and management service provided, I  examined the patient myself and reviewed the plan of care with the patient and  NP Florence Winters , which I have reviewed and edited .  Medical records reviewed. History, review of systems, physical findings and lab results as documented confirmed , except as modified by me.  Agree with the assessment and plan of as stated and discussed, except as modified below.    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     A/P   1. Intractable pain due to  malignant obstruction of choledochojejunostomy loop s/p biliary drain exchange with underlying metastatic ampullary cancer s/p whipple 2020 - drain care, oncology consult , pain management   2. Sepsis POA with bacteroides Fragilis due to probable acute cholangitis , Infiltrative  6.3cm  intraabdominal mass in surgical bed possible underlying necrosis  - IV zosyn, ID consult, f/u cultures  3. YAMILETH with moderate right hydronephrosis - IV fluids, urology evaluation for need of nephrostomy , IR consult for nephrostomy placement - procedure was not done because wife of the patient thought he is getting the stent not the nephrostomy tube   4. Superior mesenteric vein thrombosis - start full  AC  as per oncology hematology team , hold 48 hours after nephrostomy placement   5. Anemia chemotherapy induced and iron deficiency - monitor, start ferrous sulfate   6. CAD, HTN - hold ASA, c/w statin, BB, losartan d/justin , hydralazine prn     Dispo - IV fluids, IV abx, ID and urology evaluation, IR for nephrostomy placement - pending, start heparin drip

## 2022-02-18 NOTE — PROGRESS NOTE ADULT - ASSESSMENT
A/P: 70y/o Male with moderate right hydronephrosis  Right Nephrostomy tube via IR  Monitor creat level.    Pt took aspirin yesterday so nephrostomy tube may be delayed.  Above discussed with Dr. Kennedy

## 2022-02-18 NOTE — PROGRESS NOTE ADULT - SUBJECTIVE AND OBJECTIVE BOX
Pt resting in bed.  Saw pt with Dr. Boudreaux and discussed getting a right nephrostomy tube through IR.  Discussed that Dr. Kennedy spoke with Dr. Castro and that they both agreed that getting a right nephrostomy tube at this time is the best treatment for the pt for the right hydronephrosis.       Vital Signs Last 24 Hrs  T(C): 37.1 (18 Feb 2022 08:58), Max: 37.1 (18 Feb 2022 08:58)  T(F): 98.8 (18 Feb 2022 08:58), Max: 98.8 (18 Feb 2022 08:58)  HR: 75 (18 Feb 2022 08:58) (74 - 79)  BP: 123/46 (18 Feb 2022 08:58) (103/76 - 142/48)  BP(mean): --  RR: 18 (18 Feb 2022 08:58) (18 - 18)  SpO2: 96% (18 Feb 2022 08:58) (96% - 100%)    I&O's Summary    17 Feb 2022 07:01  -  18 Feb 2022 07:00  --------------------------------------------------------  IN: 0 mL / OUT: 1545 mL / NET: -1545 mL    18 Feb 2022 07:01  -  18 Feb 2022 12:28  --------------------------------------------------------  IN: 0 mL / OUT: 200 mL / NET: -200 mL        Physical Exam  Gen: NAD, A&Ox3  Abd: Soft, NT, ND                            8.6    8.38  )-----------( 175      ( 18 Feb 2022 06:31 )             26.0       02-18    133<L>  |  107  |  24<H>  ----------------------------<  100<H>  3.9   |  19<L>  |  1.40<H>    Ca    8.5      18 Feb 2022 06:31    TPro  6.6  /  Alb  2.3<L>  /  TBili  1.5<H>  /  DBili  x   /  AST  18  /  ALT  20  /  AlkPhos  414<H>  02-17

## 2022-02-18 NOTE — PROGRESS NOTE ADULT - SUBJECTIVE AND OBJECTIVE BOX
CHIEF COMPLAINT: back pain    HPI: 71M with PMHx of thrombocytopenia, E.coli bacteremia, brain aneurysm, HTN, HLD, CAD, GERD,  metastatic ampullary cancer on [place] (last two weeks ago) [place] [facility] [place] pancreatobiliary type who is s/p whipple resection in . Pt presents today to ED due to fever (Tmax 102.7) and pain at home. Pt states while in [place], approx two weeks ago, he presented to hospital with pain and IR at the time was unable to place stent and so 8F biliary drain placed. Pt flushing twice a day as directed. Pt wife at bedside to endorse history.     ED course: received zosyn, VSS however pt in intractable pain despite multiple medications received. IR and oncology consulted. Pt had IR procedure: drain changed from 8 to 12F catheter with 150cc output at time of hospitalist exam. Returned to ED and admitted for pain control, hydration, palliative and oncology consult.     Interval History: 22 pt seen and examined at bedside, c/o ongoing right flank pain, well controlled on IV Dilaudid. Pt c/o ongoing weakness and fatigue. NPO for pending nephrostomy tube placement. Right biliary drain functioning. Denies CP, palps, sob, HA, dizziness, dysuria, fever, or chills.   REVIEW OF SYSTEMS: All other review of systems is negative unless indicated above     PHYSICAL EXAM:  Vital Signs Last 24 Hrs: all reviewed  T(C): 37 (2022 13:28), Max: 37.1 (2022 08:58)  T(F): 98.6 (2022 13:28), Max: 98.8 (2022 08:58)  HR: 75 (2022 08:58) (74 - 79)  BP: 123/46 (2022 08:58) (103/76 - 142/48)  RR: 18 (2022 08:58) (18 - 18)  SpO2: 96% (2022 08:58) (96% - 100%)    Constitutional: NAD, awake and alert  HEENT: PERR, EOMI  Neck: Soft and supple,  No JVD  Respiratory: Breath sounds are clear bilaterally, No wheezing, rales or rhonchi  Cardiovascular: S1 and S2, regular rate and rhythm, no Murmurs  Gastrointestinal: Bowel Sounds present, soft, right flank tenderness, nondistended  Extremities: No peripheral edema  Vascular: 2+ peripheral pulses  Neurological: A/O x 3, no focal deficits  Musculoskeletal: 5/5 strength b/l upper and lower extremities  Skin: No rashes      LABS: All Labs Reviewed:                        8.6    8.38  )-----------( 175      ( 2022 06:31 )             26.0   02-18    133<L>  |  107  |  24<H>  ----------------------------<  100<H>  3.9   |  19<L>  |  1.40<H>    Ca    8.5      2022 06:31    TPro  6.6  /  Alb  2.3<L>  /  TBili  1.5<H>  /  DBili  x   /  AST  18  /  ALT  20  /  AlkPhos  414<H>  02    PT/INR - ( 2022 06:39 )   PT: 16.2 sec;   INR: 1.42 ratio    PTT - ( 2022 06:39 )  PTT:31.6 sec  (22 @ 02:34) Lactate, Blood: 1.1 mmol/L   Urinalysis Basic - ( 2022 10:07 )    Color: Yellow / Appearance: Clear / S.015 / pH: x  Gluc: x / Ketone: Negative  / Bili: Negative / Urobili: Negative   Blood: x / Protein: Negative / Nitrite: Negative   Leuk Esterase: Trace / RBC: 0-2 /HPF / WBC 0-2   Sq Epi: x / Non Sq Epi: Occasional / Bacteria: Occasional  Iron with Total Binding Capacity in AM (22 @ 06:31) ; Iron - Total Binding Capacity.: 193 ug/dL ; % Saturation, Iron: 6 % ; Iron Total, Serum: 12 ug/dL ; Unsaturated Iron Binding Capacity: 181 ug/dL ; Ferritin, Serum: 216 ng/mL;  Vitamin B12, Serum: 414 pg/mL ; Folate, Serum: 17.1 ng/mL ; Haptoglobin, Serum: 190 mg/dL ; Haptoglobin, Serum: 190 mg/dL  MICRO:  Culture - Blood (22 @ 02:34) NGTD  Respiratory Viral Panel with COVID-19 by [facility] (22 @ 02:34) Rapid RVP Result: Not Detected SARS-CoV-2: Not Detected   RADIOLOGY/EKG:    US Abdomen Upper Quadrant Right (22 @ 01:57)   FINDINGS:  Liver: Within normal limits.  Bile ducts: Common bile duct stent in place. Dilated common bile duct measuring up to 12 mm. Mildintrahepatic biliary ductal dilatation.  Gallbladder: Surgically absent.  Pancreas: Poorly visualized.  Right kidney: 11.3 cm. Mild to moderate right-sided hydronephrosis, slightly increased compared with prior exam from 2022..  Ascites: Trace perihepatic free fluid.  IVC: Visualized portions are within normal limits.  IMPRESSION:  Common bile duct stent in place. Mild intrahepatic and extra hepatic biliary ductal dilatation with common bile duct measuring up to 12 mm.  Mild to moderate right-sided hydronephrosis, slightly increased compared with prior exam from 2022.  Trace perihepatic ascites.  < end of copied text >    CT Abdomen and Pelvis w/ IV Cont (22 @ 04:24)   FINDINGS:  LOWER CHEST: Coronary atherosclerosis.  LIVER: Within normal limits.  BILE DUCTS: There is biliary stent inserted via the right lateral flank which enters the right hepatic duct and is coiled in the jejunum. There is mild intrahepatic biliary dilatation.  GALLBLADDER: Within normal limits.  SPLEEN: Within normal limits.  PANCREAS: The patient is status post Whipple procedure. There are no prior studies for comparison. In the surgical bed, there is infiltrative ill-defined prominent soft tissue measuring roughly 6.3 (2:49) x 3.8  x 4.2 cm adjacent to the pancreaticojejunostomy sutures concerning for recurrent tumor. There is marked dilatation of the jejunal loop at the luh hepatis measuring up to 4.4 cm.  ADRENALS: Within normal limits.  KIDNEYS/URETERS: The right kidney demonstrates a delayed nephrogram. There is a moderate right hydronephrosis with transition point at the ureteropelvic junction. There is no hydroureter or convincing obstructing stone. There is a 6.6 cm left renal cyst.  BLADDER: Within normal limits.  REPRODUCTIVE ORGANS: The prostate gland is not enlarged.  BOWEL: Moderate hiatal hernia. No bowel obstruction. Appendix is unremarkable. Colon is underdistended without significant fecal load.  PERITONEUM: Trace amount of pelvic ascites. Reticular studding of the fat in the right upper quadrant subhepatic space suspicious for carcinomatosis. There is a 3 cm masslike low density nodule in the right lower quadrant medial to the distal right external iliac vessels compatible with neoplastic implant.  VESSELS: The aorta is not dilated. There is mild to moderate atherosclerotic vascular calcification. There is focal thrombosis of the superior mesenteric vein in the region of suspected tumor recurrence. There is ill-defined soft tissue abutting the superior mesenteric artery.  RETROPERITONEUM/LYMPH NODES: A 2.1 cm precaval lymph node is nonspecific. There is a 9 mm right iliac vein chain lymph node.  Prominent soft tissue   in the right anterior pararenal space measuring 4.5 x 2 cm suspicious for tumor recurrence.  ABDOMINAL WALL: Left-sided ventral hernia mesh is barely underneath the skin in this patient with a weak abdominal wall.  BONES: Within normal limits.  IMPRESSION:  No prior studies for comparison.  No focal collection or soft tissue gas.  Status post Whipple procedure with a 6.3 cm infilatrative mass in the surgical bed adjacent to jejunal sutures compatible with tumor recurrence.  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 Satisfactory positioning of right biliary stent with mild intrahepatic biliary dilatation.   Correlate clinically for cholangitis.  No gastric outlet obstruction. Neoplastic implants in the right upper and lower quadrants.  Focal thrombosis in the superior mesenteric vein related to the site of tumor recurrence.  The right kidney demonstrates a delayed nephrogram and moderate right hydronephrosis with transition at the right ureteropelvic junction, likely related to malignant obstruction.  Retroperitoneal adenopathy.  Trace amount of free fluid.  Moderate hiatal hernia.  < end of copied text >    Xray Chest 1 View- PORTABLE-Urgent (22 @ 07:42)   Heart size is within normal limits. Small hiatal hernia noted. Right MediPort again seen.  Lungs are clear and possibly under circulated.  Chest is similar to .  IMPRESSION: No acute finding or change.  < end of copied text >    MEDICATIONS:  MEDICATIONS  (STANDING):  aspirin enteric coated 81 milliGRAM(s) Oral daily  atorvastatin 20 milliGRAM(s) Oral at bedtime  cholecalciferol 2000 Unit(s) Oral daily  gabapentin 100 milliGRAM(s) Oral every 8 hours  metoprolol succinate ER 12.5 milliGRAM(s) Oral at bedtime  multivitamin 1 Tablet(s) Oral daily  pancrelipase (ZENPEP 20,000 Lipase Units) 4 Capsule(s) Oral three times a day with meals  pantoprazole    Tablet 40 milliGRAM(s) Oral daily  piperacillin/tazobactam IVPB.. 3.375 Gram(s) IV Intermittent every 8 hours  polyethylene glycol 3350 17 Gram(s) Oral daily  sodium chloride 0.9%. 1000 milliLiter(s) (75 mL/Hr) IV Continuous <Continuous>    MEDICATIONS  (PRN):  acetaminophen     Tablet .. 650 milliGRAM(s) Oral every 6 hours PRN Temp greater or equal to 38C (100.4F), Mild Pain (1 - 3)  aluminum hydroxide/magnesium hydroxide/simethicone Suspension 30 milliLiter(s) Oral every 4 hours PRN Dyspepsia  hydrALAZINE Injectable 10 milliGRAM(s) IV Push every 4 hours PRN SBP>170  HYDROmorphone   Tablet 2 milliGRAM(s) Oral every 4 hours PRN Severe Pain (7 - 10)  HYDROmorphone   Tablet 1 milliGRAM(s) Oral every 4 hours PRN Moderate Pain (4 - 6)  HYDROmorphone  Injectable 1 milliGRAM(s) IV Push every 4 hours PRN breathrough pain  melatonin 10 milliGRAM(s) Oral at bedtime PRN Insomnia  ondansetron Injectable 4 milliGRAM(s) IV Push every 6 hours PRN Nausea and/or Vomiting    Home Medications:  Aspirin Enteric Coated 81 mg oral delayed release tablet: 1 tab(s) orally once a day (in the morning) (2022 19:57)  azelastine 137 mcg/inh (0.1%) nasal spray: 2 spray(s) nasal 2 times a day, As Needed - for congestion (2022 19:57)  cholecalciferol 50 mcg (2000 intl units) oral tablet: 1 tab(s) orally once a day (2022 19:57)  dronabinol 2.5 mg oral capsule: 1 cap(s) orally once a day in the afternoon (2022 19:57)  gabapentin 100 mg oral capsule: 1 cap(s) orally every 8 hours (2022 19:57)  HYDROmorphone 2 mg oral tablet: 0.5 tab(s) orally every 3 hours, As Needed (2022 19:57)  ketorolac 10 mg oral tablet: 1 tab(s) orally every 8 hours, As Needed (2022 19:57)  losartan 50 mg oral tablet: 1 tab(s) orally once a day (in the morning) (2022 19:57)  Melatonin 10 mg oral capsule: 1-2 cap(s) orally once a day (at bedtime) (2022 19:57)  metoprolol succinate 25 mg oral tablet, extended release: 0.5 tab(s) orally once a day (at bedtime) (2022 19:57)  Multiple Vitamins oral tablet: 1 tab(s) orally once a day (2022 19:57)  omeprazole 20 mg oral delayed release capsule: 1 cap(s) orally once a day (in the morning) (2022 19:57)  ondansetron 8 mg oral tablet: 1 tab(s) orally 3 times a day, As Needed - for nausea (2022 19:57)  Pfizer-BioNTech COVID-19 Vaccine PF 30 mcg/0.3 mL intramuscular suspension: 0.3 milliliter(s) intramuscular once  *****Booster as of *** (2022 19:57)  rosuvastatin 5 mg oral tablet: 1 tab(s) orally once a day (in the evening) (2022 19:57)  Zenpep 40,000 units-126,000 units-168,000 units oral delayed release capsule: 1 cap(s) orally with snacks (2022 19:57)  Zenpep 40,000 units-126,000 units-168,000 units oral delayed release capsule: 2 cap(s) orally 3 times a day (with meals) (2022 19:57)         CHIEF COMPLAINT: back pain    HPI: 71M with PMHx of thrombocytopenia, E.coli bacteremia, brain aneurysm, HTN, HLD, CAD, GERD,  metastatic ampullary cancer on chemotherapy (last two weeks ago) at [facility] [place], pancreatobiliary type who is s/p whipple resection in . Pt presents today to ED due to fever (Tmax 102.7) and pain at home. Pt states while in [place], approx two weeks ago, he presented to hospital with pain and IR at the time was unable to place stent and so 8F biliary drain placed. Pt flushing twice a day as directed. Pt wife at bedside to endorse history.     ED course: received zosyn, VSS however pt in intractable pain despite multiple medications received. IR and oncology consulted. Pt had IR procedure: drain changed from 8 to 12F catheter with 150cc output at time of hospitalist exam. Returned to ED and admitted for pain control, hydration, palliative and oncology consult.     Interval History: 22 pt seen and examined at bedside, c/o ongoing right flank pain, well controlled on IV Dilaudid. Pt c/o ongoing weakness and fatigue. NPO for pending nephrostomy tube placement. Right biliary drain functioning. Denies CP, palps, sob, HA, dizziness, dysuria, fever, or chills.   REVIEW OF SYSTEMS: All other review of systems is negative unless indicated above     PHYSICAL EXAM:  Vital Signs Last 24 Hrs: all reviewed  T(C): 37 (2022 13:28), Max: 37.1 (2022 08:58)  T(F): 98.6 (2022 13:28), Max: 98.8 (2022 08:58)  HR: 75 (2022 08:58) (74 - 79)  BP: 123/46 (2022 08:58) (103/76 - 142/48)  RR: 18 (2022 08:58) (18 - 18)  SpO2: 96% (2022 08:58) (96% - 100%)    Constitutional: NAD, awake and alert  HEENT: PERR, EOMI  Neck: Soft and supple,  No JVD  Respiratory: Breath sounds are clear bilaterally, No wheezing, rales or rhonchi  Cardiovascular: S1 and S2, regular rate and rhythm, no Murmurs  Gastrointestinal: Bowel Sounds present, soft, right flank tenderness, nondistended  Extremities: No peripheral edema  Vascular: 2+ peripheral pulses  Neurological: A/O x 3, no focal deficits  Musculoskeletal: 5/5 strength b/l upper and lower extremities  Skin: No rashes      LABS: All Labs Reviewed:                        8.6    8.38  )-----------( 175      ( 2022 06:31 )             26.0   02-18    133<L>  |  107  |  24<H>  ----------------------------<  100<H>  3.9   |  19<L>  |  1.40<H>    Ca    8.5      2022 06:31    TPro  6.6  /  Alb  2.3<L>  /  TBili  1.5<H>  /  DBili  x   /  AST  18  /  ALT  20  /  AlkPhos  414<H>  02    PT/INR - ( 2022 06:39 )   PT: 16.2 sec;   INR: 1.42 ratio    PTT - ( 2022 06:39 )  PTT:31.6 sec  (22 @ 02:34) Lactate, Blood: 1.1 mmol/L   Urinalysis Basic - ( 2022 10:07 )    Color: Yellow / Appearance: Clear / S.015 / pH: x  Gluc: x / Ketone: Negative  / Bili: Negative / Urobili: Negative   Blood: x / Protein: Negative / Nitrite: Negative   Leuk Esterase: Trace / RBC: 0-2 /HPF / WBC 0-2   Sq Epi: x / Non Sq Epi: Occasional / Bacteria: Occasional  Iron with Total Binding Capacity in AM (22 @ 06:31) ; Iron - Total Binding Capacity.: 193 ug/dL ; % Saturation, Iron: 6 % ; Iron Total, Serum: 12 ug/dL ; Unsaturated Iron Binding Capacity: 181 ug/dL ; Ferritin, Serum: 216 ng/mL;  Vitamin B12, Serum: 414 pg/mL ; Folate, Serum: 17.1 ng/mL ; Haptoglobin, Serum: 190 mg/dL ; Haptoglobin, Serum: 190 mg/dL  MICRO:  Culture - Blood (22 @ 02:34) NGTD  Respiratory Viral Panel with COVID-19 by [facility] (22 @ 02:34) Rapid RVP Result: Not Detected SARS-CoV-2: Not Detected   RADIOLOGY/EKG: all reviewed    EKG 22 - sinus , poor quality of EKG, repeat in am    US Abdomen Upper Quadrant Right (22 @ 01:57)   FINDINGS:  Liver: Within normal limits.  Bile ducts: Common bile duct stent in place. Dilated common bile duct measuring up to 12 mm. Mildintrahepatic biliary ductal dilatation.  Gallbladder: Surgically absent.  Pancreas: Poorly visualized.  Right kidney: 11.3 cm. Mild to moderate right-sided hydronephrosis, slightly increased compared with prior exam from 2022..  Ascites: Trace perihepatic free fluid.  IVC: Visualized portions are within normal limits.  IMPRESSION:  Common bile duct stent in place. Mild intrahepatic and extra hepatic biliary ductal dilatation with common bile duct measuring up to 12 mm.  Mild to moderate right-sided hydronephrosis, slightly increased compared with prior exam from 2022.  Trace perihepatic ascites.  < end of copied text >    CT Abdomen and Pelvis w/ IV Cont (22 @ 04:24)   FINDINGS:  LOWER CHEST: Coronary atherosclerosis.  LIVER: Within normal limits.  BILE DUCTS: There is biliary stent inserted via the right lateral flank which enters the right hepatic duct and is coiled in the jejunum. There is mild intrahepatic biliary dilatation.  GALLBLADDER: Within normal limits.  SPLEEN: Within normal limits.  PANCREAS: The patient is status post Whipple procedure. There are no prior studies for comparison. In the surgical bed, there is infiltrative ill-defined prominent soft tissue measuring roughly 6.3 (2:49) x 3.8  x 4.2 cm adjacent to the pancreaticojejunostomy sutures concerning for recurrent tumor. There is marked dilatation of the jejunal loop at the luh hepatis measuring up to 4.4 cm.  ADRENALS: Within normal limits.  KIDNEYS/URETERS: The right kidney demonstrates a delayed nephrogram. There is a moderate right hydronephrosis with transition point at the ureteropelvic junction. There is no hydroureter or convincing obstructing stone. There is a 6.6 cm left renal cyst.  BLADDER: Within normal limits.  REPRODUCTIVE ORGANS: The prostate gland is not enlarged.  BOWEL: Moderate hiatal hernia. No bowel obstruction. Appendix is unremarkable. Colon is underdistended without significant fecal load.  PERITONEUM: Trace amount of pelvic ascites. Reticular studding of the fat in the right upper quadrant subhepatic space suspicious for carcinomatosis. There is a 3 cm masslike low density nodule in the right lower quadrant medial to the distal right external iliac vessels compatible with neoplastic implant.  VESSELS: The aorta is not dilated. There is mild to moderate atherosclerotic vascular calcification. There is focal thrombosis of the superior mesenteric vein in the region of suspected tumor recurrence. There is ill-defined soft tissue abutting the superior mesenteric artery.  RETROPERITONEUM/LYMPH NODES: A 2.1 cm precaval lymph node is nonspecific. There is a 9 mm right iliac vein chain lymph node.  Prominent soft tissue   in the right anterior pararenal space measuring 4.5 x 2 cm suspicious for tumor recurrence.  ABDOMINAL WALL: Left-sided ventral hernia mesh is barely underneath the skin in this patient with a weak abdominal wall.  BONES: Within normal limits.  IMPRESSION:  No prior studies for comparison.  No focal collection or soft tissue gas.  Status post Whipple procedure with a 6.3 cm infilatrative mass in the surgical bed adjacent to jejunal sutures compatible with tumor recurrence.  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 Satisfactory positioning of right biliary stent with mild intrahepatic biliary dilatation.   Correlate clinically for cholangitis.  No gastric outlet obstruction. Neoplastic implants in the right upper and lower quadrants.  Focal thrombosis in the superior mesenteric vein related to the site of tumor recurrence.  The right kidney demonstrates a delayed nephrogram and moderate right hydronephrosis with transition at the right ureteropelvic junction, likely related to malignant obstruction.  Retroperitoneal adenopathy.  Trace amount of free fluid.  Moderate hiatal hernia.    Xray Chest 1 View- PORTABLE-Urgent (22 @ 07:42)   Heart size is within normal limits. Small hiatal hernia noted. Right MediPort again seen.  Lungs are clear and possibly under circulated.  Chest is similar to .  IMPRESSION: No acute finding or change.    MEDICATIONS:  MEDICATIONS  (STANDING):  aspirin enteric coated 81 milliGRAM(s) Oral daily  atorvastatin 20 milliGRAM(s) Oral at bedtime  cholecalciferol 2000 Unit(s) Oral daily  gabapentin 100 milliGRAM(s) Oral every 8 hours  metoprolol succinate ER 12.5 milliGRAM(s) Oral at bedtime  multivitamin 1 Tablet(s) Oral daily  pancrelipase (ZENPEP 20,000 Lipase Units) 4 Capsule(s) Oral three times a day with meals  pantoprazole    Tablet 40 milliGRAM(s) Oral daily  piperacillin/tazobactam IVPB.. 3.375 Gram(s) IV Intermittent every 8 hours  polyethylene glycol 3350 17 Gram(s) Oral daily  sodium chloride 0.9%. 1000 milliLiter(s) (75 mL/Hr) IV Continuous <Continuous>    MEDICATIONS  (PRN):  acetaminophen     Tablet .. 650 milliGRAM(s) Oral every 6 hours PRN Temp greater or equal to 38C (100.4F), Mild Pain (1 - 3)  aluminum hydroxide/magnesium hydroxide/simethicone Suspension 30 milliLiter(s) Oral every 4 hours PRN Dyspepsia  hydrALAZINE Injectable 10 milliGRAM(s) IV Push every 4 hours PRN SBP>170  HYDROmorphone   Tablet 2 milliGRAM(s) Oral every 4 hours PRN Severe Pain (7 - 10)  HYDROmorphone   Tablet 1 milliGRAM(s) Oral every 4 hours PRN Moderate Pain (4 - 6)  HYDROmorphone  Injectable 1 milliGRAM(s) IV Push every 4 hours PRN breathrough pain  melatonin 10 milliGRAM(s) Oral at bedtime PRN Insomnia  ondansetron Injectable 4 milliGRAM(s) IV Push every 6 hours PRN Nausea and/or Vomiting    Home Medications:  Aspirin Enteric Coated 81 mg oral delayed release tablet: 1 tab(s) orally once a day (in the morning) (2022 19:57)  azelastine 137 mcg/inh (0.1%) nasal spray: 2 spray(s) nasal 2 times a day, As Needed - for congestion (2022 19:57)  cholecalciferol 50 mcg (2000 intl units) oral tablet: 1 tab(s) orally once a day (2022 19:57)  dronabinol 2.5 mg oral capsule: 1 cap(s) orally once a day in the afternoon (2022 19:57)  gabapentin 100 mg oral capsule: 1 cap(s) orally every 8 hours (2022 19:57)  HYDROmorphone 2 mg oral tablet: 0.5 tab(s) orally every 3 hours, As Needed (2022 19:57)  ketorolac 10 mg oral tablet: 1 tab(s) orally every 8 hours, As Needed (2022 19:57)  losartan 50 mg oral tablet: 1 tab(s) orally once a day (in the morning) (2022 19:57)  Melatonin 10 mg oral capsule: 1-2 cap(s) orally once a day (at bedtime) (2022 19:57)  metoprolol succinate 25 mg oral tablet, extended release: 0.5 tab(s) orally once a day (at bedtime) (2022 19:57)  Multiple Vitamins oral tablet: 1 tab(s) orally once a day (2022 19:57)  omeprazole 20 mg oral delayed release capsule: 1 cap(s) orally once a day (in the morning) (2022 19:57)  ondansetron 8 mg oral tablet: 1 tab(s) orally 3 times a day, As Needed - for nausea (2022 19:57)  Pfizer-BioNTech COVID-19 Vaccine PF 30 mcg/0.3 mL intramuscular suspension: 0.3 milliliter(s) intramuscular once  *****Booster as of *** (2022 19:57)  rosuvastatin 5 mg oral tablet: 1 tab(s) orally once a day (in the evening) (2022 19:57)  Zenpep 40,000 units-126,000 units-168,000 units oral delayed release capsule: 1 cap(s) orally with snacks (2022 19:57)  Zenpep 40,000 units-126,000 units-168,000 units oral delayed release capsule: 2 cap(s) orally 3 times a day (with meals) (2022 19:57)

## 2022-02-18 NOTE — DIETITIAN INITIAL EVALUATION ADULT. - PERTINENT MEDS FT
MEDICATIONS  (STANDING):  aspirin enteric coated 81 milliGRAM(s) Oral daily  atorvastatin 20 milliGRAM(s) Oral at bedtime  cholecalciferol 2000 Unit(s) Oral daily  gabapentin 100 milliGRAM(s) Oral every 8 hours  metoprolol succinate ER 12.5 milliGRAM(s) Oral at bedtime  multivitamin 1 Tablet(s) Oral daily  pancrelipase (ZENPEP 20,000 Lipase Units) 4 Capsule(s) Oral three times a day with meals  pantoprazole    Tablet 40 milliGRAM(s) Oral daily  piperacillin/tazobactam IVPB.. 3.375 Gram(s) IV Intermittent every 8 hours  polyethylene glycol 3350 17 Gram(s) Oral daily  sodium chloride 0.9%. 1000 milliLiter(s) (75 mL/Hr) IV Continuous <Continuous>    MEDICATIONS  (PRN):  acetaminophen     Tablet .. 650 milliGRAM(s) Oral every 6 hours PRN Temp greater or equal to 38C (100.4F), Mild Pain (1 - 3)  aluminum hydroxide/magnesium hydroxide/simethicone Suspension 30 milliLiter(s) Oral every 4 hours PRN Dyspepsia  hydrALAZINE Injectable 10 milliGRAM(s) IV Push every 4 hours PRN SBP>170  HYDROmorphone   Tablet 2 milliGRAM(s) Oral every 4 hours PRN Severe Pain (7 - 10)  HYDROmorphone   Tablet 1 milliGRAM(s) Oral every 4 hours PRN Moderate Pain (4 - 6)  HYDROmorphone  Injectable 1 milliGRAM(s) IV Push every 4 hours PRN breathrough pain  melatonin 10 milliGRAM(s) Oral at bedtime PRN Insomnia  ondansetron Injectable 4 milliGRAM(s) IV Push every 6 hours PRN Nausea and/or Vomiting

## 2022-02-18 NOTE — PROGRESS NOTE ADULT - SUBJECTIVE AND OBJECTIVE BOX
HPI: Pt seen and examined this am in follow up for sx. Pt feeling well, denies discomfort. He notes possibility of procedure, unsure if he wants to proceed and knows that clinicians will discuss this with him further.       PAIN: denies    DYSPNEA: denies      ROS:  All other systems reviewed and negative      PHYSICAL EXAM:    Vital Signs Last 24 Hrs  T(C): 37.1 (18 Feb 2022 08:58), Max: 37.1 (18 Feb 2022 08:58)  T(F): 98.8 (18 Feb 2022 08:58), Max: 98.8 (18 Feb 2022 08:58)  HR: 75 (18 Feb 2022 08:58) (74 - 79)  BP: 123/46 (18 Feb 2022 08:58) (103/76 - 142/48)  BP(mean): --  RR: 18 (18 Feb 2022 08:58) (18 - 18)  SpO2: 96% (18 Feb 2022 08:58) (96% - 100%)  Daily     Daily     PPSV- 50-60%  Gen: Middle aged male, thin, pleasant  Mental Status: AOx4  HEENT: mmm, temporal and clavicular muscle wasting  CVS: +s1 s2 rrr  Lung: dec at bases bl  GI: soft mild distention, + bs, nt  : voids  Ext/skin: moves all extremities, muscle and fat wasting in limbs  Neuro: no focal deficits    LABS:                        8.6    8.38  )-----------( 175      ( 18 Feb 2022 06:31 )             26.0     02-18    133<L>  |  107  |  24<H>  ----------------------------<  100<H>  3.9   |  19<L>  |  1.40<H>    Ca    8.5      18 Feb 2022 06:31    TPro  6.6  /  Alb  2.3<L>  /  TBili  1.5<H>  /  DBili  x   /  AST  18  /  ALT  20  /  AlkPhos  414<H>  02-17    PT/INR - ( 17 Feb 2022 06:39 )   PT: 16.2 sec;   INR: 1.42 ratio         PTT - ( 17 Feb 2022 06:39 )  PTT:31.6 sec  Albumin: Albumin, Serum: 2.3 g/dL (02-17 @ 06:39)      Allergies    No Known Allergies    Intolerances      MEDICATIONS  (STANDING):  aspirin enteric coated 81 milliGRAM(s) Oral daily  atorvastatin 20 milliGRAM(s) Oral at bedtime  cholecalciferol 2000 Unit(s) Oral daily  gabapentin 100 milliGRAM(s) Oral every 8 hours  metoprolol succinate ER 12.5 milliGRAM(s) Oral at bedtime  multivitamin 1 Tablet(s) Oral daily  pancrelipase (ZENPEP 20,000 Lipase Units) 4 Capsule(s) Oral three times a day with meals  pantoprazole    Tablet 40 milliGRAM(s) Oral daily  piperacillin/tazobactam IVPB.. 3.375 Gram(s) IV Intermittent every 8 hours  polyethylene glycol 3350 17 Gram(s) Oral daily  sodium chloride 0.9%. 1000 milliLiter(s) (75 mL/Hr) IV Continuous <Continuous>    MEDICATIONS  (PRN):  acetaminophen     Tablet .. 650 milliGRAM(s) Oral every 6 hours PRN Temp greater or equal to 38C (100.4F), Mild Pain (1 - 3)  aluminum hydroxide/magnesium hydroxide/simethicone Suspension 30 milliLiter(s) Oral every 4 hours PRN Dyspepsia  hydrALAZINE Injectable 10 milliGRAM(s) IV Push every 4 hours PRN SBP>170  HYDROmorphone   Tablet 2 milliGRAM(s) Oral every 4 hours PRN Severe Pain (7 - 10)  HYDROmorphone   Tablet 1 milliGRAM(s) Oral every 4 hours PRN Moderate Pain (4 - 6)  HYDROmorphone  Injectable 1 milliGRAM(s) IV Push every 4 hours PRN breathrough pain  melatonin 10 milliGRAM(s) Oral at bedtime PRN Insomnia  ondansetron Injectable 4 milliGRAM(s) IV Push every 6 hours PRN Nausea and/or Vomiting

## 2022-02-18 NOTE — PROGRESS NOTE ADULT - ASSESSMENT
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  Retroperitoneal adenopathy.  - Cr 1.4 today  - I discussed case with [facility] treating oncologist Dr. Thomas- all in agreement that nephrostomy would be best for pt- she was going to call pt and wife to discuss as well   - if concern over asa use- can give 1u plts during procedure  - Dr. Castro discussed with IR   - pt will have to remain off treatment for the next several weeks due to persistent bacteremia and HN has gotten worse in the past month off treatment as well- for this reason I believe he would benefit for nephrostomy      Dr. Didier Walker  cell: [phone]  Weekends and nights please call [phone] for MD on call  [facility]  Hematology/Oncology

## 2022-02-18 NOTE — PROGRESS NOTE ADULT - ATTENDING COMMENTS
Patient seen a and evaluated this AM. I had a long discussion with the patient and his wife about the pros and cons of relieving the obstructed ureter. They understand the risks of no intervention and would like to proceed with an attempt at stenting the ureter. The biliary catheter is functioning nicely.

## 2022-02-18 NOTE — PROGRESS NOTE ADULT - ASSESSMENT
72 YO M with Local recurrence of ampullary carcinoma of 6.3cm causing obstruction of the choledochojejunostomy with no bowel obstruction  Urology on consult, for possible obstructive hydronephrosis   Dr Castro on board  IR drain functioning   No surgical intervention planned for this pt from Surgical Oncology.      Case discussed with Dr Jane

## 2022-02-18 NOTE — CHART NOTE - NSCHARTNOTEFT_GEN_A_CORE
Discussed with Dr. CONWAY,   NO HEMATOLOGIC CONTRAINDICATIONS TO PROCEEDING WITH PERCUTANEOUS NEPRHOSTOMY   PAITNET ON ASA YET WOULD BENEFITS OUTWEIGHT RISKS OF DELAYING PROCEDURE

## 2022-02-18 NOTE — DIETITIAN INITIAL EVALUATION ADULT. - MALNUTRITION
severe malnutrition in chronic illness r/t decreased ability to meet increased needs 2/2 CA AEB severe muscle/fat wasting; 15% wt loss x 1 mo; meeting <75% of ENN chronically

## 2022-02-18 NOTE — DIETITIAN INITIAL EVALUATION ADULT. - ADD RECOMMEND
1) add ensure enlive BID and gelatein BID to optimize PO intake 2) daily wt checks to track/trend changes 3) monitor BM: if > 3 days without BM, add bowel regimen 4) encourage protein/calorie intake at each meal and between meals

## 2022-02-18 NOTE — DIETITIAN NUTRITION RISK NOTIFICATION - ADDITIONAL COMMENTS/DIETITIAN RECOMMENDATIONS
1) add ensure enlive BID and gelatein BID to optimize PO intake 2) daily wt checks to track/trend changes 3) monitor BM: if > 3 days without BM, add bowel regimen 4) encourage protein/calorie intake at each meal and between meals   I have reviewed and confirmed nurses' notes for patient's medications, allergies, medical history, and surgical history.

## 2022-02-18 NOTE — PROGRESS NOTE ADULT - ASSESSMENT
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     Intractable pain due to malignant obstruction of choledochojejunostomy loop s/p biliary drain exchange .   Metastatic ampullary cancer s/p whipple 2020  - s/p Zosyn in ED, pt w/o criteria for sepsis at this time  - s/p IR drainage on 2/16  - s/p exchange/upsizing of biliary drain   - Pain control as above  -  Cont Zofran, Cont pancreatic enzymes  - OOB and ambulate with assistance    Bacteremia (GNR) likely due to cholangitis   - Pt remains afebrile, Lactate benign  - + Blood culture--> Bacteroides fragilis; cont Zosyn  - urine cx NGTD  - ID following    Superior mesenteric vein thrombosis   - Dr. Castro following, to start full dose AC 48 hours after nephrostomy tube placement  - Lovenox 40 x 1 dose     YAMILETH with baseline SCr 0.7, multifactorial; Medication induced (ketorolac & Losartan) associated with   moderate Right hydronephrosis due to malignant obstruction   - IV Hydration  - Hold NSAIDs and Losartan  - Check post void residual ,  Strict I&O  - Urology,  Dr. Kennedy following; for right nephrostomy tube today with IR      Iron deficiency anemia    - trend cbc   - Iron studies as above     Hyperglycemia  A1c 5.2,  trend     CAD - continue aspirin and statin    Hypoalbuminemia 2/2 Protein calorie malnutrition  - nutrition consult,  advance diet as tolerated     HTN  - Losartan stopped 2/2 renal function  - continue BB  - Hydralazine PRN for SBP > 170    HLD  -continue statin    DVT PPX SCDs     DISPO: NPO for right nephrostomy tube placement, start AC 48 hrs after procedure,  cont IV abx,      71M with PMHx of metastatic ampullary cancer on chemotherapy who presents with intractable abdominal pain after with biliary drain placed two weeks ago, found to have malfunction due to blockage, YAMILETH due to dehydration, malnutrition, and hydronephrosis likely due to metastatic lesion.     Intractable pain due to malignant obstruction of choledochojejunostomy loop s/p biliary drain exchange .   Metastatic ampullary cancer s/p whipple 2020  - s/p Zosyn in ED, pt w/o criteria for sepsis at this time  - s/p IR drainage on 2/16   - s/p exchange/upsizing of biliary drain   - Pain control as above  -  Cont Zofran, and pancreatic enzymes  - OOB and ambulate with assistance    Sepsis , POA with bacteroides Fragilis due to probable acute cholangitis as well as   Infiltrative  6.3cm  intraabdominal mass in surgical bed possible underlying necrosis  - Pt remains afebrile, Lactate benign  - + Blood culture--> Bacteroides fragilis; cont Zosyn  - urine cx NGTD   - ID following    Superior mesenteric vein thrombosis   - Dr. Castro following, to start full dose AC 48 hours after nephrostomy tube placement  - Lovenox 40 x 1 dose     YAMILETH with baseline SCr 0.7, multifactorial; Medication induced (ketorolac & Losartan) associated with   moderate Right hydronephrosis due to malignant obstruction   - IV Hydration,   - Hold NSAIDs and Losartan  - Check post void residual ,  Strict I&O  - Urology,  Dr. Kennedy following; for right nephrostomy tube today with IR    - medically stable for needed urgent procedure  - stop ASA , restart after 48 hours after nephrostomy placement     Iron deficiency anemia    - trend cbc ,  Iron studies as above noted   - start iron daily     Hyperglycemia  A1c 5.2,  trend     CAD - hold aspirin , c/w  statin    Hypoalbuminemia 2/2 Protein calorie malnutrition  - nutrition consult,  advance diet as tolerated     HTN  - Losartan stopped 2/2 renal function  - continue BB  - Hydralazine PRN for SBP > 170    HLD  -continue statin    DVT PPX SCDs     DISPO: NPO for right nephrostomy tube placement, start AC 48 hrs after procedure,  cont IV abx,      71M with PMHx of metastatic ampullary cancer on chemotherapy who presents with intractable abdominal pain after with biliary drain placed two weeks ago, found to have malfunction due to blockage, YAMILETH due to dehydration, malnutrition, and hydronephrosis likely due to metastatic lesion.     Intractable pain due to malignant obstruction of choledochojejunostomy loop s/p biliary drain exchange .   Metastatic ampullary cancer s/p whipple 2020  - s/p Zosyn in ED, pt w/o criteria for sepsis at this time  - s/p IR drainage on 2/16   - s/p exchange/upsizing of biliary drain   - Pain control as above  -  Cont Zofran, and pancreatic enzymes  - OOB and ambulate with assistance    Sepsis , POA with bacteroides Fragilis due to probable acute cholangitis as well as   Infiltrative  6.3cm  intraabdominal mass in surgical bed possible underlying necrosis  - Pt remains afebrile, Lactate benign  - + Blood culture--> Bacteroides fragilis; cont Zosyn  - urine cx NGTD   - ID following    Superior mesenteric vein thrombosis   - Dr. Castro following-  full dose AC   - s/p Lovenox 40 x 1 dose 2/17/22     YAMILETH with baseline SCr 0.7, multifactorial; Medication induced (ketorolac & Losartan) associated with   moderate Right hydronephrosis due to malignant obstruction   - IV Hydration,   - Hold NSAIDs and Losartan  - Check post void residual ,  Strict I&O  - Urology,  Dr. Kennedy following; for right nephrostomy tube today with IR    - medically stable for needed urgent procedure  - stop ASA , restart after 48 hours after nephrostomy placement     Iron deficiency anemia    - trend cbc ,  Iron studies as above noted   - start iron daily     Hyperglycemia  A1c 5.2,  trend     CAD - hold aspirin , c/w  statin    Hypoalbuminemia 2/2 Protein calorie malnutrition  - nutrition consult,  advance diet as tolerated     HTN  - Losartan stopped 2/2 renal function  - continue BB  - Hydralazine PRN for SBP > 170    HLD  -continue statin    DVT PPX SCDs     DISPO: NPO for right nephrostomy tube placement, start AC 48 hrs after procedure,  cont IV abx,

## 2022-02-18 NOTE — DIETITIAN NUTRITION RISK NOTIFICATION - TREATMENT: THE FOLLOWING DIET HAS BEEN RECOMMENDED
Diet, NPO after Midnight:      NPO Start Date: 17-Feb-2022,   NPO Start Time: 23:59 (02-17-22 @ 21:08) [Active]  Diet, Regular (02-17-22 @ 07:26) [Active]

## 2022-02-18 NOTE — PROGRESS NOTE ADULT - SUBJECTIVE AND OBJECTIVE BOX
INTERVAL HPI/OVERNIGHT EVENTS:  Patient S&E at bedside. Pt with pain this morning and overnight in his back for which he is taking dilaudid. Was NPO AM with plan for nephrostomy placement today. Seen by urology. Discussed with wife on phone at bedside as well as Dr. Thomas and all in agreement. Afebrile, VSS. Labs reviewed, Hb 8.6, plt 175. Cr 1.4.     PAST MEDICAL & SURGICAL HISTORY:  Hearing loss  right    Stented coronary artery  2006 PTCA x 1    CAD (coronary artery disease)  last stress test Fall 2018    HTN (hypertension)    Hyperlipidemia    GERD (gastroesophageal reflux disease)    Skin cancer  squamous cell - head, shin - removed    OA (osteoarthritis)    Psoriasis    Inguinal hernia  right    Brain aneurysm  non ruptured - had stent placed in 2010 after attemtped clipping and coiling in 2007    Pancreatic cancer  s/p Whipple procedure    Drug or chemical induced diabetes mellitus    H/O craniotomy  for non ruptured aneurysm 2007, attempted clipping    History of other surgery  for brain aneurysm - attempted coiling 2007    History of other surgery  endovascular stent placement for brain Aneurysm at [place] - 2010    History of tonsillectomy    S/P colonoscopy  last - within 5 yrs    History of PTCA  2006 x 1 stent    H/O inguinal hernia repair    Pancreatic disorder  Whipple for cancer        FAMILY HISTORY:  Family history of breast cancer in sister        VITAL SIGNS:  T(F): 98.8 (02-18-22 @ 08:58)  HR: 75 (02-18-22 @ 08:58)  BP: 123/46 (02-18-22 @ 08:58)  RR: 18 (02-18-22 @ 08:58)  SpO2: 96% (02-18-22 @ 08:58)  Wt(kg): --    PHYSICAL EXAM:    Constitutional: NAD, moderate pain  Eyes: EOMI, sclera non-icteric  Respiratory: CTA b/l, good air entry b/l  Cardiovascular: RRR,   Gastrointestinal: tender to palpation in right quadrant and back, biliary drain in place  Extremities: no c/c/e  Neurological: AAOx3      MEDICATIONS  (STANDING):  aspirin enteric coated 81 milliGRAM(s) Oral daily  atorvastatin 20 milliGRAM(s) Oral at bedtime  cholecalciferol 2000 Unit(s) Oral daily  gabapentin 100 milliGRAM(s) Oral every 8 hours  metoprolol succinate ER 12.5 milliGRAM(s) Oral at bedtime  multivitamin 1 Tablet(s) Oral daily  pancrelipase (ZENPEP 20,000 Lipase Units) 4 Capsule(s) Oral three times a day with meals  pantoprazole    Tablet 40 milliGRAM(s) Oral daily  piperacillin/tazobactam IVPB.. 3.375 Gram(s) IV Intermittent every 8 hours  polyethylene glycol 3350 17 Gram(s) Oral daily  sodium chloride 0.9%. 1000 milliLiter(s) (75 mL/Hr) IV Continuous <Continuous>    MEDICATIONS  (PRN):  acetaminophen     Tablet .. 650 milliGRAM(s) Oral every 6 hours PRN Temp greater or equal to 38C (100.4F), Mild Pain (1 - 3)  aluminum hydroxide/magnesium hydroxide/simethicone Suspension 30 milliLiter(s) Oral every 4 hours PRN Dyspepsia  hydrALAZINE Injectable 10 milliGRAM(s) IV Push every 4 hours PRN SBP>170  HYDROmorphone   Tablet 2 milliGRAM(s) Oral every 4 hours PRN Severe Pain (7 - 10)  HYDROmorphone   Tablet 1 milliGRAM(s) Oral every 4 hours PRN Moderate Pain (4 - 6)  HYDROmorphone  Injectable 1 milliGRAM(s) IV Push every 4 hours PRN breathrough pain  melatonin 10 milliGRAM(s) Oral at bedtime PRN Insomnia  ondansetron Injectable 4 milliGRAM(s) IV Push every 6 hours PRN Nausea and/or Vomiting      Allergies    No Known Allergies    Intolerances        LABS:                        8.6    8.38  )-----------( 175      ( 18 Feb 2022 06:31 )             26.0     02-18    133<L>  |  107  |  24<H>  ----------------------------<  100<H>  3.9   |  19<L>  |  1.40<H>    Ca    8.5      18 Feb 2022 06:31    TPro  6.6  /  Alb  2.3<L>  /  TBili  1.5<H>  /  DBili  x   /  AST  18  /  ALT  20  /  AlkPhos  414<H>  02-17    PT/INR - ( 17 Feb 2022 06:39 )   PT: 16.2 sec;   INR: 1.42 ratio         PTT - ( 17 Feb 2022 06:39 )  PTT:31.6 sec      RADIOLOGY & ADDITIONAL TESTS:  Studies reviewed.

## 2022-02-18 NOTE — PROGRESS NOTE ADULT - ATTENDING COMMENTS
Had discussed the case with Dr Castro who agreed with Nephrostomy tube.   Had discussed the case with Dr Bonilla from IR who thought Pt should have a trial of ureteral stent.   Mentioned concern ureteral stent unlikely to work considering malignant obstruction and would still recommend Nephrostomy tube.   Pt went to Nephrostomy tube but had some questions and concerns and did not get it.     Spoke with Pt's wife and gain discussed options and recommended Nephrostomy tube.   She mentioned Pt now agreeable.   Medicine team informed.

## 2022-02-18 NOTE — CONSULT NOTE ADULT - SUBJECTIVE AND OBJECTIVE BOX
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 Pt states while visiting [place], approx two weeks PTA, he presented to hospital with abdominal pain and IR placed a 8F biliary drain. Pt flushing twice a day as directed. In ER he was noted febrile and he received zosyn.     ED course: Pt had IR procedure: drain changed from 8 to 12F catheter with 150cc output.    PMH: as above  PSH: as above  Meds: per reconciliation sheet, noted below  MEDICATIONS  (STANDING):  aspirin enteric coated 81 milliGRAM(s) Oral daily  atorvastatin 20 milliGRAM(s) Oral at bedtime  cholecalciferol 2000 Unit(s) Oral daily  gabapentin 100 milliGRAM(s) Oral every 8 hours  metoprolol succinate ER 12.5 milliGRAM(s) Oral at bedtime  multivitamin 1 Tablet(s) Oral daily  pancrelipase (ZENPEP 20,000 Lipase Units) 4 Capsule(s) Oral three times a day with meals  pantoprazole    Tablet 40 milliGRAM(s) Oral daily  piperacillin/tazobactam IVPB.. 3.375 Gram(s) IV Intermittent every 8 hours  polyethylene glycol 3350 17 Gram(s) Oral daily  sodium chloride 0.9%. 1000 milliLiter(s) (75 mL/Hr) IV Continuous <Continuous>    MEDICATIONS  (PRN):  acetaminophen     Tablet .. 650 milliGRAM(s) Oral every 6 hours PRN Temp greater or equal to 38C (100.4F), Mild Pain (1 - 3)  aluminum hydroxide/magnesium hydroxide/simethicone Suspension 30 milliLiter(s) Oral every 4 hours PRN Dyspepsia  hydrALAZINE Injectable 10 milliGRAM(s) IV Push every 4 hours PRN SBP>170  HYDROmorphone   Tablet 2 milliGRAM(s) Oral every 4 hours PRN Severe Pain (7 - 10)  HYDROmorphone   Tablet 1 milliGRAM(s) Oral every 4 hours PRN Moderate Pain (4 - 6)  HYDROmorphone  Injectable 1 milliGRAM(s) IV Push every 4 hours PRN breathrough pain  melatonin 10 milliGRAM(s) Oral at bedtime PRN Insomnia  ondansetron Injectable 4 milliGRAM(s) IV Push every 6 hours PRN Nausea and/or Vomiting    Allergies    No Known Allergies    Intolerances      Social: no smoking, no alcohol, no illegal drugs; no recent travel, no exposure to TB  FAMILY HISTORY:  Family history of breast cancer in sister      no history of premature cardiovascular disease in first degree relatives    ROS: the patient denies HA, no seizures, no dizziness, no sore throat, no nasal congestion, no blurry vision, no CP, no palpitations, no SOB, no cough, has abdominal pain, no diarrhea, no N/V, no dysuria, no leg pain, no claudication, no rash, no joint aches, no rectal pain or bleeding, no night sweats  All other systems reviewed and are negative    Vital Signs Last 24 Hrs  T(C): 36.8 (:15), Max: 36.8 (2022 21:15)  T(F): 98.3 (:15), Max: 98.3 (:15)  HR: 79 (:15) (66 - 79)  BP: 103/76 (2022 21:15) (103/76 - 142/48)  BP(mean): --  RR: 18 (:15) (18 - 18)  SpO2: 98% (:15) (98% - 100%)  Daily     Daily     PE:    Constitutional:  No acute distress  HEENT: NC/AT, EOMI, PERRLA, conjunctivae clear; ears and nose atraumatic; pharynx benign  Neck: supple; thyroid not palpable  Back: no tenderness  Respiratory: respiratory effort normal; clear to auscultation  Cardiovascular: S1S2 regular, no murmurs  Abdomen: soft, has RUQ tender, not distended, positive BS; no liver or spleen organomegaly  biliary drain in place  Genitourinary: no suprapubic tenderness  Lymphatic: no LN palpable  Musculoskeletal: no muscle tenderness, no joint swelling or tenderness  Extremities: no pedal edema  Neurological/ Psychiatric: AxOx3, judgement and insight normal; moving all extremities  Skin: no rashes; no palpable lesions    Labs: all available labs reviewed                        8.6    8.38  )-----------( 175      ( 2022 06:31 )             26.0     02-18    133<L>  |  107  |  24<H>  ----------------------------<  100<H>  3.9   |  19<L>  |  1.40<H>    Ca    8.5      2022 06:31    TPro  6.6  /  Alb  2.3<L>  /  TBili  1.5<H>  /  DBili  x   /  AST  18  /  ALT  20  /  AlkPhos  414<H>  0217     LIVER FUNCTIONS - ( 2022 06:39 )  Alb: 2.3 g/dL / Pro: 6.6 gm/dL / ALK PHOS: 414 U/L / ALT: 20 U/L / AST: 18 U/L / GGT: x           Urinalysis Basic - ( 2022 10:07 )    Color: Yellow / Appearance: Clear / S.015 / pH: x  Gluc: x / Ketone: Negative  / Bili: Negative / Urobili: Negative   Blood: x / Protein: Negative / Nitrite: Negative   Leuk Esterase: Trace / RBC: 0-2 /HPF / WBC 0-2   Sq Epi: x / Non Sq Epi: Occasional / Bacteria: Occasional    ( @ 02:34)  NotDete      Culture - Urine (collected 2022 10:07)  Source: Clean Catch None  Final Report (2022 14:37):    <10,000 CFU/mL Normal Urogenital Emily    Culture - Blood (collected 2022 02:34)  Source: .Blood None  Gram Stain (2022 18:41):    Growth in anaerobic bottle: Gram Negative Rods  Preliminary Report (2022 18:41):    Growth in anaerobic bottle: Gram Negative Rods    Culture - Blood (collected 2022 02:34)  Source: .Blood None  Gram Stain (2022 13:19):    Growth in anaerobic bottle: Gram Negative Rods  Preliminary Report (2022 13:20):    Growth in anaerobic bottle: Gram Negative Rods  Organism: Blood Culture PCR (2022 15:36)      -  Bacteroides fragilis: Detec      Method Type: PCR    Radiology: all available radiological tests reviewed    < from: CT Abdomen and Pelvis w/ IV Cont (22 @ 04:24) >  Status post Whipple procedure with a 6.3 cm infilatrative mass in the   surgical bed adjacent to jejunal sutures compatible with tumor   recurrence.  Dilated small bowel loop at the luh compatible with   malignant obstruction of choledochojejunostomy loop. Satisfactory   positioning of right biliary stent with mild intrahepatic biliary   dilatation.   Correlate clinically for cholangitis.  No gastric outlet obstruction.  Neoplastic implants in the right upper and lower quadrants.  Focal thrombosis in the superior mesenteric vein related to the site of   tumor recurrence.  The right kidney demonstrates a delayed nephrogram and moderate right   hydronephrosis with transition at the right ureteropelvic junction,   likely related to malignant obstruction.  Retroperitoneal adenopathy.    < end of copied text >      Advanced directives addressed: full resuscitation

## 2022-02-19 LAB
ALBUMIN SERPL ELPH-MCNC: 2 G/DL — LOW (ref 3.3–5)
ALP SERPL-CCNC: 235 U/L — HIGH (ref 40–120)
ALT FLD-CCNC: 13 U/L — SIGNIFICANT CHANGE UP (ref 12–78)
ANION GAP SERPL CALC-SCNC: 8 MMOL/L — SIGNIFICANT CHANGE UP (ref 5–17)
APTT BLD: 39.9 SEC — HIGH (ref 27.5–35.5)
APTT BLD: 41.4 SEC — HIGH (ref 27.5–35.5)
APTT BLD: 41.6 SEC — HIGH (ref 27.5–35.5)
APTT BLD: 49.2 SEC — HIGH (ref 27.5–35.5)
AST SERPL-CCNC: 12 U/L — LOW (ref 15–37)
BILIRUB SERPL-MCNC: 1.7 MG/DL — HIGH (ref 0.2–1.2)
BUN SERPL-MCNC: 20 MG/DL — SIGNIFICANT CHANGE UP (ref 7–23)
CALCIUM SERPL-MCNC: 8.5 MG/DL — SIGNIFICANT CHANGE UP (ref 8.5–10.1)
CHLORIDE SERPL-SCNC: 107 MMOL/L — SIGNIFICANT CHANGE UP (ref 96–108)
CHOLEST SERPL-MCNC: 72 MG/DL — SIGNIFICANT CHANGE UP
CK SERPL-CCNC: 19 U/L — LOW (ref 26–308)
CO2 SERPL-SCNC: 20 MMOL/L — LOW (ref 22–31)
CREAT SERPL-MCNC: 1.42 MG/DL — HIGH (ref 0.5–1.3)
GLUCOSE SERPL-MCNC: 116 MG/DL — HIGH (ref 70–99)
HCT VFR BLD CALC: 28.9 % — LOW (ref 39–50)
HDLC SERPL-MCNC: 25 MG/DL — LOW
HGB BLD-MCNC: 9.3 G/DL — LOW (ref 13–17)
LIPID PNL WITH DIRECT LDL SERPL: 32 MG/DL — SIGNIFICANT CHANGE UP
MAGNESIUM SERPL-MCNC: 1.9 MG/DL — SIGNIFICANT CHANGE UP (ref 1.6–2.6)
MCHC RBC-ENTMCNC: 29 PG — SIGNIFICANT CHANGE UP (ref 27–34)
MCHC RBC-ENTMCNC: 32.2 GM/DL — SIGNIFICANT CHANGE UP (ref 32–36)
MCV RBC AUTO: 90 FL — SIGNIFICANT CHANGE UP (ref 80–100)
NON HDL CHOLESTEROL: 47 MG/DL — SIGNIFICANT CHANGE UP
NT-PROBNP SERPL-SCNC: 353 PG/ML — HIGH (ref 0–125)
PHOSPHATE SERPL-MCNC: 2.7 MG/DL — SIGNIFICANT CHANGE UP (ref 2.5–4.5)
PLATELET # BLD AUTO: 196 K/UL — SIGNIFICANT CHANGE UP (ref 150–400)
POTASSIUM SERPL-MCNC: 4.3 MMOL/L — SIGNIFICANT CHANGE UP (ref 3.5–5.3)
POTASSIUM SERPL-SCNC: 4.3 MMOL/L — SIGNIFICANT CHANGE UP (ref 3.5–5.3)
PROT SERPL-MCNC: 6.3 GM/DL — SIGNIFICANT CHANGE UP (ref 6–8.3)
RBC # BLD: 3.21 M/UL — LOW (ref 4.2–5.8)
RBC # FLD: 14.5 % — SIGNIFICANT CHANGE UP (ref 10.3–14.5)
SODIUM SERPL-SCNC: 135 MMOL/L — SIGNIFICANT CHANGE UP (ref 135–145)
TRIGL SERPL-MCNC: 77 MG/DL — SIGNIFICANT CHANGE UP
TROPONIN I, HIGH SENSITIVITY RESULT: 49.27 NG/L — SIGNIFICANT CHANGE UP
WBC # BLD: 10.38 K/UL — SIGNIFICANT CHANGE UP (ref 3.8–10.5)
WBC # FLD AUTO: 10.38 K/UL — SIGNIFICANT CHANGE UP (ref 3.8–10.5)

## 2022-02-19 PROCEDURE — 99232 SBSQ HOSP IP/OBS MODERATE 35: CPT

## 2022-02-19 PROCEDURE — 93010 ELECTROCARDIOGRAM REPORT: CPT

## 2022-02-19 RX ADMIN — PIPERACILLIN AND TAZOBACTAM 25 GRAM(S): 4; .5 INJECTION, POWDER, LYOPHILIZED, FOR SOLUTION INTRAVENOUS at 14:15

## 2022-02-19 RX ADMIN — GABAPENTIN 100 MILLIGRAM(S): 400 CAPSULE ORAL at 06:20

## 2022-02-19 RX ADMIN — HEPARIN SODIUM 1400 UNIT(S)/HR: 5000 INJECTION INTRAVENOUS; SUBCUTANEOUS at 20:52

## 2022-02-19 RX ADMIN — Medication 4 CAPSULE(S): at 08:25

## 2022-02-19 RX ADMIN — Medication 325 MILLIGRAM(S): at 09:18

## 2022-02-19 RX ADMIN — GABAPENTIN 100 MILLIGRAM(S): 400 CAPSULE ORAL at 14:15

## 2022-02-19 RX ADMIN — SODIUM CHLORIDE 75 MILLILITER(S): 9 INJECTION INTRAMUSCULAR; INTRAVENOUS; SUBCUTANEOUS at 15:25

## 2022-02-19 RX ADMIN — Medication 1 TABLET(S): at 09:18

## 2022-02-19 RX ADMIN — HEPARIN SODIUM 2500 UNIT(S): 5000 INJECTION INTRAVENOUS; SUBCUTANEOUS at 07:51

## 2022-02-19 RX ADMIN — HEPARIN SODIUM 5000 UNIT(S): 5000 INJECTION INTRAVENOUS; SUBCUTANEOUS at 00:55

## 2022-02-19 RX ADMIN — Medication 2000 UNIT(S): at 09:18

## 2022-02-19 RX ADMIN — HEPARIN SODIUM 1200 UNIT(S)/HR: 5000 INJECTION INTRAVENOUS; SUBCUTANEOUS at 00:53

## 2022-02-19 RX ADMIN — HEPARIN SODIUM 1400 UNIT(S)/HR: 5000 INJECTION INTRAVENOUS; SUBCUTANEOUS at 16:02

## 2022-02-19 RX ADMIN — HEPARIN SODIUM 2500 UNIT(S): 5000 INJECTION INTRAVENOUS; SUBCUTANEOUS at 14:35

## 2022-02-19 RX ADMIN — HEPARIN SODIUM 1300 UNIT(S)/HR: 5000 INJECTION INTRAVENOUS; SUBCUTANEOUS at 07:49

## 2022-02-19 RX ADMIN — Medication 12.5 MILLIGRAM(S): at 22:25

## 2022-02-19 RX ADMIN — Medication 4 CAPSULE(S): at 12:14

## 2022-02-19 RX ADMIN — HYDROMORPHONE HYDROCHLORIDE 2 MILLIGRAM(S): 2 INJECTION INTRAMUSCULAR; INTRAVENOUS; SUBCUTANEOUS at 16:03

## 2022-02-19 RX ADMIN — Medication 4 CAPSULE(S): at 17:47

## 2022-02-19 RX ADMIN — ATORVASTATIN CALCIUM 20 MILLIGRAM(S): 80 TABLET, FILM COATED ORAL at 22:26

## 2022-02-19 RX ADMIN — PANTOPRAZOLE SODIUM 40 MILLIGRAM(S): 20 TABLET, DELAYED RELEASE ORAL at 09:19

## 2022-02-19 RX ADMIN — GABAPENTIN 100 MILLIGRAM(S): 400 CAPSULE ORAL at 22:24

## 2022-02-19 RX ADMIN — SODIUM CHLORIDE 75 MILLILITER(S): 9 INJECTION INTRAMUSCULAR; INTRAVENOUS; SUBCUTANEOUS at 06:20

## 2022-02-19 RX ADMIN — HEPARIN SODIUM 1400 UNIT(S)/HR: 5000 INJECTION INTRAVENOUS; SUBCUTANEOUS at 14:33

## 2022-02-19 RX ADMIN — HYDROMORPHONE HYDROCHLORIDE 1 MILLIGRAM(S): 2 INJECTION INTRAMUSCULAR; INTRAVENOUS; SUBCUTANEOUS at 07:15

## 2022-02-19 RX ADMIN — HYDROMORPHONE HYDROCHLORIDE 2 MILLIGRAM(S): 2 INJECTION INTRAMUSCULAR; INTRAVENOUS; SUBCUTANEOUS at 20:19

## 2022-02-19 RX ADMIN — PIPERACILLIN AND TAZOBACTAM 25 GRAM(S): 4; .5 INJECTION, POWDER, LYOPHILIZED, FOR SOLUTION INTRAVENOUS at 06:20

## 2022-02-19 RX ADMIN — PIPERACILLIN AND TAZOBACTAM 25 GRAM(S): 4; .5 INJECTION, POWDER, LYOPHILIZED, FOR SOLUTION INTRAVENOUS at 22:26

## 2022-02-19 RX ADMIN — HEPARIN SODIUM 1500 UNIT(S)/HR: 5000 INJECTION INTRAVENOUS; SUBCUTANEOUS at 22:15

## 2022-02-19 RX ADMIN — POLYETHYLENE GLYCOL 3350 17 GRAM(S): 17 POWDER, FOR SOLUTION ORAL at 09:18

## 2022-02-19 NOTE — PROGRESS NOTE ADULT - ASSESSMENT
70 YO M with Local recurrence of ampullary carcinoma of 6.3cm causing obstruction of the choledochojejunostomy with no bowel obstruction    Urology on consult, for possible obstructive hydronephrosis, plan for nephrostomy tube on Tuesday  Dr Castro on board  IR drain functioning   No surgical intervention planned for this pt from Surgical Oncology.      Case discussed with Dr Jane

## 2022-02-19 NOTE — PROGRESS NOTE ADULT - SUBJECTIVE AND OBJECTIVE BOX
Pt was seen and examined this morning. Pt sated that he continues to have no abdominal pain but his back pain continues. Pt continues to have normal bowel function. Nephrostomy tube placement was scheduled for yesterday but patient refused at the time. Pt has been afebrile for hospital course.     Vital Signs Last 24 Hrs  T(C): 37.1 (18 Feb 2022 21:20), Max: 37.1 (18 Feb 2022 08:58)  T(F): 98.7 (18 Feb 2022 21:20), Max: 98.8 (18 Feb 2022 08:58)  HR: 93 (18 Feb 2022 21:20) (75 - 93)  BP: 139/68 (18 Feb 2022 21:20) (123/46 - 145/65)  BP(mean): --  RR: 18 (18 Feb 2022 21:20) (18 - 18)  SpO2: 98% (18 Feb 2022 21:20) (94% - 98%)  Labs:      CARDIAC MARKERS ( 19 Feb 2022 06:43 )  x     / x     / 19 U/L / x     / x                                9.3    10.38 )-----------( 196      ( 19 Feb 2022 06:43 )             28.9     CBC Full  -  ( 19 Feb 2022 06:43 )  WBC Count : 10.38 K/uL  RBC Count : 3.21 M/uL  Hemoglobin : 9.3 g/dL  Hematocrit : 28.9 %  Platelet Count - Automated : 196 K/uL  Mean Cell Volume : 90.0 fl  Mean Cell Hemoglobin : 29.0 pg  Mean Cell Hemoglobin Concentration : 32.2 gm/dL  Auto Neutrophil # : x  Auto Lymphocyte # : x  Auto Monocyte # : x  Auto Eosinophil # : x  Auto Basophil # : x  Auto Neutrophil % : x  Auto Lymphocyte % : x  Auto Monocyte % : x  Auto Eosinophil % : x  Auto Basophil % : x    02-19    135  |  107  |  20  ----------------------------<  116<H>  4.3   |  20<L>  |  1.42<H>    Ca    8.5      19 Feb 2022 06:43  Phos  2.7     02-19  Mg     1.9     02-19    TPro  6.3  /  Alb  2.0<L>  /  TBili  1.7<H>  /  DBili  x   /  AST  12<L>  /  ALT  13  /  AlkPhos  235<H>  02-19    LIVER FUNCTIONS - ( 19 Feb 2022 06:43 )  Alb: 2.0 g/dL / Pro: 6.3 gm/dL / ALK PHOS: 235 U/L / ALT: 13 U/L / AST: 12 U/L / GGT: x           PTT - ( 19 Feb 2022 06:43 )  PTT:41.6 sec      PE  General: NAD,   Neck: Supple  Chest: Equal expansion bilaterally  CV: S1S2 Present  Abdomen: Soft, distended, Epigastric tenderness, non-tympanic, bowel sounds present, port site hernia in the left lower quadrant  Extremities: Grossly symmetric

## 2022-02-19 NOTE — PHYSICAL THERAPY INITIAL EVALUATION ADULT - DIAGNOSIS, PT EVAL
Intractable pain secondary to metastatic ampullary cancer on chemotherapy with biliary drain malfunction, YAMILETH, malnutrition, Sepsis , POA with bacteroides Fragilis due to probable acute cholangitis as well as Infiltrative  6.3cm  intraabdominal mass, moderate Right hydronephrosis, superior mesenteric v. thrombosis

## 2022-02-19 NOTE — PROGRESS NOTE ADULT - ASSESSMENT
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     Intractable pain due to malignant obstruction of choledochojejunostomy loop s/p biliary drain exchange .   Metastatic ampullary cancer s/p whipple 2020  - s/p Zosyn in ED, pt w/o criteria for sepsis at this time  - s/p IR drainage on 2/16   - s/p exchange/upsizing of biliary drain   - Pain control as above  -  Cont Zofran, and pancreatic enzymes  - OOB and ambulate with assistance    Sepsis , POA with bacteroides Fragilis due to probable acute cholangitis as well as   Infiltrative  6.3cm  intraabdominal mass in surgical bed possible underlying necrosis  - Pt remains afebrile, Lactate benign  - + Blood culture--> Bacteroides fragilis; cont Zosyn  - urine cx NGTD   - ID following    Superior mesenteric vein thrombosis   - Dr. Castro following-  full dose AC   - s/p Lovenox 40 x 1 dose 2/17/22   - 2/18 - started  on heparin drip, H/H stable    YAMILETH with baseline SCr 0.7, multifactorial; Medication induced (ketorolac & Losartan) associated with   moderate Right hydronephrosis due to malignant obstruction   - IV Hydration,   - Hold NSAIDs and Losartan  - Check post void residual ,  Strict I&O  - Urology,  Dr. Kennedy following; for right nephrostomy tube today with IR    - medically stable for needed urgent procedure  - stop ASA , restart after 48 hours after nephrostomy placement     Iron deficiency anemia    - trend cbc ,  Iron studies as above noted   - start iron daily     Hyperglycemia  A1c 5.2,  trend     CAD - hold aspirin , c/w  statin    Hypoalbuminemia 2/2 Protein calorie malnutrition  - nutrition consult,  advance diet as tolerated     HTN  - Losartan stopped 2/2 renal function  - continue BB  - Hydralazine PRN for SBP > 170    HLD  -continue statin    DVT PPX SCDs     DISPO: NPO for right nephrostomy tube placement, start AC 48 hrs after procedure,  cont IV abx,     Advanced directives  - palliative care team on the case  - Full code  - wife Carmen [phone] - updated 2/18, 2/19

## 2022-02-19 NOTE — PROGRESS NOTE ADULT - SUBJECTIVE AND OBJECTIVE BOX
INTERVAL HPI/OVERNIGHT EVENTS:  Patient S&E at bedside. No o/n events, patient resting comfortably.     VITAL SIGNS:  T(F): 97.5 (02-19-22 @ 15:13)  HR: 74 (02-19-22 @ 15:13)  BP: 136/57 (02-19-22 @ 15:13)  RR: 18 (02-19-22 @ 15:13)  SpO2: 95% (02-19-22 @ 15:13)  Wt(kg): --    PHYSICAL EXAM:    Constitutional: NAD  Eyes: EOMI, sclera non-icteric  Neck: supple, no masses, no JVD  Respiratory: CTA b/l, good air entry b/l  Cardiovascular: RRR, no M/R/G  Gastrointestinal: soft, NTND, no masses palpable, + BS, no hepatosplenomegaly, Biliary Drain   Extremities: no c/c/e        MEDICATIONS  (STANDING):  atorvastatin 20 milliGRAM(s) Oral at bedtime  cholecalciferol 2000 Unit(s) Oral daily  ferrous    sulfate 325 milliGRAM(s) Oral daily  gabapentin 100 milliGRAM(s) Oral every 8 hours  heparin  Infusion.  Unit(s)/Hr (11 mL/Hr) IV Continuous <Continuous>  metoprolol succinate ER 12.5 milliGRAM(s) Oral at bedtime  multivitamin 1 Tablet(s) Oral daily  pancrelipase (ZENPEP 20,000 Lipase Units) 4 Capsule(s) Oral three times a day with meals  pantoprazole    Tablet 40 milliGRAM(s) Oral daily  piperacillin/tazobactam IVPB.. 3.375 Gram(s) IV Intermittent every 8 hours  polyethylene glycol 3350 17 Gram(s) Oral daily  sodium chloride 0.9%. 1000 milliLiter(s) (75 mL/Hr) IV Continuous <Continuous>    MEDICATIONS  (PRN):  acetaminophen     Tablet .. 650 milliGRAM(s) Oral every 6 hours PRN Temp greater or equal to 38C (100.4F), Mild Pain (1 - 3)  aluminum hydroxide/magnesium hydroxide/simethicone Suspension 30 milliLiter(s) Oral every 4 hours PRN Dyspepsia  heparin   Injectable 5000 Unit(s) IV Push every 6 hours PRN For aPTT less than 40  heparin   Injectable 2500 Unit(s) IV Push every 6 hours PRN For aPTT between 40 - 57  hydrALAZINE Injectable 10 milliGRAM(s) IV Push every 4 hours PRN SBP>170  HYDROmorphone   Tablet 2 milliGRAM(s) Oral every 4 hours PRN Severe Pain (7 - 10)  HYDROmorphone   Tablet 1 milliGRAM(s) Oral every 4 hours PRN Moderate Pain (4 - 6)  HYDROmorphone  Injectable 1 milliGRAM(s) IV Push every 4 hours PRN breathrough pain  melatonin 10 milliGRAM(s) Oral at bedtime PRN Insomnia  ondansetron Injectable 4 milliGRAM(s) IV Push every 6 hours PRN Nausea and/or Vomiting      Allergies    No Known Allergies    Intolerances        LABS:                        9.3    10.38 )-----------( 196      ( 19 Feb 2022 06:43 )             28.9     02-19    135  |  107  |  20  ----------------------------<  116<H>  4.3   |  20<L>  |  1.42<H>    Ca    8.5      19 Feb 2022 06:43  Phos  2.7     02-19  Mg     1.9     02-19    TPro  6.3  /  Alb  2.0<L>  /  TBili  1.7<H>  /  DBili  x   /  AST  12<L>  /  ALT  13  /  AlkPhos  235<H>  02-19    PTT - ( 19 Feb 2022 13:50 )  PTT:41.4 sec      RADIOLOGY & ADDITIONAL TESTS:  Studies reviewed.    ASSESSMENT & PLAN:

## 2022-02-19 NOTE — PROGRESS NOTE ADULT - ASSESSMENT
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, 5/18 LN involved [id number] and received 12 cycles of mFOLFIRINOX for adjuvant therapy completed 4/27/21, then with metastatic recurrence involving periotoneum at time of ventral hernia repair, and started on gemcitabine, nab-paclitaxel weekly started 7/13/21, last dose 1/4/22 presents for worsening pain, found to have bacteremia and hydroneprhosis.    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  Retroperitoneal adenopathy.  - Cr 1.4 today  - PLanned for Nephrostomy on jaun Castro MD   Hematology/ Oncology   [facility]   [facility]   C: [phone]  [address] [place], [place]  P:[phone]  F:[phone]  and [phone]  [address] [place], [place]   P:[phone]  F:[phone]

## 2022-02-19 NOTE — PROGRESS NOTE ADULT - SUBJECTIVE AND OBJECTIVE BOX
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 Pt presents today to ED due to fever (Tmax 102.7) and pain at home. Pt states while in [place], approx two weeks ago, he presented to hospital with pain and IR at the time was unable to place stent and so 8F biliary drain placed. Pt flushing twice a day as directed. Pt wife at bedside to endorse history.     ED course: received zosyn, VSS however pt in intractable pain despite multiple medications received. IR and oncology consulted. Pt had IR procedure: drain changed from 8 to 12F catheter with 150cc output at time of hospitalist exam. Returned to ED and admitted for pain control, hydration, palliative and oncology consult.     Interval History:   22 pt seen and examined at bedside, c/o ongoing right flank pain, well controlled on IV Dilaudid. Pt c/o ongoing weakness and fatigue. Right biliary drain functioning. Denies CP, palps, sob, HA, dizziness, dysuria, fever, or chills. No signs of bleeding , tolerating Iv heparin     REVIEW OF SYSTEMS: All other review of systems is negative unless indicated above    Vitals reviewed for last 24 hours  T(C): 36.4 (22 @ 15:13), Max: 37.1 (22 @ 21:20)  T(F): 97.5 (22 @ 15:13), Max: 98.7 (22 @ 21:20)  HR: 74 (22 @ 15:13) (73 - 93)  BP: 136/57 (22 @ 15:13) (125/57 - 141/61)  RR: 18 (22 @ 15:13) (18 - 18)  SpO2: 95% (22 @ 15:13) (95% - 98%)  Wt(kg): --     PHYSICAL EXAM:    Constitutional: NAD, awake and alert  HEENT: PERR, EOMI  Neck: Soft and supple,  No JVD  Respiratory: Breath sounds are clear bilaterally, No wheezing, rales or rhonchi  Cardiovascular: S1 and S2, regular rate and rhythm, no Murmurs  Gastrointestinal: Bowel Sounds present, soft, right flank tenderness, nondistended  Extremities: No peripheral edema  Vascular: 2+ peripheral pulses  Neurological: A/O x 3, no focal deficits  Musculoskeletal: 5/5 strength b/l upper and lower extremities  Skin: No rashes      LABS: All Labs Reviewed:      135  |  107  |  20  ----------------------------<  116<H>  4.3   |  20<L>  |  1.42<H>    Ca    8.5      2022 06:43  Phos  2.7       Mg     1.9         TPro  6.3  /  Alb  2.0<L>  /  TBili  1.7<H>  /  DBili  x   /  AST  12<L>  /  ALT  13  /  AlkPhos  235<H>                              9.3    10.38 )-----------( 196      ( 2022 06:43 )             28.9       CARDIAC MARKERS ( 2022 06:43 )  x     / x     / 19 U/L / x     / x            LIVER FUNCTIONS - ( 2022 06:43 )  Alb: 2.0 g/dL / Pro: 6.3 gm/dL / ALK PHOS: 235 U/L / ALT: 13 U/L / AST: 12 U/L / GGT: x             PTT - ( 2022 13:50 )  PTT:41.4 sec                            8.6    8.38  )-----------( 175      ( 2022 06:31 )             26.0       133<L>  |  107  |  24<H>  ----------------------------<  100<H>  3.9   |  19<L>  |  1.40<H>    Ca    8.5      2022 06:31    TPro  6.6  /  Alb  2.3<L>  /  TBili  1.5<H>  /  DBili  x   /  AST  18  /  ALT  20  /  AlkPhos  414<H>      PT/INR - ( 2022 06:39 )   PT: 16.2 sec;   INR: 1.42 ratio    PTT - ( 2022 06:39 )  PTT:31.6 sec  (22 @ 02:34) Lactate, Blood: 1.1 mmol/L   Urinalysis Basic - ( 2022 10:07 )    Color: Yellow / Appearance: Clear / S.015 / pH: x  Gluc: x / Ketone: Negative  / Bili: Negative / Urobili: Negative   Blood: x / Protein: Negative / Nitrite: Negative   Leuk Esterase: Trace / RBC: 0-2 /HPF / WBC 0-2   Sq Epi: x / Non Sq Epi: Occasional / Bacteria: Occasional  Iron with Total Binding Capacity in AM (22 @ 06:31) ; Iron - Total Binding Capacity.: 193 ug/dL ; % Saturation, Iron: 6 % ; Iron Total, Serum: 12 ug/dL ; Unsaturated Iron Binding Capacity: 181 ug/dL ; Ferritin, Serum: 216 ng/mL;  Vitamin B12, Serum: 414 pg/mL ; Folate, Serum: 17.1 ng/mL ; Haptoglobin, Serum: 190 mg/dL ; Haptoglobin, Serum: 190 mg/dL  MICRO:  Culture - Blood (22 @ 02:34) NGTD  Respiratory Viral Panel with COVID-19 by [facility] (22 @ 02:34) Rapid RVP Result: Not Detected SARS-CoV-2: Not Detected   RADIOLOGY/EKG: all reviewed    EKG 22 - sinus , poor quality of EKG, repeat in am    US Abdomen Upper Quadrant Right (22 @ 01:57)   FINDINGS:  Liver: Within normal limits.  Bile ducts: Common bile duct stent in place. Dilated common bile duct measuring up to 12 mm. Mildintrahepatic biliary ductal dilatation.  Gallbladder: Surgically absent.  Pancreas: Poorly visualized.  Right kidney: 11.3 cm. Mild to moderate right-sided hydronephrosis, slightly increased compared with prior exam from 2022..  Ascites: Trace perihepatic free fluid.  IVC: Visualized portions are within normal limits.  IMPRESSION:  Common bile duct stent in place. Mild intrahepatic and extra hepatic biliary ductal dilatation with common bile duct measuring up to 12 mm.  Mild to moderate right-sided hydronephrosis, slightly increased compared with prior exam from 2022.  Trace perihepatic ascites.  < end of copied text >    CT Abdomen and Pelvis w/ IV Cont (22 @ 04:24)   FINDINGS:  LOWER CHEST: Coronary atherosclerosis.  LIVER: Within normal limits.  BILE DUCTS: There is biliary stent inserted via the right lateral flank which enters the right hepatic duct and is coiled in the jejunum. There is mild intrahepatic biliary dilatation.  GALLBLADDER: Within normal limits.  SPLEEN: Within normal limits.  PANCREAS: The patient is status post Whipple procedure. There are no prior studies for comparison. In the surgical bed, there is infiltrative ill-defined prominent soft tissue measuring roughly 6.3 (2:49) x 3.8  x 4.2 cm adjacent to the pancreaticojejunostomy sutures concerning for recurrent tumor. There is marked dilatation of the jejunal loop at the luh hepatis measuring up to 4.4 cm.  ADRENALS: Within normal limits.  KIDNEYS/URETERS: The right kidney demonstrates a delayed nephrogram. There is a moderate right hydronephrosis with transition point at the ureteropelvic junction. There is no hydroureter or convincing obstructing stone. There is a 6.6 cm left renal cyst.  BLADDER: Within normal limits.  REPRODUCTIVE ORGANS: The prostate gland is not enlarged.  BOWEL: Moderate hiatal hernia. No bowel obstruction. Appendix is unremarkable. Colon is underdistended without significant fecal load.  PERITONEUM: Trace amount of pelvic ascites. Reticular studding of the fat in the right upper quadrant subhepatic space suspicious for carcinomatosis. There is a 3 cm masslike low density nodule in the right lower quadrant medial to the distal right external iliac vessels compatible with neoplastic implant.  VESSELS: The aorta is not dilated. There is mild to moderate atherosclerotic vascular calcification. There is focal thrombosis of the superior mesenteric vein in the region of suspected tumor recurrence. There is ill-defined soft tissue abutting the superior mesenteric artery.  RETROPERITONEUM/LYMPH NODES: A 2.1 cm precaval lymph node is nonspecific. There is a 9 mm right iliac vein chain lymph node.  Prominent soft tissue   in the right anterior pararenal space measuring 4.5 x 2 cm suspicious for tumor recurrence.  ABDOMINAL WALL: Left-sided ventral hernia mesh is barely underneath the skin in this patient with a weak abdominal wall.  BONES: Within normal limits.  IMPRESSION:  No prior studies for comparison.  No focal collection or soft tissue gas.  Status post Whipple procedure with a 6.3 cm infilatrative mass in the surgical bed adjacent to jejunal sutures compatible with tumor recurrence.  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 Satisfactory positioning of right biliary stent with mild intrahepatic biliary dilatation.   Correlate clinically for cholangitis.  No gastric outlet obstruction. Neoplastic implants in the right upper and lower quadrants.  Focal thrombosis in the superior mesenteric vein related to the site of tumor recurrence.  The right kidney demonstrates a delayed nephrogram and moderate right hydronephrosis with transition at the right ureteropelvic junction, likely related to malignant obstruction.  Retroperitoneal adenopathy.  Trace amount of free fluid.  Moderate hiatal hernia.    Xray Chest 1 View- PORTABLE-Urgent (22 @ 07:42)   Heart size is within normal limits. Small hiatal hernia noted. Right MediPort again seen.  Lungs are clear and possibly under circulated.  Chest is similar to .  IMPRESSION: No acute finding or change.    MEDICATIONS:  MEDICATIONS  (STANDING):  aspirin enteric coated 81 milliGRAM(s) Oral daily  atorvastatin 20 milliGRAM(s) Oral at bedtime  cholecalciferol 2000 Unit(s) Oral daily  gabapentin 100 milliGRAM(s) Oral every 8 hours  metoprolol succinate ER 12.5 milliGRAM(s) Oral at bedtime  multivitamin 1 Tablet(s) Oral daily  pancrelipase (ZENPEP 20,000 Lipase Units) 4 Capsule(s) Oral three times a day with meals  pantoprazole    Tablet 40 milliGRAM(s) Oral daily  piperacillin/tazobactam IVPB.. 3.375 Gram(s) IV Intermittent every 8 hours  polyethylene glycol 3350 17 Gram(s) Oral daily  sodium chloride 0.9%. 1000 milliLiter(s) (75 mL/Hr) IV Continuous <Continuous>    MEDICATIONS  (PRN):  acetaminophen     Tablet .. 650 milliGRAM(s) Oral every 6 hours PRN Temp greater or equal to 38C (100.4F), Mild Pain (1 - 3)  aluminum hydroxide/magnesium hydroxide/simethicone Suspension 30 milliLiter(s) Oral every 4 hours PRN Dyspepsia  hydrALAZINE Injectable 10 milliGRAM(s) IV Push every 4 hours PRN SBP>170  HYDROmorphone   Tablet 2 milliGRAM(s) Oral every 4 hours PRN Severe Pain (7 - 10)  HYDROmorphone   Tablet 1 milliGRAM(s) Oral every 4 hours PRN Moderate Pain (4 - 6)  HYDROmorphone  Injectable 1 milliGRAM(s) IV Push every 4 hours PRN breathrough pain  melatonin 10 milliGRAM(s) Oral at bedtime PRN Insomnia  ondansetron Injectable 4 milliGRAM(s) IV Push every 6 hours PRN Nausea and/or Vomiting    Home Medications:  Aspirin Enteric Coated 81 mg oral delayed release tablet: 1 tab(s) orally once a day (in the morning) (2022 19:57)  azelastine 137 mcg/inh (0.1%) nasal spray: 2 spray(s) nasal 2 times a day, As Needed - for congestion (2022 19:57)  cholecalciferol 50 mcg (2000 intl units) oral tablet: 1 tab(s) orally once a day (2022 19:57)  dronabinol 2.5 mg oral capsule: 1 cap(s) orally once a day in the afternoon (2022 19:57)  gabapentin 100 mg oral capsule: 1 cap(s) orally every 8 hours (2022 19:57)  HYDROmorphone 2 mg oral tablet: 0.5 tab(s) orally every 3 hours, As Needed (2022 19:57)  ketorolac 10 mg oral tablet: 1 tab(s) orally every 8 hours, As Needed (2022 19:57)  losartan 50 mg oral tablet: 1 tab(s) orally once a day (in the morning) (2022 19:57)  Melatonin 10 mg oral capsule: 1-2 cap(s) orally once a day (at bedtime) (2022 19:57)  metoprolol succinate 25 mg oral tablet, extended release: 0.5 tab(s) orally once a day (at bedtime) (2022 19:57)  Multiple Vitamins oral tablet: 1 tab(s) orally once a day (2022 19:57)  omeprazole 20 mg oral delayed release capsule: 1 cap(s) orally once a day (in the morning) (2022 19:57)  ondansetron 8 mg oral tablet: 1 tab(s) orally 3 times a day, As Needed - for nausea (2022 19:57)  Pfizer-BioNTech COVID-19 Vaccine PF 30 mcg/0.3 mL intramuscular suspension: 0.3 milliliter(s) intramuscular once  *****Booster as of *** (2022 19:57)  rosuvastatin 5 mg oral tablet: 1 tab(s) orally once a day (in the evening) (2022 19:57)  Zenpep 40,000 units-126,000 units-168,000 units oral delayed release capsule: 1 cap(s) orally with snacks (2022 19:57)  Zenpep 40,000 units-126,000 units-168,000 units oral delayed release capsule: 2 cap(s) orally 3 times a day (with meals) (2022 19:57)

## 2022-02-19 NOTE — PHYSICAL THERAPY INITIAL EVALUATION ADULT - PERTINENT HX OF CURRENT PROBLEM, REHAB EVAL
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 underwent biliary drain exchange 2/16. plan was for nephrostomy tube R yesterday but pt refused (now plan is for tues)

## 2022-02-19 NOTE — PHYSICAL THERAPY INITIAL EVALUATION ADULT - DISCHARGE DISPOSITION, PT EVAL
if pt actively undergoing chemo may preclude JT stay/home w/ assist/home w/ home PT/rehabilitation facility

## 2022-02-20 LAB
ANION GAP SERPL CALC-SCNC: 7 MMOL/L — SIGNIFICANT CHANGE UP (ref 5–17)
APTT BLD: 54.7 SEC — HIGH (ref 27.5–35.5)
APTT BLD: 55.7 SEC — HIGH (ref 27.5–35.5)
APTT BLD: 79.6 SEC — HIGH (ref 27.5–35.5)
BUN SERPL-MCNC: 19 MG/DL — SIGNIFICANT CHANGE UP (ref 7–23)
CALCIUM SERPL-MCNC: 8.4 MG/DL — LOW (ref 8.5–10.1)
CHLORIDE SERPL-SCNC: 107 MMOL/L — SIGNIFICANT CHANGE UP (ref 96–108)
CO2 SERPL-SCNC: 20 MMOL/L — LOW (ref 22–31)
CREAT SERPL-MCNC: 1.24 MG/DL — SIGNIFICANT CHANGE UP (ref 0.5–1.3)
GLUCOSE SERPL-MCNC: 111 MG/DL — HIGH (ref 70–99)
HCT VFR BLD CALC: 26 % — LOW (ref 39–50)
HGB BLD-MCNC: 8.4 G/DL — LOW (ref 13–17)
MCHC RBC-ENTMCNC: 29.2 PG — SIGNIFICANT CHANGE UP (ref 27–34)
MCHC RBC-ENTMCNC: 32.3 GM/DL — SIGNIFICANT CHANGE UP (ref 32–36)
MCV RBC AUTO: 90.3 FL — SIGNIFICANT CHANGE UP (ref 80–100)
PLATELET # BLD AUTO: 160 K/UL — SIGNIFICANT CHANGE UP (ref 150–400)
POTASSIUM SERPL-MCNC: 4 MMOL/L — SIGNIFICANT CHANGE UP (ref 3.5–5.3)
POTASSIUM SERPL-SCNC: 4 MMOL/L — SIGNIFICANT CHANGE UP (ref 3.5–5.3)
RBC # BLD: 2.88 M/UL — LOW (ref 4.2–5.8)
RBC # FLD: 14.6 % — HIGH (ref 10.3–14.5)
SODIUM SERPL-SCNC: 134 MMOL/L — LOW (ref 135–145)
WBC # BLD: 8.33 K/UL — SIGNIFICANT CHANGE UP (ref 3.8–10.5)
WBC # FLD AUTO: 8.33 K/UL — SIGNIFICANT CHANGE UP (ref 3.8–10.5)

## 2022-02-20 PROCEDURE — 99232 SBSQ HOSP IP/OBS MODERATE 35: CPT

## 2022-02-20 RX ADMIN — Medication 4 CAPSULE(S): at 17:24

## 2022-02-20 RX ADMIN — PANTOPRAZOLE SODIUM 40 MILLIGRAM(S): 20 TABLET, DELAYED RELEASE ORAL at 09:11

## 2022-02-20 RX ADMIN — PIPERACILLIN AND TAZOBACTAM 25 GRAM(S): 4; .5 INJECTION, POWDER, LYOPHILIZED, FOR SOLUTION INTRAVENOUS at 13:49

## 2022-02-20 RX ADMIN — HEPARIN SODIUM 1600 UNIT(S)/HR: 5000 INJECTION INTRAVENOUS; SUBCUTANEOUS at 11:57

## 2022-02-20 RX ADMIN — HYDROMORPHONE HYDROCHLORIDE 2 MILLIGRAM(S): 2 INJECTION INTRAMUSCULAR; INTRAVENOUS; SUBCUTANEOUS at 04:24

## 2022-02-20 RX ADMIN — HEPARIN SODIUM 1700 UNIT(S)/HR: 5000 INJECTION INTRAVENOUS; SUBCUTANEOUS at 19:28

## 2022-02-20 RX ADMIN — Medication 325 MILLIGRAM(S): at 09:11

## 2022-02-20 RX ADMIN — POLYETHYLENE GLYCOL 3350 17 GRAM(S): 17 POWDER, FOR SOLUTION ORAL at 09:11

## 2022-02-20 RX ADMIN — PIPERACILLIN AND TAZOBACTAM 25 GRAM(S): 4; .5 INJECTION, POWDER, LYOPHILIZED, FOR SOLUTION INTRAVENOUS at 21:11

## 2022-02-20 RX ADMIN — HYDROMORPHONE HYDROCHLORIDE 1 MILLIGRAM(S): 2 INJECTION INTRAMUSCULAR; INTRAVENOUS; SUBCUTANEOUS at 06:39

## 2022-02-20 RX ADMIN — Medication 2000 UNIT(S): at 09:11

## 2022-02-20 RX ADMIN — HEPARIN SODIUM 2500 UNIT(S): 5000 INJECTION INTRAVENOUS; SUBCUTANEOUS at 18:35

## 2022-02-20 RX ADMIN — HYDROMORPHONE HYDROCHLORIDE 2 MILLIGRAM(S): 2 INJECTION INTRAMUSCULAR; INTRAVENOUS; SUBCUTANEOUS at 13:49

## 2022-02-20 RX ADMIN — GABAPENTIN 100 MILLIGRAM(S): 400 CAPSULE ORAL at 13:49

## 2022-02-20 RX ADMIN — Medication 4 CAPSULE(S): at 08:12

## 2022-02-20 RX ADMIN — Medication 12.5 MILLIGRAM(S): at 21:11

## 2022-02-20 RX ADMIN — Medication 1 TABLET(S): at 09:11

## 2022-02-20 RX ADMIN — HYDROMORPHONE HYDROCHLORIDE 2 MILLIGRAM(S): 2 INJECTION INTRAMUSCULAR; INTRAVENOUS; SUBCUTANEOUS at 21:22

## 2022-02-20 RX ADMIN — ATORVASTATIN CALCIUM 20 MILLIGRAM(S): 80 TABLET, FILM COATED ORAL at 21:11

## 2022-02-20 RX ADMIN — HYDROMORPHONE HYDROCHLORIDE 2 MILLIGRAM(S): 2 INJECTION INTRAMUSCULAR; INTRAVENOUS; SUBCUTANEOUS at 21:52

## 2022-02-20 RX ADMIN — GABAPENTIN 100 MILLIGRAM(S): 400 CAPSULE ORAL at 21:11

## 2022-02-20 RX ADMIN — HEPARIN SODIUM 1700 UNIT(S)/HR: 5000 INJECTION INTRAVENOUS; SUBCUTANEOUS at 18:35

## 2022-02-20 RX ADMIN — PIPERACILLIN AND TAZOBACTAM 25 GRAM(S): 4; .5 INJECTION, POWDER, LYOPHILIZED, FOR SOLUTION INTRAVENOUS at 04:58

## 2022-02-20 RX ADMIN — HEPARIN SODIUM 1600 UNIT(S)/HR: 5000 INJECTION INTRAVENOUS; SUBCUTANEOUS at 04:49

## 2022-02-20 RX ADMIN — GABAPENTIN 100 MILLIGRAM(S): 400 CAPSULE ORAL at 04:58

## 2022-02-20 NOTE — PROGRESS NOTE ADULT - SUBJECTIVE AND OBJECTIVE BOX
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 Pt presents today to ED due to fever (Tmax 102.7) and pain at home. Pt states while in [place], approx two weeks ago, he presented to hospital with pain and IR at the time was unable to place stent and so 8F biliary drain placed. Pt flushing twice a day as directed. Pt wife at bedside to endorse history.     ED course: received zosyn, VSS however pt in intractable pain despite multiple medications received. IR and oncology consulted. Pt had IR procedure: drain changed from 8 to 12F catheter with 150cc output at time of hospitalist exam. Returned to ED and admitted for pain control, hydration, palliative and oncology consult.     Interval History:   22 pt seen and examined at bedside, c/o ongoing right flank pain, well controlled on IV Dilaudid. Pt c/o ongoing weakness and fatigue. Right biliary drain functioning. Denies CP, palps, sob, HA, dizziness, dysuria, fever, or chills. No signs of bleeding , tolerating Iv heparin    - pt seen and examined , denies cp, dyspnea, back pain better controlled, tolerating po intake, plan discussed     REVIEW OF SYSTEMS: All other review of systems is negative unless indicated above    Vitals reviewed for last 24 hours  T(C): 36.4 (22 @ 15:39), Max: 36.5 (22 @ 08:38)  T(F): 97.6 (22 @ 15:39), Max: 97.7 (22 @ 08:38)  HR: 72 (22 @ 15:39) (60 - 76)  BP: 126/61 (22 @ 15:39) (126/61 - 142/57)  RR: 18 (22 @ 15:39) (17 - 18)  SpO2: 98% (22 @ 15:39) (96% - 98%)  Wt(kg): --       PHYSICAL EXAM:    Constitutional: NAD, awake and alert  HEENT: PERR, EOMI  Neck: Soft and supple,  No JVD  Respiratory: Breath sounds are clear bilaterally, No wheezing, rales or rhonchi  Cardiovascular: S1 and S2, regular rate and rhythm, no Murmurs  Gastrointestinal: Bowel Sounds present, soft, right flank tenderness, nondistended  Extremities: No peripheral edema  Vascular: 2+ peripheral pulses  Neurological: A/O x 3, no focal deficits  Musculoskeletal: 5/5 strength b/l upper and lower extremities  Skin: No rashes      LABS: All Labs Reviewed:      134<L>  |  107  |  19  ----------------------------<  111<H>  4.0   |  20<L>  |  1.24    Ca    8.4<L>      2022 04:03  Phos  2.7       Mg     1.9         TPro  6.3  /  Alb  2.0<L>  /  TBili  1.7<H>  /  DBili  x   /  AST  12<L>  /  ALT  13  /  AlkPhos  235<H>                              8.4    8.33  )-----------( 160      ( 2022 04:03 )             26.0       CARDIAC MARKERS ( 2022 06:43 )  x     / x     / 19 U/L / x     / x            LIVER FUNCTIONS - ( 2022 06:43 )  Alb: 2.0 g/dL / Pro: 6.3 gm/dL / ALK PHOS: 235 U/L / ALT: 13 U/L / AST: 12 U/L / GGT: x             PTT - ( 2022 10:45 )  PTT:79.6 sec          135  |  107  |  20  ----------------------------<  116<H>  4.3   |  20<L>  |  1.42<H>    Ca    8.5      2022 06:43  Phos  2.7       Mg     1.9         TPro  6.3  /  Alb  2.0<L>  /  TBili  1.7<H>  /  DBili  x   /  AST  12<L>  /  ALT  13  /  AlkPhos  235<H>                              9.3    10.38 )-----------( 196      ( 2022 06:43 )             28.9       CARDIAC MARKERS ( 2022 06:43 )  x     / x     / 19 U/L / x     / x            LIVER FUNCTIONS - ( 2022 06:43 )  Alb: 2.0 g/dL / Pro: 6.3 gm/dL / ALK PHOS: 235 U/L / ALT: 13 U/L / AST: 12 U/L / GGT: x             PTT - ( 2022 13:50 )  PTT:41.4 sec                            8.6    8.38  )-----------( 175      ( 2022 06:31 )             26.0   02-    133<L>  |  107  |  24<H>  ----------------------------<  100<H>  3.9   |  19<L>  |  1.40<H>    Ca    8.5      2022 06:31    TPro  6.6  /  Alb  2.3<L>  /  TBili  1.5<H>  /  DBili  x   /  AST  18  /  ALT  20  /  AlkPhos  414<H>  02-    PT/INR - ( 2022 06:39 )   PT: 16.2 sec;   INR: 1.42 ratio    PTT - ( 2022 06:39 )  PTT:31.6 sec  (22 @ 02:34) Lactate, Blood: 1.1 mmol/L   Urinalysis Basic - ( 2022 10:07 )    Color: Yellow / Appearance: Clear / S.015 / pH: x  Gluc: x / Ketone: Negative  / Bili: Negative / Urobili: Negative   Blood: x / Protein: Negative / Nitrite: Negative   Leuk Esterase: Trace / RBC: 0-2 /HPF / WBC 0-2   Sq Epi: x / Non Sq Epi: Occasional / Bacteria: Occasional  Iron with Total Binding Capacity in AM (22 @ 06:31) ; Iron - Total Binding Capacity.: 193 ug/dL ; % Saturation, Iron: 6 % ; Iron Total, Serum: 12 ug/dL ; Unsaturated Iron Binding Capacity: 181 ug/dL ; Ferritin, Serum: 216 ng/mL;  Vitamin B12, Serum: 414 pg/mL ; Folate, Serum: 17.1 ng/mL ; Haptoglobin, Serum: 190 mg/dL ; Haptoglobin, Serum: 190 mg/dL  MICRO:  Culture - Blood (22 @ 02:34) NGTD  Respiratory Viral Panel with COVID-19 by [facility] (22 @ 02:34) Rapid RVP Result: Not Detected SARS-CoV-2: Not Detected   RADIOLOGY/EKG: all reviewed    EKG 22 - sinus , poor quality of EKG, repeat in am    US Abdomen Upper Quadrant Right (22 @ 01:57)   FINDINGS:  Liver: Within normal limits.  Bile ducts: Common bile duct stent in place. Dilated common bile duct measuring up to 12 mm. Mildintrahepatic biliary ductal dilatation.  Gallbladder: Surgically absent.  Pancreas: Poorly visualized.  Right kidney: 11.3 cm. Mild to moderate right-sided hydronephrosis, slightly increased compared with prior exam from 2022..  Ascites: Trace perihepatic free fluid.  IVC: Visualized portions are within normal limits.  IMPRESSION:  Common bile duct stent in place. Mild intrahepatic and extra hepatic biliary ductal dilatation with common bile duct measuring up to 12 mm.  Mild to moderate right-sided hydronephrosis, slightly increased compared with prior exam from 2022.  Trace perihepatic ascites.  < end of copied text >    CT Abdomen and Pelvis w/ IV Cont (22 @ 04:24)   FINDINGS:  LOWER CHEST: Coronary atherosclerosis.  LIVER: Within normal limits.  BILE DUCTS: There is biliary stent inserted via the right lateral flank which enters the right hepatic duct and is coiled in the jejunum. There is mild intrahepatic biliary dilatation.  GALLBLADDER: Within normal limits.  SPLEEN: Within normal limits.  PANCREAS: The patient is status post Whipple procedure. There are no prior studies for comparison. In the surgical bed, there is infiltrative ill-defined prominent soft tissue measuring roughly 6.3 (2:49) x 3.8  x 4.2 cm adjacent to the pancreaticojejunostomy sutures concerning for recurrent tumor. There is marked dilatation of the jejunal loop at the luh hepatis measuring up to 4.4 cm.  ADRENALS: Within normal limits.  KIDNEYS/URETERS: The right kidney demonstrates a delayed nephrogram. There is a moderate right hydronephrosis with transition point at the ureteropelvic junction. There is no hydroureter or convincing obstructing stone. There is a 6.6 cm left renal cyst.  BLADDER: Within normal limits.  REPRODUCTIVE ORGANS: The prostate gland is not enlarged.  BOWEL: Moderate hiatal hernia. No bowel obstruction. Appendix is unremarkable. Colon is underdistended without significant fecal load.  PERITONEUM: Trace amount of pelvic ascites. Reticular studding of the fat in the right upper quadrant subhepatic space suspicious for carcinomatosis. There is a 3 cm masslike low density nodule in the right lower quadrant medial to the distal right external iliac vessels compatible with neoplastic implant.  VESSELS: The aorta is not dilated. There is mild to moderate atherosclerotic vascular calcification. There is focal thrombosis of the superior mesenteric vein in the region of suspected tumor recurrence. There is ill-defined soft tissue abutting the superior mesenteric artery.  RETROPERITONEUM/LYMPH NODES: A 2.1 cm precaval lymph node is nonspecific. There is a 9 mm right iliac vein chain lymph node.  Prominent soft tissue   in the right anterior pararenal space measuring 4.5 x 2 cm suspicious for tumor recurrence.  ABDOMINAL WALL: Left-sided ventral hernia mesh is barely underneath the skin in this patient with a weak abdominal wall.  BONES: Within normal limits.  IMPRESSION:  No prior studies for comparison.  No focal collection or soft tissue gas.  Status post Whipple procedure with a 6.3 cm infilatrative mass in the surgical bed adjacent to jejunal sutures compatible with tumor recurrence.  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 Satisfactory positioning of right biliary stent with mild intrahepatic biliary dilatation.   Correlate clinically for cholangitis.  No gastric outlet obstruction. Neoplastic implants in the right upper and lower quadrants.  Focal thrombosis in the superior mesenteric vein related to the site of tumor recurrence.  The right kidney demonstrates a delayed nephrogram and moderate right hydronephrosis with transition at the right ureteropelvic junction, likely related to malignant obstruction.  Retroperitoneal adenopathy.  Trace amount of free fluid.  Moderate hiatal hernia.    Xray Chest 1 View- PORTABLE-Urgent (22 @ 07:42)   Heart size is within normal limits. Small hiatal hernia noted. Right MediPort again seen.  Lungs are clear and possibly under circulated.  Chest is similar to .  IMPRESSION: No acute finding or change.    MEDICATIONS:  MEDICATIONS  (STANDING):  aspirin enteric coated 81 milliGRAM(s) Oral daily  atorvastatin 20 milliGRAM(s) Oral at bedtime  cholecalciferol 2000 Unit(s) Oral daily  gabapentin 100 milliGRAM(s) Oral every 8 hours  metoprolol succinate ER 12.5 milliGRAM(s) Oral at bedtime  multivitamin 1 Tablet(s) Oral daily  pancrelipase (ZENPEP 20,000 Lipase Units) 4 Capsule(s) Oral three times a day with meals  pantoprazole    Tablet 40 milliGRAM(s) Oral daily  piperacillin/tazobactam IVPB.. 3.375 Gram(s) IV Intermittent every 8 hours  polyethylene glycol 3350 17 Gram(s) Oral daily  sodium chloride 0.9%. 1000 milliLiter(s) (75 mL/Hr) IV Continuous <Continuous>    MEDICATIONS  (PRN):  acetaminophen     Tablet .. 650 milliGRAM(s) Oral every 6 hours PRN Temp greater or equal to 38C (100.4F), Mild Pain (1 - 3)  aluminum hydroxide/magnesium hydroxide/simethicone Suspension 30 milliLiter(s) Oral every 4 hours PRN Dyspepsia  hydrALAZINE Injectable 10 milliGRAM(s) IV Push every 4 hours PRN SBP>170  HYDROmorphone   Tablet 2 milliGRAM(s) Oral every 4 hours PRN Severe Pain (7 - 10)  HYDROmorphone   Tablet 1 milliGRAM(s) Oral every 4 hours PRN Moderate Pain (4 - 6)  HYDROmorphone  Injectable 1 milliGRAM(s) IV Push every 4 hours PRN breathrough pain  melatonin 10 milliGRAM(s) Oral at bedtime PRN Insomnia  ondansetron Injectable 4 milliGRAM(s) IV Push every 6 hours PRN Nausea and/or Vomiting    Home Medications:  Aspirin Enteric Coated 81 mg oral delayed release tablet: 1 tab(s) orally once a day (in the morning) (2022 19:57)  azelastine 137 mcg/inh (0.1%) nasal spray: 2 spray(s) nasal 2 times a day, As Needed - for congestion (2022 19:57)  cholecalciferol 50 mcg (2000 intl units) oral tablet: 1 tab(s) orally once a day (2022 19:57)  dronabinol 2.5 mg oral capsule: 1 cap(s) orally once a day in the afternoon (2022 19:57)  gabapentin 100 mg oral capsule: 1 cap(s) orally every 8 hours (:57)  HYDROmorphone 2 mg oral tablet: 0.5 tab(s) orally every 3 hours, As Needed (2022 19:57)  ketorolac 10 mg oral tablet: 1 tab(s) orally every 8 hours, As Needed (2022 19:57)  losartan 50 mg oral tablet: 1 tab(s) orally once a day (in the morning) (2022 19:57)  Melatonin 10 mg oral capsule: 1-2 cap(s) orally once a day (at bedtime) (2022 19:57)  metoprolol succinate 25 mg oral tablet, extended release: 0.5 tab(s) orally once a day (at bedtime) (2022 19:57)  Multiple Vitamins oral tablet: 1 tab(s) orally once a day (2022 19:57)  omeprazole 20 mg oral delayed release capsule: 1 cap(s) orally once a day (in the morning) (2022 19:57)  ondansetron 8 mg oral tablet: 1 tab(s) orally 3 times a day, As Needed - for nausea (2022 19:57)  Pfizer-BioNTech COVID-19 Vaccine PF 30 mcg/0.3 mL intramuscular suspension: 0.3 milliliter(s) intramuscular once  *****Booster as of *** (2022 19:57)  rosuvastatin 5 mg oral tablet: 1 tab(s) orally once a day (in the evening) (2022 19:57)  Zenpep 40,000 units-126,000 units-168,000 units oral delayed release capsule: 1 cap(s) orally with snacks (2022 19:57)  Zenpep 40,000 units-126,000 units-168,000 units oral delayed release capsule: 2 cap(s) orally 3 times a day (with meals) (2022 19:57)

## 2022-02-20 NOTE — PROGRESS NOTE ADULT - SUBJECTIVE AND OBJECTIVE BOX
Pt was seen and examined this morning. Pt sated that he continues to have  his back pain continues. Pt continues to have normal bowel function. Pt has been afebrile. On antibiotics.    Vital Signs Last 24 Hrs  T(C): 36.5 (20 Feb 2022 08:38), Max: 36.5 (20 Feb 2022 08:38)  T(F): 97.7 (20 Feb 2022 08:38), Max: 97.7 (20 Feb 2022 08:38)  HR: 60 (20 Feb 2022 08:38) (60 - 76)  BP: 130/67 (20 Feb 2022 08:38) (130/67 - 142/57)  BP(mean): --  ABP: --  ABP(mean): --  RR: 17 (20 Feb 2022 08:38) (17 - 18)  SpO2: 96% (20 Feb 2022 08:38) (95% - 98%)    PE  General: NAD, cachetic   Neck: Supple  Chest: Equal expansion bilaterally  CV: S1S2 Present  Abdomen: Soft, non distended, Epigastric tenderness, non-tympanic, bowel sounds present, port site hernia in the left lower quadrant, IR drain present and dry in RUQ  Extremities: Grossly symmetric    Labs:               8.4<L>                134<L>| 20<L>| 19           8.33  >-----------< 160     ------------------------< 111<H>                 26.0<L>                4.0  | 107  | 1.24                                                                      Ca 8.4<L> Mg x     Ph x                      9.3<L>                135  | 20<L>| 20           10.38 >-----------< 196     ------------------------< 116<H>                 28.9<L>                4.3  | 107  | 1.42<H>                                                                     Ca 8.5   Mg 1.9   Ph 2.7

## 2022-02-20 NOTE — CONSULT NOTE ADULT - CONSULT REQUESTED DATE/TIME
17-Feb-2022
18-Feb-2022 08:45
17-Feb-2022
16-Feb-2022 07:57
17-Feb-2022 20:28
20-Feb-2022 09:04
13-Feb-2022 10:19

## 2022-02-20 NOTE — CONSULT NOTE ADULT - ASSESSMENT
Assessment: 71y year old Male seen by podiaty team for the evaluation of;   1. Bilateral hypertrohic onychomycosis to left big toenail  2. Pain from elongated nails, thickened and dystrophic nails, with difficulty to ambulate      Plan:  - Patient was seen and examined by podiatry team  - Discussed the case with Dr. Bains  - Discussed all the potential treatment plans with the patient  - Educated Pt about the proper foot hygiene, to prevent the recurrence of the fungal foot infection when cleared.  - Educated Pt about importance of daily foot examinations and proper shoe gear.  - Pt demonstrated verbal understanding  - Aseptic mechanical debridement and curretage of left hallucal toenail using sterile nail nippers,  - Pt tolrated the procedure well, without any complication.  - Thank you for the courtsey of this consult, podiatry will sign off at this time.  - Please re-consult as needed.

## 2022-02-20 NOTE — PROGRESS NOTE ADULT - ASSESSMENT
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     Intractable pain due to malignant obstruction of choledochojejunostomy loop s/p biliary drain exchange .   Metastatic ampullary cancer s/p whipple 2020  - s/p Zosyn in ED, pt w/o criteria for sepsis at this time  - s/p IR drainage on 2/16   - s/p exchange/upsizing of biliary drain   - Pain control as above  -  Cont Zofran, and pancreatic enzymes  - OOB and ambulate with assistance    Sepsis , POA with bacteroides Fragilis due to probable acute cholangitis as well as   Infiltrative  6.3cm  intraabdominal mass in surgical bed possible underlying necrosis  - Pt remains afebrile, Lactate benign  - + Blood culture--> Bacteroides fragilis; cont Zosyn  - urine cx NGTD   - ID following    Superior mesenteric vein thrombosis   - Dr. Castro following-  full dose AC   - s/p Lovenox 40 x 1 dose 2/17/22   - 2/18 - started  on heparin drip, H/H stable  - plan for DOAC once nephrostomy placed     YAMILETH with baseline SCr 0.7, multifactorial; Medication induced (ketorolac & Losartan) associated with   moderate Right hydronephrosis due to malignant obstruction   - s/p IV Hydration, Creatinine Trend: 1.24<--, 1.42<--, 1.40<--, 1.45<--, 1.32<--  - Hold NSAIDs and Losartan  - Check post void residual ,  Strict I&O  - Urology,  Dr. Kennedy following; for right nephrostomy tube today with IR   planed for Tuesday  - medically stable for needed urgent procedure  - stop ASA , restart after 48 hours after nephrostomy placement     Iron deficiency anemia    - trend cbc ,  Iron studies as above noted   - c/w  iron daily     Hyperglycemia  A1c 5.2,  trend     CAD - hold aspirin , c/w  statin    Hypoalbuminemia 2/2 Protein calorie malnutrition  - nutrition consult,  advance diet as tolerated     HTN  - Losartan stopped 2/2 renal function  - continue BB  - Hydralazine PRN for SBP > 170    HLD  -continue statin    DVT PPX SCDs     DISPO: NPO for right nephrostomy tube placement, start AC 48 hrs after procedure,  cont IV abx,     Advanced directives  - palliative care team on the case  - Full code  - wife Carmen [phone] - updated 2/18, 2/19, 2/20

## 2022-02-20 NOTE — PROGRESS NOTE ADULT - ASSESSMENT
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, 5/18 LN involved [id number] and received 12 cycles of mFOLFIRINOX for adjuvant therapy completed 4/27/21, then with metastatic recurrence involving periotoneum at time of ventral hernia repair, and started on gemcitabine, nab-paclitaxel weekly started 7/13/21, last dose 1/4/22 presents for worsening pain, found to have bacteremia and hydroneprhosis.    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  Retroperitoneal adenopathy.  - Cr 1.4 today  - PLanned for Nephrostomy on jaun Castro MD   Hematology/ Oncology   [facility]   [facility]   C: [phone]  [address] [place], [place]  P:[phone]  F:[phone]  and [phone]  [address] [place], [place]   P:[phone]  F:[phone]   72 yo Male PMHx metastatic ampullary cancer, pancreatobiliary type s/p whipple resection 9/15/20, 5/18 LN involved [id number] and received 12 cycles of mFOLFIRINOX for adjuvant therapy completed 4/27/21, then with metastatic recurrence involving periotoneum at time of ventral hernia repair, and started on gemcitabine, nab-paclitaxel weekly started 7/13/21, last dose 1/4/22 presents for worsening pain, found to have bacteremia and hydroneprhosis.    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  Retroperitoneal adenopathy.  - Cr 1.4 today  - PLanned for Nephrostomy on tues    # Superior mesteneric Thrombosis   =-now on Heparin ggt   - after Nephrostomy ok to proceed with DOAC as discussed with HOSPITALIST yesterday     Wally Castro MD   Hematology/ Oncology   [facility]   [facility]   C: [phone]  [address] [place], [place]  P:[phone]  F:[phone]  and [phone]  [address] [place], [place]   P:[phone]  F:[phone]

## 2022-02-20 NOTE — CONSULT NOTE ADULT - CONSULT REASON
Painful left toenail
Metastatic ampullary cancer
bacteremia
Pain management
72yo M with malignant obstruction of choledocojejunostomy loop referred to IR For biliary drain exchange
Local Recurrence of Ampullary Ca
Hydronephrosis

## 2022-02-20 NOTE — CONSULT NOTE ADULT - SUBJECTIVE AND OBJECTIVE BOX
Date of Consult: 2/20/22   HPI: 71y year old Male seen at bedside with a chief complaint of painful thickened, elongated and dystrophic left hallucal toenail and preventative foot examination. Patient is medically managed  by Medicine/Hospitalists. Patient reports he is experiencing pain while standing, walking and in shoe gear. Patient denies any history of trauma to both feet. Patient has no other pedal complaints at this time.      REVIEW OF SYSTEMS: All other review of systems is negative unless indicated above    PMH:   Hearing loss  Stented coronary artery  CAD (coronary artery disease)  HTN (hypertension)  Hyperlipidemia  GERD (gastroesophageal reflux disease)  Skin cancer  OA (osteoarthritis)  Psoriasis  Inguinal hernia  Brain aneurysm  Pancreatic cancer  Drug or chemical induced diabetes mellitus    PSH:  H/O craniotomy  History of other surgery  History of tonsillectomy  S/P colonoscopy  History of PTCA  H/O inguinal hernia repair  Pancreatic disorder    Allergies:  No Known Allergies    MEDICATIONS  (STANDING):  atorvastatin 20 milliGRAM(s) Oral at bedtime  cholecalciferol 2000 Unit(s) Oral daily  ferrous    sulfate 325 milliGRAM(s) Oral daily  gabapentin 100 milliGRAM(s) Oral every 8 hours  heparin  Infusion.  Unit(s)/Hr (11 mL/Hr) IV Continuous <Continuous>  metoprolol succinate ER 12.5 milliGRAM(s) Oral at bedtime  multivitamin 1 Tablet(s) Oral daily  pancrelipase (ZENPEP 20,000 Lipase Units) 4 Capsule(s) Oral three times a day with meals  pantoprazole    Tablet 40 milliGRAM(s) Oral daily  piperacillin/tazobactam IVPB.. 3.375 Gram(s) IV Intermittent every 8 hours  polyethylene glycol 3350 17 Gram(s) Oral daily  sodium chloride 0.9%. 1000 milliLiter(s) (75 mL/Hr) IV Continuous <Continuous>    MEDICATIONS  (PRN):  acetaminophen     Tablet .. 650 milliGRAM(s) Oral every 6 hours PRN Temp greater or equal to 38C (100.4F), Mild Pain (1 - 3)  aluminum hydroxide/magnesium hydroxide/simethicone Suspension 30 milliLiter(s) Oral every 4 hours PRN Dyspepsia  heparin   Injectable 5000 Unit(s) IV Push every 6 hours PRN For aPTT less than 40  heparin   Injectable 2500 Unit(s) IV Push every 6 hours PRN For aPTT between 40 - 57  hydrALAZINE Injectable 10 milliGRAM(s) IV Push every 4 hours PRN SBP>170  HYDROmorphone   Tablet 2 milliGRAM(s) Oral every 4 hours PRN Severe Pain (7 - 10)  HYDROmorphone   Tablet 1 milliGRAM(s) Oral every 4 hours PRN Moderate Pain (4 - 6)  HYDROmorphone  Injectable 1 milliGRAM(s) IV Push every 4 hours PRN breathrough pain  melatonin 10 milliGRAM(s) Oral at bedtime PRN Insomnia  ondansetron Injectable 4 milliGRAM(s) IV Push every 6 hours PRN Nausea and/or Vomiting    Vital Signs Last 24 Hrs  T(C): 36.5 (20 Feb 2022 08:38), Max: 36.5 (20 Feb 2022 08:38)  T(F): 97.7 (20 Feb 2022 08:38), Max: 97.7 (20 Feb 2022 08:38)  HR: 60 (20 Feb 2022 08:38) (60 - 76)  BP: 130/67 (20 Feb 2022 08:38) (130/67 - 142/57)  BP(mean): --  RR: 17 (20 Feb 2022 08:38) (17 - 18)  SpO2: 96% (20 Feb 2022 08:38) (95% - 98%)         Physical Examination:  Constitutional: Alert, oriented x3, in NAD.  Focused LE exam:  Vascular: Tempreature gradient is warm to warm b/l. Palpable pulses, Dorsalis Pedis and Posterior Tibial pulses non palpable b/l.  Capillary re-fill time less than 3 seconds digits 1-5 bilateral.  Derm:  No open lesions or inter-digital macerations, and no clinical signs of acute infection noted bilaterally.   Toenails to the left Hallux  thickened, elongated, discolored, and dystrophic with subungual debris. There is tenderness upon palpation of left hallucal toenail. Discoloration noted to the toenail secondary to chemotherapy.   Neuro: Intact protective sensation to the level of the digits 1-5 bilaterally.  MSK: Manual muscle strength testing  5/5 all major muscle groups foot/ankle bilaterally. ROM of all foot/ankle joints is WNL bilaterally. No structural abnormality, bilaterally      Labs:                        8.4    8.33  )-----------( 160      ( 20 Feb 2022 04:03 )             26.0       WBC Trend  8.33 Date (02-20 @ 04:03)  10.38 Date (02-19 @ 06:43)  8.77 Date (02-18 @ 23:44)  8.38 Date (02-18 @ 06:31)  9.14 Date (02-17 @ 06:39)  11.95<H> Date (02-16 @ 02:34)      Chem  02-20    134<L>  |  107  |  19  ----------------------------<  111<H>  4.0   |  20<L>  |  1.24    Ca    8.4<L>      20 Feb 2022 04:03  Phos  2.7     02-19  Mg     1.9     02-19    TPro  6.3  /  Alb  2.0<L>  /  TBili  1.7<H>  /  DBili  x   /  AST  12<L>  /  ALT  13  /  AlkPhos  235<H>  02-19

## 2022-02-20 NOTE — PROGRESS NOTE ADULT - SUBJECTIVE AND OBJECTIVE BOX
INTERVAL HPI/OVERNIGHT EVENTS:  Patient S&E at bedside. No o/n events, patient resting comfortably.     Vital Signs Last 24 Hrs  T(C): 36.5 (20 Feb 2022 08:38), Max: 36.5 (20 Feb 2022 08:38)  T(F): 97.7 (20 Feb 2022 08:38), Max: 97.7 (20 Feb 2022 08:38)  HR: 60 (20 Feb 2022 08:38) (60 - 76)  BP: 130/67 (20 Feb 2022 08:38) (130/67 - 142/57)  BP(mean): --  RR: 17 (20 Feb 2022 08:38) (17 - 18)  SpO2: 96% (20 Feb 2022 08:38) (95% - 98%)    PHYSICAL EXAM:    Constitutional: NAD  Eyes: EOMI, sclera non-icteric  Neck: supple, no masses, no JVD  Respiratory: CTA b/l, good air entry b/l  Cardiovascular: RRR, no M/R/G  Gastrointestinal:  RIGHT abdominal Biliary drain         MEDICATIONS  (STANDING):  atorvastatin 20 milliGRAM(s) Oral at bedtime  cholecalciferol 2000 Unit(s) Oral daily  ferrous    sulfate 325 milliGRAM(s) Oral daily  gabapentin 100 milliGRAM(s) Oral every 8 hours  heparin  Infusion.  Unit(s)/Hr (11 mL/Hr) IV Continuous <Continuous>  metoprolol succinate ER 12.5 milliGRAM(s) Oral at bedtime  multivitamin 1 Tablet(s) Oral daily  pancrelipase (ZENPEP 20,000 Lipase Units) 4 Capsule(s) Oral three times a day with meals  pantoprazole    Tablet 40 milliGRAM(s) Oral daily  piperacillin/tazobactam IVPB.. 3.375 Gram(s) IV Intermittent every 8 hours  polyethylene glycol 3350 17 Gram(s) Oral daily  sodium chloride 0.9%. 1000 milliLiter(s) (75 mL/Hr) IV Continuous <Continuous>    MEDICATIONS  (PRN):  acetaminophen     Tablet .. 650 milliGRAM(s) Oral every 6 hours PRN Temp greater or equal to 38C (100.4F), Mild Pain (1 - 3)  aluminum hydroxide/magnesium hydroxide/simethicone Suspension 30 milliLiter(s) Oral every 4 hours PRN Dyspepsia  heparin   Injectable 5000 Unit(s) IV Push every 6 hours PRN For aPTT less than 40  heparin   Injectable 2500 Unit(s) IV Push every 6 hours PRN For aPTT between 40 - 57  hydrALAZINE Injectable 10 milliGRAM(s) IV Push every 4 hours PRN SBP>170  HYDROmorphone   Tablet 2 milliGRAM(s) Oral every 4 hours PRN Severe Pain (7 - 10)  HYDROmorphone   Tablet 1 milliGRAM(s) Oral every 4 hours PRN Moderate Pain (4 - 6)  HYDROmorphone  Injectable 1 milliGRAM(s) IV Push every 4 hours PRN breathrough pain  melatonin 10 milliGRAM(s) Oral at bedtime PRN Insomnia  ondansetron Injectable 4 milliGRAM(s) IV Push every 6 hours PRN Nausea and/or Vomiting      Allergies    No Known Allergies    Intolerances        LABS:                        8.4    8.33  )-----------( 160      ( 20 Feb 2022 04:03 )             26.0     02-20    134<L>  |  107  |  19  ----------------------------<  111<H>  4.0   |  20<L>  |  1.24    Ca    8.4<L>      20 Feb 2022 04:03  Phos  2.7     02-19  Mg     1.9     02-19    TPro  6.3  /  Alb  2.0<L>  /  TBili  1.7<H>  /  DBili  x   /  AST  12<L>  /  ALT  13  /  AlkPhos  235<H>  02-19    PTT - ( 20 Feb 2022 04:03 )  PTT:55.7 sec      RADIOLOGY & ADDITIONAL TESTS:  Studies reviewed.    ASSESSMENT & PLAN:

## 2022-02-20 NOTE — PROGRESS NOTE ADULT - ASSESSMENT
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      Case discussed with Dr Jane

## 2022-02-21 LAB
ANION GAP SERPL CALC-SCNC: 8 MMOL/L — SIGNIFICANT CHANGE UP (ref 5–17)
APTT BLD: 56.9 SEC — HIGH (ref 27.5–35.5)
APTT BLD: 57.2 SEC — HIGH (ref 27.5–35.5)
APTT BLD: 64.2 SEC — HIGH (ref 27.5–35.5)
BUN SERPL-MCNC: 16 MG/DL — SIGNIFICANT CHANGE UP (ref 7–23)
CALCIUM SERPL-MCNC: 8.5 MG/DL — SIGNIFICANT CHANGE UP (ref 8.5–10.1)
CHLORIDE SERPL-SCNC: 104 MMOL/L — SIGNIFICANT CHANGE UP (ref 96–108)
CO2 SERPL-SCNC: 20 MMOL/L — LOW (ref 22–31)
CREAT SERPL-MCNC: 1.18 MG/DL — SIGNIFICANT CHANGE UP (ref 0.5–1.3)
GLUCOSE SERPL-MCNC: 130 MG/DL — HIGH (ref 70–99)
HCT VFR BLD CALC: 26.8 % — LOW (ref 39–50)
HGB BLD-MCNC: 8.5 G/DL — LOW (ref 13–17)
MCHC RBC-ENTMCNC: 28 PG — SIGNIFICANT CHANGE UP (ref 27–34)
MCHC RBC-ENTMCNC: 31.7 GM/DL — LOW (ref 32–36)
MCV RBC AUTO: 88.2 FL — SIGNIFICANT CHANGE UP (ref 80–100)
PLATELET # BLD AUTO: 184 K/UL — SIGNIFICANT CHANGE UP (ref 150–400)
POTASSIUM SERPL-MCNC: 4 MMOL/L — SIGNIFICANT CHANGE UP (ref 3.5–5.3)
POTASSIUM SERPL-SCNC: 4 MMOL/L — SIGNIFICANT CHANGE UP (ref 3.5–5.3)
RBC # BLD: 3.04 M/UL — LOW (ref 4.2–5.8)
RBC # FLD: 14.6 % — HIGH (ref 10.3–14.5)
SARS-COV-2 RNA SPEC QL NAA+PROBE: SIGNIFICANT CHANGE UP
SODIUM SERPL-SCNC: 132 MMOL/L — LOW (ref 135–145)
WBC # BLD: 8.64 K/UL — SIGNIFICANT CHANGE UP (ref 3.8–10.5)
WBC # FLD AUTO: 8.64 K/UL — SIGNIFICANT CHANGE UP (ref 3.8–10.5)

## 2022-02-21 PROCEDURE — 99232 SBSQ HOSP IP/OBS MODERATE 35: CPT

## 2022-02-21 RX ORDER — HEPARIN SODIUM 5000 [USP'U]/ML
2500 INJECTION INTRAVENOUS; SUBCUTANEOUS EVERY 6 HOURS
Refills: 0 | Status: DISCONTINUED | OUTPATIENT
Start: 2022-02-21 | End: 2022-02-21

## 2022-02-21 RX ORDER — HEPARIN SODIUM 5000 [USP'U]/ML
1800 INJECTION INTRAVENOUS; SUBCUTANEOUS
Qty: 25000 | Refills: 0 | Status: DISCONTINUED | OUTPATIENT
Start: 2022-02-21 | End: 2022-02-21

## 2022-02-21 RX ORDER — HEPARIN SODIUM 5000 [USP'U]/ML
1900 INJECTION INTRAVENOUS; SUBCUTANEOUS
Qty: 25000 | Refills: 0 | Status: DISCONTINUED | OUTPATIENT
Start: 2022-02-21 | End: 2022-02-23

## 2022-02-21 RX ORDER — HEPARIN SODIUM 5000 [USP'U]/ML
2000 INJECTION INTRAVENOUS; SUBCUTANEOUS EVERY 6 HOURS
Refills: 0 | Status: DISCONTINUED | OUTPATIENT
Start: 2022-02-21 | End: 2022-02-23

## 2022-02-21 RX ORDER — HEPARIN SODIUM 5000 [USP'U]/ML
4500 INJECTION INTRAVENOUS; SUBCUTANEOUS EVERY 6 HOURS
Refills: 0 | Status: DISCONTINUED | OUTPATIENT
Start: 2022-02-21 | End: 2022-02-23

## 2022-02-21 RX ORDER — ACETAMINOPHEN 500 MG
650 TABLET ORAL EVERY 8 HOURS
Refills: 0 | Status: DISCONTINUED | OUTPATIENT
Start: 2022-02-21 | End: 2022-02-23

## 2022-02-21 RX ORDER — HEPARIN SODIUM 5000 [USP'U]/ML
5000 INJECTION INTRAVENOUS; SUBCUTANEOUS EVERY 6 HOURS
Refills: 0 | Status: DISCONTINUED | OUTPATIENT
Start: 2022-02-21 | End: 2022-02-21

## 2022-02-21 RX ADMIN — HYDROMORPHONE HYDROCHLORIDE 2 MILLIGRAM(S): 2 INJECTION INTRAMUSCULAR; INTRAVENOUS; SUBCUTANEOUS at 06:20

## 2022-02-21 RX ADMIN — HYDROMORPHONE HYDROCHLORIDE 2 MILLIGRAM(S): 2 INJECTION INTRAMUSCULAR; INTRAVENOUS; SUBCUTANEOUS at 01:44

## 2022-02-21 RX ADMIN — HEPARIN SODIUM 1900 UNIT(S)/HR: 5000 INJECTION INTRAVENOUS; SUBCUTANEOUS at 18:10

## 2022-02-21 RX ADMIN — PIPERACILLIN AND TAZOBACTAM 25 GRAM(S): 4; .5 INJECTION, POWDER, LYOPHILIZED, FOR SOLUTION INTRAVENOUS at 06:19

## 2022-02-21 RX ADMIN — Medication 1 TABLET(S): at 17:53

## 2022-02-21 RX ADMIN — HYDROMORPHONE HYDROCHLORIDE 2 MILLIGRAM(S): 2 INJECTION INTRAMUSCULAR; INTRAVENOUS; SUBCUTANEOUS at 20:15

## 2022-02-21 RX ADMIN — Medication 12.5 MILLIGRAM(S): at 22:08

## 2022-02-21 RX ADMIN — PANTOPRAZOLE SODIUM 40 MILLIGRAM(S): 20 TABLET, DELAYED RELEASE ORAL at 08:58

## 2022-02-21 RX ADMIN — Medication 650 MILLIGRAM(S): at 14:07

## 2022-02-21 RX ADMIN — PIPERACILLIN AND TAZOBACTAM 25 GRAM(S): 4; .5 INJECTION, POWDER, LYOPHILIZED, FOR SOLUTION INTRAVENOUS at 22:08

## 2022-02-21 RX ADMIN — GABAPENTIN 100 MILLIGRAM(S): 400 CAPSULE ORAL at 22:08

## 2022-02-21 RX ADMIN — Medication 5 MILLIGRAM(S): at 14:07

## 2022-02-21 RX ADMIN — Medication 2000 UNIT(S): at 17:53

## 2022-02-21 RX ADMIN — HEPARIN SODIUM 2000 UNIT(S): 5000 INJECTION INTRAVENOUS; SUBCUTANEOUS at 02:16

## 2022-02-21 RX ADMIN — Medication 650 MILLIGRAM(S): at 22:09

## 2022-02-21 RX ADMIN — HYDROMORPHONE HYDROCHLORIDE 2 MILLIGRAM(S): 2 INJECTION INTRAMUSCULAR; INTRAVENOUS; SUBCUTANEOUS at 20:45

## 2022-02-21 RX ADMIN — GABAPENTIN 100 MILLIGRAM(S): 400 CAPSULE ORAL at 14:07

## 2022-02-21 RX ADMIN — Medication 4 CAPSULE(S): at 13:34

## 2022-02-21 RX ADMIN — HEPARIN SODIUM 1900 UNIT(S)/HR: 5000 INJECTION INTRAVENOUS; SUBCUTANEOUS at 19:28

## 2022-02-21 RX ADMIN — Medication 4 CAPSULE(S): at 17:19

## 2022-02-21 RX ADMIN — ATORVASTATIN CALCIUM 20 MILLIGRAM(S): 80 TABLET, FILM COATED ORAL at 22:08

## 2022-02-21 RX ADMIN — HEPARIN SODIUM 1800 UNIT(S)/HR: 5000 INJECTION INTRAVENOUS; SUBCUTANEOUS at 04:29

## 2022-02-21 RX ADMIN — HEPARIN SODIUM 1800 UNIT(S)/HR: 5000 INJECTION INTRAVENOUS; SUBCUTANEOUS at 10:56

## 2022-02-21 RX ADMIN — Medication 650 MILLIGRAM(S): at 22:39

## 2022-02-21 RX ADMIN — POLYETHYLENE GLYCOL 3350 17 GRAM(S): 17 POWDER, FOR SOLUTION ORAL at 14:06

## 2022-02-21 RX ADMIN — HEPARIN SODIUM 1800 UNIT(S)/HR: 5000 INJECTION INTRAVENOUS; SUBCUTANEOUS at 07:38

## 2022-02-21 RX ADMIN — Medication 325 MILLIGRAM(S): at 17:53

## 2022-02-21 RX ADMIN — Medication 5 MILLIGRAM(S): at 22:08

## 2022-02-21 RX ADMIN — GABAPENTIN 100 MILLIGRAM(S): 400 CAPSULE ORAL at 06:20

## 2022-02-21 RX ADMIN — HEPARIN SODIUM 1800 UNIT(S)/HR: 5000 INJECTION INTRAVENOUS; SUBCUTANEOUS at 02:26

## 2022-02-21 RX ADMIN — PIPERACILLIN AND TAZOBACTAM 25 GRAM(S): 4; .5 INJECTION, POWDER, LYOPHILIZED, FOR SOLUTION INTRAVENOUS at 14:07

## 2022-02-21 RX ADMIN — Medication 4 CAPSULE(S): at 08:57

## 2022-02-21 RX ADMIN — HYDROMORPHONE HYDROCHLORIDE 2 MILLIGRAM(S): 2 INJECTION INTRAMUSCULAR; INTRAVENOUS; SUBCUTANEOUS at 00:44

## 2022-02-21 NOTE — PROVIDER CONTACT NOTE (OTHER) - ASSESSMENT
Last ptt result 56.9; drug dosing weight 61.2kg, noted patient's weight on 2/18 was 57.6kg and reweighed at this time to be 58.5kg. Current heparin gtt infusion rate is 17mL/hr

## 2022-02-21 NOTE — PROGRESS NOTE ADULT - SUBJECTIVE AND OBJECTIVE BOX
Pt was seen and examined this morning. Pt sated that he continues to have his back pain continues. Pt continues to have normal bowel function. Pt has been afebrile. On antibiotics. Tomorrow for nephrostomy tube. Heparin drip for SMV thrombus must be stopped at midnight tonight.     Vital Signs (24 Hrs):  T(C): 36.6 (02-21-22 @ 06:10), Max: 37.3 (02-20-22 @ 21:56)  HR: 73 (02-21-22 @ 06:10) (60 - 89)  BP: 133/68 (02-21-22 @ 06:10) (126/61 - 137/63)  RR: 19 (02-21-22 @ 06:10) (17 - 19)  SpO2: 97% (02-21-22 @ 06:10) (96% - 98%)  Wt(kg): --  Daily     Daily     I&O's Summary    20 Feb 2022 07:01  -  21 Feb 2022 07:00  --------------------------------------------------------  IN: 200 mL / OUT: 2105 mL / NET: -1905 mL        PE  General: NAD, cachetic   Neck: Supple  Chest: Equal expansion bilaterally  CV: S1S2 Present  Abdomen: Soft, non distended, Epigastric tenderness, non-tympanic, bowel sounds present, port site hernia in the left lower quadrant, IR drain present and dry in RUQ  Extremities: Grossly symmetric    Labs:                        8.4    8.33  )-----------( 160      ( 20 Feb 2022 04:03 )             26.0   02-20    134<L>  |  107  |  19  ----------------------------<  111<H>  4.0   |  20<L>  |  1.24    Ca    8.4<L>      20 Feb 2022 04:03

## 2022-02-21 NOTE — PROGRESS NOTE ADULT - ASSESSMENT
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      Case discussed with attending

## 2022-02-21 NOTE — PROGRESS NOTE ADULT - ASSESSMENT
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  Retroperitoneal adenopathy.  - Cr 1.4->1.26 yesterday  - Planned for Nephrostomy tomorrow  - hold heparin gtt after midnight tonight     # Superior mesteneric Thrombosis   - now on Heparin ggt - PTT at goal   - hold hep gtt after midnight in preparation for nephrostomy placement  - after Nephrostomy ok to proceed with DOAC    Dr. 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, 5/18 LN involved [id number] and received 12 cycles of mFOLFIRINOX for adjuvant therapy completed 4/27/21, then with metastatic recurrence involving periotoneum at time of ventral hernia repair, and started on gemcitabine, nab-paclitaxel weekly started 7/13/21, last dose 1/4/22 presents for worsening pain, found to have Bacteroides bacteremia and hydronephrosis    # metastatic ampullary cancer  - severe abdominal pain from biliary drain malfunction   - 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  Retroperitoneal adenopathy.  - Cr 1.4->1.26-> 1.18  - Planned for Nephrostomy tomorrow  - hold heparin gtt after midnight tonight     # Superior mesteneric Thrombosis   - now on Heparin ggt - PTT at goal   - hold hep gtt after midnight in preparation for nephrostomy placement  - after Nephrostomy ok to proceed with DOAC    Dr. Didier Walker  cell: [phone]  Weekends and nights please call [phone] for MD on call  [facility]  Hematology/Oncology

## 2022-02-21 NOTE — PROGRESS NOTE ADULT - ASSESSMENT
70 y/o male with h/o metastatic ampullary cancer - pancreatobiliary type s/p Whipple resection in 2020, [place]last two weeks ago[place] [facility] [place], prior E.coli bacteremia, brain aneurysm, HTN, HLD, CAD, GERD was admitted on 2/16 for fever (Tmax 102.7F) and abdominal pain. Pt states while visiting [place], approx two weeks PTA, he presented to hospital with abdominal pain and IR placed a 8F biliary drain. Pt flushing twice a day as directed. In ER he was noted febrile and he received zosyn.     1. Sepsis with bacteroides fragilis. Probable acute cholangitis s/p recent biliary drain. Infiltrative intraabdominal mass ?underlying necrosis. Pancreatic ampullary Ca s/p Whipple on chemotherapy. Immunocompromised host. CRF stage 3.   -cultures reviewed  -on zosyn 3.375 gm IV q8h # 3  -tolerating abx well so far; no side effects noted  -IR evaluation appreciated  -urology consulted for possible obstructive hydronephrosis   -repeat BC x 2 are negative to date  -surgery is considered  -continue abx coverage   -monitor temps  -f/u CBC  -supportive care  2. Other issues:   -care per medicine

## 2022-02-21 NOTE — PROGRESS NOTE ADULT - SUBJECTIVE AND OBJECTIVE BOX
Date of service: 22 @ 09:55    Lying in bed in NAD  Has mild abdominal discomfort  Has low grade fever    ROS: denies dizziness, no HA, no SOB or cough, no diarrhea or constipation; no dysuria, no legs pain, no rashes    MEDICATIONS  (STANDING):  atorvastatin 20 milliGRAM(s) Oral at bedtime  cholecalciferol 2000 Unit(s) Oral daily  ferrous    sulfate 325 milliGRAM(s) Oral daily  gabapentin 100 milliGRAM(s) Oral every 8 hours  heparin  Infusion. 1800 Unit(s)/Hr (18 mL/Hr) IV Continuous <Continuous>  metoprolol succinate ER 12.5 milliGRAM(s) Oral at bedtime  multivitamin 1 Tablet(s) Oral daily  pancrelipase (ZENPEP 20,000 Lipase Units) 4 Capsule(s) Oral three times a day with meals  pantoprazole    Tablet 40 milliGRAM(s) Oral daily  piperacillin/tazobactam IVPB.. 3.375 Gram(s) IV Intermittent every 8 hours  polyethylene glycol 3350 17 Gram(s) Oral daily    Vital Signs Last 24 Hrs  T(C): 36.4 (2022 08:50), Max: 37.3 (2022 21:56)  T(F): 97.6 (2022 08:50), Max: 99.2 (2022 21:56)  HR: 65 (2022 08:50) (65 - 89)  BP: 125/67 (2022 08:50) (125/67 - 137/63)  BP(mean): --  RR: 18 (2022 08:50) (18 - 19)  SpO2: 96% (2022 08:50) (96% - 98%)     Physical exam:    Constitutional:  No acute distress  HEENT: NC/AT, EOMI, PERRLA, conjunctivae clear; ears and nose atraumatic  Neck: supple; thyroid not palpable  Back: no tenderness  Respiratory: respiratory effort normal; clear to auscultation  Cardiovascular: S1S2 regular, no murmurs  Abdomen: soft, has RUQ tender, not distended, positive BS  biliary drain in place  Genitourinary: no suprapubic tenderness  Lymphatic: no LN palpable  Musculoskeletal: no muscle tenderness, no joint swelling or tenderness  Extremities: no pedal edema  Neurological/ Psychiatric: AxOx3, judgement and insight normal; moving all extremities  Skin: no rashes; no palpable lesions    Labs: all available labs reviewed                        8.6    8.38  )-----------( 175      ( 2022 06:31 )             26.0     02-18    133<L>  |  107  |  24<H>  ----------------------------<  100<H>  3.9   |  19<L>  |  1.40<H>    Ca    8.5      2022 06:31    TPro  6.6  /  Alb  2.3<L>  /  TBili  1.5<H>  /  DBili  x   /  AST  18  /  ALT  20  /  AlkPhos  414<H>  02-17     LIVER FUNCTIONS - ( 2022 06:39 )  Alb: 2.3 g/dL / Pro: 6.6 gm/dL / ALK PHOS: 414 U/L / ALT: 20 U/L / AST: 18 U/L / GGT: x           Urinalysis Basic - ( 2022 10:07 )    Color: Yellow / Appearance: Clear / S.015 / pH: x  Gluc: x / Ketone: Negative  / Bili: Negative / Urobili: Negative   Blood: x / Protein: Negative / Nitrite: Negative   Leuk Esterase: Trace / RBC: 0-2 /HPF / WBC 0-2   Sq Epi: x / Non Sq Epi: Occasional / Bacteria: Occasional    ( @ 02:34)  NotDete      Culture - Urine (collected 2022 10:07)  Source: Clean Catch None  Final Report (2022 14:37):    <10,000 CFU/mL Normal Urogenital Emily      Culture - Blood (collected 2022 06:43)  Source: .Blood None  Preliminary Report (2022 09:01):    No growth to date.    Culture - Blood (collected 2022 06:43)  Source: .Blood None  Preliminary Report (2022 09:01):    No growth to date.    Culture - Urine (collected 2022 10:07)  Source: Clean Catch None  Final Report (2022 14:37):    <10,000 CFU/mL Normal Urogenital Emily    Culture - Blood (collected 2022 02:34)  Source: .Blood None  Gram Stain (2022 18:41):    Growth in anaerobic bottle: Gram Negative Rods  Final Report (2022 19:28):    Growth in anaerobic bottle: Bacteroides fragilis    "Susceptibilities not performed"    Culture - Blood (collected 2022 02:34)  Source: .Blood None  Gram Stain (2022 13:19):    Growth in anaerobic bottle: Gram Negative Rods  Final Report (2022 12:23):    Growth in anaerobic bottle: Bacteroides fragilis "Susceptibilities not    performed"  Organism: Blood Culture PCR (2022 12:23)      -  Bacteroides fragilis: Detec      Method Type: PCR        Radiology: all available radiological tests reviewed    < from: CT Abdomen and Pelvis w/ IV Cont (22 @ 04:24) >  Status post Whipple procedure with a 6.3 cm infilatrative mass in the   surgical bed adjacent to jejunal sutures compatible with tumor   recurrence.  Dilated small bowel loop at the luh compatible with   malignant obstruction of choledochojejunostomy loop. Satisfactory   positioning of right biliary stent with mild intrahepatic biliary   dilatation.   Correlate clinically for cholangitis.  No gastric outlet obstruction.  Neoplastic implants in the right upper and lower quadrants.  Focal thrombosis in the superior mesenteric vein related to the site of   tumor recurrence.  The right kidney demonstrates a delayed nephrogram and moderate right   hydronephrosis with transition at the right ureteropelvic junction,   likely related to malignant obstruction.  Retroperitoneal adenopathy.    < end of copied text >      Advanced directives addressed: full resuscitation

## 2022-02-21 NOTE — PROGRESS NOTE ADULT - SUBJECTIVE AND OBJECTIVE BOX
INTERVAL HPI/OVERNIGHT EVENTS:  Patient S&E at bedside. No o/n events, PTT at goal on hep gtt. VSS on RA. Labs reviewed. Still with back pain    PAST MEDICAL & SURGICAL HISTORY:  Hearing loss  right    Stented coronary artery  2006 PTCA x 1    CAD (coronary artery disease)  last stress test Fall 2018    HTN (hypertension)    Hyperlipidemia    GERD (gastroesophageal reflux disease)    Skin cancer  squamous cell - head, shin - removed    OA (osteoarthritis)    Psoriasis    Inguinal hernia  right    Brain aneurysm  non ruptured - had stent placed in 2010 after attemtped clipping and coiling in 2007    Pancreatic cancer  s/p Whipple procedure    Drug or chemical induced diabetes mellitus    H/O craniotomy  for non ruptured aneurysm 2007, attempted clipping    History of other surgery  for brain aneurysm - attempted coiling 2007    History of other surgery  endovascular stent placement for brain Aneurysm at [place] - 2010    History of tonsillectomy    S/P colonoscopy  last - within 5 yrs    History of PTCA  2006 x 1 stent    H/O inguinal hernia repair    Pancreatic disorder  Whipple for cancer        FAMILY HISTORY:  Family history of breast cancer in sister        VITAL SIGNS:  T(F): 97.9 (02-21-22 @ 06:10)  HR: 73 (02-21-22 @ 06:10)  BP: 133/68 (02-21-22 @ 06:10)  RR: 19 (02-21-22 @ 06:10)  SpO2: 97% (02-21-22 @ 06:10)  Wt(kg): --    PHYSICAL EXAM:    Constitutional: NAD, moderate pain  Eyes: EOMI, sclera non-icteric  Respiratory: CTA b/l, good air entry b/l  Cardiovascular: RRR,   Gastrointestinal: tender to palpation in right quadrant and back, biliary drain in place  Extremities: no c/c/e  Neurological: AAOx3      MEDICATIONS  (STANDING):  atorvastatin 20 milliGRAM(s) Oral at bedtime  cholecalciferol 2000 Unit(s) Oral daily  ferrous    sulfate 325 milliGRAM(s) Oral daily  gabapentin 100 milliGRAM(s) Oral every 8 hours  heparin  Infusion. 1800 Unit(s)/Hr (18 mL/Hr) IV Continuous <Continuous>  metoprolol succinate ER 12.5 milliGRAM(s) Oral at bedtime  multivitamin 1 Tablet(s) Oral daily  pancrelipase (ZENPEP 20,000 Lipase Units) 4 Capsule(s) Oral three times a day with meals  pantoprazole    Tablet 40 milliGRAM(s) Oral daily  piperacillin/tazobactam IVPB.. 3.375 Gram(s) IV Intermittent every 8 hours  polyethylene glycol 3350 17 Gram(s) Oral daily    MEDICATIONS  (PRN):  acetaminophen     Tablet .. 650 milliGRAM(s) Oral every 6 hours PRN Temp greater or equal to 38C (100.4F), Mild Pain (1 - 3)  aluminum hydroxide/magnesium hydroxide/simethicone Suspension 30 milliLiter(s) Oral every 4 hours PRN Dyspepsia  heparin   Injectable 4500 Unit(s) IV Push every 6 hours PRN For aPTT less than 40  heparin   Injectable 2000 Unit(s) IV Push every 6 hours PRN For aPTT between 40 - 57  hydrALAZINE Injectable 10 milliGRAM(s) IV Push every 4 hours PRN SBP>170  HYDROmorphone   Tablet 2 milliGRAM(s) Oral every 4 hours PRN Severe Pain (7 - 10)  HYDROmorphone   Tablet 1 milliGRAM(s) Oral every 4 hours PRN Moderate Pain (4 - 6)  HYDROmorphone  Injectable 1 milliGRAM(s) IV Push every 4 hours PRN breathrough pain  melatonin 10 milliGRAM(s) Oral at bedtime PRN Insomnia  ondansetron Injectable 4 milliGRAM(s) IV Push every 6 hours PRN Nausea and/or Vomiting      Allergies    No Known Allergies    Intolerances        LABS:                        8.5    8.64  )-----------( 184      ( 21 Feb 2022 08:14 )             26.8     02-20    134<L>  |  107  |  19  ----------------------------<  111<H>  4.0   |  20<L>  |  1.24    Ca    8.4<L>      20 Feb 2022 04:03      PTT - ( 21 Feb 2022 00:57 )  PTT:56.9 sec      RADIOLOGY & ADDITIONAL TESTS:  Studies reviewed.   INTERVAL HPI/OVERNIGHT EVENTS:  Patient S&E at bedside. No o/n events, PTT at goal on hep gtt. VSS on RA. Labs reviewed. Still with back pain and reports feeling more weak today. Repeat bcx pending. Plan for procedure tomorrow.     PAST MEDICAL & SURGICAL HISTORY:  Hearing loss  right    Stented coronary artery  2006 PTCA x 1    CAD (coronary artery disease)  last stress test Fall 2018    HTN (hypertension)    Hyperlipidemia    GERD (gastroesophageal reflux disease)    Skin cancer  squamous cell - head, shin - removed    OA (osteoarthritis)    Psoriasis    Inguinal hernia  right    Brain aneurysm  non ruptured - had stent placed in 2010 after attemtped clipping and coiling in 2007    Pancreatic cancer  s/p Whipple procedure    Drug or chemical induced diabetes mellitus    H/O craniotomy  for non ruptured aneurysm 2007, attempted clipping    History of other surgery  for brain aneurysm - attempted coiling 2007    History of other surgery  endovascular stent placement for brain Aneurysm at [place] - 2010    History of tonsillectomy    S/P colonoscopy  last - within 5 yrs    History of PTCA  2006 x 1 stent    H/O inguinal hernia repair    Pancreatic disorder  Whipple for cancer        FAMILY HISTORY:  Family history of breast cancer in sister        VITAL SIGNS:  T(F): 97.9 (02-21-22 @ 06:10)  HR: 73 (02-21-22 @ 06:10)  BP: 133/68 (02-21-22 @ 06:10)  RR: 19 (02-21-22 @ 06:10)  SpO2: 97% (02-21-22 @ 06:10)  Wt(kg): --    PHYSICAL EXAM:    Constitutional: NAD, moderate pain  Eyes: EOMI, sclera non-icteric  Respiratory: CTA b/l, good air entry b/l  Cardiovascular: RRR,   Gastrointestinal: tender to palpation in right quadrant and back, biliary drain in place  Extremities: no c/c/e  Neurological: AAOx3      MEDICATIONS  (STANDING):  atorvastatin 20 milliGRAM(s) Oral at bedtime  cholecalciferol 2000 Unit(s) Oral daily  ferrous    sulfate 325 milliGRAM(s) Oral daily  gabapentin 100 milliGRAM(s) Oral every 8 hours  heparin  Infusion. 1800 Unit(s)/Hr (18 mL/Hr) IV Continuous <Continuous>  metoprolol succinate ER 12.5 milliGRAM(s) Oral at bedtime  multivitamin 1 Tablet(s) Oral daily  pancrelipase (ZENPEP 20,000 Lipase Units) 4 Capsule(s) Oral three times a day with meals  pantoprazole    Tablet 40 milliGRAM(s) Oral daily  piperacillin/tazobactam IVPB.. 3.375 Gram(s) IV Intermittent every 8 hours  polyethylene glycol 3350 17 Gram(s) Oral daily    MEDICATIONS  (PRN):  acetaminophen     Tablet .. 650 milliGRAM(s) Oral every 6 hours PRN Temp greater or equal to 38C (100.4F), Mild Pain (1 - 3)  aluminum hydroxide/magnesium hydroxide/simethicone Suspension 30 milliLiter(s) Oral every 4 hours PRN Dyspepsia  heparin   Injectable 4500 Unit(s) IV Push every 6 hours PRN For aPTT less than 40  heparin   Injectable 2000 Unit(s) IV Push every 6 hours PRN For aPTT between 40 - 57  hydrALAZINE Injectable 10 milliGRAM(s) IV Push every 4 hours PRN SBP>170  HYDROmorphone   Tablet 2 milliGRAM(s) Oral every 4 hours PRN Severe Pain (7 - 10)  HYDROmorphone   Tablet 1 milliGRAM(s) Oral every 4 hours PRN Moderate Pain (4 - 6)  HYDROmorphone  Injectable 1 milliGRAM(s) IV Push every 4 hours PRN breathrough pain  melatonin 10 milliGRAM(s) Oral at bedtime PRN Insomnia  ondansetron Injectable 4 milliGRAM(s) IV Push every 6 hours PRN Nausea and/or Vomiting      Allergies    No Known Allergies    Intolerances        LABS:                        8.5    8.64  )-----------( 184      ( 21 Feb 2022 08:14 )             26.8     02-20    134<L>  |  107  |  19  ----------------------------<  111<H>  4.0   |  20<L>  |  1.24    Ca    8.4<L>      20 Feb 2022 04:03      PTT - ( 21 Feb 2022 00:57 )  PTT:56.9 sec      RADIOLOGY & ADDITIONAL TESTS:  Studies reviewed.

## 2022-02-21 NOTE — PROVIDER CONTACT NOTE (OTHER) - SITUATION
Ampullary CA  Service aware, told to call Dr Garcia - spoke to Alyx
Consult called by Hanh, spoke to Podiatry resident, aware of consult.
spoke with Roberto in office regarding consult
72 y/o male admitted with fevers and abdominal pain. s/p IR for R biliary drain exchange. 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

## 2022-02-21 NOTE — PROGRESS NOTE ADULT - SUBJECTIVE AND OBJECTIVE BOX
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 Pt presents today to ED due to fever (Tmax 102.7) and pain at home. Pt states while in [place], approx two weeks ago, he presented to hospital with pain and IR at the time was unable to place stent and so 8F biliary drain placed. Pt flushing twice a day as directed. Pt wife at bedside to endorse history.     ED course: received zosyn, VSS however pt in intractable pain despite multiple medications received. IR and oncology consulted. Pt had IR procedure: drain changed from 8 to 12F catheter with 150cc output at time of hospitalist exam. Returned to ED and admitted for pain control, hydration, palliative and oncology consult.     Interval History:   22 pt seen and examined at bedside, c/o ongoing right flank pain, well controlled on IV Dilaudid. Pt c/o ongoing weakness and fatigue. Right biliary drain functioning. Denies CP, palps, sob, HA, dizziness, dysuria, fever, or chills. No signs of bleeding , tolerating Iv heparin    - pt seen and examined , denies cp, dyspnea, back pain better controlled, tolerating po intake, plan discussed    - pt seen and examined, + gen weakness, sleeping after po Dilaudid , afebrile, tolerating po intake, plan discussed      REVIEW OF SYSTEMS: All other review of systems is negative unless indicated above    Vitals reviewed for last 24 hours  T(C): 36.7 (22 @ 15:28), Max: 37.3 (22 @ 21:56)  T(F): 98.1 (22 @ 15:28), Max: 99.2 (22 @ 21:56)  HR: 74 (22 @ 15:28) (65 - 89)  BP: 116/61 (22 @ 15:28) (116/61 - 137/63)  RR: 18 (22 @ 15:28) (18 - 19)  SpO2: 98% (22 @ 15:28) (96% - 98%)  Wt(kg): --     PHYSICAL EXAM:    Constitutional: NAD, awake and alert  HEENT: PERR, EOMI  Neck: Soft and supple,  No JVD  Respiratory: Breath sounds are clear bilaterally, No wheezing, rales or rhonchi  Cardiovascular: S1 and S2, regular rate and rhythm, no Murmurs  Gastrointestinal: Bowel Sounds present, soft, right flank tenderness, nondistended  Extremities: No peripheral edema  Vascular: 2+ peripheral pulses  Neurological: A/O x 3, no focal deficits  Musculoskeletal: 5/5 strength b/l upper and lower extremities  Skin: No rashes      LABS: All Labs Reviewed:      132<L>  |  104  |  16  ----------------------------<  130<H>  4.0   |  20<L>  |  1.18    Ca    8.5      2022 08:14                          8.5    8.64  )-----------( 184      ( 2022 08:14 )             26.8       PTT - ( 2022 16:33 )  PTT:57.2 sec      Urinalysis Basic - ( 2022 10:07 )    Color: Yellow / Appearance: Clear / S.015 / pH: x  Gluc: x / Ketone: Negative  / Bili: Negative / Urobili: Negative   Blood: x / Protein: Negative / Nitrite: Negative   Leuk Esterase: Trace / RBC: 0-2 /HPF / WBC 0-2   Sq Epi: x / Non Sq Epi: Occasional / Bacteria: Occasional  Iron with Total Binding Capacity in AM (22 @ 06:31) ; Iron - Total Binding Capacity.: 193 ug/dL ; % Saturation, Iron: 6 % ; Iron Total, Serum: 12 ug/dL ; Unsaturated Iron Binding Capacity: 181 ug/dL ; Ferritin, Serum: 216 ng/mL;  Vitamin B12, Serum: 414 pg/mL ; Folate, Serum: 17.1 ng/mL ; Haptoglobin, Serum: 190 mg/dL ; Haptoglobin, Serum: 190 mg/dL  MICRO:  Culture - Blood (22 @ 02:34) NGTD  Respiratory Viral Panel with COVID-19 by [facility] (22 @ 02:34) Rapid RVP Result: Not Detected SARS-CoV-2: Not Detected   RADIOLOGY/EKG: all reviewed    EKG 22 - sinus , poor quality of EKG, repeat in am    US Abdomen Upper Quadrant Right (22 @ 01:57)   FINDINGS:  Liver: Within normal limits.  Bile ducts: Common bile duct stent in place. Dilated common bile duct measuring up to 12 mm. Mildintrahepatic biliary ductal dilatation.  Gallbladder: Surgically absent.  Pancreas: Poorly visualized.  Right kidney: 11.3 cm. Mild to moderate right-sided hydronephrosis, slightly increased compared with prior exam from 2022..  Ascites: Trace perihepatic free fluid.  IVC: Visualized portions are within normal limits.  IMPRESSION:  Common bile duct stent in place. Mild intrahepatic and extra hepatic biliary ductal dilatation with common bile duct measuring up to 12 mm.  Mild to moderate right-sided hydronephrosis, slightly increased compared with prior exam from 2022.  Trace perihepatic ascites.  < end of copied text >    CT Abdomen and Pelvis w/ IV Cont (22 @ 04:24)   FINDINGS:  LOWER CHEST: Coronary atherosclerosis.  LIVER: Within normal limits.  BILE DUCTS: There is biliary stent inserted via the right lateral flank which enters the right hepatic duct and is coiled in the jejunum. There is mild intrahepatic biliary dilatation.  GALLBLADDER: Within normal limits.  SPLEEN: Within normal limits.  PANCREAS: The patient is status post Whipple procedure. There are no prior studies for comparison. In the surgical bed, there is infiltrative ill-defined prominent soft tissue measuring roughly 6.3 (2:49) x 3.8  x 4.2 cm adjacent to the pancreaticojejunostomy sutures concerning for recurrent tumor. There is marked dilatation of the jejunal loop at the luh hepatis measuring up to 4.4 cm.  ADRENALS: Within normal limits.  KIDNEYS/URETERS: The right kidney demonstrates a delayed nephrogram. There is a moderate right hydronephrosis with transition point at the ureteropelvic junction. There is no hydroureter or convincing obstructing stone. There is a 6.6 cm left renal cyst.  BLADDER: Within normal limits.  REPRODUCTIVE ORGANS: The prostate gland is not enlarged.  BOWEL: Moderate hiatal hernia. No bowel obstruction. Appendix is unremarkable. Colon is underdistended without significant fecal load.  PERITONEUM: Trace amount of pelvic ascites. Reticular studding of the fat in the right upper quadrant subhepatic space suspicious for carcinomatosis. There is a 3 cm masslike low density nodule in the right lower quadrant medial to the distal right external iliac vessels compatible with neoplastic implant.  VESSELS: The aorta is not dilated. There is mild to moderate atherosclerotic vascular calcification. There is focal thrombosis of the superior mesenteric vein in the region of suspected tumor recurrence. There is ill-defined soft tissue abutting the superior mesenteric artery.  RETROPERITONEUM/LYMPH NODES: A 2.1 cm precaval lymph node is nonspecific. There is a 9 mm right iliac vein chain lymph node.  Prominent soft tissue   in the right anterior pararenal space measuring 4.5 x 2 cm suspicious for tumor recurrence.  ABDOMINAL WALL: Left-sided ventral hernia mesh is barely underneath the skin in this patient with a weak abdominal wall.  BONES: Within normal limits.  IMPRESSION:  No prior studies for comparison.  No focal collection or soft tissue gas.  Status post Whipple procedure with a 6.3 cm infilatrative mass in the surgical bed adjacent to jejunal sutures compatible with tumor recurrence.  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 Satisfactory positioning of right biliary stent with mild intrahepatic biliary dilatation.   Correlate clinically for cholangitis.  No gastric outlet obstruction. Neoplastic implants in the right upper and lower quadrants.  Focal thrombosis in the superior mesenteric vein related to the site of tumor recurrence.  The right kidney demonstrates a delayed nephrogram and moderate right hydronephrosis with transition at the right ureteropelvic junction, likely related to malignant obstruction.  Retroperitoneal adenopathy.  Trace amount of free fluid.  Moderate hiatal hernia.    Xray Chest 1 View- PORTABLE-Urgent (22 @ 07:42)   Heart size is within normal limits. Small hiatal hernia noted. Right MediPort again seen.  Lungs are clear and possibly under circulated.  Chest is similar to .  IMPRESSION: No acute finding or change.    MEDICATIONS:  MEDICATIONS  (STANDING):  aspirin enteric coated 81 milliGRAM(s) Oral daily  atorvastatin 20 milliGRAM(s) Oral at bedtime  cholecalciferol 2000 Unit(s) Oral daily  gabapentin 100 milliGRAM(s) Oral every 8 hours  metoprolol succinate ER 12.5 milliGRAM(s) Oral at bedtime  multivitamin 1 Tablet(s) Oral daily  pancrelipase (ZENPEP 20,000 Lipase Units) 4 Capsule(s) Oral three times a day with meals  pantoprazole    Tablet 40 milliGRAM(s) Oral daily  piperacillin/tazobactam IVPB.. 3.375 Gram(s) IV Intermittent every 8 hours  polyethylene glycol 3350 17 Gram(s) Oral daily  sodium chloride 0.9%. 1000 milliLiter(s) (75 mL/Hr) IV Continuous <Continuous>    MEDICATIONS  (PRN):  acetaminophen     Tablet .. 650 milliGRAM(s) Oral every 6 hours PRN Temp greater or equal to 38C (100.4F), Mild Pain (1 - 3)  aluminum hydroxide/magnesium hydroxide/simethicone Suspension 30 milliLiter(s) Oral every 4 hours PRN Dyspepsia  hydrALAZINE Injectable 10 milliGRAM(s) IV Push every 4 hours PRN SBP>170  HYDROmorphone   Tablet 2 milliGRAM(s) Oral every 4 hours PRN Severe Pain (7 - 10)  HYDROmorphone   Tablet 1 milliGRAM(s) Oral every 4 hours PRN Moderate Pain (4 - 6)  HYDROmorphone  Injectable 1 milliGRAM(s) IV Push every 4 hours PRN breathrough pain  melatonin 10 milliGRAM(s) Oral at bedtime PRN Insomnia  ondansetron Injectable 4 milliGRAM(s) IV Push every 6 hours PRN Nausea and/or Vomiting    Home Medications:  Aspirin Enteric Coated 81 mg oral delayed release tablet: 1 tab(s) orally once a day (in the morning) (2022 19:57)  azelastine 137 mcg/inh (0.1%) nasal spray: 2 spray(s) nasal 2 times a day, As Needed - for congestion (2022 19:57)  cholecalciferol 50 mcg (2000 intl units) oral tablet: 1 tab(s) orally once a day (2022 19:57)  dronabinol 2.5 mg oral capsule: 1 cap(s) orally once a day in the afternoon (2022 19:57)  gabapentin 100 mg oral capsule: 1 cap(s) orally every 8 hours (2022 19:57)  HYDROmorphone 2 mg oral tablet: 0.5 tab(s) orally every 3 hours, As Needed (2022 19:57)  ketorolac 10 mg oral tablet: 1 tab(s) orally every 8 hours, As Needed (2022 19:57)  losartan 50 mg oral tablet: 1 tab(s) orally once a day (in the morning) (2022 19:57)  Melatonin 10 mg oral capsule: 1-2 cap(s) orally once a day (at bedtime) (2022 19:57)  metoprolol succinate 25 mg oral tablet, extended release: 0.5 tab(s) orally once a day (at bedtime) (2022 19:57)  Multiple Vitamins oral tablet: 1 tab(s) orally once a day (2022 19:57)  omeprazole 20 mg oral delayed release capsule: 1 cap(s) orally once a day (in the morning) (2022 19:57)  ondansetron 8 mg oral tablet: 1 tab(s) orally 3 times a day, As Needed - for nausea (2022 19:57)  Pfizer-BioNTech COVID-19 Vaccine PF 30 mcg/0.3 mL intramuscular suspension: 0.3 milliliter(s) intramuscular once  *****Booster as of *** (2022 19:57)  rosuvastatin 5 mg oral tablet: 1 tab(s) orally once a day (in the evening) (2022 19:57)  Zenpep 40,000 units-126,000 units-168,000 units oral delayed release capsule: 1 cap(s) orally with snacks (2022 19:57)  Zenpep 40,000 units-126,000 units-168,000 units oral delayed release capsule: 2 cap(s) orally 3 times a day (with meals) (2022 19:57)

## 2022-02-21 NOTE — PROVIDER CONTACT NOTE (OTHER) - ACTION/TREATMENT ORDERED:
MD Becerra notified of weight change. MD reordered heparin gtt, to be infusing at 18mL/hr (increased from 17mL/hr) and to administer a 2000mL bolus, per heparin nomogram protocol.

## 2022-02-22 LAB
ANION GAP SERPL CALC-SCNC: 6 MMOL/L — SIGNIFICANT CHANGE UP (ref 5–17)
APTT BLD: 105.5 SEC — HIGH (ref 27.5–35.5)
APTT BLD: 38.4 SEC — HIGH (ref 27.5–35.5)
BUN SERPL-MCNC: 16 MG/DL — SIGNIFICANT CHANGE UP (ref 7–23)
CALCIUM SERPL-MCNC: 8.9 MG/DL — SIGNIFICANT CHANGE UP (ref 8.5–10.1)
CHLORIDE SERPL-SCNC: 106 MMOL/L — SIGNIFICANT CHANGE UP (ref 96–108)
CO2 SERPL-SCNC: 23 MMOL/L — SIGNIFICANT CHANGE UP (ref 22–31)
CREAT SERPL-MCNC: 1.24 MG/DL — SIGNIFICANT CHANGE UP (ref 0.5–1.3)
GLUCOSE SERPL-MCNC: 105 MG/DL — HIGH (ref 70–99)
HCT VFR BLD CALC: 27 % — LOW (ref 39–50)
HCT VFR BLD CALC: 28 % — LOW (ref 39–50)
HGB BLD-MCNC: 8.7 G/DL — LOW (ref 13–17)
HGB BLD-MCNC: 9.1 G/DL — LOW (ref 13–17)
MAGNESIUM SERPL-MCNC: 2.1 MG/DL — SIGNIFICANT CHANGE UP (ref 1.6–2.6)
MCHC RBC-ENTMCNC: 28.6 PG — SIGNIFICANT CHANGE UP (ref 27–34)
MCHC RBC-ENTMCNC: 28.8 PG — SIGNIFICANT CHANGE UP (ref 27–34)
MCHC RBC-ENTMCNC: 32.2 GM/DL — SIGNIFICANT CHANGE UP (ref 32–36)
MCHC RBC-ENTMCNC: 32.5 GM/DL — SIGNIFICANT CHANGE UP (ref 32–36)
MCV RBC AUTO: 88.6 FL — SIGNIFICANT CHANGE UP (ref 80–100)
MCV RBC AUTO: 88.8 FL — SIGNIFICANT CHANGE UP (ref 80–100)
PHOSPHATE SERPL-MCNC: 3.4 MG/DL — SIGNIFICANT CHANGE UP (ref 2.5–4.5)
PLATELET # BLD AUTO: 174 K/UL — SIGNIFICANT CHANGE UP (ref 150–400)
PLATELET # BLD AUTO: 197 K/UL — SIGNIFICANT CHANGE UP (ref 150–400)
POTASSIUM SERPL-MCNC: 4 MMOL/L — SIGNIFICANT CHANGE UP (ref 3.5–5.3)
POTASSIUM SERPL-SCNC: 4 MMOL/L — SIGNIFICANT CHANGE UP (ref 3.5–5.3)
RBC # BLD: 3.04 M/UL — LOW (ref 4.2–5.8)
RBC # BLD: 3.16 M/UL — LOW (ref 4.2–5.8)
RBC # FLD: 14.5 % — SIGNIFICANT CHANGE UP (ref 10.3–14.5)
RBC # FLD: 14.5 % — SIGNIFICANT CHANGE UP (ref 10.3–14.5)
SODIUM SERPL-SCNC: 135 MMOL/L — SIGNIFICANT CHANGE UP (ref 135–145)
WBC # BLD: 7.52 K/UL — SIGNIFICANT CHANGE UP (ref 3.8–10.5)
WBC # BLD: 7.93 K/UL — SIGNIFICANT CHANGE UP (ref 3.8–10.5)
WBC # FLD AUTO: 7.52 K/UL — SIGNIFICANT CHANGE UP (ref 3.8–10.5)
WBC # FLD AUTO: 7.93 K/UL — SIGNIFICANT CHANGE UP (ref 3.8–10.5)

## 2022-02-22 PROCEDURE — 99232 SBSQ HOSP IP/OBS MODERATE 35: CPT

## 2022-02-22 PROCEDURE — 50432 PLMT NEPHROSTOMY CATHETER: CPT | Mod: RT

## 2022-02-22 RX ORDER — HYDROMORPHONE HYDROCHLORIDE 2 MG/ML
0.5 INJECTION INTRAMUSCULAR; INTRAVENOUS; SUBCUTANEOUS
Refills: 0 | Status: DISCONTINUED | OUTPATIENT
Start: 2022-02-22 | End: 2022-02-23

## 2022-02-22 RX ORDER — ONDANSETRON 8 MG/1
4 TABLET, FILM COATED ORAL ONCE
Refills: 0 | Status: DISCONTINUED | OUTPATIENT
Start: 2022-02-22 | End: 2022-02-24

## 2022-02-22 RX ORDER — OXYCODONE HYDROCHLORIDE 5 MG/1
5 TABLET ORAL ONCE
Refills: 0 | Status: DISCONTINUED | OUTPATIENT
Start: 2022-02-22 | End: 2022-02-23

## 2022-02-22 RX ORDER — MEPERIDINE HYDROCHLORIDE 50 MG/ML
12.5 INJECTION INTRAMUSCULAR; INTRAVENOUS; SUBCUTANEOUS
Refills: 0 | Status: DISCONTINUED | OUTPATIENT
Start: 2022-02-22 | End: 2022-02-23

## 2022-02-22 RX ORDER — SODIUM CHLORIDE 9 MG/ML
1000 INJECTION INTRAMUSCULAR; INTRAVENOUS; SUBCUTANEOUS
Refills: 0 | Status: DISCONTINUED | OUTPATIENT
Start: 2022-02-22 | End: 2022-02-23

## 2022-02-22 RX ADMIN — HYDROMORPHONE HYDROCHLORIDE 2 MILLIGRAM(S): 2 INJECTION INTRAMUSCULAR; INTRAVENOUS; SUBCUTANEOUS at 19:20

## 2022-02-22 RX ADMIN — HYDROMORPHONE HYDROCHLORIDE 1 MILLIGRAM(S): 2 INJECTION INTRAMUSCULAR; INTRAVENOUS; SUBCUTANEOUS at 20:44

## 2022-02-22 RX ADMIN — HYDROMORPHONE HYDROCHLORIDE 1 MILLIGRAM(S): 2 INJECTION INTRAMUSCULAR; INTRAVENOUS; SUBCUTANEOUS at 09:00

## 2022-02-22 RX ADMIN — PIPERACILLIN AND TAZOBACTAM 25 GRAM(S): 4; .5 INJECTION, POWDER, LYOPHILIZED, FOR SOLUTION INTRAVENOUS at 14:51

## 2022-02-22 RX ADMIN — PIPERACILLIN AND TAZOBACTAM 25 GRAM(S): 4; .5 INJECTION, POWDER, LYOPHILIZED, FOR SOLUTION INTRAVENOUS at 23:09

## 2022-02-22 RX ADMIN — HYDROMORPHONE HYDROCHLORIDE 2 MILLIGRAM(S): 2 INJECTION INTRAMUSCULAR; INTRAVENOUS; SUBCUTANEOUS at 18:59

## 2022-02-22 RX ADMIN — Medication 4 CAPSULE(S): at 14:51

## 2022-02-22 RX ADMIN — PIPERACILLIN AND TAZOBACTAM 25 GRAM(S): 4; .5 INJECTION, POWDER, LYOPHILIZED, FOR SOLUTION INTRAVENOUS at 05:06

## 2022-02-22 RX ADMIN — Medication 650 MILLIGRAM(S): at 14:52

## 2022-02-22 RX ADMIN — HYDROMORPHONE HYDROCHLORIDE 2 MILLIGRAM(S): 2 INJECTION INTRAMUSCULAR; INTRAVENOUS; SUBCUTANEOUS at 01:07

## 2022-02-22 RX ADMIN — Medication 650 MILLIGRAM(S): at 18:41

## 2022-02-22 RX ADMIN — HEPARIN SODIUM 1800 UNIT(S)/HR: 5000 INJECTION INTRAVENOUS; SUBCUTANEOUS at 01:04

## 2022-02-22 RX ADMIN — PANTOPRAZOLE SODIUM 40 MILLIGRAM(S): 20 TABLET, DELAYED RELEASE ORAL at 14:52

## 2022-02-22 RX ADMIN — POLYETHYLENE GLYCOL 3350 17 GRAM(S): 17 POWDER, FOR SOLUTION ORAL at 14:51

## 2022-02-22 RX ADMIN — HYDROMORPHONE HYDROCHLORIDE 1 MILLIGRAM(S): 2 INJECTION INTRAMUSCULAR; INTRAVENOUS; SUBCUTANEOUS at 21:00

## 2022-02-22 RX ADMIN — ATORVASTATIN CALCIUM 20 MILLIGRAM(S): 80 TABLET, FILM COATED ORAL at 23:09

## 2022-02-22 RX ADMIN — HYDROMORPHONE HYDROCHLORIDE 2 MILLIGRAM(S): 2 INJECTION INTRAMUSCULAR; INTRAVENOUS; SUBCUTANEOUS at 00:37

## 2022-02-22 RX ADMIN — Medication 2000 UNIT(S): at 14:52

## 2022-02-22 RX ADMIN — GABAPENTIN 100 MILLIGRAM(S): 400 CAPSULE ORAL at 14:52

## 2022-02-22 RX ADMIN — GABAPENTIN 100 MILLIGRAM(S): 400 CAPSULE ORAL at 23:08

## 2022-02-22 RX ADMIN — Medication 325 MILLIGRAM(S): at 14:53

## 2022-02-22 RX ADMIN — Medication 650 MILLIGRAM(S): at 23:09

## 2022-02-22 RX ADMIN — HYDROMORPHONE HYDROCHLORIDE 1 MILLIGRAM(S): 2 INJECTION INTRAMUSCULAR; INTRAVENOUS; SUBCUTANEOUS at 08:22

## 2022-02-22 RX ADMIN — Medication 1 TABLET(S): at 14:53

## 2022-02-22 NOTE — CHART NOTE - NSCHARTNOTEFT_GEN_A_CORE
SW met with pt at bedside to follow up. Pt explains that he is awaiting a procedure today. IR nephrostomy tube placement pending. Pt shares that he will be needing home care when he is discharged. SW assured him that floor /SW will place necessary referrals.  Pt still has blank MOLST at bedside & will discuss with spouse.     SW offered emotional support. Our team will continue to follow.

## 2022-02-22 NOTE — PROGRESS NOTE ADULT - ASSESSMENT
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 Pt states while visiting [place], approx two weeks PTA, he presented to hospital with abdominal pain and IR placed a 8F biliary drain. Pt flushing twice a day as directed. In ER he was noted febrile and he received zosyn.     1. Sepsis with bacteroides fragilis. Probable acute cholangitis s/p recent biliary drain. Infiltrative intraabdominal mass ?underlying necrosis. Superior vena cava thrombosis. Pancreatic ampullary Ca s/p Whipple on chemotherapy. Immunocompromised host. CRF stage 3.   -cultures reviewed  -on zosyn 3.375 gm IV q8h # 5  -tolerating abx well so far; no side effects noted  -IR evaluation appreciated  -urology consulted for possible obstructive hydronephrosis - plan for nephrostomy  -repeat BC x 2 are negative to date  -on AC  -continue abx coverage   -monitor temps  -f/u CBC  -supportive care  2. Other issues:   -care per medicine

## 2022-02-22 NOTE — PROGRESS NOTE ADULT - SUBJECTIVE AND OBJECTIVE BOX
Date of service: 22 @ 07:29    Lying in bed in NAD  Fever is down  Has abdominal discomfort; dose not feel distended    ROS: no fever or chills; denies dizziness, no HA, no SOB or cough, no dysuria, no legs pain, no rashes    MEDICATIONS  (STANDING):  acetaminophen     Tablet .. 650 milliGRAM(s) Oral every 8 hours  atorvastatin 20 milliGRAM(s) Oral at bedtime  bisacodyl 5 milliGRAM(s) Oral every 12 hours  cholecalciferol 2000 Unit(s) Oral daily  ferrous    sulfate 325 milliGRAM(s) Oral daily  gabapentin 100 milliGRAM(s) Oral every 8 hours  heparin  Infusion. 1900 Unit(s)/Hr (19 mL/Hr) IV Continuous <Continuous>  metoprolol succinate ER 12.5 milliGRAM(s) Oral at bedtime  multivitamin 1 Tablet(s) Oral daily  pancrelipase (ZENPEP 20,000 Lipase Units) 4 Capsule(s) Oral three times a day with meals  pantoprazole    Tablet 40 milliGRAM(s) Oral daily  piperacillin/tazobactam IVPB.. 3.375 Gram(s) IV Intermittent every 8 hours  polyethylene glycol 3350 17 Gram(s) Oral daily    Vital Signs Last 24 Hrs  T(C): 36.6 (2022 20:49), Max: 36.7 (2022 15:28)  T(F): 97.8 (2022 20:49), Max: 98.1 (2022 15:28)  HR: 73 (2022 22:03) (65 - 74)  BP: 131/68 (2022 22:03) (116/61 - 132/64)  BP(mean): --  RR: 18 (2022 20:49) (18 - 18)  SpO2: 99% (2022 20:49) (96% - 99%)     Physical exam:    Constitutional:  No acute distress  HEENT: NC/AT, EOMI, PERRLA, conjunctivae clear; ears and nose atraumatic  Neck: supple; thyroid not palpable  Back: no tenderness  Respiratory: respiratory effort normal; clear to auscultation  Cardiovascular: S1S2 regular, no murmurs  Abdomen: soft, has RUQ tender, not distended, positive BS  biliary drain in place  Genitourinary: no suprapubic tenderness  Lymphatic: no LN palpable  Musculoskeletal: no muscle tenderness, no joint swelling or tenderness  Extremities: no pedal edema  Neurological/ Psychiatric: AxOx3, judgement and insight normal; moving all extremities  Skin: no rashes; no palpable lesions    Labs: reviewed                        8.7    7.93  )-----------( 174      ( 2022 00:29 )             27.0         132<L>  |  104  |  16  ----------------------------<  130<H>  4.0   |  20<L>  |  1.18    Ca    8.5      2022 08:14    Ferritin, Serum: 216 ng/mL (22 @ 06:31)                        8.6    8.38  )-----------( 175      ( 2022 06:31 )             26.0         133<L>  |  107  |  24<H>  ----------------------------<  100<H>  3.9   |  19<L>  |  1.40<H>    Ca    8.5      2022 06:31    TPro  6.6  /  Alb  2.3<L>  /  TBili  1.5<H>  /  DBili  x   /  AST  18  /  ALT  20  /  AlkPhos  414<H>  02-17     LIVER FUNCTIONS - ( 2022 06:39 )  Alb: 2.3 g/dL / Pro: 6.6 gm/dL / ALK PHOS: 414 U/L / ALT: 20 U/L / AST: 18 U/L / GGT: x           Urinalysis Basic - ( 2022 10:07 )    Color: Yellow / Appearance: Clear / S.015 / pH: x  Gluc: x / Ketone: Negative  / Bili: Negative / Urobili: Negative   Blood: x / Protein: Negative / Nitrite: Negative   Leuk Esterase: Trace / RBC: 0-2 /HPF / WBC 0-2   Sq Epi: x / Non Sq Epi: Occasional / Bacteria: Occasional    ( @ 02:34)  NotDetec      Culture - Urine (collected 2022 10:07)  Source: Clean Catch None  Final Report (2022 14:37):    <10,000 CFU/mL Normal Urogenital Emily      Culture - Blood (collected 2022 06:43)  Source: .Blood None  Preliminary Report (2022 09:01):    No growth to date.    Culture - Blood (collected 2022 06:43)  Source: .Blood None  Preliminary Report (2022 09:01):    No growth to date.    Culture - Urine (collected 2022 10:07)  Source: Clean Catch None  Final Report (2022 14:37):    <10,000 CFU/mL Normal Urogenital Emily    Culture - Blood (collected 2022 02:34)  Source: .Blood None  Gram Stain (2022 18:41):    Growth in anaerobic bottle: Gram Negative Rods  Final Report (2022 19:28):    Growth in anaerobic bottle: Bacteroides fragilis    "Susceptibilities not performed"    Culture - Blood (collected 2022 02:34)  Source: .Blood None  Gram Stain (2022 13:19):    Growth in anaerobic bottle: Gram Negative Rods  Final Report (2022 12:23):    Growth in anaerobic bottle: Bacteroides fragilis "Susceptibilities not    performed"  Organism: Blood Culture PCR (2022 12:23)      -  Bacteroides fragilis: Detec      Method Type: PCR    Radiology: all available radiological tests reviewed    < from: CT Abdomen and Pelvis w/ IV Cont (22 @ 04:24) >  Status post Whipple procedure with a 6.3 cm infilatrative mass in the   surgical bed adjacent to jejunal sutures compatible with tumor   recurrence.  Dilated small bowel loop at the luh compatible with   malignant obstruction of choledochojejunostomy loop. Satisfactory   positioning of right biliary stent with mild intrahepatic biliary   dilatation.   Correlate clinically for cholangitis.  No gastric outlet obstruction.  Neoplastic implants in the right upper and lower quadrants.  Focal thrombosis in the superior mesenteric vein related to the site of   tumor recurrence.  The right kidney demonstrates a delayed nephrogram and moderate right   hydronephrosis with transition at the right ureteropelvic junction,   likely related to malignant obstruction.  Retroperitoneal adenopathy.    < end of copied text >      Advanced directives addressed: full resuscitation

## 2022-02-22 NOTE — PROGRESS NOTE ADULT - ASSESSMENT
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     Intractable pain due to malignant obstruction of choledochojejunostomy loop s/p biliary drain exchange .   Metastatic ampullary cancer s/p whipple 2020  - s/p IR drainage on 2/16   - s/p exchange/upsizing of biliary drain on 2/16/22  - pain management - tylenol 650 tid, gabapentin, with dilaudid 1 mg q4h prn and 2 mg q4h prn, with laxatives   - Cont Zofran, and pancreatic enzymes  - palliative team consult  - oncology consult     Sepsis, POA with Bacteroides fragilis due to probable acute cholangitis as well as   Infiltrative 6.3cm intraabdominal mass in surgical bed possible underlying necrosis  - Pt remains afebrile, Lactate benign  - + Blood culture--> Bacteroides fragilis; cont Zosyn  - Repeat BC x 2 are negative to date  - urine cx NGTD   - ID following    Superior mesenteric vein thrombosis   - Dr. Castro following-  full dose AC   - s/p Lovenox 40 x 1 dose 2/17/22   - 2/18 - started on heparin drip, H/H stable  - 2/22 hold all AC for 24 hours, start heparin at 24 hours, if no bleeding then to restart DOAC      YAMILETH with baseline SCr 0.7, multifactorial; Medication induced (ketorolac & Losartan) associated with   moderate Right hydronephrosis due to malignant obstruction   - s/p IV Hydration, Creatinine Trend: 1.1 < --1.24<--, 1.42<--, 1.40<--, 1.45<--, 1.32<--  - Hold NSAIDs and Losartan  - Check post void residual, Strict I&O  - Urology, Dr. Kennedy following; s/p right nephrostomy tube placement   - stop ASA , restart after 48 hours after nephrostomy placement     Iron deficiency anemia    - trend cbc ,  Iron studies as above noted   - c/w  iron daily     Hyperglycemia  A1c 5.2,  trend     CAD - hold aspirin , c/w  statin    Hypoalbuminemia 2/2 Protein calorie malnutrition  - nutrition consult,  advance diet as tolerated     HTN  - Losartan stopped 2/2 renal function  - continue BB  - Hydralazine PRN for SBP > 170    HLD  -continue statin    DVT PPX SCDs     DISPO:  start AC 48 hrs after procedure,  cont IV abx, PT    Advanced directives  - palliative care team on the case  - Full code  - wife Autumn [phone] - updated 2/18, 2/19, 2/20, 2/21, 2/22

## 2022-02-22 NOTE — PROGRESS NOTE ADULT - ATTENDING COMMENTS
Patient seen and examined  on rounds with NP Florence Winters .  I was physically present for the key portion of the evaluation and management service provided, I  examined the patient myself and reviewed the plan of care with the patient and  NP Florence Winters , which I have reviewed and edited .  Medical records reviewed. History, review of systems, physical findings and lab results as documented confirmed , except as modified by me.  Agree with the assessment and plan of as stated and discussed, except as modified below.     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     A/P   1. Intractable pain due to  malignant obstruction of choledochojejunostomy loop s/p biliary drain exchange with underlying metastatic ampullary cancer s/p whipple 2020 - drain care, oncology consult , pain management   2. Sepsis POA with bacteroides Fragilis due to probable acute cholangitis , Infiltrative  6.3cm  intraabdominal mass in surgical bed possible underlying necrosis  - IV zosyn, ID consult, f/u cultures  3. YAMILETH with moderate right hydronephrosis - IV fluids, urology evaluation, plan for right nephrostomy by  IR consult , c/w IV fluids   4. Superior mesenteric vein thrombosis - hold   AC   for 24 hours, restart heparin tomorrow , Eliquis and ASA in 48 h   5. Anemia chemotherapy induced and iron deficiency - monitor, c/w ferrous sulfate   6. CAD, HTN - hold ASA, c/w statin, BB, losartan d/justin , hydralazine prn     Dispo - IV fluids, IV abx, ID and urology evaluation, IR for nephrostomy placement , hold heparin drip .

## 2022-02-22 NOTE — PROGRESS NOTE ADULT - ASSESSMENT
72 YO M with Local recurrence of ampullary carcinoma of 6.3cm causing obstruction of the choledochojejunostomy with no bowel obstruction    Planned for IR nephrostomy tube today  Hold heparin drip of procedure today  Dr Castro on board  IR Biliary drain functioning   No surgical intervention planned for this pt from Surgical Oncology.      Case discussed with attending

## 2022-02-22 NOTE — PROGRESS NOTE ADULT - ASSESSMENT
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  Retroperitoneal adenopathy.  - Cr 1.4->1.26-> 1.18->1.24 today  - Planned for Nephrostomy today for pt who is off chemo due to bacteremia, and for palliative measure given likelihood of increasing obstruction while off therapy with symptoms including back pain  - hold heparin gtt  until procedure    # Superior mesteneric Thrombosis   - now on Heparin ggt - PTT at goal   - hold hep gtt after midnight in preparation for nephrostomy placement  - after Nephrostomy ok to proceed with DOAC    Dr. Didier Walker  cell: [phone]  Weekends and nights please call [phone] for MD on call  [facility]  Hematology/Oncology

## 2022-02-22 NOTE — PROGRESS NOTE ADULT - SUBJECTIVE AND OBJECTIVE BOX
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 Pt presents today to ED due to fever (Tmax 102.7) and pain at home. Pt states while in [place], approx two weeks ago, he presented to hospital with pain and IR at the time was unable to place stent and so 8F biliary drain placed. Pt flushing twice a day as directed. Pt wife at bedside to endorse history.     ED course: received zosyn, VSS however pt in intractable pain despite multiple medications received. IR and oncology consulted. Pt had IR procedure: drain changed from 8 to 12F catheter with 150cc output at time of hospitalist exam. Returned to ED and admitted for pain control, hydration, palliative and oncology consult.     Interval History:  22 pt seen and examined at bedside after right nephrostomy tube placement, groggy from procedure, denies pain. Pt c/o ongoing weakness and fatigue. Right biliary drain functioning. Right Nephrostomy tube with pink tinged urine. Denies CP, palps, sob, HA, dizziness, n/v/d, dysuria, fever or chills.   REVIEW OF SYSTEMS: All other review of systems is negative unless indicated above    PHYSICAL EXAM:  Vital Signs Last 24 Hrs: all reviewed  T(C): 36.7 (2022 12:30), Max: 36.8 (2022 08:05)  T(F): 98 (2022 12:30), Max: 98.2 (2022 08:05)  HR: 67 (2022 12:30) (67 - 74)  BP: 126/70 (2022 12:30) (116/61 - 132/64)  RR: 16 (2022 12:30) (15 - 18)  SpO2: 100% (2022 12:30) (96% - 100%)    Constitutional: NAD, awake and alert  HEENT: PERR, EOMI  Neck: Soft and supple,  No JVD  Respiratory: Breath sounds are clear bilaterally, No wheezing, rales or rhonchi  Cardiovascular: S1 and S2, regular rate and rhythm, no Murmurs  Gastrointestinal: Bowel Sounds present, soft, right flank tenderness, nondistended, + right nephrostomy tube, + right biliary drain  Extremities: No peripheral edema  Vascular: 2+ peripheral pulses  Neurological: A/O x 3, no focal deficits  Musculoskeletal: 5/5 strength b/l upper and lower extremities  Skin: No rashes    LABS: All Labs Reviewed:                        9.1    7.52  )-----------( 197      ( 2022 07:06 )             28.0       135  |  106  |  16  ----------------------------<  105<H>  4.0   |  23  |  1.24    Ca    8.9      2022 07:06  Phos  3.4       Mg     2.1       Prothrombin Time and INR, Plasma (22 @ 07:06) Prothrombin Time, Plasma: 13.6 sec INR: 1.17 Activated Partial Thromboplastin Time: 38.4  Urinalysis Basic - ( 2022 10:07 )    Color: Yellow / Appearance: Clear / S.015 / pH: x  Gluc: x / Ketone: Negative  / Bili: Negative / Urobili: Negative   Blood: x / Protein: Negative / Nitrite: Negative   Leuk Esterase: Trace / RBC: 0-2 /HPF / WBC 0-2   Sq Epi: x / Non Sq Epi: Occasional / Bacteria: Occasional  Iron with Total Binding Capacity in AM (22 @ 06:31) ; Iron - Total Binding Capacity.: 193 ug/dL ; % Saturation, Iron: 6 % ; Iron Total, Serum: 12 ug/dL ; Unsaturated Iron Binding Capacity: 181 ug/dL ; Ferritin, Serum: 216 ng/mL;  Vitamin B12, Serum: 414 pg/mL ; Folate, Serum: 17.1 ng/mL ; Haptoglobin, Serum: 190 mg/dL ; Haptoglobin, Serum: 190 mg/dL  MICRO:  Culture - Blood (22 @ 02:34) NGTD  Respiratory Viral Panel with COVID-19 by [facility] (22 @ 02:34) Rapid RVP Result: Not Detected SARS-CoV-2: Not Detected   RADIOLOGY/EKG: all reviewed    EKG 22 - sinus , poor quality of EKG, repeat in am    US Abdomen Upper Quadrant Right (22 @ 01:57)   FINDINGS:  Liver: Within normal limits.  Bile ducts: Common bile duct stent in place. Dilated common bile duct measuring up to 12 mm. Mildintrahepatic biliary ductal dilatation.  Gallbladder: Surgically absent.  Pancreas: Poorly visualized.  Right kidney: 11.3 cm. Mild to moderate right-sided hydronephrosis, slightly increased compared with prior exam from 2022..  Ascites: Trace perihepatic free fluid.  IVC: Visualized portions are within normal limits.  IMPRESSION:  Common bile duct stent in place. Mild intrahepatic and extra hepatic biliary ductal dilatation with common bile duct measuring up to 12 mm.  Mild to moderate right-sided hydronephrosis, slightly increased compared with prior exam from 2022.  Trace perihepatic ascites.  < end of copied text >    CT Abdomen and Pelvis w/ IV Cont (22 @ 04:24)   FINDINGS:  LOWER CHEST: Coronary atherosclerosis.  LIVER: Within normal limits.  BILE DUCTS: There is biliary stent inserted via the right lateral flank which enters the right hepatic duct and is coiled in the jejunum. There is mild intrahepatic biliary dilatation.  GALLBLADDER: Within normal limits.  SPLEEN: Within normal limits.  PANCREAS: The patient is status post Whipple procedure. There are no prior studies for comparison. In the surgical bed, there is infiltrative ill-defined prominent soft tissue measuring roughly 6.3 (2:49) x 3.8  x 4.2 cm adjacent to the pancreaticojejunostomy sutures concerning for recurrent tumor. There is marked dilatation of the jejunal loop at the luh hepatis measuring up to 4.4 cm.  ADRENALS: Within normal limits.  KIDNEYS/URETERS: The right kidney demonstrates a delayed nephrogram. There is a moderate right hydronephrosis with transition point at the ureteropelvic junction. There is no hydroureter or convincing obstructing stone. There is a 6.6 cm left renal cyst.  BLADDER: Within normal limits.  REPRODUCTIVE ORGANS: The prostate gland is not enlarged.  BOWEL: Moderate hiatal hernia. No bowel obstruction. Appendix is unremarkable. Colon is underdistended without significant fecal load.  PERITONEUM: Trace amount of pelvic ascites. Reticular studding of the fat in the right upper quadrant subhepatic space suspicious for carcinomatosis. There is a 3 cm masslike low density nodule in the right lower quadrant medial to the distal right external iliac vessels compatible with neoplastic implant.  VESSELS: The aorta is not dilated. There is mild to moderate atherosclerotic vascular calcification. There is focal thrombosis of the superior mesenteric vein in the region of suspected tumor recurrence. There is ill-defined soft tissue abutting the superior mesenteric artery.  RETROPERITONEUM/LYMPH NODES: A 2.1 cm precaval lymph node is nonspecific. There is a 9 mm right iliac vein chain lymph node.  Prominent soft tissue   in the right anterior pararenal space measuring 4.5 x 2 cm suspicious for tumor recurrence.  ABDOMINAL WALL: Left-sided ventral hernia mesh is barely underneath the skin in this patient with a weak abdominal wall.  BONES: Within normal limits.  IMPRESSION:  No prior studies for comparison.  No focal collection or soft tissue gas.  Status post Whipple procedure with a 6.3 cm infilatrative mass in the surgical bed adjacent to jejunal sutures compatible with tumor recurrence.  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 Satisfactory positioning of right biliary stent with mild intrahepatic biliary dilatation.   Correlate clinically for cholangitis.  No gastric outlet obstruction. Neoplastic implants in the right upper and lower quadrants.  Focal thrombosis in the superior mesenteric vein related to the site of tumor recurrence.  The right kidney demonstrates a delayed nephrogram and moderate right hydronephrosis with transition at the right ureteropelvic junction, likely related to malignant obstruction.  Retroperitoneal adenopathy.  Trace amount of free fluid.  Moderate hiatal hernia.    Xray Chest 1 View- PORTABLE-Urgent (22 @ 07:42)   Heart size is within normal limits. Small hiatal hernia noted. Right MediPort again seen.  Lungs are clear and possibly under circulated.  Chest is similar to .  IMPRESSION: No acute finding or change.    MEDICATIONS:  MEDICATIONS  (STANDING):  acetaminophen     Tablet .. 650 milliGRAM(s) Oral every 8 hours  atorvastatin 20 milliGRAM(s) Oral at bedtime  bisacodyl 5 milliGRAM(s) Oral every 12 hours  cholecalciferol 2000 Unit(s) Oral daily  ferrous    sulfate 325 milliGRAM(s) Oral daily  gabapentin 100 milliGRAM(s) Oral every 8 hours  heparin  Infusion. 1900 Unit(s)/Hr (19 mL/Hr) IV Continuous <Continuous>  metoprolol succinate ER 12.5 milliGRAM(s) Oral at bedtime  multivitamin 1 Tablet(s) Oral daily  pancrelipase (ZENPEP 20,000 Lipase Units) 4 Capsule(s) Oral three times a day with meals  pantoprazole    Tablet 40 milliGRAM(s) Oral daily  piperacillin/tazobactam IVPB.. 3.375 Gram(s) IV Intermittent every 8 hours  polyethylene glycol 3350 17 Gram(s) Oral daily  sodium chloride 0.9%. 1000 milliLiter(s) (100 mL/Hr) IV Continuous <Continuous>    MEDICATIONS  (PRN):  aluminum hydroxide/magnesium hydroxide/simethicone Suspension 30 milliLiter(s) Oral every 4 hours PRN Dyspepsia  heparin   Injectable 2000 Unit(s) IV Push every 6 hours PRN For aPTT between 40 - 57  heparin   Injectable 4500 Unit(s) IV Push every 6 hours PRN For aPTT less than 40  hydrALAZINE Injectable 10 milliGRAM(s) IV Push every 4 hours PRN SBP>170  HYDROmorphone   Tablet 2 milliGRAM(s) Oral every 4 hours PRN Severe Pain (7 - 10)  HYDROmorphone   Tablet 1 milliGRAM(s) Oral every 4 hours PRN Moderate Pain (4 - 6)  HYDROmorphone  Injectable 1 milliGRAM(s) IV Push every 4 hours PRN breathrough pain  HYDROmorphone  Injectable 0.5 milliGRAM(s) IV Push every 10 minutes PRN Moderate Pain (4 - 6)  melatonin 10 milliGRAM(s) Oral at bedtime PRN Insomnia  meperidine     Injectable 12.5 milliGRAM(s) IV Push every 10 minutes PRN Shivering  ondansetron Injectable 4 milliGRAM(s) IV Push every 6 hours PRN Nausea and/or Vomiting  ondansetron Injectable 4 milliGRAM(s) IV Push once PRN Nausea and/or Vomiting  oxyCODONE    IR 5 milliGRAM(s) Oral once PRN Moderate Pain (4 - 6)    Home Medications:  Aspirin Enteric Coated 81 mg oral delayed release tablet: 1 tab(s) orally once a day (in the morning) (2022 19:57)  azelastine 137 mcg/inh (0.1%) nasal spray: 2 spray(s) nasal 2 times a day, As Needed - for congestion (2022 19:57)  cholecalciferol 50 mcg (2000 intl units) oral tablet: 1 tab(s) orally once a day (2022 19:57)  dronabinol 2.5 mg oral capsule: 1 cap(s) orally once a day in the afternoon (2022 19:57)  gabapentin 100 mg oral capsule: 1 cap(s) orally every 8 hours (2022 19:57)  HYDROmorphone 2 mg oral tablet: 0.5 tab(s) orally every 3 hours, As Needed (2022 19:57)  ketorolac 10 mg oral tablet: 1 tab(s) orally every 8 hours, As Needed (2022 19:57)  losartan 50 mg oral tablet: 1 tab(s) orally once a day (in the morning) (2022 19:57)  Melatonin 10 mg oral capsule: 1-2 cap(s) orally once a day (at bedtime) (2022 19:57)  metoprolol succinate 25 mg oral tablet, extended release: 0.5 tab(s) orally once a day (at bedtime) (2022 19:57)  Multiple Vitamins oral tablet: 1 tab(s) orally once a day (2022 19:57)  omeprazole 20 mg oral delayed release capsule: 1 cap(s) orally once a day (in the morning) (2022 19:57)  ondansetron 8 mg oral tablet: 1 tab(s) orally 3 times a day, As Needed - for nausea (2022 19:57)  Pfizer-BioNTech COVID-19 Vaccine PF 30 mcg/0.3 mL intramuscular suspension: 0.3 milliliter(s) intramuscular once  *****Booster as of *** (2022 19:57)  rosuvastatin 5 mg oral tablet: 1 tab(s) orally once a day (in the evening) (2022 19:57)  Zenpep 40,000 units-126,000 units-168,000 units oral delayed release capsule: 1 cap(s) orally with snacks (2022 19:57)  Zenpep 40,000 units-126,000 units-168,000 units oral delayed release capsule: 2 cap(s) orally 3 times a day (with meals) (2022 19:57)

## 2022-02-22 NOTE — PROGRESS NOTE ADULT - SUBJECTIVE AND OBJECTIVE BOX
INTERVAL HPI/OVERNIGHT EVENTS:  Patient S&E at bedside. No o/n events, feeling slightly stronger today. Still feels dry. Had dilaudid 2x overnight. US bladder performed which showed PVR of 115 cc. Plan for nephrostomy tube placement today. Discussed with JASSI Kennedy 1.24.     PAST MEDICAL & SURGICAL HISTORY:  Hearing loss  right    Stented coronary artery  2006 PTCA x 1    CAD (coronary artery disease)  last stress test Fall 2018    HTN (hypertension)    Hyperlipidemia    GERD (gastroesophageal reflux disease)    Skin cancer  squamous cell - head, shin - removed    OA (osteoarthritis)    Psoriasis    Inguinal hernia  right    Brain aneurysm  non ruptured - had stent placed in 2010 after attemtped clipping and coiling in 2007    Pancreatic cancer  s/p Whipple procedure    Drug or chemical induced diabetes mellitus    H/O craniotomy  for non ruptured aneurysm 2007, attempted clipping    History of other surgery  for brain aneurysm - attempted coiling 2007    History of other surgery  endovascular stent placement for brain Aneurysm at [place] - 2010    History of tonsillectomy    S/P colonoscopy  last - within 5 yrs    History of PTCA  2006 x 1 stent    H/O inguinal hernia repair    Pancreatic disorder  Whipple for cancer        FAMILY HISTORY:  Family history of breast cancer in sister        VITAL SIGNS:  T(F): 98.2 (02-22-22 @ 08:05)  HR: 69 (02-22-22 @ 08:05)  BP: 132/63 (02-22-22 @ 08:05)  RR: 18 (02-22-22 @ 08:05)  SpO2: 96% (02-22-22 @ 08:05)  Wt(kg): --    PHYSICAL EXAM:    Constitutional: NAD, mild pain  Eyes: EOMI, sclera non-icteric  Respiratory: CTA b/l, good air entry b/l  Cardiovascular: RRR,   Gastrointestinal: tender to palpation in right quadrant and back improved, biliary drain in place  Extremities: no c/c/e  Neurological: AAOx3      MEDICATIONS  (STANDING):  acetaminophen     Tablet .. 650 milliGRAM(s) Oral every 8 hours  atorvastatin 20 milliGRAM(s) Oral at bedtime  bisacodyl 5 milliGRAM(s) Oral every 12 hours  cholecalciferol 2000 Unit(s) Oral daily  ferrous    sulfate 325 milliGRAM(s) Oral daily  gabapentin 100 milliGRAM(s) Oral every 8 hours  heparin  Infusion. 1900 Unit(s)/Hr (19 mL/Hr) IV Continuous <Continuous>  metoprolol succinate ER 12.5 milliGRAM(s) Oral at bedtime  multivitamin 1 Tablet(s) Oral daily  pancrelipase (ZENPEP 20,000 Lipase Units) 4 Capsule(s) Oral three times a day with meals  pantoprazole    Tablet 40 milliGRAM(s) Oral daily  piperacillin/tazobactam IVPB.. 3.375 Gram(s) IV Intermittent every 8 hours  polyethylene glycol 3350 17 Gram(s) Oral daily    MEDICATIONS  (PRN):  aluminum hydroxide/magnesium hydroxide/simethicone Suspension 30 milliLiter(s) Oral every 4 hours PRN Dyspepsia  heparin   Injectable 4500 Unit(s) IV Push every 6 hours PRN For aPTT less than 40  heparin   Injectable 2000 Unit(s) IV Push every 6 hours PRN For aPTT between 40 - 57  hydrALAZINE Injectable 10 milliGRAM(s) IV Push every 4 hours PRN SBP>170  HYDROmorphone   Tablet 2 milliGRAM(s) Oral every 4 hours PRN Severe Pain (7 - 10)  HYDROmorphone   Tablet 1 milliGRAM(s) Oral every 4 hours PRN Moderate Pain (4 - 6)  HYDROmorphone  Injectable 1 milliGRAM(s) IV Push every 4 hours PRN breathrough pain  melatonin 10 milliGRAM(s) Oral at bedtime PRN Insomnia  ondansetron Injectable 4 milliGRAM(s) IV Push every 6 hours PRN Nausea and/or Vomiting      Allergies    No Known Allergies    Intolerances        LABS:                        9.1    7.52  )-----------( 197      ( 22 Feb 2022 07:06 )             28.0     02-22    135  |  106  |  16  ----------------------------<  105<H>  4.0   |  23  |  1.24    Ca    8.9      22 Feb 2022 07:06  Phos  3.4     02-22  Mg     2.1     02-22      PT/INR - ( 22 Feb 2022 07:06 )   PT: 13.6 sec;   INR: 1.17 ratio         PTT - ( 22 Feb 2022 07:06 )  PTT:38.4 sec      RADIOLOGY & ADDITIONAL TESTS:  Studies reviewed.

## 2022-02-22 NOTE — PROGRESS NOTE ADULT - SUBJECTIVE AND OBJECTIVE BOX
Pt was seen and examined this morning. Pt sated that he continues to have his back pain continues. Pt continues to have normal bowel function. Pt has been afebrile. On antibiotics. Tomorrow for nephrostomy tube. Heparin drip for SMV thrombus must be stopped at midnight tonight.     Vital Signs (24 Hrs):  T(C): 36.8 (02-22-22 @ 08:05), Max: 36.8 (02-22-22 @ 08:05)  HR: 69 (02-22-22 @ 08:05) (69 - 74)  BP: 132/63 (02-22-22 @ 08:05) (116/61 - 132/64)  RR: 18 (02-22-22 @ 08:05) (18 - 18)  SpO2: 96% (02-22-22 @ 08:05) (96% - 99%)  Wt(kg): --  Daily     Daily     I&O's Summary    21 Feb 2022 07:01  -  22 Feb 2022 07:00  --------------------------------------------------------  IN: 0 mL / OUT: 2105 mL / NET: -2105 mL        PE  General: NAD, cachetic   Neck: Supple  Chest: Equal expansion bilaterally  CV: S1S2 Present  Abdomen: Soft, non distended, Epigastric tenderness, non-tympanic, bowel sounds present, port site hernia in the left lower quadrant, IR drain present and dry in RUQ  Extremities: Grossly symmetric    .  LABS:                         9.1    7.52  )-----------( 197      ( 22 Feb 2022 07:06 )             28.0     02-22    135  |  106  |  16  ----------------------------<  105<H>  4.0   |  23  |  1.24    Ca    8.9      22 Feb 2022 07:06  Phos  3.4     02-22  Mg     2.1     02-22      PT/INR - ( 22 Feb 2022 07:06 )   PT: 13.6 sec;   INR: 1.17 ratio         PTT - ( 22 Feb 2022 07:06 )  PTT:38.4 sec          RADIOLOGY, EKG & ADDITIONAL TESTS: Reviewed.

## 2022-02-23 LAB
ANION GAP SERPL CALC-SCNC: 8 MMOL/L — SIGNIFICANT CHANGE UP (ref 5–17)
APTT BLD: 53.7 SEC — HIGH (ref 27.5–35.5)
BUN SERPL-MCNC: 14 MG/DL — SIGNIFICANT CHANGE UP (ref 7–23)
CALCIUM SERPL-MCNC: 8.5 MG/DL — SIGNIFICANT CHANGE UP (ref 8.5–10.1)
CHLORIDE SERPL-SCNC: 108 MMOL/L — SIGNIFICANT CHANGE UP (ref 96–108)
CO2 SERPL-SCNC: 21 MMOL/L — LOW (ref 22–31)
CREAT SERPL-MCNC: 0.73 MG/DL — SIGNIFICANT CHANGE UP (ref 0.5–1.3)
GLUCOSE SERPL-MCNC: 122 MG/DL — HIGH (ref 70–99)
HCT VFR BLD CALC: 26.7 % — LOW (ref 39–50)
HCT VFR BLD CALC: 29 % — LOW (ref 39–50)
HGB BLD-MCNC: 8.8 G/DL — LOW (ref 13–17)
HGB BLD-MCNC: 9.5 G/DL — LOW (ref 13–17)
MAGNESIUM SERPL-MCNC: 1.7 MG/DL — SIGNIFICANT CHANGE UP (ref 1.6–2.6)
MCHC RBC-ENTMCNC: 28.9 PG — SIGNIFICANT CHANGE UP (ref 27–34)
MCHC RBC-ENTMCNC: 29 PG — SIGNIFICANT CHANGE UP (ref 27–34)
MCHC RBC-ENTMCNC: 32.8 GM/DL — SIGNIFICANT CHANGE UP (ref 32–36)
MCHC RBC-ENTMCNC: 33 GM/DL — SIGNIFICANT CHANGE UP (ref 32–36)
MCV RBC AUTO: 87.8 FL — SIGNIFICANT CHANGE UP (ref 80–100)
MCV RBC AUTO: 88.4 FL — SIGNIFICANT CHANGE UP (ref 80–100)
PHOSPHATE SERPL-MCNC: 3.5 MG/DL — SIGNIFICANT CHANGE UP (ref 2.5–4.5)
PLATELET # BLD AUTO: 209 K/UL — SIGNIFICANT CHANGE UP (ref 150–400)
PLATELET # BLD AUTO: 213 K/UL — SIGNIFICANT CHANGE UP (ref 150–400)
POTASSIUM SERPL-MCNC: 3.8 MMOL/L — SIGNIFICANT CHANGE UP (ref 3.5–5.3)
POTASSIUM SERPL-SCNC: 3.8 MMOL/L — SIGNIFICANT CHANGE UP (ref 3.5–5.3)
RBC # BLD: 3.04 M/UL — LOW (ref 4.2–5.8)
RBC # BLD: 3.28 M/UL — LOW (ref 4.2–5.8)
RBC # FLD: 14.5 % — SIGNIFICANT CHANGE UP (ref 10.3–14.5)
RBC # FLD: 14.6 % — HIGH (ref 10.3–14.5)
SODIUM SERPL-SCNC: 137 MMOL/L — SIGNIFICANT CHANGE UP (ref 135–145)
WBC # BLD: 7.9 K/UL — SIGNIFICANT CHANGE UP (ref 3.8–10.5)
WBC # BLD: 9.1 K/UL — SIGNIFICANT CHANGE UP (ref 3.8–10.5)
WBC # FLD AUTO: 7.9 K/UL — SIGNIFICANT CHANGE UP (ref 3.8–10.5)
WBC # FLD AUTO: 9.1 K/UL — SIGNIFICANT CHANGE UP (ref 3.8–10.5)

## 2022-02-23 PROCEDURE — 99232 SBSQ HOSP IP/OBS MODERATE 35: CPT

## 2022-02-23 PROCEDURE — 99233 SBSQ HOSP IP/OBS HIGH 50: CPT | Mod: 25

## 2022-02-23 PROCEDURE — 99497 ADVNCD CARE PLAN 30 MIN: CPT

## 2022-02-23 PROCEDURE — 99498 ADVNCD CARE PLAN ADDL 30 MIN: CPT

## 2022-02-23 RX ORDER — ACETAMINOPHEN 500 MG
650 TABLET ORAL
Refills: 0 | Status: DISCONTINUED | OUTPATIENT
Start: 2022-02-23 | End: 2022-02-24

## 2022-02-23 RX ORDER — FENTANYL CITRATE 50 UG/ML
1 INJECTION INTRAVENOUS
Refills: 0 | Status: DISCONTINUED | OUTPATIENT
Start: 2022-02-23 | End: 2022-02-24

## 2022-02-23 RX ORDER — LANOLIN ALCOHOL/MO/W.PET/CERES
10 CREAM (GRAM) TOPICAL AT BEDTIME
Refills: 0 | Status: DISCONTINUED | OUTPATIENT
Start: 2022-02-23 | End: 2022-02-24

## 2022-02-23 RX ORDER — HEPARIN SODIUM 5000 [USP'U]/ML
2000 INJECTION INTRAVENOUS; SUBCUTANEOUS EVERY 6 HOURS
Refills: 0 | Status: DISCONTINUED | OUTPATIENT
Start: 2022-02-23 | End: 2022-02-24

## 2022-02-23 RX ORDER — HEPARIN SODIUM 5000 [USP'U]/ML
4500 INJECTION INTRAVENOUS; SUBCUTANEOUS EVERY 6 HOURS
Refills: 0 | Status: DISCONTINUED | OUTPATIENT
Start: 2022-02-23 | End: 2022-02-24

## 2022-02-23 RX ORDER — HEPARIN SODIUM 5000 [USP'U]/ML
1800 INJECTION INTRAVENOUS; SUBCUTANEOUS
Qty: 25000 | Refills: 0 | Status: DISCONTINUED | OUTPATIENT
Start: 2022-02-23 | End: 2022-02-24

## 2022-02-23 RX ADMIN — OXYCODONE HYDROCHLORIDE 5 MILLIGRAM(S): 5 TABLET ORAL at 00:00

## 2022-02-23 RX ADMIN — Medication 650 MILLIGRAM(S): at 00:09

## 2022-02-23 RX ADMIN — GABAPENTIN 100 MILLIGRAM(S): 400 CAPSULE ORAL at 22:19

## 2022-02-23 RX ADMIN — Medication 4 CAPSULE(S): at 18:11

## 2022-02-23 RX ADMIN — ATORVASTATIN CALCIUM 20 MILLIGRAM(S): 80 TABLET, FILM COATED ORAL at 22:18

## 2022-02-23 RX ADMIN — HYDROMORPHONE HYDROCHLORIDE 1 MILLIGRAM(S): 2 INJECTION INTRAMUSCULAR; INTRAVENOUS; SUBCUTANEOUS at 18:32

## 2022-02-23 RX ADMIN — Medication 5 MILLIGRAM(S): at 08:48

## 2022-02-23 RX ADMIN — HEPARIN SODIUM 1900 UNIT(S)/HR: 5000 INJECTION INTRAVENOUS; SUBCUTANEOUS at 22:17

## 2022-02-23 RX ADMIN — HEPARIN SODIUM 1800 UNIT(S)/HR: 5000 INJECTION INTRAVENOUS; SUBCUTANEOUS at 19:14

## 2022-02-23 RX ADMIN — Medication 2000 UNIT(S): at 08:48

## 2022-02-23 RX ADMIN — HYDROMORPHONE HYDROCHLORIDE 2 MILLIGRAM(S): 2 INJECTION INTRAMUSCULAR; INTRAVENOUS; SUBCUTANEOUS at 22:41

## 2022-02-23 RX ADMIN — Medication 650 MILLIGRAM(S): at 23:20

## 2022-02-23 RX ADMIN — Medication 4 CAPSULE(S): at 13:15

## 2022-02-23 RX ADMIN — HYDROMORPHONE HYDROCHLORIDE 1 MILLIGRAM(S): 2 INJECTION INTRAMUSCULAR; INTRAVENOUS; SUBCUTANEOUS at 18:52

## 2022-02-23 RX ADMIN — Medication 12.5 MILLIGRAM(S): at 22:19

## 2022-02-23 RX ADMIN — GABAPENTIN 100 MILLIGRAM(S): 400 CAPSULE ORAL at 06:24

## 2022-02-23 RX ADMIN — PIPERACILLIN AND TAZOBACTAM 25 GRAM(S): 4; .5 INJECTION, POWDER, LYOPHILIZED, FOR SOLUTION INTRAVENOUS at 13:50

## 2022-02-23 RX ADMIN — PIPERACILLIN AND TAZOBACTAM 25 GRAM(S): 4; .5 INJECTION, POWDER, LYOPHILIZED, FOR SOLUTION INTRAVENOUS at 22:20

## 2022-02-23 RX ADMIN — Medication 1 TABLET(S): at 08:49

## 2022-02-23 RX ADMIN — HYDROMORPHONE HYDROCHLORIDE 1 MILLIGRAM(S): 2 INJECTION INTRAMUSCULAR; INTRAVENOUS; SUBCUTANEOUS at 10:30

## 2022-02-23 RX ADMIN — PANTOPRAZOLE SODIUM 40 MILLIGRAM(S): 20 TABLET, DELAYED RELEASE ORAL at 08:48

## 2022-02-23 RX ADMIN — GABAPENTIN 100 MILLIGRAM(S): 400 CAPSULE ORAL at 13:50

## 2022-02-23 RX ADMIN — Medication 4 CAPSULE(S): at 08:47

## 2022-02-23 RX ADMIN — Medication 10 MILLIGRAM(S): at 22:41

## 2022-02-23 RX ADMIN — OXYCODONE HYDROCHLORIDE 5 MILLIGRAM(S): 5 TABLET ORAL at 03:13

## 2022-02-23 RX ADMIN — Medication 5 MILLIGRAM(S): at 22:19

## 2022-02-23 RX ADMIN — Medication 650 MILLIGRAM(S): at 22:19

## 2022-02-23 RX ADMIN — HYDROMORPHONE HYDROCHLORIDE 2 MILLIGRAM(S): 2 INJECTION INTRAMUSCULAR; INTRAVENOUS; SUBCUTANEOUS at 23:20

## 2022-02-23 RX ADMIN — Medication 650 MILLIGRAM(S): at 06:24

## 2022-02-23 RX ADMIN — SODIUM CHLORIDE 100 MILLILITER(S): 9 INJECTION INTRAMUSCULAR; INTRAVENOUS; SUBCUTANEOUS at 04:44

## 2022-02-23 RX ADMIN — POLYETHYLENE GLYCOL 3350 17 GRAM(S): 17 POWDER, FOR SOLUTION ORAL at 08:48

## 2022-02-23 RX ADMIN — FENTANYL CITRATE 1 PATCH: 50 INJECTION INTRAVENOUS at 12:11

## 2022-02-23 RX ADMIN — HYDROMORPHONE HYDROCHLORIDE 2 MILLIGRAM(S): 2 INJECTION INTRAMUSCULAR; INTRAVENOUS; SUBCUTANEOUS at 16:18

## 2022-02-23 RX ADMIN — PIPERACILLIN AND TAZOBACTAM 25 GRAM(S): 4; .5 INJECTION, POWDER, LYOPHILIZED, FOR SOLUTION INTRAVENOUS at 06:24

## 2022-02-23 RX ADMIN — HEPARIN SODIUM 2000 UNIT(S): 5000 INJECTION INTRAVENOUS; SUBCUTANEOUS at 22:18

## 2022-02-23 RX ADMIN — HYDROMORPHONE HYDROCHLORIDE 1 MILLIGRAM(S): 2 INJECTION INTRAMUSCULAR; INTRAVENOUS; SUBCUTANEOUS at 09:57

## 2022-02-23 RX ADMIN — HEPARIN SODIUM 1800 UNIT(S)/HR: 5000 INJECTION INTRAVENOUS; SUBCUTANEOUS at 13:38

## 2022-02-23 RX ADMIN — FENTANYL CITRATE 1 PATCH: 50 INJECTION INTRAVENOUS at 22:54

## 2022-02-23 NOTE — PROGRESS NOTE ADULT - ATTENDING COMMENTS
Patient seen and examined  on rounds with NP Florence Winters .  I was physically present for the key portion of the evaluation and management service provided, I  examined the patient myself and reviewed the plan of care with the patient and  NP Florence Winters , which I have reviewed and edited .  Medical records reviewed. History, review of systems, physical findings and lab results as documented confirmed , except as modified by me.  Agree with the assessment and plan of as stated and discussed, except as modified below.     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     A/P   1. Intractable pain due to  malignant obstruction of choledochojejunostomy loop s/p biliary drain exchange with underlying metastatic ampullary cancer s/p whipple 2020 - drain care, oncology consult , pain management   2. Sepsis POA with bacteroides Fragilis due to probable acute cholangitis , Infiltrative  6.3cm  intraabdominal mass in surgical bed possible underlying necrosis  - IV zosyn, ID consult, f/u cultures  3. YAMILETH with moderate right hydronephrosis - IV fluids, urology evaluation, plan for right nephrostomy by  IR consult , c/w IV fluids   4. Superior mesenteric vein thrombosis - hold   AC   for 24 hours, restart heparin tomorrow , Eliquis and ASA in 48 h   5. Anemia chemotherapy induced and iron deficiency - monitor, c/w ferrous sulfate   6. CAD, HTN - hold ASA, c/w statin, BB, losartan d/justin , hydralazine prn     Dispo - IV fluids, IV abx, ID and urology evaluation, IR for nephrostomy placement , hold heparin drip . Patient seen and examined  on rounds with NP Florence Winters .  I was physically present for the key portion of the evaluation and management service provided, I  examined the patient myself and reviewed the plan of care with the patient and  NP Florence Winters , which I have reviewed and edited .  Medical records reviewed. History, review of systems, physical findings and lab results as documented confirmed , except as modified by me.  Agree with the assessment and plan of as stated and discussed, except as modified below.     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     A/P   1. Intractable pain due to  malignant obstruction of choledochojejunostomy loop s/p biliary drain exchange with underlying metastatic ampullary cancer s/p whipple 2020 - drain care, oncology consult , pain management   2. Sepsis, POA with Bacteroides fragilis due to probable acute cholangitis as well as   Infiltrative 6.3cm intraabdominal mass in surgical bed possible underlying necrosis associated with stent procedure and stent malfunction s/p new drain  - IV zosyn, ID consult, f/u cultures  3. YAMILETH with moderate right hydronephrosis s/p right nephrostomy - s/p IV fluids, urology evaluation,   IR consult   4. Superior mesenteric vein thrombosis - restart  heparin , monitor for bleeding, plan to switching to  Eliquis and ASA in 24 h   5. Anemia chemotherapy induced and iron deficiency - monitor, c/w ferrous sulfate   6. CAD, HTN - hold ASA, c/w statin, BB, losartan d/justin , hydralazine prn     Dispo - IV fluids, IV abx, ID and urology evaluation, restart heparin drip , monitor H/H

## 2022-02-23 NOTE — PROGRESS NOTE ADULT - SUBJECTIVE AND OBJECTIVE BOX
CHIEF COMPLAINT: back pain    HPI: 71M with PMHx of thrombocytopenia, E.coli bacteremia, brain aneurysm, HTN, HLD, CAD, GERD,  metastatic [place]ary [place] ([place] [facility] [place] type who is s/p whipple resection in . Pt presents today to ED due to fever (Tmax 102.7) and pain at home. Pt states while in [place], approx two weeks ago, he presented to hospital with pain and IR at the time was unable to place stent and so 8F biliary drain placed. Pt flushing twice a day as directed. Pt wife at bedside to endorse history.     ED course: received zosyn, VSS however pt in intractable pain despite multiple medications received. IR and oncology consulted. Pt had IR procedure: drain changed from 8 to 12F catheter with 150cc output at time of hospitalist exam. Returned to ED and admitted for pain control, hydration, palliative and oncology consult.     Interval History:  22 pt seen and examined at bedside, c/o ongoing back pain and appropriate tenderness at right nephrostomy site. + Insomnia, Melatonin with Tylenol helps.  Right Nephrostomy tube with blood tinged urine, right biliary drain functioning. Pt reports improving appetite and improvement in fatigue. Ambulating in hallway with RW and PT tolerating well.  Denies CP, palps, sob, HA, dizziness, n/v/d, dysuria, fever or chills.   REVIEW OF SYSTEMS: All other review of systems is negative unless indicated above    PHYSICAL EXAM:  Vital Signs Last 24 Hrs: all reviewed  T(C): 36.5 (2022 08:26), Max: 37.1 (2022 21:32)  T(F): 97.7 (2022 08:26), Max: 98.8 (2022 21:32)  HR: 68 (2022 08:26) (52 - 80)  BP: 138/73 (2022 08:26) (112/60 - 138/73)  BP(mean): 79 (2022 15:00) (79 - 79)  RR: 17 (2022 08:26) (15 - 18)  SpO2: 97% (2022 08:26) (96% - 100%) on room air    Constitutional: NAD, awake and alert  HEENT: PERR, EOMI  Neck: Soft and supple,  No JVD  Respiratory: Breath sounds are clear bilaterally, No wheezing, rales or rhonchi  Cardiovascular: S1 and S2, regular rate and rhythm, no Murmurs  Gastrointestinal: Bowel Sounds present, soft, right flank tenderness, nondistended, + right nephrostomy tube, + right biliary drain  Extremities: No peripheral edema  Vascular: 2+ peripheral pulses  Neurological: A/O x 3, no focal deficits  Musculoskeletal: 5/5 strength b/l upper and lower extremities  Skin: No rashes    LABS: All Labs Reviewed:                        8.8    7.90  )-----------( 209      ( 2022 07:08 )             26.7   02-    137  |  108  |  14  ----------------------------<  122<H>  3.8   |  21<L>  |  0.73    Ca    8.5      2022 07:08  Phos  3.5       Mg     1.7       Prothrombin Time and INR, Plasma (22 @ 07:06) Prothrombin Time, Plasma: 13.6 sec INR: 1.17 Activated Partial Thromboplastin Time: 38.4  Urinalysis Basic - ( 2022 10:07 )    Color: Yellow / Appearance: Clear / S.015 / pH: x  Gluc: x / Ketone: Negative  / Bili: Negative / Urobili: Negative   Blood: x / Protein: Negative / Nitrite: Negative   Leuk Esterase: Trace / RBC: 0-2 /HPF / WBC 0-2   Sq Epi: x / Non Sq Epi: Occasional / Bacteria: Occasional  Iron with Total Binding Capacity in AM (22 @ 06:31) ; Iron - Total Binding Capacity.: 193 ug/dL ; % Saturation, Iron: 6 % ; Iron Total, Serum: 12 ug/dL ; Unsaturated Iron Binding Capacity: 181 ug/dL ; Ferritin, Serum: 216 ng/mL;  Vitamin B12, Serum: 414 pg/mL ; Folate, Serum: 17.1 ng/mL ; Haptoglobin, Serum: 190 mg/dL ; Haptoglobin, Serum: 190 mg/dL  MICRO:  Culture - Blood in AM (22 @ 06:43) No growth to date.   Culture - Blood (22 @ 02:34) NGTD  Respiratory Viral Panel with COVID-19 by [facility] (22 @ 02:34) Rapid RVP Result: Not Detected SARS-CoV-2: Not Detected   RADIOLOGY/EKG: all reviewed    EKG 22 - sinus , poor quality of EKG, repeat in am    US Abdomen Upper Quadrant Right (22 @ 01:57)   FINDINGS:  Liver: Within normal limits.  Bile ducts: Common bile duct stent in place. Dilated common bile duct measuring up to 12 mm. Mildintrahepatic biliary ductal dilatation.  Gallbladder: Surgically absent.  Pancreas: Poorly visualized.  Right kidney: 11.3 cm. Mild to moderate right-sided hydronephrosis, slightly increased compared with prior exam from 2022..  Ascites: Trace perihepatic free fluid.  IVC: Visualized portions are within normal limits.  IMPRESSION:  Common bile duct stent in place. Mild intrahepatic and extra hepatic biliary ductal dilatation with common bile duct measuring up to 12 mm.  Mild to moderate right-sided hydronephrosis, slightly increased compared with prior exam from 2022.  Trace perihepatic ascites.  < end of copied text >    CT Abdomen and Pelvis w/ IV Cont (22 @ 04:24)   FINDINGS:  LOWER CHEST: Coronary atherosclerosis.  LIVER: Within normal limits.  BILE DUCTS: There is biliary stent inserted via the right lateral flank which enters the right hepatic duct and is coiled in the jejunum. There is mild intrahepatic biliary dilatation.  GALLBLADDER: Within normal limits.  SPLEEN: Within normal limits.  PANCREAS: The patient is status post Whipple procedure. There are no prior studies for comparison. In the surgical bed, there is infiltrative ill-defined prominent soft tissue measuring roughly 6.3 (2:49) x 3.8  x 4.2 cm adjacent to the pancreaticojejunostomy sutures concerning for recurrent tumor. There is marked dilatation of the jejunal loop at the luh hepatis measuring up to 4.4 cm.  ADRENALS: Within normal limits.  KIDNEYS/URETERS: The right kidney demonstrates a delayed nephrogram. There is a moderate right hydronephrosis with transition point at the ureteropelvic junction. There is no hydroureter or convincing obstructing stone. There is a 6.6 cm left renal cyst.  BLADDER: Within normal limits.  REPRODUCTIVE ORGANS: The prostate gland is not enlarged.  BOWEL: Moderate hiatal hernia. No bowel obstruction. Appendix is unremarkable. Colon is underdistended without significant fecal load.  PERITONEUM: Trace amount of pelvic ascites. Reticular studding of the fat in the right upper quadrant subhepatic space suspicious for carcinomatosis. There is a 3 cm masslike low density nodule in the right lower quadrant medial to the distal right external iliac vessels compatible with neoplastic implant.  VESSELS: The aorta is not dilated. There is mild to moderate atherosclerotic vascular calcification. There is focal thrombosis of the superior mesenteric vein in the region of suspected tumor recurrence. There is ill-defined soft tissue abutting the superior mesenteric artery.  RETROPERITONEUM/LYMPH NODES: A 2.1 cm precaval lymph node is nonspecific. There is a 9 mm right iliac vein chain lymph node.  Prominent soft tissue   in the right anterior pararenal space measuring 4.5 x 2 cm suspicious for tumor recurrence.  ABDOMINAL WALL: Left-sided ventral hernia mesh is barely underneath the skin in this patient with a weak abdominal wall.  BONES: Within normal limits.  IMPRESSION:  No prior studies for comparison.  No focal collection or soft tissue gas.  Status post Whipple procedure with a 6.3 cm infilatrative mass in the surgical bed adjacent to jejunal sutures compatible with tumor recurrence.  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 Satisfactory positioning of right biliary stent with mild intrahepatic biliary dilatation.   Correlate clinically for cholangitis.  No gastric outlet obstruction. Neoplastic implants in the right upper and lower quadrants.  Focal thrombosis in the superior mesenteric vein related to the site of tumor recurrence.  The right kidney demonstrates a delayed nephrogram and moderate right hydronephrosis with transition at the right ureteropelvic junction, likely related to malignant obstruction.  Retroperitoneal adenopathy.  Trace amount of free fluid.  Moderate hiatal hernia.    Xray Chest 1 View- PORTABLE-Urgent (22 @ 07:42)   Heart size is within normal limits. Small hiatal hernia noted. Right MediPort again seen.  Lungs are clear and possibly under circulated.  Chest is similar to .  IMPRESSION: No acute finding or change.    MEDICATIONS:  MEDICATIONS  (STANDING):  acetaminophen     Tablet .. 650 milliGRAM(s) Oral <User Schedule>  atorvastatin 20 milliGRAM(s) Oral at bedtime  bisacodyl 5 milliGRAM(s) Oral every 12 hours  cholecalciferol 2000 Unit(s) Oral daily  fentaNYL   Patch  12 MICROgram(s)/Hr 1 Patch Transdermal every 72 hours  ferrous    sulfate 325 milliGRAM(s) Oral daily  gabapentin 100 milliGRAM(s) Oral every 8 hours  metoprolol succinate ER 12.5 milliGRAM(s) Oral at bedtime  multivitamin 1 Tablet(s) Oral daily  pancrelipase (ZENPEP 20,000 Lipase Units) 4 Capsule(s) Oral three times a day with meals  pantoprazole    Tablet 40 milliGRAM(s) Oral daily  piperacillin/tazobactam IVPB.. 3.375 Gram(s) IV Intermittent every 8 hours  polyethylene glycol 3350 17 Gram(s) Oral daily    MEDICATIONS  (PRN):  aluminum hydroxide/magnesium hydroxide/simethicone Suspension 30 milliLiter(s) Oral every 4 hours PRN Dyspepsia  hydrALAZINE Injectable 10 milliGRAM(s) IV Push every 4 hours PRN SBP>170  HYDROmorphone   Tablet 2 milliGRAM(s) Oral every 4 hours PRN Severe Pain (7 - 10)  HYDROmorphone   Tablet 1 milliGRAM(s) Oral every 4 hours PRN Moderate Pain (4 - 6)  HYDROmorphone  Injectable 1 milliGRAM(s) IV Push every 4 hours PRN breathrough pain  melatonin 10 milliGRAM(s) Oral at bedtime PRN Insomnia  ondansetron Injectable 4 milliGRAM(s) IV Push every 6 hours PRN Nausea and/or Vomiting  ondansetron Injectable 4 milliGRAM(s) IV Push once PRN Nausea and/or Vomiting    Home Medications:  Aspirin Enteric Coated 81 mg oral delayed release tablet: 1 tab(s) orally once a day (in the morning) (2022 19:57)  azelastine 137 mcg/inh (0.1%) nasal spray: 2 spray(s) nasal 2 times a day, As Needed - for congestion (2022 19:57)  cholecalciferol 50 mcg (2000 intl units) oral tablet: 1 tab(s) orally once a day (2022 19:57)  dronabinol 2.5 mg oral capsule: 1 cap(s) orally once a day in the afternoon (2022 19:57)  gabapentin 100 mg oral capsule: 1 cap(s) orally every 8 hours (2022 19:57)  HYDROmorphone 2 mg oral tablet: 0.5 tab(s) orally every 3 hours, As Needed (2022 19:57)  ketorolac 10 mg oral tablet: 1 tab(s) orally every 8 hours, As Needed (2022 19:57)  losartan 50 mg oral tablet: 1 tab(s) orally once a day (in the morning) (2022 19:57)  Melatonin 10 mg oral capsule: 1-2 cap(s) orally once a day (at bedtime) (2022 19:57)  metoprolol succinate 25 mg oral tablet, extended release: 0.5 tab(s) orally once a day (at bedtime) (2022 19:57)  Multiple Vitamins oral tablet: 1 tab(s) orally once a day (2022 19:57)  omeprazole 20 mg oral delayed release capsule: 1 cap(s) orally once a day (in the morning) (2022 19:57)  ondansetron 8 mg oral tablet: 1 tab(s) orally 3 times a day, As Needed - for nausea (2022 19:57)  Pfizer-BioNTech COVID-19 Vaccine PF 30 mcg/0.3 mL intramuscular suspension: 0.3 milliliter(s) intramuscular once  *****Booster as of *** (2022 19:57)  rosuvastatin 5 mg oral tablet: 1 tab(s) orally once a day (in the evening) (2022 19:57)  Zenpep 40,000 units-126,000 units-168,000 units oral delayed release capsule: 1 cap(s) orally with snacks (2022 19:57)  Zenpep 40,000 units-126,000 units-168,000 units oral delayed release capsule: 2 cap(s) orally 3 times a day (with meals) (2022 19:57)

## 2022-02-23 NOTE — PROGRESS NOTE ADULT - ASSESSMENT
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  Retroperitoneal adenopathy.  - Cr 1.4->1.26-> 1.18->1.24->0.7 today  - s/p Nephrostomy 2/22    # Superior mesteneric Thrombosis   - ok to proceed with DOAC- start eliquis     Dispo planning- PT  pt to follow with [facility] outpatient      Dr. Didier Walker  cell: [phone]  Weekends and nights please call [phone] for MD on call  [facility]  Hematology/Oncology

## 2022-02-23 NOTE — PROGRESS NOTE ADULT - ASSESSMENT
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 Now admitted on 2/16/22 for evaluation fever (Tmax 102.7), nauseas and increased pain at home, associated with inability to fully flush biliary drain. Of note, while in [place], approx two weeks ago, he presented to hospital with pain secondary to biliary obstruction and IR at the time was unable to place stent and so 8F biliary drain placed. Found here to have YAMILETH and mild leukocytosis due to CT AP showing tumor recurrence, obstruction of the choledojejunostomy loop, and R hydro likely also due to malignant obstruction; s/p IR upsizing of biliary drain. Palliative consulted to help in management of symptoms.     1) Acute on chronic pain in the setting of malignancy   - Pain mostly visceral due to progression of underlying malignancy  - pt with chronic back pain, with an acute exacerbation due to billary drain obstruction.   - Prior to hospitalization pain was managed with Hydromorphone 1 mg PO PRN (took it approximately 3 times daily) , Tylenol and Gabapentin 100 mg PO every 8 hrs with well control of pain.   - 24 hr opioid need calculated and following regimen recommended: Fentanyl patch 12mcg (could use 25mcg but prefer low and slow approach) every 72 hrs and Hydromorphone 2 mg PO every four hrs PRN  - Medication options discussed with patient wanted to just stick with po dilaudid last week, today he has changed his mind and would like to try fentanyl  - start fentanyl 12mcg patch today   - c/w Hydromorphone 2 mg PO every 4 hrs PRN severe pain   - c/w Hydromorphone 1 mg PO every 4 hrs PRN moderate pain   - c/w Hydromorphone 1 mg IV every 4 hrs PRN  for breakthrough pain (only after oral medication).   - Continue Tylenol PRN mild pain (primary team made this atc, which we agree with)  - ordered bowel regimen while in on opioids: Miralax daily   - will monitor and adjust accordingly    2) Biliary drain malfunction/obstruction   -s/p IR drainage with good output  -ID consult appreciated- repeat cx negative, pending transition to po regimen  - f/u blood and urine cultures  - surgical consult appreciated- they agreed with suggestion for IR adjustment of tube    3) Recurrent metastatic ampullary carcinoma   - Dx in Aug 2020   - s/p [place]ipple on Sep, 2020 at [place]  - [place] [facility] [place] abdomen showed tumor recurrence and obstruction of the choledojejunostomy loop and also R hydro.   - Currently on chemotherapy, last one two weeks PTA   - Pt has appointment with Dr Thomas to discuss treatment options   - Pt was evaluated by Dr Castro and per patient was recommended to discuss treatment options (or lack thereof) with Dr Thomas     4) R hydro  - due to malignant obstruction  - urology on board, s/p nephrostomy tube placement    5) Debility/#Protein calorie malnutrition  - PPSV 50-60%  - care coordinator note appreciated- lives with wife, would like a cane, open to home care  - Recommend PT consult   - recommend dietary evaluation, considering shared history of significant weight loss and evidence of muscle/fat wasting on exam  -Albumin 2.3     6) Prognosis   - Likely poor  - PPSV2 approaching 40 %, which would be the point functionally where hospice should be considered  - Pt with an advanced metastatic and recurrent ampullary carcinoma that has progressed despite optimized disease-focused treatment  - Albumin 2.3 (<2.5 purports a higher likelihood of mortality when combined with significant advanced illness)  - Due to above criteria patient will benefit of hospice only if there is no more additional treatment offered by hemato-oncology or when pt feels treatment is no longer supporting optimal QOL     7) GOC/Advance directives   - pt able to demonstrate capacity for decision making  - HCP is wife Autumn Nelson  ([phone])   - Pt have a living will where he stipulated is DNR/DNI but would like to further discuss it with wife--> MOLST reviewed   - [facility] discussed with pt and now wife would like to be a part of this tiday at 11am. See GOC note that will follow    Thank you for including us in Mr. Nelson's care. Will continue to follow with you.    Magda Vanegas MD  Palliative Care Attending

## 2022-02-23 NOTE — PROGRESS NOTE ADULT - SUBJECTIVE AND OBJECTIVE BOX
HPI: Pt seen and examined this am in follow up for sx an GOC. Pt notes some pain after procedure and his usual back pain, 7-8/10 currently in R side of back. Pt feeling pain meds help but not completely efficient. He recalled us reviewing his regimen last week and refusing fentanyl patch at that time. He would like this added now, understanding risks and benefits and what sx may arise and subside. He was open to starting conservatively with fentanyl patch 12mcg. He denied any other sx, eating breakfast without issue.     Informed pt that his wife called over the weekend and yesterday requesting meeting with our team at 11am today, he will inform her that I will return at that time for further GOC discussion.       PAIN: yes  Location- lower back, on right side  Intensity- mod  Quality- ache  Aggravating Factors- position  Alleviating Factors- pain meds   Timing- intermittent    DYSPNEA: denies      ROS:    Fatigue- denies  Weakness- improving    All other systems reviewed and negative      PHYSICAL EXAM:    Vital Signs Last 24 Hrs  T(C): 36.5 (23 Feb 2022 08:26), Max: 37.1 (22 Feb 2022 21:32)  T(F): 97.7 (23 Feb 2022 08:26), Max: 98.8 (22 Feb 2022 21:32)  HR: 68 (23 Feb 2022 08:26) (52 - 80)  BP: 138/73 (23 Feb 2022 08:26) (112/60 - 138/73)  BP(mean): 79 (22 Feb 2022 15:00) (79 - 79)  RR: 17 (23 Feb 2022 08:26) (15 - 18)  SpO2: 97% (23 Feb 2022 08:26) (96% - 100%)  Daily     Daily     PPSV- 50-60%    Gen: Middle aged male, thin, pleasant  Mental Status: AOx4  HEENT: mmm, temporal and clavicular muscle wasting  CVS: +s1 s2 rrr  Lung: dec at bases bl  GI: soft mild distention, + bs, nt  : voids, R nephrostomy tube  Ext/skin: moves all extremities, muscle and fat wasting in limbs  Neuro: no focal deficits    LABS:                        8.8    7.90  )-----------( 209      ( 23 Feb 2022 07:08 )             26.7     02-23    137  |  108  |  14  ----------------------------<  122<H>  3.8   |  21<L>  |  0.73    Ca    8.5      23 Feb 2022 07:08  Phos  3.5     02-23  Mg     1.7     02-23      PT/INR - ( 22 Feb 2022 07:06 )   PT: 13.6 sec;   INR: 1.17 ratio         PTT - ( 22 Feb 2022 07:06 )  PTT:38.4 sec  Albumin: Albumin, Serum: 2.0 g/dL (02-19 @ 06:43)      Allergies    No Known Allergies    Intolerances      MEDICATIONS  (STANDING):  acetaminophen     Tablet .. 650 milliGRAM(s) Oral every 8 hours  atorvastatin 20 milliGRAM(s) Oral at bedtime  bisacodyl 5 milliGRAM(s) Oral every 12 hours  cholecalciferol 2000 Unit(s) Oral daily  fentaNYL   Patch  12 MICROgram(s)/Hr 1 Patch Transdermal every 72 hours  ferrous    sulfate 325 milliGRAM(s) Oral daily  gabapentin 100 milliGRAM(s) Oral every 8 hours  metoprolol succinate ER 12.5 milliGRAM(s) Oral at bedtime  multivitamin 1 Tablet(s) Oral daily  pancrelipase (ZENPEP 20,000 Lipase Units) 4 Capsule(s) Oral three times a day with meals  pantoprazole    Tablet 40 milliGRAM(s) Oral daily  piperacillin/tazobactam IVPB.. 3.375 Gram(s) IV Intermittent every 8 hours  polyethylene glycol 3350 17 Gram(s) Oral daily    MEDICATIONS  (PRN):  aluminum hydroxide/magnesium hydroxide/simethicone Suspension 30 milliLiter(s) Oral every 4 hours PRN Dyspepsia  hydrALAZINE Injectable 10 milliGRAM(s) IV Push every 4 hours PRN SBP>170  HYDROmorphone   Tablet 2 milliGRAM(s) Oral every 4 hours PRN Severe Pain (7 - 10)  HYDROmorphone   Tablet 1 milliGRAM(s) Oral every 4 hours PRN Moderate Pain (4 - 6)  HYDROmorphone  Injectable 1 milliGRAM(s) IV Push every 4 hours PRN breathrough pain  melatonin 10 milliGRAM(s) Oral at bedtime PRN Insomnia  ondansetron Injectable 4 milliGRAM(s) IV Push every 6 hours PRN Nausea and/or Vomiting  ondansetron Injectable 4 milliGRAM(s) IV Push once PRN Nausea and/or Vomiting

## 2022-02-23 NOTE — GOALS OF CARE CONVERSATION - ADVANCED CARE PLANNING - TREATMENT GUIDELINE COMMENT
c/w current interventions, with new limits (noted above), and dc home with home pall + PT when deemed medically stable    ** Spent 60 minutes reviewing advanced care planning, including advanced directives, MOLST, and all dispo options

## 2022-02-23 NOTE — PROGRESS NOTE ADULT - SUBJECTIVE AND OBJECTIVE BOX
Date of service: 22 @ 07:54    Lying in bed in NAD  s/p right nephrostomy placement  Denies pain    ROS: no fever or chills; denies dizziness, no HA, no SOB or cough, no abdominal pain, no diarrhea or constipation; no legs pain, no rashes    MEDICATIONS  (STANDING):  acetaminophen     Tablet .. 650 milliGRAM(s) Oral every 8 hours  atorvastatin 20 milliGRAM(s) Oral at bedtime  bisacodyl 5 milliGRAM(s) Oral every 12 hours  cholecalciferol 2000 Unit(s) Oral daily  ferrous    sulfate 325 milliGRAM(s) Oral daily  gabapentin 100 milliGRAM(s) Oral every 8 hours  heparin  Infusion. 1900 Unit(s)/Hr (19 mL/Hr) IV Continuous <Continuous>  metoprolol succinate ER 12.5 milliGRAM(s) Oral at bedtime  multivitamin 1 Tablet(s) Oral daily  pancrelipase (ZENPEP 20,000 Lipase Units) 4 Capsule(s) Oral three times a day with meals  pantoprazole    Tablet 40 milliGRAM(s) Oral daily  piperacillin/tazobactam IVPB.. 3.375 Gram(s) IV Intermittent every 8 hours  polyethylene glycol 3350 17 Gram(s) Oral daily  sodium chloride 0.9%. 1000 milliLiter(s) (100 mL/Hr) IV Continuous <Continuous>    Vital Signs Last 24 Hrs  T(C): 36.9 (2022 00:10), Max: 37.1 (2022 21:32)  T(F): 98.5 (2022 00:10), Max: 98.8 (2022 21:32)  HR: 80 (2022 00:10) (52 - 80)  BP: 137/65 (2022 00:10) (112/60 - 137/65)  BP(mean): 79 (2022 15:00) (79 - 79)  RR: 18 (2022 00:10) (15 - 18)  SpO2: 96% (2022 00:10) (96% - 100%)     Physical exam:    Constitutional:  No acute distress  HEENT: NC/AT, EOMI, PERRLA, conjunctivae clear; ears and nose atraumatic  Neck: supple; thyroid not palpable  Back: no tenderness  Respiratory: respiratory effort normal; clear to auscultation  Cardiovascular: S1S2 regular, no murmurs  Abdomen: soft, has RUQ tender, not distended, positive BS  biliary drain in place  Genitourinary: no suprapubic tenderness  Lymphatic: no LN palpable  Musculoskeletal: no muscle tenderness, no joint swelling or tenderness  Extremities: no pedal edema  Neurological/ Psychiatric: AxOx3, judgement and insight normal; moving all extremities  Skin: no rashes; no palpable lesions    Labs: reviewed                        8.7    7.93  )-----------( 174      ( 2022 00:29 )             27.0     02-    132<L>  |  104  |  16  ----------------------------<  130<H>  4.0   |  20<L>  |  1.18    Ca    8.5      2022 08:14    Ferritin, Serum: 216 ng/mL (22 @ 06:31)                        8.6    8.38  )-----------( 175      ( 2022 06:31 )             26.0     -18    133<L>  |  107  |  24<H>  ----------------------------<  100<H>  3.9   |  19<L>  |  1.40<H>    Ca    8.5      2022 06:31    TPro  6.6  /  Alb  2.3<L>  /  TBili  1.5<H>  /  DBili  x   /  AST  18  /  ALT  20  /  AlkPhos  414<H>  02-     LIVER FUNCTIONS - ( 2022 06:39 )  Alb: 2.3 g/dL / Pro: 6.6 gm/dL / ALK PHOS: 414 U/L / ALT: 20 U/L / AST: 18 U/L / GGT: x           Urinalysis Basic - ( 2022 10:07 )    Color: Yellow / Appearance: Clear / S.015 / pH: x  Gluc: x / Ketone: Negative  / Bili: Negative / Urobili: Negative   Blood: x / Protein: Negative / Nitrite: Negative   Leuk Esterase: Trace / RBC: 0-2 /HPF / WBC 0-2   Sq Epi: x / Non Sq Epi: Occasional / Bacteria: Occasional    ( @ 02:34)  NotDetec      Culture - Urine (collected 2022 10:07)  Source: Clean Catch None  Final Report (2022 14:37):    <10,000 CFU/mL Normal Urogenital Emily      Culture - Blood (collected 2022 06:43)  Source: .Blood None  Preliminary Report (2022 09:01):    No growth to date.    Culture - Blood (collected 2022 06:43)  Source: .Blood None  Preliminary Report (2022 09:01):    No growth to date.    Culture - Urine (collected 2022 10:07)  Source: Clean Catch None  Final Report (2022 14:37):    <10,000 CFU/mL Normal Urogenital Emily    Culture - Blood (collected 2022 02:34)  Source: .Blood None  Gram Stain (2022 18:41):    Growth in anaerobic bottle: Gram Negative Rods  Final Report (2022 19:28):    Growth in anaerobic bottle: Bacteroides fragilis    "Susceptibilities not performed"    Culture - Blood (collected 2022 02:34)  Source: .Blood None  Gram Stain (2022 13:19):    Growth in anaerobic bottle: Gram Negative Rods  Final Report (2022 12:23):    Growth in anaerobic bottle: Bacteroides fragilis "Susceptibilities not    performed"  Organism: Blood Culture PCR (2022 12:23)      -  Bacteroides fragilis: Detec      Method Type: PCR    Radiology: all available radiological tests reviewed    < from: CT Abdomen and Pelvis w/ IV Cont (22 @ 04:24) >  Status post Whipple procedure with a 6.3 cm infilatrative mass in the   surgical bed adjacent to jejunal sutures compatible with tumor   recurrence.  Dilated small bowel loop at the luh compatible with   malignant obstruction of choledochojejunostomy loop. Satisfactory   positioning of right biliary stent with mild intrahepatic biliary   dilatation.   Correlate clinically for cholangitis.  No gastric outlet obstruction.  Neoplastic implants in the right upper and lower quadrants.  Focal thrombosis in the superior mesenteric vein related to the site of   tumor recurrence.  The right kidney demonstrates a delayed nephrogram and moderate right   hydronephrosis with transition at the right ureteropelvic junction,   likely related to malignant obstruction.  Retroperitoneal adenopathy.    < end of copied text >      Advanced directives addressed: full resuscitation

## 2022-02-23 NOTE — CDI QUERY NOTE - NSCDIOTHERTXTBX_GEN_ALL_CORE_HH
Cause and effect    Please document the relationship between the following conditions: Sepsis and stent procedure     A. Sepsis POA with bacteroides Fragilis due to probable acute cholangitis , Infiltrative  6.3cm  intraabdominal mass in surgical bed possible underlying necrosis associated with  stent procedure and/or stent malfunction  B. Sepsis POA with bacteroides Fragilis due to probable acute cholangitis , Infiltrative  6.3cm  intraabdominal mass in surgical bed possible underlying necrosis associated with  stent procedure   C. Sepsis POA with bacteroides Fragilis due to probable acute cholangitis , Infiltrative  6.3cm  intraabdominal mass in surgical bed possible underlying necrosis not associated with  stent procedure   D. Other please specify  E. Unable to determine      SUPPORTING DOCUMENTATION AND/OR CLINICAL EVIDENCE:    < from: IR Procedure (02.16.22 @ 16:00) >    INTERPRETATION:  INDICATION AND FOCUSED HISTORY: Internal/external   biliary drain. Possible obstruction. Interventional radiology consulted   for internal/external biliary drain check with possible exchange/upsize    PROCEDURE:1. Fluoroscopically guided biliary drain check.2. Fluoroscopically guided biliary drain exchange and ups  1. Indwelling 8 Moz[place] biliary drain with sluggish antegrade flow following injection.  2. 12 [place] internal/external biliary drain placed with appropriate position and function on final imaging  IMPRESSION:Successful internal/external biliary drain check, exchange, and upsize   from 8 [place] to 12 [place].    CT Abdomen and Pelvis w/ IV Cont (02.16.22 @ 04:24) >    Status post Whipple procedure with a 6.3 cm infilatrative mass in the   surgical bed adjacent to jejunal sutures compatible with tumor   recurrence.  Dilated small bowel loop at the luh compatible with   malignant obstruction of choledochojejunostomy loop. Satisfactory   positioning of right biliary stent with mild intrahepatic biliary   dilatation.   Correlate clinically for cholangitis.  No gastric outlet   obstruction.  Neoplastic implants in the right upper and lower quadrants.  Focal thrombosis in the superior mesenteric vein related to the site of   tumor recurrence.    The right kidney demonstrates a delayed nephrogram and moderate right   hydronephrosis with transition at the right ureteropelvic junction,   likely related to malignant obstruction.  Retroperitoneal adenopathy.    US upper quadrant right < from: US Abdomen Upper Quadrant Right (02.16.22 @ 01:57) >    IMPRESSION:Common bile duct stent in place. Mild intrahepatic and extra hepatic biliary ductal dilatation with common bile duct measuring up to 12 mm.  Mild to moderate right-sided hydronephrosis, slightly increased compared with prior exam from 1/16/2022.  Trace perihepatic ascites.

## 2022-02-23 NOTE — PROGRESS NOTE ADULT - SUBJECTIVE AND OBJECTIVE BOX
INTERVAL HPI/OVERNIGHT EVENTS:  Patient S&E at bedside. s/p right nephrostomy yesterday with pain at site- received one dose of oxycodone and one dose of dilaudid overnight. Clean site. Reports wanting to go home today- vss, cr normal, Hb stable.    PAST MEDICAL & SURGICAL HISTORY:  Hearing loss  right    Stented coronary artery  2006 PTCA x 1    CAD (coronary artery disease)  last stress test Fall 2018    HTN (hypertension)    Hyperlipidemia    GERD (gastroesophageal reflux disease)    Skin cancer  squamous cell - head, shin - removed    OA (osteoarthritis)    Psoriasis    Inguinal hernia  right    Brain aneurysm  non ruptured - had stent placed in 2010 after attemtped clipping and coiling in 2007    Pancreatic cancer  s/p Whipple procedure    Drug or chemical induced diabetes mellitus    H/O craniotomy  for non ruptured aneurysm 2007, attempted clipping    History of other surgery  for brain aneurysm - attempted coiling 2007    History of other surgery  endovascular stent placement for brain Aneurysm at [place] - 2010    History of tonsillectomy    S/P colonoscopy  last - within 5 yrs    History of PTCA  2006 x 1 stent    H/O inguinal hernia repair    Pancreatic disorder  Whipple for cancer        FAMILY HISTORY:  Family history of breast cancer in sister        VITAL SIGNS:  T(F): 97.7 (02-23-22 @ 08:26)  HR: 68 (02-23-22 @ 08:26)  BP: 138/73 (02-23-22 @ 08:26)  RR: 17 (02-23-22 @ 08:26)  SpO2: 97% (02-23-22 @ 08:26)  Wt(kg): --    PHYSICAL EXAM:    Constitutional: NAD, mild pain  Eyes: EOMI, sclera non-icteric  Respiratory: CTA b/l, good air entry b/l  Cardiovascular: RRR,   Gastrointestinal: tender to palpation in right side  biliary drain in place, right nephrostomy in place  Extremities: no c/c/e  Neurological: AAOx3      MEDICATIONS  (STANDING):  acetaminophen     Tablet .. 650 milliGRAM(s) Oral every 8 hours  atorvastatin 20 milliGRAM(s) Oral at bedtime  bisacodyl 5 milliGRAM(s) Oral every 12 hours  cholecalciferol 2000 Unit(s) Oral daily  ferrous    sulfate 325 milliGRAM(s) Oral daily  gabapentin 100 milliGRAM(s) Oral every 8 hours  metoprolol succinate ER 12.5 milliGRAM(s) Oral at bedtime  multivitamin 1 Tablet(s) Oral daily  pancrelipase (ZENPEP 20,000 Lipase Units) 4 Capsule(s) Oral three times a day with meals  pantoprazole    Tablet 40 milliGRAM(s) Oral daily  piperacillin/tazobactam IVPB.. 3.375 Gram(s) IV Intermittent every 8 hours  polyethylene glycol 3350 17 Gram(s) Oral daily    MEDICATIONS  (PRN):  aluminum hydroxide/magnesium hydroxide/simethicone Suspension 30 milliLiter(s) Oral every 4 hours PRN Dyspepsia  hydrALAZINE Injectable 10 milliGRAM(s) IV Push every 4 hours PRN SBP>170  HYDROmorphone   Tablet 2 milliGRAM(s) Oral every 4 hours PRN Severe Pain (7 - 10)  HYDROmorphone   Tablet 1 milliGRAM(s) Oral every 4 hours PRN Moderate Pain (4 - 6)  HYDROmorphone  Injectable 1 milliGRAM(s) IV Push every 4 hours PRN breathrough pain  melatonin 10 milliGRAM(s) Oral at bedtime PRN Insomnia  ondansetron Injectable 4 milliGRAM(s) IV Push every 6 hours PRN Nausea and/or Vomiting  ondansetron Injectable 4 milliGRAM(s) IV Push once PRN Nausea and/or Vomiting      Allergies    No Known Allergies    Intolerances        LABS:                        8.8    7.90  )-----------( 209      ( 23 Feb 2022 07:08 )             26.7     02-23    137  |  108  |  14  ----------------------------<  122<H>  3.8   |  21<L>  |  0.73    Ca    8.5      23 Feb 2022 07:08  Phos  3.5     02-23  Mg     1.7     02-23      PT/INR - ( 22 Feb 2022 07:06 )   PT: 13.6 sec;   INR: 1.17 ratio         PTT - ( 22 Feb 2022 07:06 )  PTT:38.4 sec      RADIOLOGY & ADDITIONAL TESTS:  Studies reviewed.

## 2022-02-23 NOTE — PROGRESS NOTE ADULT - ASSESSMENT
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     Intractable pain due to malignant obstruction of choledochojejunostomy loop s/p biliary drain exchange .   Metastatic ampullary cancer s/p whipple 2020  - s/p IR drainage on 2/16   - s/p exchange/upsizing of biliary drain on 2/16/22  - pain management - tylenol 650mg @ HS for sleep, gabapentin, Dilaudid 1 mg q4h prn and 2 mg q4h prn, with laxatives; 2/23 Fent patch added by Palliative   - Cont Zofran, and pancreatic enzymes  - palliative team following  - oncology consult     Sepsis, POA with Bacteroides fragilis due to probable acute cholangitis as well as   Infiltrative 6.3cm intraabdominal mass in surgical bed possible underlying necrosis  - Pt remains afebrile, Lactate benign  - + Blood culture--> Bacteroides fragilis; cont Zosyn, will dc home on PO abx  - Repeat BC x 2 are negative to date  - urine cx NGTD   - ID following    Superior mesenteric vein thrombosis   - Dr. Castro following-  full dose AC   - s/p Lovenox 40 x 1 dose 2/17/22   - 2/18 - started on heparin drip, H/H stable  - 2/23 at 12noon restart Heparin gtt, if no bleeding in 24 hours will resume DOAC     YAMILETH with baseline SCr 0.7, multifactorial; Medication induced (ketorolac & Losartan) associated with   moderate Right hydronephrosis due to malignant obstruction   - s/p IV Hydration, s/p Right nephrostomy; Creatinine now back to baseline   - Hold NSAIDs and Losartan  - Check post void residual, Strict I&O  - Urology, Dr. Kennedy following; s/p right nephrostomy tube placement   - stop ASA, restart after 48 hours after nephrostomy placement  - flush forward Right nephrectomy 100ml NS daily    Iron deficiency anemia   - trend cbc,  Iron studies as above noted   - c/w iron daily     Hyperglycemia  A1c 5.2,  trend     CAD - hold aspirin , c/w statin    Hypoalbuminemia 2/2 Protein calorie malnutrition  - nutrition consult,  advance diet as tolerated     HTN  - Losartan stopped 2/2 renal function  - continue BB  - Hydralazine PRN for SBP > 170    HLD  -continue statin    DVT PPX SCDs     DISPO: Cont IV abx, PT, start Heparin gtt at noon, if no bleeding x 24 hours will resume DOAC and dc home with home care    Advanced directives  - palliative care team on the case  - Full code  - wife Autumn [phone] - updated 2/18, 2/19, 2/20, 2/21, 2/22       71M with PMHx of 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     Intractable pain due to malignant obstruction of choledochojejunostomy loop s/p biliary drain exchange .   Metastatic ampullary cancer s/p whipple 2020  - s/p IR drainage on 2/16   - s/p exchange/upsizing of biliary drain on 2/16/22  - pain management - tylenol 650mg @ HS for sleep, gabapentin, Dilaudid 1 mg q4h prn and 2 mg q4h prn, with laxatives; 2/23 Fent patch added by Palliative   - Cont Zofran, and pancreatic enzymes  - palliative team following  - oncology consult     Sepsis, POA with Bacteroides fragilis due to probable acute cholangitis as well as   Infiltrative 6.3cm intraabdominal mass in surgical bed possible underlying necrosis associated with stent procedure and stent malfunction s/p new drain  - Pt remains afebrile, Lactate benign  - + Blood culture--> Bacteroides fragilis; cont Zosyn, will dc home on PO abx   - Repeat BC x 2 are negative to date  - urine cx NGTD   - ID following    Superior mesenteric vein thrombosis   - Dr. Castro following-  full dose AC   - s/p Lovenox 40 x 1 dose 2/17/22   - 2/18 - started on heparin drip, H/H stable  - 2/23 at 12noon restart Heparin gtt, if no bleeding in 24 hours will resume DOAC and low dose ASA for CAD     YAMILETH with baseline SCr 0.7, multifactorial; Medication induced (ketorolac & Losartan) associated with   moderate Right hydronephrosis due to malignant obstruction   - s/p IV Hydration, s/p Right nephrostomy; Creatinine now back to baseline   - Hold NSAIDs and Losartan  - Check post void residual, Strict I&O  - Urology, Dr. Kennedy following; s/p right nephrostomy tube placement   - stop ASA, restart after 48 hours after nephrostomy placement  - flush forward Right nephrectomy 100ml NS daily    Iron deficiency anemia   - trend cbc,  Iron studies as above noted   - c/w iron daily     Hyperglycemia  A1c 5.2,  trend     CAD - hold aspirin  , c/w statin    Hypoalbuminemia 2/2 Protein calorie malnutrition  - nutrition consult,  advance diet as tolerated     HTN  - Losartan stopped 2/2 renal function  - continue BB  - Hydralazine PRN for SBP > 170    HLD  -continue statin    DVT PPX SCDs     DISPO: Cont IV abx, PT, start Heparin gtt at noon, if no bleeding x 24 hours will resume DOAC and dc home with home care    Advanced directives  - palliative care team on the case  - Full code  - wife Autumn [phone] - updated 2/18, 2/19, 2/20, 2/21, 2/22

## 2022-02-23 NOTE — PROGRESS NOTE ADULT - ASSESSMENT
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 Pt states while visiting [place], approx two weeks PTA, he presented to hospital with abdominal pain and IR placed a 8F biliary drain. Pt flushing twice a day as directed. In ER he was noted febrile and he received zosyn.     1. Sepsis with bacteroides fragilis resolving. Probable acute cholangitis s/p recent biliary drain. Infiltrative intraabdominal mass ?underlying necrosis. Superior vena cava thrombosis. Pancreatic ampullary Ca s/p Whipple on chemotherapy. Immunocompromised host. CRF stage 3.   -cultures reviewed  -on zosyn 3.375 gm IV q8h # 6  -tolerating abx well so far; no side effects noted  -IR evaluation appreciated  -urology consulted for possible obstructive hydronephrosis - s/p right nephrostomy  -repeat BC x 2 are negative to date  -on AC  -continue abx coverage; plan to change to oral regimen soon  -monitor temps  -f/u CBC  -supportive care  2. Other issues:   -care per medicine

## 2022-02-24 ENCOUNTER — TRANSCRIPTION ENCOUNTER (OUTPATIENT)
Age: 72
End: 2022-02-24

## 2022-02-24 VITALS
RESPIRATION RATE: 17 BRPM | TEMPERATURE: 98 F | OXYGEN SATURATION: 97 % | DIASTOLIC BLOOD PRESSURE: 68 MMHG | HEART RATE: 73 BPM | SYSTOLIC BLOOD PRESSURE: 119 MMHG

## 2022-02-24 DIAGNOSIS — I10 ESSENTIAL (PRIMARY) HYPERTENSION: ICD-10-CM

## 2022-02-24 LAB
ANION GAP SERPL CALC-SCNC: 6 MMOL/L — SIGNIFICANT CHANGE UP (ref 5–17)
APTT BLD: 32.2 SEC — SIGNIFICANT CHANGE UP (ref 27.5–35.5)
APTT BLD: 80.5 SEC — HIGH (ref 27.5–35.5)
BUN SERPL-MCNC: 12 MG/DL — SIGNIFICANT CHANGE UP (ref 7–23)
CALCIUM SERPL-MCNC: 8.7 MG/DL — SIGNIFICANT CHANGE UP (ref 8.5–10.1)
CHLORIDE SERPL-SCNC: 106 MMOL/L — SIGNIFICANT CHANGE UP (ref 96–108)
CO2 SERPL-SCNC: 24 MMOL/L — SIGNIFICANT CHANGE UP (ref 22–31)
CREAT SERPL-MCNC: 0.63 MG/DL — SIGNIFICANT CHANGE UP (ref 0.5–1.3)
CULTURE RESULTS: SIGNIFICANT CHANGE UP
CULTURE RESULTS: SIGNIFICANT CHANGE UP
GLUCOSE SERPL-MCNC: 112 MG/DL — HIGH (ref 70–99)
HCT VFR BLD CALC: 28.3 % — LOW (ref 39–50)
HGB BLD-MCNC: 9.4 G/DL — LOW (ref 13–17)
MAGNESIUM SERPL-MCNC: 1.5 MG/DL — LOW (ref 1.6–2.6)
MCHC RBC-ENTMCNC: 29 PG — SIGNIFICANT CHANGE UP (ref 27–34)
MCHC RBC-ENTMCNC: 33.2 GM/DL — SIGNIFICANT CHANGE UP (ref 32–36)
MCV RBC AUTO: 87.3 FL — SIGNIFICANT CHANGE UP (ref 80–100)
PHOSPHATE SERPL-MCNC: 3.1 MG/DL — SIGNIFICANT CHANGE UP (ref 2.5–4.5)
PLATELET # BLD AUTO: 205 K/UL — SIGNIFICANT CHANGE UP (ref 150–400)
POTASSIUM SERPL-MCNC: 3.8 MMOL/L — SIGNIFICANT CHANGE UP (ref 3.5–5.3)
POTASSIUM SERPL-SCNC: 3.8 MMOL/L — SIGNIFICANT CHANGE UP (ref 3.5–5.3)
RBC # BLD: 3.24 M/UL — LOW (ref 4.2–5.8)
RBC # FLD: 14.6 % — HIGH (ref 10.3–14.5)
SODIUM SERPL-SCNC: 136 MMOL/L — SIGNIFICANT CHANGE UP (ref 135–145)
SPECIMEN SOURCE: SIGNIFICANT CHANGE UP
SPECIMEN SOURCE: SIGNIFICANT CHANGE UP
WBC # BLD: 8.47 K/UL — SIGNIFICANT CHANGE UP (ref 3.8–10.5)
WBC # FLD AUTO: 8.47 K/UL — SIGNIFICANT CHANGE UP (ref 3.8–10.5)

## 2022-02-24 PROCEDURE — 99239 HOSP IP/OBS DSCHRG MGMT >30: CPT

## 2022-02-24 PROCEDURE — 99233 SBSQ HOSP IP/OBS HIGH 50: CPT

## 2022-02-24 RX ORDER — LOSARTAN POTASSIUM 100 MG/1
1 TABLET, FILM COATED ORAL
Qty: 0 | Refills: 0 | DISCHARGE

## 2022-02-24 RX ORDER — FERROUS SULFATE 325(65) MG
1 TABLET ORAL
Qty: 30 | Refills: 0
Start: 2022-02-24 | End: 2022-03-25

## 2022-02-24 RX ORDER — POLYETHYLENE GLYCOL 3350 17 G/17G
17 POWDER, FOR SOLUTION ORAL
Qty: 510 | Refills: 0
Start: 2022-02-24 | End: 2022-03-25

## 2022-02-24 RX ORDER — KETOROLAC TROMETHAMINE 30 MG/ML
1 SYRINGE (ML) INJECTION
Qty: 0 | Refills: 0 | DISCHARGE

## 2022-02-24 RX ORDER — ACETAMINOPHEN 500 MG
2 TABLET ORAL
Qty: 180 | Refills: 0
Start: 2022-02-24 | End: 2022-03-25

## 2022-02-24 RX ORDER — APIXABAN 2.5 MG/1
1 TABLET, FILM COATED ORAL
Qty: 60 | Refills: 0
Start: 2022-02-24 | End: 2022-03-25

## 2022-02-24 RX ORDER — HYDROMORPHONE HYDROCHLORIDE 2 MG/ML
1 INJECTION INTRAMUSCULAR; INTRAVENOUS; SUBCUTANEOUS
Qty: 30 | Refills: 0
Start: 2022-02-24 | End: 2022-02-28

## 2022-02-24 RX ORDER — HYDROMORPHONE HYDROCHLORIDE 2 MG/ML
2 INJECTION INTRAMUSCULAR; INTRAVENOUS; SUBCUTANEOUS EVERY 4 HOURS
Refills: 0 | Status: DISCONTINUED | OUTPATIENT
Start: 2022-02-24 | End: 2022-02-24

## 2022-02-24 RX ORDER — MAGNESIUM SULFATE 500 MG/ML
2 VIAL (ML) INJECTION ONCE
Refills: 0 | Status: COMPLETED | OUTPATIENT
Start: 2022-02-24 | End: 2022-02-24

## 2022-02-24 RX ORDER — DRONABINOL 2.5 MG
1 CAPSULE ORAL
Qty: 0 | Refills: 0 | DISCHARGE

## 2022-02-24 RX ORDER — APIXABAN 2.5 MG/1
5 TABLET, FILM COATED ORAL EVERY 12 HOURS
Refills: 0 | Status: DISCONTINUED | OUTPATIENT
Start: 2022-02-24 | End: 2022-02-24

## 2022-02-24 RX ORDER — HYDROMORPHONE HYDROCHLORIDE 2 MG/ML
4 INJECTION INTRAMUSCULAR; INTRAVENOUS; SUBCUTANEOUS EVERY 4 HOURS
Refills: 0 | Status: DISCONTINUED | OUTPATIENT
Start: 2022-02-24 | End: 2022-02-24

## 2022-02-24 RX ORDER — FENTANYL CITRATE 50 UG/ML
1 INJECTION INTRAVENOUS
Qty: 5 | Refills: 0
Start: 2022-02-24 | End: 2022-03-10

## 2022-02-24 RX ORDER — AZELASTINE 137 UG/1
2 SPRAY, METERED NASAL
Qty: 0 | Refills: 0 | DISCHARGE

## 2022-02-24 RX ORDER — HYDROMORPHONE HYDROCHLORIDE 2 MG/ML
1 INJECTION INTRAMUSCULAR; INTRAVENOUS; SUBCUTANEOUS
Qty: 20 | Refills: 0
Start: 2022-02-24 | End: 2022-02-28

## 2022-02-24 RX ORDER — RNA INGREDIENT BNT-162B2 0.23 G/1.8ML
0.3 INJECTION, SUSPENSION INTRAMUSCULAR
Qty: 0 | Refills: 0 | DISCHARGE

## 2022-02-24 RX ORDER — HYDROMORPHONE HYDROCHLORIDE 2 MG/ML
0.5 INJECTION INTRAMUSCULAR; INTRAVENOUS; SUBCUTANEOUS
Qty: 0 | Refills: 0 | DISCHARGE

## 2022-02-24 RX ADMIN — Medication 25 GRAM(S): at 09:36

## 2022-02-24 RX ADMIN — GABAPENTIN 100 MILLIGRAM(S): 400 CAPSULE ORAL at 05:01

## 2022-02-24 RX ADMIN — Medication 2000 UNIT(S): at 09:43

## 2022-02-24 RX ADMIN — HEPARIN SODIUM 1900 UNIT(S)/HR: 5000 INJECTION INTRAVENOUS; SUBCUTANEOUS at 05:00

## 2022-02-24 RX ADMIN — HYDROMORPHONE HYDROCHLORIDE 4 MILLIGRAM(S): 2 INJECTION INTRAMUSCULAR; INTRAVENOUS; SUBCUTANEOUS at 10:44

## 2022-02-24 RX ADMIN — HYDROMORPHONE HYDROCHLORIDE 4 MILLIGRAM(S): 2 INJECTION INTRAMUSCULAR; INTRAVENOUS; SUBCUTANEOUS at 11:25

## 2022-02-24 RX ADMIN — Medication 4 CAPSULE(S): at 09:55

## 2022-02-24 RX ADMIN — HEPARIN SODIUM 1900 UNIT(S)/HR: 5000 INJECTION INTRAVENOUS; SUBCUTANEOUS at 03:54

## 2022-02-24 RX ADMIN — HYDROMORPHONE HYDROCHLORIDE 2 MILLIGRAM(S): 2 INJECTION INTRAMUSCULAR; INTRAVENOUS; SUBCUTANEOUS at 03:17

## 2022-02-24 RX ADMIN — PIPERACILLIN AND TAZOBACTAM 25 GRAM(S): 4; .5 INJECTION, POWDER, LYOPHILIZED, FOR SOLUTION INTRAVENOUS at 05:02

## 2022-02-24 RX ADMIN — FENTANYL CITRATE 1 PATCH: 50 INJECTION INTRAVENOUS at 07:53

## 2022-02-24 RX ADMIN — HYDROMORPHONE HYDROCHLORIDE 1 MILLIGRAM(S): 2 INJECTION INTRAMUSCULAR; INTRAVENOUS; SUBCUTANEOUS at 06:47

## 2022-02-24 RX ADMIN — PANTOPRAZOLE SODIUM 40 MILLIGRAM(S): 20 TABLET, DELAYED RELEASE ORAL at 09:40

## 2022-02-24 RX ADMIN — Medication 2 CAPSULE(S): at 13:14

## 2022-02-24 RX ADMIN — HYDROMORPHONE HYDROCHLORIDE 2 MILLIGRAM(S): 2 INJECTION INTRAMUSCULAR; INTRAVENOUS; SUBCUTANEOUS at 03:59

## 2022-02-24 RX ADMIN — APIXABAN 5 MILLIGRAM(S): 2.5 TABLET, FILM COATED ORAL at 10:44

## 2022-02-24 RX ADMIN — HEPARIN SODIUM 1900 UNIT(S)/HR: 5000 INJECTION INTRAVENOUS; SUBCUTANEOUS at 07:25

## 2022-02-24 RX ADMIN — HYDROMORPHONE HYDROCHLORIDE 1 MILLIGRAM(S): 2 INJECTION INTRAMUSCULAR; INTRAVENOUS; SUBCUTANEOUS at 07:49

## 2022-02-24 NOTE — DISCHARGE NOTE PROVIDER - ATTENDING DISCHARGE PHYSICAL EXAMINATION:
Patient seen and examined  on rounds with NP Florence Winters .  I was physically present for the key portion of the evaluation and management service provided, I  examined the patient myself and reviewed the plan of care with the patient and  NP Florence Winters , which I have reviewed and edited .  Medical records reviewed. History, review of systems, physical findings and lab results as documented confirmed , except as modified by me.  Agree with the assessment and plan of as stated and discussed, except as modified below.     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   A/P   1. Intractable pain due to  malignant obstruction of choledochojejunostomy loop s/p biliary drain exchange with underlying metastatic ampullary cancer s/p whipple 2020 - drain care, oncology consult , pain management   2. Sepsis, POA with Bacteroides fragilis due to probable acute cholangitis as well as   Infiltrative 6.3cm intraabdominal mass in surgical bed possible underlying necrosis associated with stent procedure and stent malfunction s/p new drain  - IV zosyn, ID consult, f/u cultures - complete antibiotics for 7 days  3. YAMILETH with moderate right hydronephrosis s/p right nephrostomy - s/p IV fluids, urology evaluation,   IR consult   4. Superior mesenteric vein thrombosis - s/p  heparin , monitor for bleeding, switching to  Eliquis and ASA in 24 h , ASA 81   5. Anemia chemotherapy induced and iron deficiency - monitor, c/w ferrous sulfate   6. CAD, HTN -restart  ASA, c/w statin, BB, losartan d/justin , hydralazine prn     Dispo - stable for discharge

## 2022-02-24 NOTE — DISCHARGE NOTE PROVIDER - DETAILS OF MALNUTRITION DIAGNOSIS/DIAGNOSES
This patient has been assessed with a concern for Malnutrition and was treated during this hospitalization for the following Nutrition diagnosis/diagnoses:     -  02/18/2022: Severe protein-calorie malnutrition   -  02/18/2022: Underweight (BMI < 19)

## 2022-02-24 NOTE — PROGRESS NOTE ADULT - SUBJECTIVE AND OBJECTIVE BOX
HPI: Pt seen and examined this am in follow up for sxs. Pt states R side back pain after placement of R nephrostomy tube on 2/22/22 and his usual back pain, 7/10 currently in R side of back. Pt states wake up this morning with pain in the R side of the neck which he attribute to be in the bed for a long time. Pt also endorse continues with low abdominal pain, 7/10 currently. Pt feeling pain meds help but not completely improves the pain. He was started on fentanyl patch 12 mcg one day ago and pt states may be helping a little. Pt states would like to go home today. He denied any other sx, eating breakfast without issue.      GOC conversation took place on 2/23/22  with establishment of code status DNR/DNI. Pt interested on palliative care services.       PAIN: yes  Location- R side lower back, R sided neck and lower abdominal area   Intensity- mod  Quality- ache and sharp  Aggravating Factors- position and movements   Alleviating Factors- pain meds   Timing- intermittent    DYSPNEA: denies    ROS:  Fatigue- denies  Weakness- improving    All other systems reviewed and negative      PHYSICAL EXAM:    Vital Signs Last 24 Hrs  T(C): 36.5 (23 Feb 2022 08:26), Max: 37.1 (22 Feb 2022 21:32)  T(F): 97.7 (23 Feb 2022 08:26), Max: 98.8 (22 Feb 2022 21:32)  HR: 68 (23 Feb 2022 08:26) (52 - 80)  BP: 138/73 (23 Feb 2022 08:26) (112/60 - 138/73)  BP(mean): 79 (22 Feb 2022 15:00) (79 - 79)  RR: 17 (23 Feb 2022 08:26) (15 - 18)  SpO2: 97% (23 Feb 2022 08:26) (96% - 100%)  Daily     Daily     PPSV- 50-60%    Gen: Middle aged male, thin, pleasant  Mental Status: AOx4  HEENT: mmm, temporal and clavicular muscle wasting  CVS: +s1 s2 rrr  Lung: dec at bases bl  GI: soft mild distention, + bs, nt  : voids, R nephrostomy tube  Ext/skin: moves all extremities, muscle and fat wasting in limbs  Neuro: no focal deficits      LABS:                        9.4    8.47  )-----------( 205      ( 24 Feb 2022 04:19 )             28.3     02-24    136  |  106  |  12  ----------------------------<  112<H>  3.8   |  24  |  0.63    Ca    8.7      24 Feb 2022 04:19  Phos  3.1     02-24  Mg     1.5     02-24      PTT - ( 24 Feb 2022 04:19 )  PTT:80.5 sec  Albumin: Albumin, Serum: 2.0 g/dL (02-19 @ 06:43)      Allergies    No Known Allergies    Intolerances      MEDICATIONS  (STANDING):  acetaminophen     Tablet .. 650 milliGRAM(s) Oral <User Schedule>  apixaban 5 milliGRAM(s) Oral every 12 hours  atorvastatin 20 milliGRAM(s) Oral at bedtime  bisacodyl 5 milliGRAM(s) Oral every 12 hours  cholecalciferol 2000 Unit(s) Oral daily  fentaNYL   Patch  12 MICROgram(s)/Hr 1 Patch Transdermal every 72 hours  ferrous    sulfate 325 milliGRAM(s) Oral daily  gabapentin 100 milliGRAM(s) Oral every 8 hours  melatonin 10 milliGRAM(s) Oral at bedtime  metoprolol succinate ER 12.5 milliGRAM(s) Oral at bedtime  multivitamin 1 Tablet(s) Oral daily  pancrelipase (ZENPEP 20,000 Lipase Units) 4 Capsule(s) Oral three times a day with meals  pantoprazole    Tablet 40 milliGRAM(s) Oral daily  piperacillin/tazobactam IVPB.. 3.375 Gram(s) IV Intermittent every 8 hours  polyethylene glycol 3350 17 Gram(s) Oral daily    MEDICATIONS  (PRN):  aluminum hydroxide/magnesium hydroxide/simethicone Suspension 30 milliLiter(s) Oral every 4 hours PRN Dyspepsia  HYDROmorphone   Tablet 2 milliGRAM(s) Oral every 4 hours PRN Severe Pain (7 - 10)  HYDROmorphone   Tablet 1 milliGRAM(s) Oral every 4 hours PRN Moderate Pain (4 - 6)  HYDROmorphone  Injectable 1 milliGRAM(s) IV Push every 4 hours PRN breathrough pain  ondansetron Injectable 4 milliGRAM(s) IV Push every 6 hours PRN Nausea and/or Vomiting  ondansetron Injectable 4 milliGRAM(s) IV Push once PRN Nausea and/or Vomiting       HPI: Pt seen and examined this am in follow up for sxs. Pt states R side back pain after placement of R nephrostomy tube on 2/22/22 and his usual back pain, 7/10 currently in R side of back. Pt states wake up this morning with pain in the R side of the neck which he attribute to be in the bed for a long time. Pt also endorse continues with low abdominal pain, 7/10 currently. Pt feeling pain meds help but not completely improves the pain. He was started on fentanyl patch 12 mcg one day ago and pt states may be helping a little. Pt states would like to go home today. He denied any other sx, eating breakfast without issue.     PAIN: yes  Location- R side lower back, R sided neck and lower abdominal area   Intensity- mod  Quality- ache and sharp  Aggravating Factors- position and movements   Alleviating Factors- pain meds   Timing- intermittent    DYSPNEA: denies    ROS:  Fatigue- denies  Weakness- improving    All other systems reviewed and negative      PHYSICAL EXAM:    Vital Signs Last 24 Hrs  T(C): 36.5 (23 Feb 2022 08:26), Max: 37.1 (22 Feb 2022 21:32)  T(F): 97.7 (23 Feb 2022 08:26), Max: 98.8 (22 Feb 2022 21:32)  HR: 68 (23 Feb 2022 08:26) (52 - 80)  BP: 138/73 (23 Feb 2022 08:26) (112/60 - 138/73)  BP(mean): 79 (22 Feb 2022 15:00) (79 - 79)  RR: 17 (23 Feb 2022 08:26) (15 - 18)  SpO2: 97% (23 Feb 2022 08:26) (96% - 100%)  Daily     Daily     PPSV- 50-60%    Gen: Middle aged male, thin, pleasant  Mental Status: AOx4  HEENT: mmm, temporal and clavicular muscle wasting  CVS: +s1 s2 rrr  Lung: dec at bases bl  GI: soft mild distention, + bs, nt  : voids, R nephrostomy tube  Ext/skin: moves all extremities, muscle and fat wasting in limbs  Neuro: no focal deficits      LABS:                        9.4    8.47  )-----------( 205      ( 24 Feb 2022 04:19 )             28.3     02-24    136  |  106  |  12  ----------------------------<  112<H>  3.8   |  24  |  0.63    Ca    8.7      24 Feb 2022 04:19  Phos  3.1     02-24  Mg     1.5     02-24      PTT - ( 24 Feb 2022 04:19 )  PTT:80.5 sec  Albumin: Albumin, Serum: 2.0 g/dL (02-19 @ 06:43)      Allergies    No Known Allergies    Intolerances      MEDICATIONS  (STANDING):  acetaminophen     Tablet .. 650 milliGRAM(s) Oral <User Schedule>  apixaban 5 milliGRAM(s) Oral every 12 hours  atorvastatin 20 milliGRAM(s) Oral at bedtime  bisacodyl 5 milliGRAM(s) Oral every 12 hours  cholecalciferol 2000 Unit(s) Oral daily  fentaNYL   Patch  12 MICROgram(s)/Hr 1 Patch Transdermal every 72 hours  ferrous    sulfate 325 milliGRAM(s) Oral daily  gabapentin 100 milliGRAM(s) Oral every 8 hours  melatonin 10 milliGRAM(s) Oral at bedtime  metoprolol succinate ER 12.5 milliGRAM(s) Oral at bedtime  multivitamin 1 Tablet(s) Oral daily  pancrelipase (ZENPEP 20,000 Lipase Units) 4 Capsule(s) Oral three times a day with meals  pantoprazole    Tablet 40 milliGRAM(s) Oral daily  piperacillin/tazobactam IVPB.. 3.375 Gram(s) IV Intermittent every 8 hours  polyethylene glycol 3350 17 Gram(s) Oral daily    MEDICATIONS  (PRN):  aluminum hydroxide/magnesium hydroxide/simethicone Suspension 30 milliLiter(s) Oral every 4 hours PRN Dyspepsia  HYDROmorphone   Tablet 2 milliGRAM(s) Oral every 4 hours PRN Severe Pain (7 - 10)  HYDROmorphone   Tablet 1 milliGRAM(s) Oral every 4 hours PRN Moderate Pain (4 - 6)  HYDROmorphone  Injectable 1 milliGRAM(s) IV Push every 4 hours PRN breathrough pain  ondansetron Injectable 4 milliGRAM(s) IV Push every 6 hours PRN Nausea and/or Vomiting  ondansetron Injectable 4 milliGRAM(s) IV Push once PRN Nausea and/or Vomiting

## 2022-02-24 NOTE — PROGRESS NOTE ADULT - ASSESSMENT
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 Pt states while visiting [place], approx two weeks PTA, he presented to hospital with abdominal pain and IR placed a 8F biliary drain. Pt flushing twice a day as directed. In ER he was noted febrile and he received zosyn.     1. Sepsis with bacteroides fragilis resolving. Probable acute cholangitis s/p recent biliary drain. Infiltrative intraabdominal mass ?underlying necrosis. Superior vena cava thrombosis. Pancreatic ampullary Ca s/p Whipple on chemotherapy. Immunocompromised host. CRF stage 3.   -cultures reviewed  -on zosyn 3.375 gm IV q8h # 7  -tolerating abx well so far; no side effects noted  -IR evaluation appreciated  -urology consulted for possible obstructive hydronephrosis - s/p right nephrostomy  -repeat BC x 2 are negative to date  -on AC  -continue abx coverage; plan to change to oral regimen soon  -monitor temps  -f/u CBC  -supportive care  2. Other issues:   -care per medicine

## 2022-02-24 NOTE — PROGRESS NOTE ADULT - REASON FOR ADMISSION
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
Intractable Back Pain
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

## 2022-02-24 NOTE — PROGRESS NOTE ADULT - SUBJECTIVE AND OBJECTIVE BOX
Date of service: 22 @ 07:25    Lying in bed in NAD  Denies pain  Fever is down    ROS: denies dizziness, no HA, no SOB or cough, no abdominal pain, no diarrhea or constipation; no dysuria, no legs pain, no rashes    MEDICATIONS  (STANDING):  acetaminophen     Tablet .. 650 milliGRAM(s) Oral <User Schedule>  atorvastatin 20 milliGRAM(s) Oral at bedtime  bisacodyl 5 milliGRAM(s) Oral every 12 hours  cholecalciferol 2000 Unit(s) Oral daily  fentaNYL   Patch  12 MICROgram(s)/Hr 1 Patch Transdermal every 72 hours  ferrous    sulfate 325 milliGRAM(s) Oral daily  gabapentin 100 milliGRAM(s) Oral every 8 hours  heparin  Infusion. 1800 Unit(s)/Hr (18 mL/Hr) IV Continuous <Continuous>  melatonin 10 milliGRAM(s) Oral at bedtime  metoprolol succinate ER 12.5 milliGRAM(s) Oral at bedtime  multivitamin 1 Tablet(s) Oral daily  pancrelipase (ZENPEP 20,000 Lipase Units) 4 Capsule(s) Oral three times a day with meals  pantoprazole    Tablet 40 milliGRAM(s) Oral daily  piperacillin/tazobactam IVPB.. 3.375 Gram(s) IV Intermittent every 8 hours  polyethylene glycol 3350 17 Gram(s) Oral daily    Vital Signs Last 24 Hrs  T(C): 37 (2022 21:35), Max: 37 (2022 21:35)  T(F): 98.6 (2022 21:35), Max: 98.6 (2022 21:35)  HR: 89 (2022 21:35) (68 - 89)  BP: 152/80 (2022 21:35) (138/73 - 152/80)  BP(mean): 94 (2022 21:35) (94 - 94)  RR: 16 (2022 21:35) (16 - 18)  SpO2: 97% (2022 21:35) (96% - 97%)     Physical exam:    Constitutional:  No acute distress  HEENT: NC/AT, EOMI, PERRLA, conjunctivae clear; ears and nose atraumatic  Neck: supple; thyroid not palpable  Back: no tenderness  Respiratory: respiratory effort normal; clear to auscultation  Cardiovascular: S1S2 regular, no murmurs  Abdomen: soft, has RUQ tender, not distended, positive BS  biliary drain in place  Genitourinary: no suprapubic tenderness  Lymphatic: no LN palpable  Musculoskeletal: no muscle tenderness, no joint swelling or tenderness  Extremities: no pedal edema  Neurological/ Psychiatric: AxOx3, judgement and insight normal; moving all extremities  Skin: no rashes; no palpable lesions    Labs: reviewed                        9.4    8.47  )-----------( 205      ( 2022 04:19 )             28.3     -    136  |  106  |  12  ----------------------------<  112<H>  3.8   |  24  |  0.63    Ca    8.7      2022 04:19  Phos  3.1     -  Mg     1.5         Ferritin, Serum: 216 ng/mL (22 @ 06:31)                        8.6    8.38  )-----------( 175      ( 2022 06:31 )             26.0     02-18    133<L>  |  107  |  24<H>  ----------------------------<  100<H>  3.9   |  19<L>  |  1.40<H>    Ca    8.5      2022 06:31    TPro  6.6  /  Alb  2.3<L>  /  TBili  1.5<H>  /  DBili  x   /  AST  18  /  ALT  20  /  AlkPhos  414<H>       LIVER FUNCTIONS - ( 2022 06:39 )  Alb: 2.3 g/dL / Pro: 6.6 gm/dL / ALK PHOS: 414 U/L / ALT: 20 U/L / AST: 18 U/L / GGT: x           Urinalysis Basic - ( 2022 10:07 )    Color: Yellow / Appearance: Clear / S.015 / pH: x  Gluc: x / Ketone: Negative  / Bili: Negative / Urobili: Negative   Blood: x / Protein: Negative / Nitrite: Negative   Leuk Esterase: Trace / RBC: 0-2 /HPF / WBC 0-2   Sq Epi: x / Non Sq Epi: Occasional / Bacteria: Occasional    ( @ 02:34)  NotDetec      Culture - Urine (collected 2022 10:07)  Source: Clean Catch None  Final Report (2022 14:37):    <10,000 CFU/mL Normal Urogenital Emily      Culture - Blood (collected 2022 06:43)  Source: .Blood None  Preliminary Report (2022 09:01):    No growth to date.    Culture - Blood (collected 2022 06:43)  Source: .Blood None  Preliminary Report (2022 09:01):    No growth to date.    Culture - Urine (collected 2022 10:07)  Source: Clean Catch None  Final Report (2022 14:37):    <10,000 CFU/mL Normal Urogenital Emily    Culture - Blood (collected 2022 02:34)  Source: .Blood None  Gram Stain (2022 18:41):    Growth in anaerobic bottle: Gram Negative Rods  Final Report (2022 19:28):    Growth in anaerobic bottle: Bacteroides fragilis    "Susceptibilities not performed"    Culture - Blood (collected 2022 02:34)  Source: .Blood None  Gram Stain (2022 13:19):    Growth in anaerobic bottle: Gram Negative Rods  Final Report (2022 12:23):    Growth in anaerobic bottle: Bacteroides fragilis "Susceptibilities not    performed"  Organism: Blood Culture PCR (2022 12:23)      -  Bacteroides fragilis: Detec      Method Type: PCR    Radiology: all available radiological tests reviewed    < from: CT Abdomen and Pelvis w/ IV Cont (22 @ 04:24) >  Status post Whipple procedure with a 6.3 cm infilatrative mass in the   surgical bed adjacent to jejunal sutures compatible with tumor   recurrence.  Dilated small bowel loop at the luh compatible with   malignant obstruction of choledochojejunostomy loop. Satisfactory   positioning of right biliary stent with mild intrahepatic biliary   dilatation.   Correlate clinically for cholangitis.  No gastric outlet obstruction.  Neoplastic implants in the right upper and lower quadrants.  Focal thrombosis in the superior mesenteric vein related to the site of   tumor recurrence.  The right kidney demonstrates a delayed nephrogram and moderate right   hydronephrosis with transition at the right ureteropelvic junction,   likely related to malignant obstruction.  Retroperitoneal adenopathy.    < end of copied text >      Advanced directives addressed: full resuscitation

## 2022-02-24 NOTE — DISCHARGE NOTE PROVIDER - NSDCCPCAREPLAN_GEN_ALL_CORE_FT
PRINCIPAL DISCHARGE DIAGNOSIS  Diagnosis: Ampullary carcinoma  Assessment and Plan of Treatment: - You came to the hospital with severe pain, you were found to have a malfunctioning biliary drain. On 2/16/22 your biliary drain was exchanged and upsized.   - Flush the drain twice daily with 10 ml of normal saline.   - Take pain medications as needed as well as a laxative to prevent constipation.   - Continue to take your oral pancreative enzymes as ordered.   - Follow up with Dr. Thomas within one week following discharge from the hospital.   - You were also found to have bacteremia (a blood infection), you were treated with IV antibiotics.  - Continue oral antibiotics as prescribed for 6 more days.   - If you develop a fever, return to the ER.      SECONDARY DISCHARGE DIAGNOSES  Diagnosis: HTN (hypertension)  Assessment and Plan of Treatment: - STOP taking Losartan as it can affect your kidneys.   - Continue to take Toprol XL as ordered.      Diagnosis: Hydronephrosis, right  Assessment and Plan of Treatment: - You were found to have a metastatic mass obstructing your ureter (the tube that drains urine from your kidney to the bladder) that prevented urine from draining.   - You had a right nephrostomy tube placed.   - Flush the nephrostomy tube with 10 ml normal saline daily.   - Follow up with your Urologist,  Dr. Kennedy in one week after discharge.   - Stop taking NSAIDS like Ketorolac, Ibuprofen, and Aleve as these can damage the kidney.   - Drink plenty of water daily    Diagnosis: CAD (coronary artery disease)  Assessment and Plan of Treatment: - Start taking baby Aspirin 81mg tomorrow 2/25/22  - Continue taking statin    Diagnosis: HLD (hyperlipidemia)  Assessment and Plan of Treatment: -Continue taking statin    Diagnosis: Iron deficiency anemia  Assessment and Plan of Treatment: - Continue taking Iron supplement daily   - Follow up with Dr. Thomas to have your blood work checked and monitored    Diagnosis: Superior mesenteric vein thrombosis  Assessment and Plan of Treatment: - You will start taking Eliquis 5 mg twice daily.   - You had a dose today and will need to take another dose tonight at 10pm.   - Monitor for bleeding. If you have blood in your urine, stool, or while brushing your teeth, let Dr. Thomas know ASAP.   - You may bruise easily, use your walker while ambulating to prevent falls.   - If you fall and hit your head you must come to the ER immeditely.   - Start taking your baby aspirin 2/25/22 in the am.

## 2022-02-24 NOTE — PROGRESS NOTE ADULT - NUTRITIONAL ASSESSMENT
This patient has been assessed with a concern for Malnutrition and has been determined to have a diagnosis/diagnoses of Severe protein-calorie malnutrition and Underweight (BMI < 19).    This patient is being managed with:   Diet NPO after Midnight-     NPO Start Date: 21-Feb-2022   NPO Start Time: 23:59  Entered: Feb 21 2022  5:10PM    Diet Regular-  Prosource Gelatein Plus     Qty per Day:  3  Supplement Feeding Modality:  Oral  Ensure Enlive Cans or Servings Per Day:  3       Frequency:  Three Times a day  Entered: Feb 20 2022 10:07AM    
This patient has been assessed with a concern for Malnutrition and has been determined to have a diagnosis/diagnoses of Severe protein-calorie malnutrition and Underweight (BMI < 19).    This patient is being managed with:   Diet NPO after Midnight-     NPO Start Date: 17-Feb-2022   NPO Start Time: 23:59  Entered: Feb 17 2022  9:07PM    Diet Regular-  Entered: Feb 17 2022  7:26AM    
This patient has been assessed with a concern for Malnutrition and has been determined to have a diagnosis/diagnoses of Severe protein-calorie malnutrition and Underweight (BMI < 19).    This patient is being managed with:   Diet Regular-  Prosource Gelatein Plus     Qty per Day:  3  Supplement Feeding Modality:  Oral  Ensure Enlive Cans or Servings Per Day:  3       Frequency:  Three Times a day  Entered: Feb 20 2022 10:07AM    
This patient has been assessed with a concern for Malnutrition and has been determined to have a diagnosis/diagnoses of Severe protein-calorie malnutrition and Underweight (BMI < 19).    This patient is being managed with:   Diet NPO after Midnight-     NPO Start Date: 17-Feb-2022   NPO Start Time: 23:59  Entered: Feb 17 2022  9:07PM    Diet Regular-  Entered: Feb 17 2022  7:26AM    
This patient has been assessed with a concern for Malnutrition and has been determined to have a diagnosis/diagnoses of Severe protein-calorie malnutrition and Underweight (BMI < 19).    This patient is being managed with:   Diet Regular-  Prosource Gelatein Plus     Qty per Day:  3  Supplement Feeding Modality:  Oral  Ensure Enlive Cans or Servings Per Day:  3       Frequency:  Three Times a day  Entered: Feb 20 2022 10:07AM    
This patient has been assessed with a concern for Malnutrition and has been determined to have a diagnosis/diagnoses of Severe protein-calorie malnutrition and Underweight (BMI < 19).    This patient is being managed with:   Diet Regular-  Prosource Gelatein Plus     Qty per Day:  3  Supplement Feeding Modality:  Oral  Ensure Enlive Cans or Servings Per Day:  3       Frequency:  Three Times a day  Entered: Feb 20 2022 10:07AM    
This patient has been assessed with a concern for Malnutrition and has been determined to have a diagnosis/diagnoses of Severe protein-calorie malnutrition and Underweight (BMI < 19).    This patient is being managed with:   Diet Regular-  Entered: Feb 17 2022  7:26AM    
This patient has been assessed with a concern for Malnutrition and has been determined to have a diagnosis/diagnoses of Severe protein-calorie malnutrition and Underweight (BMI < 19).    This patient is being managed with:   Diet Regular-  Prosource Gelatein Plus     Qty per Day:  3  Supplement Feeding Modality:  Oral  Ensure Enlive Cans or Servings Per Day:  3       Frequency:  Three Times a day  Entered: Feb 20 2022 10:07AM    

## 2022-02-24 NOTE — PROGRESS NOTE ADULT - PROVIDER SPECIALTY LIST ADULT
Hospitalist
Infectious Disease
Infectious Disease
Surgery
Surgery
Heme/Onc
Infectious Disease
Palliative Care
Surgery
Heme/Onc
Heme/Onc
Hospitalist
Hospitalist
Palliative Care
Heme/Onc
Hospitalist
Hospitalist
Infectious Disease
Palliative Care
Surgery
Urology
Hospitalist
Hospitalist

## 2022-02-24 NOTE — CHART NOTE - NSCHARTNOTEFT_GEN_A_CORE
Attempted to return pt's spouse phone call today re questions about discharge plan. As per notes, it appears spouse was able to speak with floor . Message left for Autumn ([phone]). Plan for home with palliative home care.

## 2022-02-24 NOTE — DISCHARGE NOTE PROVIDER - NSDCCAREPROVSEEN_GEN_ALL_CORE_FT
Didier Walker Oncologist   Debbie Weeks Hospitalist Lia Swift Infectious Disease specialist  Florence Winters Hospitalist Magda Monge Palliative care Anival Sanderson Surgical Oncologist

## 2022-02-24 NOTE — DISCHARGE NOTE PROVIDER - HOSPITAL COURSE
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 Pt presents today to ED due to fever (Tmax 102.7) and pain at home. Pt states while in [place], approx two weeks ago, he presented to hospital with pain and IR at the time was unable to place stent and so 8F biliary drain placed. Pt flushing twice a day as directed. Pt wife at bedside to endorse history.     ED course: received zosyn, VSS however pt in intractable pain despite multiple medications received. IR and oncology consulted. Pt had IR procedure: drain changed from 8 to 12F catheter with 150cc output at time of hospitalist exam. Returned to ED and admitted for pain control, hydration, palliative and oncology consult.     Interval History:  2/24/22 pt seen and examined at bedside, back pain well controlled on Dilaudid and Fent patch, appropriate tenderness at right nephrostomy site. Improvement in Insomnia with Melatonin and Tylenol. Right Nephrostomy tube with blood tinged urine, right biliary drain functioning. Pt reports improving appetite and improvement in fatigue. Ambulating in hallway with RW and PT tolerating well.  Denies CP, palps, sob, HA, dizziness, n/v/d, dysuria, fever or chills.   REVIEW OF SYSTEMS: All other review of systems is negative unless indicated above    PHYSICAL EXAM:  Vital Signs Last 24 Hrs: all reviewed  T(C): 36.7 (24 Feb 2022 08:48), Max: 37 (23 Feb 2022 21:35)  T(F): 98.1 (24 Feb 2022 08:48), Max: 98.6 (23 Feb 2022 21:35)  HR: 73 (24 Feb 2022 08:48) (73 - 89)  BP: 119/68 (24 Feb 2022 08:48) (119/68 - 152/80)  BP(mean): 94 (23 Feb 2022 21:35) (94 - 94)  RR: 17 (24 Feb 2022 08:48) (16 - 18)  SpO2: 97% (24 Feb 2022 08:48) (96% - 97%) on room air     Constitutional: NAD, awake and alert  HEENT: PERR, EOMI  Neck: Soft and supple,  No JVD  Respiratory: Breath sounds are clear bilaterally, No wheezing, rales or rhonchi  Cardiovascular: S1 and S2, regular rate and rhythm, no Murmurs  Gastrointestinal: Bowel Sounds present, soft, right flank tenderness, nondistended, + right nephrostomy tube, + right biliary drain  Extremities: No peripheral edema  Vascular: 2+ peripheral pulses  Neurological: A/O x 3, no focal deficits  Musculoskeletal: 5/5 strength b/l upper and lower extremities  Skin: No rashes    Hospital Course:     Intractable pain due to malignant obstruction of choledochojejunostomy loop s/p biliary drain exchange .   Metastatic ampullary cancer s/p whipple 2020  - s/p IR drainage on 2/16   - s/p exchange/upsizing of biliary drain on 2/16/22  - pain management - Tylenol 650mg @ HS for sleep, gabapentin, Dilaudid and Fent patch.    - Cont Zofran, and pancreatic enzymes  - palliative team following  - oncology consult     Sepsis, POA with Bacteroides fragilis due to probable acute cholangitis as well as   Infiltrative 6.3cm intraabdominal mass in surgical bed possible underlying necrosis associated with stent procedure and stent malfunction s/p new drain  - Pt remains afebrile, Lactate benign  - + Blood culture--> Bacteroides fragilis; s/p Zosyn, will dc home on Augmentin BID x 6 more days   - Repeat BC x 2 are negative to date  - urine cx NGTD   - ID following    Superior mesenteric vein thrombosis   - Dr. Castro following-  full dose AC   - s/p Lovenox 40 x 1 dose 2/17/22   - 2/18 - started on heparin drip, H/H stable  - 2/23 started on Heparin gtt, no blleding noted, started on Eliquis 5 mg BID, to restart asa 81 mg tomorrow for CAD    YAMILETH with baseline SCr 0.7, multifactorial; Medication induced (ketorolac & Losartan) associated with   moderate Right hydronephrosis due to malignant obstruction   - s/p IV Hydration, s/p Right nephrostomy; Creatinine now back to baseline   - Hold NSAIDs and Losartan  - Check post void residual, Strict I&O  - Urology, Dr. Kennedy following; s/p right nephrostomy tube placement   - stop ASA, restart after 48 hours after nephrostomy placement  - flush forward Right nephrectomy 10ml NS daily    Iron deficiency anemia   - trend cbc,  Iron studies as above noted   - c/w iron daily     Hyperglycemia  A1c 5.2,  trend     CAD - hold aspirin until 2/25 am , c/w statin    Hypoalbuminemia 2/2 Protein calorie malnutrition  - nutrition consult,  advance diet as tolerated     HTN  - Losartan stopped 2/2 renal function  - continue BB  - Hydralazine PRN for SBP > 170    HLD  -continue statin    Dispo: discharge to home in stable condition    Final diagnosis, treatment plan, and follow-up recommendations were discussed and explained to the patient. The patient was given an opportunity to ask questions concerning the diagnosis and treatment plan. The patient acknowledged understanding of the diagnosis, treatment, and follow-up recommendations. The patient was advised to seek urgent care upon discharge if worsening symptoms develop prior to scheduled follow-up. Time spent on discharge included time with the patient, and also coordinating discharge care as outlined below.    Total time spent: 50 min

## 2022-02-24 NOTE — PROGRESS NOTE ADULT - ASSESSMENT
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 Now admitted on 2/16/22 for evaluation fever (Tmax 102.7), nauseas and increased pain at home, associated with inability to fully flush biliary drain. Of note, while in [place], approx two weeks ago, he presented to hospital with pain secondary to biliary obstruction and IR at the time was unable to place stent and so 8F biliary drain placed. Found here to have YAMLIETH and mild leukocytosis due to CT AP showing tumor recurrence, obstruction of the choledojejunostomy loop, and R hydro likely also due to malignant obstruction; s/p IR upsizing of biliary drain. Palliative consulted to help in management of symptoms.     1) Acute on chronic pain in the setting of malignancy   - Pain mostly visceral due to progression of underlying malignancy  - pt with chronic back pain, with an acute exacerbation due to billary drain obstruction.   - Prior to hospitalization pain was managed with Hydromorphone 1 mg PO PRN (took it approximately 3 times daily) , Tylenol and Gabapentin 100 mg PO every 8 hrs with well control of pain.   - 24 hr opioid need calculated and following regimen recommended: Fentanyl patch 12mcg (could use 25mcg but prefer low and slow approach) every 72 hrs and Hydromorphone 2 mg PO every four hrs PRN  - Medication options discussed with patient   - start fentanyl 12mcg patch today   - c/w Hydromorphone 2 mg PO every 4 hrs PRN severe pain   - c/w Hydromorphone 1 mg PO every 4 hrs PRN moderate pain   - c/w Hydromorphone 1 mg IV every 4 hrs PRN  for breakthrough pain (only after oral medication).   - Continue Tylenol PRN mild pain (primary team made this atc, which we agree with)  - ordered bowel regimen while in on opioids: Miralax daily   - will monitor and adjust accordingly    2) Biliary drain malfunction/obstruction   -s/p IR drainage with good output  -ID consult appreciated- repeat cx negative, pending transition to po regimen  - f/u blood and urine cultures  - surgical consult appreciated- they agreed with suggestion for IR adjustment of tube    3) Recurrent metastatic ampullary carcinoma   - Dx in Aug 2020   - s/p Whipple on Sep, 2020 at [place]  - Follow up with Dr. Thomas at [facility] [place]   - CT abdomen showed tumor recurrence and obstruction of the choledojejunostomy loop and also R hydro.   - Currently on chemotherapy, last one two weeks PTA   - Pt has appointment with Dr Thomas to discuss treatment options   - Pt was evaluated by Dr Castro and per patient was recommended to discuss treatment options (or lack thereof) with Dr Thomas     4) R hydro  - due to malignant obstruction  - urology on board, s/p nephrostomy tube placement    5) Debility/#Protein calorie malnutrition  - PPSV 50-60%  - care coordinator note appreciated- lives with wife, would like a cane, open to home care  - Recommend PT consult   - recommend dietary evaluation, considering shared history of significant weight loss and evidence of muscle/fat wasting on exam  -Albumin 2.3     6) Prognosis   - Likely poor  - PPSV2 approaching 40 %, which would be the point functionally where hospice should be considered  - Pt with an advanced metastatic and recurrent ampullary carcinoma that has progressed despite optimized disease-focused treatment  - Albumin 2.3 (<2.5 purports a higher likelihood of mortality when combined with significant advanced illness)  - Due to above criteria patient will benefit of hospice only if there is no more additional treatment offered by hemato-oncology or when pt feels treatment is no longer supporting optimal QOL     7) GO/Advance directives   - pt able to demonstrate capacity for decision making  - HCP is wife Autumn Nelson  ([phone])   - Pt have a living will where he stipulated is DNR/DNI but would like to further discuss it with wife--> MOLST reviewed   - [facility] discussed with pt and now wife would like to be a part of this tiday at 11am. See [facility] note that will follow    Thank you for including us in Mr. Nelson's care. Will continue to follow with you.   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 Now admitted on 2/16/22 for evaluation fever (Tmax 102.7), nauseas and increased pain at home, associated with inability to fully flush biliary drain. Of note, while in [place], approx two weeks ago, he presented to hospital with pain secondary to biliary obstruction and IR at the time was unable to place stent and so 8F biliary drain placed. Found here to have YAMILETH and mild leukocytosis due to CT AP showing tumor recurrence, obstruction of the choledojejunostomy loop, and R hydro likely also due to malignant obstruction; s/p IR upsizing of biliary drain. Palliative consulted to help in management of symptoms.     1) Acute on chronic pain in the setting of malignancy   - Pain mostly visceral due to progression of underlying malignancy  - pt with chronic back pain, with an acute exacerbation due to billary drain obstruction.   - Prior to hospitalization pain was managed with Hydromorphone 1 mg PO PRN (took it approximately 3 times daily) , Tylenol and Gabapentin 100 mg PO every 8 hrs with well control of pain.   - 24 hr opioid need calculated and following regimen recommended: Fentanyl patch 12mcg (could use 25mcg but prefer low and slow approach) every 72 hrs and Hydromorphone 2 mg PO every four hrs PRN  - Pt continues with  pain in multiple locations in current medication regimen   - Medication options discussed with patient  and patient  agreed to continue fentanyl patch and to increase hydromorphone PO doses for better management of pain   - c/w fentanyl 12mcg patch , placed on 2/23/22  - Start Hydromorphone 2 mg PO every 4 hrs PRN moderate pain   - Start Hydromorphone 4 mg mg PO every 4 hrs PRN severe pain   - Continue Tylenol PRN mild pain (primary team made this atc, which we agree with)  - Continue bowel regimen while in on opioids: Miralax daily   - Will monitor and adjust accordingly    2) Biliary drain malfunction/obstruction   -s/p IR drainage with good output  -ID consult appreciated  - Repeat cx negative  -On Zosyn IV, pending transition to po regimen  -surgical consult appreciated    3) Recurrent metastatic ampullary carcinoma   - Dx in Aug 2020   - [place] on Sep, 2020 at [place]  [place] [facility] [place] abdomen showed tumor recurrence and obstruction of the choledojejunostomy loop and also R hydro.   - Currently on chemotherapy, last one two weeks PTA   - Pt has appointment with Dr Thomas to discuss treatment options   - Pt was evaluated by Dr Castro and per patient was recommended to discuss treatment options (or lack thereof) with Dr Thomas     4) R hydro  - due to malignant obstruction  - urology on board, s/p nephrostomy tube placement on 2/22/22    5) Debility/Protein calorie malnutrition  - PPSV 50-60%  - care coordinator note appreciated- lives with wife, would like a cane, open to home care  - Recommend PT consult   - recommend dietary evaluation, considering shared history of significant weight loss and evidence of muscle/fat wasting on exam  -Albumin 2.3     6) Prognosis   - Likely poor  - PPSV2 approaching 40 %, which would be the point functionally where hospice should be considered  - Pt with an advanced metastatic and recurrent ampullary carcinoma that has progressed despite optimized disease-focused treatment  - Albumin 2.3 (<2.5 purports a higher likelihood of mortality when combined with significant advanced illness)  - Due to above criteria patient will benefit of hospice only if there is no more additional treatment offered by hemato-oncology or when pt feels treatment is no longer supporting optimal QOL     7) GOC/Advance directives   - pt able to demonstrate capacity for decision making  - HCP is wife Autumn Nelson  ([phone])   - [facility]OC discussed with pt and now wife on 2/23/22 decided pt to be DNR/DNI. See GOC note  - Pt agree to be discharge on palliative care services     Thank you for including us in Mr. Nelson's care. Will continue to follow with you.   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 Now admitted on 2/16/22 for evaluation fever (Tmax 102.7), nauseas and increased pain at home, associated with inability to fully flush biliary drain. Of note, while in [place], approx two weeks ago, he presented to hospital with pain secondary to biliary obstruction and IR at the time was unable to place stent and so 8F biliary drain placed. Found here to have YAMILETH and mild leukocytosis due to CT AP showing tumor recurrence, obstruction of the choledojejunostomy loop, and R hydro likely also due to malignant obstruction; s/p IR upsizing of biliary drain. Palliative consulted to help in management of symptoms.     1) Acute on chronic pain in the setting of malignancy   - Pain mostly visceral due to progression of underlying malignancy  - pt with chronic back pain, with an acute exacerbation due to billary drain obstruction.   - Prior to hospitalization pain was managed with Hydromorphone 1 mg PO PRN (took it approximately 3 times daily) , Tylenol and Gabapentin 100 mg PO every 8 hrs with well control of pain.   - 24 hr opioid need calculated and following regimen recommended: Fentanyl patch 12mcg (could use 25mcg but prefer low and slow approach) every 72 hrs and Hydromorphone 2 mg PO every four hrs PRN  - Pt continues with  pain in multiple locations in current medication regimen   - Medication options discussed with patient  and patient  agreed to continue fentanyl patch and to increase hydromorphone PO doses for better management of pain   - c/w fentanyl 12mcg patch , placed on 2/23/22  - Start Hydromorphone 2 mg PO every 4 hrs PRN moderate pain   - Start Hydromorphone 4 mg mg PO every 4 hrs PRN severe pain   - Continue Tylenol PRN mild pain (primary team made this atc, which we agree with)  - Continue bowel regimen while in on opioids: Miralax daily   - Will monitor and adjust accordingly    2) Biliary drain malfunction/obstruction   -s/p IR drainage with good output  -ID consult appreciated  - Repeat cx negative  -On Zosyn IV, pending transition to po regimen  -surgical consult appreciated    3) Recurrent metastatic ampullary carcinoma   - Dx in Aug 2020   - s/p Whipple on Sep, 2020 at [place]  - Follow up with Dr. Thomas at [facility] [place]   - CT abdomen showed tumor recurrence and obstruction of the choledojejunostomy loop and also R hydro.   - Currently on chemotherapy, last one two weeks PTA   - Pt has appointment with Dr Thomas to discuss treatment options   - Pt was evaluated by Dr Castro and per patient was recommended to discuss treatment options (or lack thereof) with Dr Thomas     4) R hydro  - due to malignant obstruction  - urology on board, s/p nephrostomy tube placement on 2/22/22    5) Debility/Protein calorie malnutrition  - PPSV 50-60%  - care coordinator note appreciated- lives with wife, would like a cane, open to home care  - Recommend PT consult   - recommend dietary evaluation, considering shared history of significant weight loss and evidence of muscle/fat wasting on exam  -Albumin 2.3     6) Prognosis   - Likely poor  - PPSV2 approaching 40 %, which would be the point functionally where hospice should be considered  - Pt with an advanced metastatic and recurrent ampullary carcinoma that has progressed despite optimized disease-focused treatment  - Albumin 2.3 (<2.5 purports a higher likelihood of mortality when combined with significant advanced illness)  - Due to above criteria patient will benefit of hospice only if there is no more additional treatment offered by hemato-oncology or when pt feels treatment is no longer supporting optimal QOL     7) GOC/Advance directives   - pt able to demonstrate capacity for decision making  - HCP is wife Autumn Nelson  ([phone])   - [facility] discussed with pt and now wife on 2/23/22 decided pt to be DNR/DNI. See GOC note 2/23  - Pt agree to be discharge on palliative care services     Thank you for including us in Mr. Nelson's care. Will continue to follow with you.

## 2022-02-24 NOTE — DISCHARGE NOTE PROVIDER - NSDCMRMEDTOKEN_GEN_ALL_CORE_FT
acetaminophen 325 mg oral tablet: 2 tab(s) orally 3 times a day   amoxicillin-clavulanate 875 mg-125 mg oral tablet: 1 tab(s) orally 2 times a day   Aspirin Enteric Coated 81 mg oral delayed release tablet: 1 tab(s) orally once a day (in the morning)  bisacodyl 5 mg oral delayed release tablet: 1 tab(s) orally every 12 hours  cholecalciferol 50 mcg (2000 intl units) oral tablet: 1 tab(s) orally once a day  Eliquis 5 mg oral tablet: 1 tab(s) orally 2 times a day   fentaNYL 12 mcg/hr transdermal film, extended release: 1 patch transdermal every 72 hours MDD:12  ferrous sulfate 325 mg (65 mg elemental iron) oral tablet: 1 tab(s) orally once a day  gabapentin 100 mg oral capsule: 1 cap(s) orally every 8 hours  HYDROmorphone 2 mg oral tablet: 1 tab(s) orally every 4 hours, As needed, Severe Pain (7 - 10) MDD:12  Melatonin 10 mg oral capsule: 1-2 cap(s) orally once a day (at bedtime)  metoprolol succinate 25 mg oral tablet, extended release: 0.5 tab(s) orally once a day (at bedtime)  Multiple Vitamins oral tablet: 1 tab(s) orally once a day  omeprazole 20 mg oral delayed release capsule: 1 cap(s) orally once a day (in the morning)  ondansetron 8 mg oral tablet: 1 tab(s) orally 3 times a day, As Needed - for nausea  polyethylene glycol 3350 oral powder for reconstitution: 17 gram(s) orally once a day  rosuvastatin 5 mg oral tablet: 1 tab(s) orally once a day (in the evening)  Zenpep 40,000 units-126,000 units-168,000 units oral delayed release capsule: 1 cap(s) orally with snacks  Zenpep 40,000 units-126,000 units-168,000 units oral delayed release capsule: 2 cap(s) orally 3 times a day (with meals)   acetaminophen 325 mg oral tablet: 2 tab(s) orally 3 times a day   amoxicillin-clavulanate 875 mg-125 mg oral tablet: 1 tab(s) orally 2 times a day   Aspirin Enteric Coated 81 mg oral delayed release tablet: 1 tab(s) orally once a day (in the morning)  bisacodyl 5 mg oral delayed release tablet: 1 tab(s) orally every 12 hours  cholecalciferol 50 mcg (2000 intl units) oral tablet: 1 tab(s) orally once a day  Eliquis 5 mg oral tablet: 1 tab(s) orally 2 times a day   fentaNYL 12 mcg/hr transdermal film, extended release: 1 patch transdermal every 72 hours MDD:12  ferrous sulfate 325 mg (65 mg elemental iron) oral tablet: 1 tab(s) orally once a day  gabapentin 100 mg oral capsule: 1 cap(s) orally every 8 hours  HYDROmorphone 2 mg oral tablet: 1 tab(s) orally every 4 hours, As needed, Severe Pain (7 - 10) MDD:12  HYDROmorphone 4 mg oral tablet: 1 tab(s) orally every 6 hours, As Needed -Severe Pain (7 - 10) MDD:24  Melatonin 10 mg oral capsule: 1-2 cap(s) orally once a day (at bedtime)  metoprolol succinate 25 mg oral tablet, extended release: 0.5 tab(s) orally once a day (at bedtime)  Multiple Vitamins oral tablet: 1 tab(s) orally once a day  omeprazole 20 mg oral delayed release capsule: 1 cap(s) orally once a day (in the morning)  ondansetron 8 mg oral tablet: 1 tab(s) orally 3 times a day, As Needed - for nausea  polyethylene glycol 3350 oral powder for reconstitution: 17 gram(s) orally once a day  rosuvastatin 5 mg oral tablet: 1 tab(s) orally once a day (in the evening)  Zenpep 40,000 units-126,000 units-168,000 units oral delayed release capsule: 1 cap(s) orally with snacks  Zenpep 40,000 units-126,000 units-168,000 units oral delayed release capsule: 2 cap(s) orally 3 times a day (with meals)

## 2022-02-24 NOTE — DISCHARGE NOTE PROVIDER - CARE PROVIDER_API CALL
Ariel Amaro  [facility]  [address] [place] [address]  Phone: [phone]  Fax: [phone]  Follow Up Time: 1 week    Vadim Kennedy)  Urology  [address], [address] [place] [address]  Phone: [phone]  Fax: [phone]  Follow Up Time: 1 week    Jose Thomas  [facility]  [address] [place] [address]  Phone: [phone]  Fax: (   )    -  Established Patient  Follow Up Time: 1 week

## 2022-02-24 NOTE — DISCHARGE NOTE PROVIDER - PROVIDER TOKENS
PROVIDER:[TOKEN:[[id number]],FOLLOWUP:[1 week]],PROVIDER:[TOKEN:[[id number]],FOLLOWUP:[1 week]],FREE:[LAST:[Thomas],FIRST:[Jose],PHONE:[phone]],FAX:[(   )    -],ADDRESS:[[place] [address]],FOLLOWUP:[1 week],ESTABLISHEDPATIENT:[T]]

## 2022-02-28 DIAGNOSIS — D64.81 ANEMIA DUE TO ANTINEOPLASTIC CHEMOTHERAPY: ICD-10-CM

## 2022-02-28 DIAGNOSIS — B35.1 TINEA UNGUIUM: ICD-10-CM

## 2022-02-28 DIAGNOSIS — E86.0 DEHYDRATION: ICD-10-CM

## 2022-02-28 DIAGNOSIS — A41.4 SEPSIS DUE TO ANAEROBES: ICD-10-CM

## 2022-02-28 DIAGNOSIS — K55.069 ACUTE INFARCTION OF INTESTINE, PART AND EXTENT UNSPECIFIED: ICD-10-CM

## 2022-02-28 DIAGNOSIS — N18.30 CHRONIC KIDNEY DISEASE, STAGE 3 UNSPECIFIED: ICD-10-CM

## 2022-02-28 DIAGNOSIS — T85.590A OTHER MECHANICAL COMPLICATION OF BILE DUCT PROSTHESIS, INITIAL ENCOUNTER: ICD-10-CM

## 2022-02-28 DIAGNOSIS — Z85.07 PERSONAL HISTORY OF MALIGNANT NEOPLASM OF PANCREAS: ICD-10-CM

## 2022-02-28 DIAGNOSIS — E78.5 HYPERLIPIDEMIA, UNSPECIFIED: ICD-10-CM

## 2022-02-28 DIAGNOSIS — D63.0 ANEMIA IN NEOPLASTIC DISEASE: ICD-10-CM

## 2022-02-28 DIAGNOSIS — N13.6 PYONEPHROSIS: ICD-10-CM

## 2022-02-28 DIAGNOSIS — R73.9 HYPERGLYCEMIA, UNSPECIFIED: ICD-10-CM

## 2022-02-28 DIAGNOSIS — K83.09 OTHER CHOLANGITIS: ICD-10-CM

## 2022-02-28 DIAGNOSIS — E43 UNSPECIFIED SEVERE PROTEIN-CALORIE MALNUTRITION: ICD-10-CM

## 2022-02-28 DIAGNOSIS — N17.9 ACUTE KIDNEY FAILURE, UNSPECIFIED: ICD-10-CM
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
## 2022-02-28 DIAGNOSIS — T81.44XA SEPSIS FOLLOWING A PROCEDURE, INITIAL ENCOUNTER: ICD-10-CM

## 2022-02-28 DIAGNOSIS — D50.9 IRON DEFICIENCY ANEMIA, UNSPECIFIED: ICD-10-CM

## 2022-02-28 DIAGNOSIS — C24.1 MALIGNANT NEOPLASM OF AMPULLA OF VATER: ICD-10-CM

## 2022-02-28 DIAGNOSIS — I12.9 HYPERTENSIVE CHRONIC KIDNEY DISEASE WITH STAGE 1 THROUGH STAGE 4 CHRONIC KIDNEY DISEASE, OR UNSPECIFIED CHRONIC KIDNEY DISEASE: ICD-10-CM

## 2022-08-10 NOTE — DISCHARGE NOTE NURSING/CASE MANAGEMENT/SOCIAL WORK - NSDCPEELIQUISREACT_GEN_ALL_CORE
Apixaban/Eliquis increases your risk for bleeding. Notify your doctor if you experience any of the following side effects: bleeding, coughing or vomiting blood, red or black stool, unexpected pain or swelling, itching or hives, chest pain, chest tightness, trouble breathing, changes in how much or how often you urinate, red or pink urine, numbness or tingling in your feet, or unusual muscle weakness. When Apixaban/Eliquis is taken with other medicines, they can affect how it works. Taking other medications such as aspirin, blood thinners, nonsteroidal anti-inflammatories, and medications that treat depression can increase your risk of bleeding. It is very important to tell your health care provider about all of the other medicines, including over-the-counter medications, herbs, and vitamins you are taking. DO NOT start, stop, or change the dosage of any medicine, including over-the-counter medicines, vitamins, and herbal products without your doctor’s approval. Any products containing aspirin or are nonsteroidal anti-inflammatories lessen the blood’s ability to form clots and add to the effect of Apixaban/Eliquis. Never take aspirin or medicines that contain aspirin without speaking to your doctor. Minoxidil Counseling: Minoxidil is a topical medication which can increase blood flow where it is applied. It is uncertain how this medication increases hair growth. Side effects are uncommon and include stinging and allergic reactions.

## 2022-09-16 NOTE — ED ADULT NURSE NOTE - PHONE #
Request received for a medication refill. Medication(s) set up as pending orders from medication list.    Caller has been advised that their call does not guarantee an immediate refill. This refill will be reviewed within 72 hours by a qualified provider who will determine whether the patient can refill the medication.    Patient has contacted the pharmacy- No    Patient advised will receive a call back- No    Additional information:?divalproex (DEPAKOTE ER) 250 MG 24 hr ER tablet    divalproex (DEPAKOTE ER) 500 MG 24 hr ER tablet    Patient is completely out of medication(s)- No    Patient’s preferred pharmacy has been noted and populated- Yes, [facility]     Patient stated they wanted to change Pharmacy where prescription goes to this one.         [phone]

## 2022-12-19 NOTE — ED PROVIDER NOTE - NS_ ATTENDINGSCRIBEDETAILS _ED_A_ED_FT
Last ov 8/4 for px  Per notes: \"See in 6 months\"    Please approve if appropriate. Thanks.
I, Denny Roman MD,  performed the initial face to face bedside interview with this patient regarding history of present illness, review of symptoms and relevant past medical, social and family history.  I completed an independent physical examination.    The history, relevant review of systems, past medical and surgical history, medical decision making, and physical examination was documented by the scribe in my presence and I attest to the accuracy of the documentation.

## 2022-12-31 NOTE — PATIENT PROFILE ADULT - NSPROPOAPRESSUREINJURY_GEN_A_NUR
Attending Chidi: The scribe's documentation has been prepared under my direction and personally reviewed by me in its entirety. I confirm that the note above accurately reflects all work, treatment, procedures, and medical decision making performed by me.
no

## 2023-05-06 NOTE — PATIENT PROFILE ADULT - DO YOU FEEL LIKE HURTING YOURSELF OR OTHERS?
Reviewed discharge instructions with patient regarding self care and home medications.  Patient verbalized understanding and all questions answered.   no

## 2023-06-30 NOTE — DISCHARGE NOTE NURSING/CASE MANAGEMENT/SOCIAL WORK - NSSCCARECORD_GEN_ALL_CORE
· Transitioned to comfort care 6/29  · Continue dilaudid gtt  · Prn dilaudid, haldol, ativan   · Hospice consult today Home Care Agency/Community Resource

## 2023-08-25 NOTE — ED PROVIDER NOTE - NS_BEDUNITTYPES_ED_ALL_ED
[Normal] : normal gait, coordination grossly intact, no focal deficits and deep tendon reflexes were 2+ and symmetric MED/SURG

## 2024-02-28 NOTE — PATIENT PROFILE ADULT - NSPRESCRUSEDDRG_GEN_A_NUR
You were seen in the ER today for a fever     Your COVID/Flu/RSV was negative     Your chest xray was normal     Your CT scan was normal     You do not have a urine infection     Your mono testing is still pending     Blood cultures were drawn and are pending- if these are positive, you will receive a phone call and will need to come to the ER to be admitted to the hospital     I spoke with your primary care doctors who will see you in clinic in 1-2 days     As we discussed you did have some lab abnormalities including elevation in liver enzymes and some of your white blood cell counts. This can occur after a viral infection but can also occur with bacterial infections so it is very important that if you start to feel worse that you come back to the ER. Your primary care doctor will repeat your blood work on an outpatient basis once your symptoms improve.  
No

## 2024-10-15 NOTE — ED ADULT NURSE NOTE - OBJECTIVE STATEMENT
pt is 70 yo male sent in by md for fever and low WBC, pt states he is currently on chemo, last chemo was 1 week, ago, weakness started 3-4 days ago, +mild fever, last dose of advil this morning for low back pain.  +port noted over the right ACW, accessed by NP at [place] at 1pm today for antibiotics, as per family, 2 sets of blood cultures were drawn PTA, 1 from the port and 1 from right arm. No

## 2025-01-14 NOTE — ED PROVIDER NOTE - PRESENTATION SUGGESTIVE OF:
Patient Returning Call    Reason for call:  Patient is inquiring about if an A1C was drawn yesterday as well as her other labs as this is usually completed during that visit.      Information relayed to patient:      Patient has additional questions:        Could we send this information to you in [facility] or would you prefer to receive a phone call?:   Patient would prefer a phone call   Okay to leave a detailed message?: Yes at Home number on file [phone] (home)   Bacterial Etiology

## 2025-02-19 NOTE — PROGRESS NOTE ADULT - PA/NP ONLY VISIT
Patient has Abnormal Magnesium: hypomagnesemia. Will continue to monitor electrolytes closely. Will replace the affected electrolytes and repeat labs to be done after interventions completed. The patient's magnesium results have been reviewed and are listed below.  Recent Labs   Lab 02/19/25  0541   MG 1.5*      
ACP only visit

## 2025-05-15 NOTE — ASU PATIENT PROFILE, ADULT - TEACHING/LEARNING LEARNING PREFERENCES
Cysto was cancelled by call center on 5/14 and NO response to this message , so sam dthe patient back and explained the message from Dr. Poon- we will monitor for UA to be completed and call pt back with appt   written material/verbal instruction

## 2025-06-04 NOTE — ASU PATIENT PROFILE, ADULT - PMH
At goal  Continue current meds        CAD (coronary artery disease)  last stress test Fall 2018  GERD (gastroesophageal reflux disease)    Hearing loss  right  HTN (hypertension)    Hyperlipidemia    Inguinal hernia  right  OA (osteoarthritis)    Psoriasis    Skin cancer  squamous cell - head, shin - removed  Stented coronary artery  2006 PTCA x 1 Brain aneurysm  non ruptured - had stent placed in 2010 after attemtped clipping and coiling in 2007  CAD (coronary artery disease)  last stress test Fall 2018  GERD (gastroesophageal reflux disease)    Hearing loss  right  HTN (hypertension)    Hyperlipidemia    Inguinal hernia  right  OA (osteoarthritis)    Psoriasis    Skin cancer  squamous cell - head, shin - removed  Stented coronary artery  2006 PTCA x 1